# Patient Record
Sex: FEMALE | Race: OTHER | Employment: OTHER | URBAN - METROPOLITAN AREA
[De-identification: names, ages, dates, MRNs, and addresses within clinical notes are randomized per-mention and may not be internally consistent; named-entity substitution may affect disease eponyms.]

---

## 2017-08-17 ENCOUNTER — HOSPITAL ENCOUNTER (INPATIENT)
Age: 82
LOS: 1 days | Discharge: HOME | DRG: 392 | End: 2017-08-19
Attending: EMERGENCY MEDICINE | Admitting: INTERNAL MEDICINE
Payer: MEDICARE

## 2017-08-17 DIAGNOSIS — K57.92 ACUTE DIVERTICULITIS: Primary | ICD-10-CM

## 2017-08-17 PROCEDURE — P9612 CATHETERIZE FOR URINE SPEC: HCPCS

## 2017-08-17 PROCEDURE — 99285 EMERGENCY DEPT VISIT HI MDM: CPT

## 2017-08-17 RX ORDER — LOSARTAN POTASSIUM 50 MG/1
50 TABLET ORAL DAILY
Status: ON HOLD | COMMUNITY
End: 2020-10-20 | Stop reason: HOSPADM

## 2017-08-17 RX ORDER — ONDANSETRON 2 MG/ML
4 INJECTION INTRAMUSCULAR; INTRAVENOUS ONCE
Status: COMPLETED | OUTPATIENT
Start: 2017-08-17 | End: 2017-08-18

## 2017-08-17 RX ORDER — HYDROCHLOROTHIAZIDE 25 MG/1
25 TABLET ORAL DAILY
Status: ON HOLD | COMMUNITY
End: 2020-10-20

## 2017-08-17 RX ORDER — METOPROLOL SUCCINATE 50 MG/1
50 TABLET, EXTENDED RELEASE ORAL DAILY
Status: ON HOLD | COMMUNITY
End: 2021-01-01 | Stop reason: HOSPADM

## 2017-08-17 RX ORDER — LAMOTRIGINE 100 MG/1
100 TABLET ORAL 2 TIMES DAILY
COMMUNITY

## 2017-08-17 RX ORDER — SODIUM CHLORIDE 0.9 % (FLUSH) 0.9 %
3 SYRINGE (ML) INJECTION EVERY 8 HOURS
Status: DISCONTINUED | OUTPATIENT
Start: 2017-08-17 | End: 2017-08-18

## 2017-08-17 RX ORDER — ISOSORBIDE MONONITRATE 30 MG/1
30 TABLET, EXTENDED RELEASE ORAL DAILY
Status: ON HOLD | COMMUNITY
End: 2020-10-20

## 2017-08-17 RX ORDER — ROSUVASTATIN CALCIUM 10 MG/1
20 TABLET, COATED ORAL DAILY
COMMUNITY
End: 2022-01-01

## 2017-08-17 RX ORDER — CLOPIDOGREL BISULFATE 75 MG/1
75 TABLET ORAL DAILY
Status: ON HOLD | COMMUNITY
End: 2020-10-20

## 2017-08-17 RX ORDER — 0.9 % SODIUM CHLORIDE 0.9 %
500 INTRAVENOUS SOLUTION INTRAVENOUS ONCE
Status: COMPLETED | OUTPATIENT
Start: 2017-08-17 | End: 2017-08-18

## 2017-08-17 ASSESSMENT — PAIN SCALES - GENERAL: PAINLEVEL_OUTOF10: 9

## 2017-08-17 ASSESSMENT — PAIN DESCRIPTION - PAIN TYPE: TYPE: ACUTE PAIN

## 2017-08-17 ASSESSMENT — PAIN DESCRIPTION - LOCATION: LOCATION: ABDOMEN

## 2017-08-17 ASSESSMENT — PAIN DESCRIPTION - DESCRIPTORS: DESCRIPTORS: BURNING;SHARP

## 2017-08-17 ASSESSMENT — PAIN DESCRIPTION - ORIENTATION: ORIENTATION: MID

## 2017-08-18 ENCOUNTER — APPOINTMENT (OUTPATIENT)
Dept: GENERAL RADIOLOGY | Age: 82
DRG: 392 | End: 2017-08-18
Payer: MEDICARE

## 2017-08-18 ENCOUNTER — APPOINTMENT (OUTPATIENT)
Dept: CT IMAGING | Age: 82
DRG: 392 | End: 2017-08-18
Payer: MEDICARE

## 2017-08-18 PROBLEM — K57.32 DIVERTICULITIS OF LARGE INTESTINE: Status: ACTIVE | Noted: 2017-08-18

## 2017-08-18 LAB
ALBUMIN SERPL-MCNC: 4.3 G/DL (ref 3.9–4.9)
ALP BLD-CCNC: 71 U/L (ref 40–130)
ALT SERPL-CCNC: 11 U/L (ref 0–33)
AMYLASE: 104 U/L (ref 28–100)
ANION GAP SERPL CALCULATED.3IONS-SCNC: 16 MEQ/L (ref 7–13)
AST SERPL-CCNC: 16 U/L (ref 0–35)
BACTERIA: ABNORMAL /HPF
BASOPHILS ABSOLUTE: 0 K/UL (ref 0–0.2)
BASOPHILS RELATIVE PERCENT: 0.2 %
BILIRUB SERPL-MCNC: 0.4 MG/DL (ref 0–1.2)
BILIRUBIN URINE: ABNORMAL
BLOOD, URINE: NEGATIVE
BUN BLDV-MCNC: 26 MG/DL (ref 8–23)
CALCIUM SERPL-MCNC: 10.1 MG/DL (ref 8.6–10.2)
CHLORIDE BLD-SCNC: 97 MEQ/L (ref 98–107)
CLARITY: CLEAR
CO2: 23 MEQ/L (ref 22–29)
COLOR: ABNORMAL
CREAT SERPL-MCNC: 1.09 MG/DL (ref 0.5–0.9)
EOSINOPHILS ABSOLUTE: 0 K/UL (ref 0–0.7)
EOSINOPHILS RELATIVE PERCENT: 0.2 %
EPITHELIAL CELLS, UA: ABNORMAL /HPF
GFR AFRICAN AMERICAN: 58
GFR NON-AFRICAN AMERICAN: 47.9
GLOBULIN: 2.8 G/DL (ref 2.3–3.5)
GLUCOSE BLD-MCNC: 161 MG/DL (ref 74–109)
GLUCOSE URINE: NEGATIVE MG/DL
HCT VFR BLD CALC: 40.4 % (ref 37–47)
HEMOGLOBIN: 13 G/DL (ref 12–16)
KETONES, URINE: ABNORMAL MG/DL
LEUKOCYTE ESTERASE, URINE: ABNORMAL
LIPASE: 17 U/L (ref 13–60)
LYMPHOCYTES ABSOLUTE: 1.2 K/UL (ref 1–4.8)
LYMPHOCYTES RELATIVE PERCENT: 8.3 %
MCH RBC QN AUTO: 29.4 PG (ref 27–31.3)
MCHC RBC AUTO-ENTMCNC: 32.1 % (ref 33–37)
MCV RBC AUTO: 91.3 FL (ref 82–100)
MONOCYTES ABSOLUTE: 0.9 K/UL (ref 0.2–0.8)
MONOCYTES RELATIVE PERCENT: 6.5 %
NEUTROPHILS ABSOLUTE: 11.9 K/UL (ref 1.4–6.5)
NEUTROPHILS RELATIVE PERCENT: 84.8 %
NITRITE, URINE: NEGATIVE
PDW BLD-RTO: 13.9 % (ref 11.5–14.5)
PH UA: 6.5 (ref 5–9)
PLATELET # BLD: 274 K/UL (ref 130–400)
POTASSIUM SERPL-SCNC: 3.7 MEQ/L (ref 3.5–5.1)
PROTEIN UA: NEGATIVE MG/DL
RBC # BLD: 4.42 M/UL (ref 4.2–5.4)
RBC UA: ABNORMAL /HPF (ref 0–2)
SODIUM BLD-SCNC: 136 MEQ/L (ref 132–144)
SPECIFIC GRAVITY UA: 1.02 (ref 1–1.03)
TOTAL PROTEIN: 7.1 G/DL (ref 6.4–8.1)
TROPONIN: <0.01 NG/ML (ref 0–0.01)
URINE REFLEX TO CULTURE: YES
UROBILINOGEN, URINE: 1 E.U./DL
WBC # BLD: 14 K/UL (ref 4.8–10.8)
WBC UA: ABNORMAL /HPF (ref 0–5)

## 2017-08-18 PROCEDURE — 97161 PT EVAL LOW COMPLEX 20 MIN: CPT

## 2017-08-18 PROCEDURE — 82150 ASSAY OF AMYLASE: CPT

## 2017-08-18 PROCEDURE — G0378 HOSPITAL OBSERVATION PER HR: HCPCS

## 2017-08-18 PROCEDURE — G8987 SELF CARE CURRENT STATUS: HCPCS

## 2017-08-18 PROCEDURE — 2580000003 HC RX 258: Performed by: EMERGENCY MEDICINE

## 2017-08-18 PROCEDURE — 85025 COMPLETE CBC W/AUTO DIFF WBC: CPT

## 2017-08-18 PROCEDURE — 71010 XR CHEST PORTABLE: CPT

## 2017-08-18 PROCEDURE — 97166 OT EVAL MOD COMPLEX 45 MIN: CPT

## 2017-08-18 PROCEDURE — G8978 MOBILITY CURRENT STATUS: HCPCS

## 2017-08-18 PROCEDURE — 87086 URINE CULTURE/COLONY COUNT: CPT

## 2017-08-18 PROCEDURE — G8979 MOBILITY GOAL STATUS: HCPCS

## 2017-08-18 PROCEDURE — G8980 MOBILITY D/C STATUS: HCPCS

## 2017-08-18 PROCEDURE — 1210000000 HC MED SURG R&B

## 2017-08-18 PROCEDURE — G8988 SELF CARE GOAL STATUS: HCPCS

## 2017-08-18 PROCEDURE — 74176 CT ABD & PELVIS W/O CONTRAST: CPT

## 2017-08-18 PROCEDURE — 81001 URINALYSIS AUTO W/SCOPE: CPT

## 2017-08-18 PROCEDURE — 83690 ASSAY OF LIPASE: CPT

## 2017-08-18 PROCEDURE — 6360000002 HC RX W HCPCS: Performed by: EMERGENCY MEDICINE

## 2017-08-18 PROCEDURE — 6360000002 HC RX W HCPCS: Performed by: INTERNAL MEDICINE

## 2017-08-18 PROCEDURE — 96374 THER/PROPH/DIAG INJ IV PUSH: CPT

## 2017-08-18 PROCEDURE — 2580000003 HC RX 258: Performed by: INTERNAL MEDICINE

## 2017-08-18 PROCEDURE — 96375 TX/PRO/DX INJ NEW DRUG ADDON: CPT

## 2017-08-18 PROCEDURE — 6370000000 HC RX 637 (ALT 250 FOR IP): Performed by: INTERNAL MEDICINE

## 2017-08-18 PROCEDURE — 36415 COLL VENOUS BLD VENIPUNCTURE: CPT

## 2017-08-18 PROCEDURE — 93005 ELECTROCARDIOGRAM TRACING: CPT

## 2017-08-18 PROCEDURE — 80053 COMPREHEN METABOLIC PANEL: CPT

## 2017-08-18 PROCEDURE — 84484 ASSAY OF TROPONIN QUANT: CPT

## 2017-08-18 PROCEDURE — 2500000003 HC RX 250 WO HCPCS: Performed by: INTERNAL MEDICINE

## 2017-08-18 RX ORDER — METOPROLOL SUCCINATE 50 MG/1
50 TABLET, EXTENDED RELEASE ORAL DAILY
Status: DISCONTINUED | OUTPATIENT
Start: 2017-08-18 | End: 2017-08-19 | Stop reason: HOSPADM

## 2017-08-18 RX ORDER — CLOPIDOGREL BISULFATE 75 MG/1
75 TABLET ORAL DAILY
Status: DISCONTINUED | OUTPATIENT
Start: 2017-08-18 | End: 2017-08-19 | Stop reason: HOSPADM

## 2017-08-18 RX ORDER — ONDANSETRON 2 MG/ML
4 INJECTION INTRAMUSCULAR; INTRAVENOUS EVERY 6 HOURS PRN
Status: DISCONTINUED | OUTPATIENT
Start: 2017-08-18 | End: 2017-08-19 | Stop reason: HOSPADM

## 2017-08-18 RX ORDER — LOSARTAN POTASSIUM 50 MG/1
50 TABLET ORAL DAILY
Status: DISCONTINUED | OUTPATIENT
Start: 2017-08-18 | End: 2017-08-19 | Stop reason: HOSPADM

## 2017-08-18 RX ORDER — SODIUM CHLORIDE 9 MG/ML
INJECTION, SOLUTION INTRAVENOUS CONTINUOUS
Status: DISCONTINUED | OUTPATIENT
Start: 2017-08-18 | End: 2017-08-19

## 2017-08-18 RX ORDER — MORPHINE SULFATE 2 MG/ML
2 INJECTION, SOLUTION INTRAMUSCULAR; INTRAVENOUS
Status: DISCONTINUED | OUTPATIENT
Start: 2017-08-18 | End: 2017-08-19 | Stop reason: HOSPADM

## 2017-08-18 RX ORDER — ATORVASTATIN CALCIUM 20 MG/1
20 TABLET, FILM COATED ORAL NIGHTLY
Status: DISCONTINUED | OUTPATIENT
Start: 2017-08-18 | End: 2017-08-19 | Stop reason: HOSPADM

## 2017-08-18 RX ORDER — LAMOTRIGINE 200 MG/1
200 TABLET ORAL 2 TIMES DAILY
Status: DISCONTINUED | OUTPATIENT
Start: 2017-08-18 | End: 2017-08-19 | Stop reason: HOSPADM

## 2017-08-18 RX ORDER — CIPROFLOXACIN 2 MG/ML
400 INJECTION, SOLUTION INTRAVENOUS ONCE
Status: DISCONTINUED | OUTPATIENT
Start: 2017-08-18 | End: 2017-08-18

## 2017-08-18 RX ORDER — CIPROFLOXACIN 2 MG/ML
400 INJECTION, SOLUTION INTRAVENOUS EVERY 24 HOURS
Status: DISCONTINUED | OUTPATIENT
Start: 2017-08-19 | End: 2017-08-19

## 2017-08-18 RX ORDER — ISOSORBIDE MONONITRATE 30 MG/1
30 TABLET, EXTENDED RELEASE ORAL DAILY
Status: DISCONTINUED | OUTPATIENT
Start: 2017-08-18 | End: 2017-08-19 | Stop reason: HOSPADM

## 2017-08-18 RX ORDER — FAMOTIDINE 20 MG/1
20 TABLET, FILM COATED ORAL DAILY
Status: DISCONTINUED | OUTPATIENT
Start: 2017-08-18 | End: 2017-08-19 | Stop reason: HOSPADM

## 2017-08-18 RX ORDER — ACETAMINOPHEN 325 MG/1
650 TABLET ORAL EVERY 4 HOURS PRN
Status: DISCONTINUED | OUTPATIENT
Start: 2017-08-18 | End: 2017-08-19 | Stop reason: HOSPADM

## 2017-08-18 RX ADMIN — ACETAMINOPHEN 650 MG: 325 TABLET ORAL at 22:17

## 2017-08-18 RX ADMIN — VITAMIN D, TAB 1000IU (100/BT) 1000 UNITS: 25 TAB at 09:12

## 2017-08-18 RX ADMIN — LOSARTAN POTASSIUM 50 MG: 50 TABLET, FILM COATED ORAL at 09:12

## 2017-08-18 RX ADMIN — ACETAMINOPHEN 650 MG: 325 TABLET ORAL at 12:13

## 2017-08-18 RX ADMIN — ATORVASTATIN CALCIUM 20 MG: 20 TABLET, FILM COATED ORAL at 22:17

## 2017-08-18 RX ADMIN — HYDROMORPHONE HYDROCHLORIDE 0.5 MG: 1 INJECTION, SOLUTION INTRAMUSCULAR; INTRAVENOUS; SUBCUTANEOUS at 03:52

## 2017-08-18 RX ADMIN — METRONIDAZOLE 500 MG: 500 INJECTION, SOLUTION INTRAVENOUS at 06:12

## 2017-08-18 RX ADMIN — LAMOTRIGINE 200 MG: 200 TABLET ORAL at 22:17

## 2017-08-18 RX ADMIN — LAMOTRIGINE 200 MG: 200 TABLET ORAL at 09:12

## 2017-08-18 RX ADMIN — FAMOTIDINE 20 MG: 20 TABLET ORAL at 09:12

## 2017-08-18 RX ADMIN — METOPROLOL SUCCINATE 50 MG: 50 TABLET, EXTENDED RELEASE ORAL at 09:12

## 2017-08-18 RX ADMIN — CIPROFLOXACIN 400 MG: 2 INJECTION, SOLUTION INTRAVENOUS at 03:54

## 2017-08-18 RX ADMIN — TAPENTADOL HYDROCHLORIDE 50 MG: 50 TABLET, FILM COATED ORAL at 22:18

## 2017-08-18 RX ADMIN — ENOXAPARIN SODIUM 30 MG: 30 INJECTION SUBCUTANEOUS at 09:19

## 2017-08-18 RX ADMIN — ISOSORBIDE MONONITRATE 30 MG: 30 TABLET, EXTENDED RELEASE ORAL at 09:12

## 2017-08-18 RX ADMIN — HYDROMORPHONE HYDROCHLORIDE 1 MG: 1 INJECTION, SOLUTION INTRAMUSCULAR; INTRAVENOUS; SUBCUTANEOUS at 00:05

## 2017-08-18 RX ADMIN — METRONIDAZOLE 500 MG: 500 INJECTION, SOLUTION INTRAVENOUS at 12:13

## 2017-08-18 RX ADMIN — SODIUM CHLORIDE 500 ML: 9 INJECTION, SOLUTION INTRAVENOUS at 00:05

## 2017-08-18 RX ADMIN — ONDANSETRON 4 MG: 2 INJECTION INTRAMUSCULAR; INTRAVENOUS at 00:05

## 2017-08-18 RX ADMIN — SODIUM CHLORIDE: 9 INJECTION, SOLUTION INTRAVENOUS at 17:58

## 2017-08-18 RX ADMIN — METRONIDAZOLE 500 MG: 500 INJECTION, SOLUTION INTRAVENOUS at 22:22

## 2017-08-18 RX ADMIN — CLOPIDOGREL BISULFATE 75 MG: 75 TABLET ORAL at 09:12

## 2017-08-18 RX ADMIN — SODIUM CHLORIDE: 9 INJECTION, SOLUTION INTRAVENOUS at 06:12

## 2017-08-18 ASSESSMENT — ENCOUNTER SYMPTOMS
COUGH: 0
CONSTIPATION: 0
BLOOD IN STOOL: 0
DOUBLE VISION: 0
WHEEZING: 0
VOMITING: 1
ABDOMINAL PAIN: 1
HEMOPTYSIS: 0
SPUTUM PRODUCTION: 0
BLURRED VISION: 0
SHORTNESS OF BREATH: 0
HEARTBURN: 0
ORTHOPNEA: 0
DIARRHEA: 1
NAUSEA: 1

## 2017-08-18 ASSESSMENT — PAIN SCALES - GENERAL
PAINLEVEL_OUTOF10: 2
PAINLEVEL_OUTOF10: 0
PAINLEVEL_OUTOF10: 8
PAINLEVEL_OUTOF10: 0
PAINLEVEL_OUTOF10: 5
PAINLEVEL_OUTOF10: 4
PAINLEVEL_OUTOF10: 4
PAINLEVEL_OUTOF10: 0

## 2017-08-18 ASSESSMENT — PAIN DESCRIPTION - DESCRIPTORS: DESCRIPTORS: BURNING;THROBBING

## 2017-08-18 ASSESSMENT — PAIN DESCRIPTION - ORIENTATION: ORIENTATION: MID

## 2017-08-18 ASSESSMENT — PAIN DESCRIPTION - LOCATION: LOCATION: ABDOMEN

## 2017-08-19 VITALS
WEIGHT: 130 LBS | RESPIRATION RATE: 16 BRPM | HEART RATE: 63 BPM | OXYGEN SATURATION: 96 % | DIASTOLIC BLOOD PRESSURE: 52 MMHG | HEIGHT: 59 IN | SYSTOLIC BLOOD PRESSURE: 124 MMHG | BODY MASS INDEX: 26.21 KG/M2 | TEMPERATURE: 98.2 F

## 2017-08-19 LAB
ALBUMIN SERPL-MCNC: 3.4 G/DL (ref 3.9–4.9)
ALP BLD-CCNC: 57 U/L (ref 40–130)
ALT SERPL-CCNC: 8 U/L (ref 0–33)
ANION GAP SERPL CALCULATED.3IONS-SCNC: 7 MEQ/L (ref 7–13)
AST SERPL-CCNC: 12 U/L (ref 0–35)
BILIRUB SERPL-MCNC: 0.3 MG/DL (ref 0–1.2)
BUN BLDV-MCNC: 11 MG/DL (ref 8–23)
CALCIUM SERPL-MCNC: 8.6 MG/DL (ref 8.6–10.2)
CHLORIDE BLD-SCNC: 108 MEQ/L (ref 98–107)
CO2: 28 MEQ/L (ref 22–29)
CREAT SERPL-MCNC: 0.71 MG/DL (ref 0.5–0.9)
EKG ATRIAL RATE: 63 BPM
EKG P AXIS: 24 DEGREES
EKG P-R INTERVAL: 208 MS
EKG Q-T INTERVAL: 442 MS
EKG QRS DURATION: 118 MS
EKG QTC CALCULATION (BAZETT): 452 MS
EKG R AXIS: -2 DEGREES
EKG T AXIS: 187 DEGREES
EKG VENTRICULAR RATE: 63 BPM
GFR AFRICAN AMERICAN: >60
GFR NON-AFRICAN AMERICAN: >60
GLOBULIN: 2.1 G/DL (ref 2.3–3.5)
GLUCOSE BLD-MCNC: 74 MG/DL (ref 74–109)
HCT VFR BLD CALC: 34.5 % (ref 37–47)
HEMOGLOBIN: 11.2 G/DL (ref 12–16)
MCH RBC QN AUTO: 29.9 PG (ref 27–31.3)
MCHC RBC AUTO-ENTMCNC: 32.6 % (ref 33–37)
MCV RBC AUTO: 91.7 FL (ref 82–100)
PDW BLD-RTO: 13.6 % (ref 11.5–14.5)
PLATELET # BLD: 222 K/UL (ref 130–400)
POTASSIUM SERPL-SCNC: 4.1 MEQ/L (ref 3.5–5.1)
RBC # BLD: 3.76 M/UL (ref 4.2–5.4)
SODIUM BLD-SCNC: 143 MEQ/L (ref 132–144)
TOTAL PROTEIN: 5.5 G/DL (ref 6.4–8.1)
WBC # BLD: 6.5 K/UL (ref 4.8–10.8)

## 2017-08-19 PROCEDURE — 80053 COMPREHEN METABOLIC PANEL: CPT

## 2017-08-19 PROCEDURE — G0378 HOSPITAL OBSERVATION PER HR: HCPCS

## 2017-08-19 PROCEDURE — 2500000003 HC RX 250 WO HCPCS: Performed by: INTERNAL MEDICINE

## 2017-08-19 PROCEDURE — 2580000003 HC RX 258: Performed by: INTERNAL MEDICINE

## 2017-08-19 PROCEDURE — 85027 COMPLETE CBC AUTOMATED: CPT

## 2017-08-19 PROCEDURE — 36415 COLL VENOUS BLD VENIPUNCTURE: CPT

## 2017-08-19 PROCEDURE — 6370000000 HC RX 637 (ALT 250 FOR IP): Performed by: INTERNAL MEDICINE

## 2017-08-19 PROCEDURE — 2700000000 HC OXYGEN THERAPY PER DAY

## 2017-08-19 PROCEDURE — 6360000002 HC RX W HCPCS: Performed by: INTERNAL MEDICINE

## 2017-08-19 PROCEDURE — 93010 ELECTROCARDIOGRAM REPORT: CPT | Performed by: INTERNAL MEDICINE

## 2017-08-19 RX ORDER — CIPROFLOXACIN 250 MG/1
250 TABLET, FILM COATED ORAL 2 TIMES DAILY
Qty: 10 TABLET | Refills: 0 | Status: SHIPPED | OUTPATIENT
Start: 2017-08-19 | End: 2017-08-24

## 2017-08-19 RX ORDER — METRONIDAZOLE 250 MG/1
250 TABLET ORAL 3 TIMES DAILY
Qty: 15 TABLET | Refills: 0 | Status: SHIPPED | OUTPATIENT
Start: 2017-08-19 | End: 2017-08-24

## 2017-08-19 RX ORDER — METRONIDAZOLE 500 MG/1
250 TABLET ORAL 3 TIMES DAILY
Status: DISCONTINUED | OUTPATIENT
Start: 2017-08-19 | End: 2017-08-19 | Stop reason: HOSPADM

## 2017-08-19 RX ORDER — CIPROFLOXACIN 500 MG/1
250 TABLET, FILM COATED ORAL 2 TIMES DAILY
Status: DISCONTINUED | OUTPATIENT
Start: 2017-08-19 | End: 2017-08-19 | Stop reason: HOSPADM

## 2017-08-19 RX ADMIN — LAMOTRIGINE 200 MG: 200 TABLET ORAL at 09:14

## 2017-08-19 RX ADMIN — ENOXAPARIN SODIUM 30 MG: 30 INJECTION SUBCUTANEOUS at 09:14

## 2017-08-19 RX ADMIN — CIPROFLOXACIN 400 MG: 2 INJECTION, SOLUTION INTRAVENOUS at 03:50

## 2017-08-19 RX ADMIN — TAPENTADOL HYDROCHLORIDE 50 MG: 50 TABLET, FILM COATED ORAL at 11:47

## 2017-08-19 RX ADMIN — ISOSORBIDE MONONITRATE 30 MG: 30 TABLET, EXTENDED RELEASE ORAL at 09:14

## 2017-08-19 RX ADMIN — METOPROLOL SUCCINATE 50 MG: 50 TABLET, EXTENDED RELEASE ORAL at 09:14

## 2017-08-19 RX ADMIN — VITAMIN D, TAB 1000IU (100/BT) 1000 UNITS: 25 TAB at 09:14

## 2017-08-19 RX ADMIN — LOSARTAN POTASSIUM 50 MG: 50 TABLET, FILM COATED ORAL at 09:14

## 2017-08-19 RX ADMIN — ACETAMINOPHEN 650 MG: 325 TABLET ORAL at 03:57

## 2017-08-19 RX ADMIN — CLOPIDOGREL BISULFATE 75 MG: 75 TABLET ORAL at 09:14

## 2017-08-19 RX ADMIN — SODIUM CHLORIDE: 9 INJECTION, SOLUTION INTRAVENOUS at 03:50

## 2017-08-19 RX ADMIN — METRONIDAZOLE 500 MG: 500 INJECTION, SOLUTION INTRAVENOUS at 05:38

## 2017-08-19 RX ADMIN — FAMOTIDINE 20 MG: 20 TABLET ORAL at 09:14

## 2017-08-19 ASSESSMENT — PAIN SCALES - GENERAL
PAINLEVEL_OUTOF10: 1
PAINLEVEL_OUTOF10: 0
PAINLEVEL_OUTOF10: 0

## 2017-08-19 ASSESSMENT — ENCOUNTER SYMPTOMS
SHORTNESS OF BREATH: 0
NAUSEA: 0
VOMITING: 0

## 2017-08-20 LAB — URINE CULTURE, ROUTINE: NORMAL

## 2018-01-14 ENCOUNTER — HOSPITAL ENCOUNTER (INPATIENT)
Age: 83
LOS: 2 days | Discharge: HOME | DRG: 392 | End: 2018-01-17
Attending: INTERNAL MEDICINE | Admitting: INTERNAL MEDICINE
Payer: MEDICARE

## 2018-01-14 ENCOUNTER — APPOINTMENT (OUTPATIENT)
Dept: CT IMAGING | Age: 83
DRG: 392 | End: 2018-01-14
Payer: MEDICARE

## 2018-01-14 DIAGNOSIS — K52.9 COLITIS, ACUTE: Primary | ICD-10-CM

## 2018-01-14 DIAGNOSIS — R11.2 NON-INTRACTABLE VOMITING WITH NAUSEA, UNSPECIFIED VOMITING TYPE: ICD-10-CM

## 2018-01-14 LAB
ALBUMIN SERPL-MCNC: 4.4 G/DL (ref 3.9–4.9)
ALP BLD-CCNC: 71 U/L (ref 40–130)
ALT SERPL-CCNC: 12 U/L (ref 0–33)
ANION GAP SERPL CALCULATED.3IONS-SCNC: 19 MEQ/L (ref 7–13)
AST SERPL-CCNC: 19 U/L (ref 0–35)
BASOPHILS ABSOLUTE: 0.1 K/UL (ref 0–0.2)
BASOPHILS RELATIVE PERCENT: 0.5 %
BILIRUB SERPL-MCNC: 0.6 MG/DL (ref 0–1.2)
BUN BLDV-MCNC: 25 MG/DL (ref 8–23)
CALCIUM SERPL-MCNC: 10.6 MG/DL (ref 8.6–10.2)
CHLORIDE BLD-SCNC: 97 MEQ/L (ref 98–107)
CO2: 22 MEQ/L (ref 22–29)
CREAT SERPL-MCNC: 0.92 MG/DL (ref 0.5–0.9)
EOSINOPHILS ABSOLUTE: 0 K/UL (ref 0–0.7)
EOSINOPHILS RELATIVE PERCENT: 0.3 %
GFR AFRICAN AMERICAN: >60
GFR NON-AFRICAN AMERICAN: 58.2
GLOBULIN: 2.8 G/DL (ref 2.3–3.5)
GLUCOSE BLD-MCNC: 141 MG/DL (ref 74–109)
HCT VFR BLD CALC: 43.2 % (ref 37–47)
HEMOGLOBIN: 14.3 G/DL (ref 12–16)
LACTIC ACID: 2.3 MMOL/L (ref 0.5–2.2)
LIPASE: 22 U/L (ref 13–60)
LYMPHOCYTES ABSOLUTE: 1.2 K/UL (ref 1–4.8)
LYMPHOCYTES RELATIVE PERCENT: 9.1 %
MCH RBC QN AUTO: 30.5 PG (ref 27–31.3)
MCHC RBC AUTO-ENTMCNC: 33.1 % (ref 33–37)
MCV RBC AUTO: 92.3 FL (ref 82–100)
MONOCYTES ABSOLUTE: 0.6 K/UL (ref 0.2–0.8)
MONOCYTES RELATIVE PERCENT: 4.4 %
NEUTROPHILS ABSOLUTE: 11.7 K/UL (ref 1.4–6.5)
NEUTROPHILS RELATIVE PERCENT: 85.7 %
PDW BLD-RTO: 13.9 % (ref 11.5–14.5)
PLATELET # BLD: 266 K/UL (ref 130–400)
POTASSIUM SERPL-SCNC: 4 MEQ/L (ref 3.5–5.1)
RBC # BLD: 4.68 M/UL (ref 4.2–5.4)
SODIUM BLD-SCNC: 138 MEQ/L (ref 132–144)
TOTAL PROTEIN: 7.2 G/DL (ref 6.4–8.1)
WBC # BLD: 13.7 K/UL (ref 4.8–10.8)

## 2018-01-14 PROCEDURE — G0378 HOSPITAL OBSERVATION PER HR: HCPCS

## 2018-01-14 PROCEDURE — 2500000003 HC RX 250 WO HCPCS: Performed by: PHYSICIAN ASSISTANT

## 2018-01-14 PROCEDURE — 2580000003 HC RX 258: Performed by: INTERNAL MEDICINE

## 2018-01-14 PROCEDURE — 96375 TX/PRO/DX INJ NEW DRUG ADDON: CPT

## 2018-01-14 PROCEDURE — 83605 ASSAY OF LACTIC ACID: CPT

## 2018-01-14 PROCEDURE — 36415 COLL VENOUS BLD VENIPUNCTURE: CPT

## 2018-01-14 PROCEDURE — 80053 COMPREHEN METABOLIC PANEL: CPT

## 2018-01-14 PROCEDURE — 96361 HYDRATE IV INFUSION ADD-ON: CPT

## 2018-01-14 PROCEDURE — 6360000002 HC RX W HCPCS: Performed by: PHYSICIAN ASSISTANT

## 2018-01-14 PROCEDURE — 2580000003 HC RX 258: Performed by: PHYSICIAN ASSISTANT

## 2018-01-14 PROCEDURE — 96374 THER/PROPH/DIAG INJ IV PUSH: CPT

## 2018-01-14 PROCEDURE — 96376 TX/PRO/DX INJ SAME DRUG ADON: CPT

## 2018-01-14 PROCEDURE — 83690 ASSAY OF LIPASE: CPT

## 2018-01-14 PROCEDURE — 96365 THER/PROPH/DIAG IV INF INIT: CPT

## 2018-01-14 PROCEDURE — 99285 EMERGENCY DEPT VISIT HI MDM: CPT

## 2018-01-14 PROCEDURE — 6360000004 HC RX CONTRAST MEDICATION: Performed by: RADIOLOGY

## 2018-01-14 PROCEDURE — 85025 COMPLETE CBC W/AUTO DIFF WBC: CPT

## 2018-01-14 PROCEDURE — 96367 TX/PROPH/DG ADDL SEQ IV INF: CPT

## 2018-01-14 PROCEDURE — 74177 CT ABD & PELVIS W/CONTRAST: CPT

## 2018-01-14 RX ORDER — CIPROFLOXACIN 2 MG/ML
400 INJECTION, SOLUTION INTRAVENOUS ONCE
Status: COMPLETED | OUTPATIENT
Start: 2018-01-14 | End: 2018-01-14

## 2018-01-14 RX ORDER — SODIUM CHLORIDE 0.9 % (FLUSH) 0.9 %
10 SYRINGE (ML) INJECTION EVERY 12 HOURS SCHEDULED
Status: DISCONTINUED | OUTPATIENT
Start: 2018-01-15 | End: 2018-01-17 | Stop reason: HOSPADM

## 2018-01-14 RX ORDER — MORPHINE SULFATE 2 MG/ML
2 INJECTION, SOLUTION INTRAMUSCULAR; INTRAVENOUS
Status: DISCONTINUED | OUTPATIENT
Start: 2018-01-14 | End: 2018-01-15

## 2018-01-14 RX ORDER — MORPHINE SULFATE 4 MG/ML
4 INJECTION, SOLUTION INTRAMUSCULAR; INTRAVENOUS ONCE
Status: COMPLETED | OUTPATIENT
Start: 2018-01-14 | End: 2018-01-14

## 2018-01-14 RX ORDER — SODIUM CHLORIDE 0.9 % (FLUSH) 0.9 %
10 SYRINGE (ML) INJECTION PRN
Status: DISCONTINUED | OUTPATIENT
Start: 2018-01-14 | End: 2018-01-17 | Stop reason: HOSPADM

## 2018-01-14 RX ORDER — DEXTROSE AND SODIUM CHLORIDE 5; .9 G/100ML; G/100ML
INJECTION, SOLUTION INTRAVENOUS CONTINUOUS
Status: DISCONTINUED | OUTPATIENT
Start: 2018-01-14 | End: 2018-01-17

## 2018-01-14 RX ORDER — HYDROXYZINE HYDROCHLORIDE 25 MG/1
25 TABLET, FILM COATED ORAL EVERY 4 HOURS PRN
Status: ON HOLD | COMMUNITY
End: 2020-10-20

## 2018-01-14 RX ORDER — ONDANSETRON 2 MG/ML
4 INJECTION INTRAMUSCULAR; INTRAVENOUS ONCE
Status: COMPLETED | OUTPATIENT
Start: 2018-01-14 | End: 2018-01-14

## 2018-01-14 RX ORDER — 0.9 % SODIUM CHLORIDE 0.9 %
500 INTRAVENOUS SOLUTION INTRAVENOUS ONCE
Status: COMPLETED | OUTPATIENT
Start: 2018-01-14 | End: 2018-01-14

## 2018-01-14 RX ORDER — ONDANSETRON 2 MG/ML
4 INJECTION INTRAMUSCULAR; INTRAVENOUS EVERY 8 HOURS PRN
Status: DISCONTINUED | OUTPATIENT
Start: 2018-01-14 | End: 2018-01-17 | Stop reason: HOSPADM

## 2018-01-14 RX ORDER — MORPHINE SULFATE 4 MG/ML
4 INJECTION, SOLUTION INTRAMUSCULAR; INTRAVENOUS
Status: DISCONTINUED | OUTPATIENT
Start: 2018-01-14 | End: 2018-01-15

## 2018-01-14 RX ORDER — SODIUM CHLORIDE 0.9 % (FLUSH) 0.9 %
10 SYRINGE (ML) INJECTION EVERY 12 HOURS SCHEDULED
Status: DISCONTINUED | OUTPATIENT
Start: 2018-01-14 | End: 2018-01-14

## 2018-01-14 RX ORDER — ACETAMINOPHEN 325 MG/1
650 TABLET ORAL EVERY 4 HOURS PRN
COMMUNITY

## 2018-01-14 RX ORDER — ACETAMINOPHEN 325 MG/1
650 TABLET ORAL EVERY 4 HOURS PRN
Status: DISCONTINUED | OUTPATIENT
Start: 2018-01-14 | End: 2018-01-17 | Stop reason: HOSPADM

## 2018-01-14 RX ORDER — LORATADINE 10 MG/1
10 CAPSULE, LIQUID FILLED ORAL DAILY
COMMUNITY

## 2018-01-14 RX ORDER — DEXTROSE AND SODIUM CHLORIDE 5; .45 G/100ML; G/100ML
INJECTION, SOLUTION INTRAVENOUS CONTINUOUS
Status: DISCONTINUED | OUTPATIENT
Start: 2018-01-14 | End: 2018-01-14

## 2018-01-14 RX ORDER — NITROGLYCERIN 0.4 MG/1
0.4 TABLET SUBLINGUAL EVERY 5 MIN PRN
COMMUNITY

## 2018-01-14 RX ADMIN — ONDANSETRON 4 MG: 2 INJECTION INTRAMUSCULAR; INTRAVENOUS at 18:56

## 2018-01-14 RX ADMIN — CIPROFLOXACIN 400 MG: 2 INJECTION, SOLUTION INTRAVENOUS at 21:18

## 2018-01-14 RX ADMIN — METRONIDAZOLE 500 MG: 500 INJECTION, SOLUTION INTRAVENOUS at 22:59

## 2018-01-14 RX ADMIN — Medication 4 MG: at 19:30

## 2018-01-14 RX ADMIN — SODIUM CHLORIDE, PRESERVATIVE FREE 10 ML: 5 INJECTION INTRAVENOUS at 23:00

## 2018-01-14 RX ADMIN — IOPAMIDOL 100 ML: 755 INJECTION, SOLUTION INTRAVENOUS at 20:06

## 2018-01-14 RX ADMIN — Medication 4 MG: at 21:18

## 2018-01-14 RX ADMIN — SODIUM CHLORIDE 500 ML: 9 INJECTION, SOLUTION INTRAVENOUS at 18:55

## 2018-01-14 ASSESSMENT — ENCOUNTER SYMPTOMS
SHORTNESS OF BREATH: 0
PHOTOPHOBIA: 0
NAUSEA: 1
SORE THROAT: 0
BACK PAIN: 0
ABDOMINAL PAIN: 1
VOMITING: 1
COUGH: 0
TROUBLE SWALLOWING: 0

## 2018-01-14 ASSESSMENT — PAIN SCALES - GENERAL
PAINLEVEL_OUTOF10: 10
PAINLEVEL_OUTOF10: 0
PAINLEVEL_OUTOF10: 10
PAINLEVEL_OUTOF10: 7

## 2018-01-14 ASSESSMENT — PAIN DESCRIPTION - PAIN TYPE: TYPE: ACUTE PAIN

## 2018-01-14 ASSESSMENT — PAIN DESCRIPTION - FREQUENCY: FREQUENCY: CONTINUOUS

## 2018-01-14 ASSESSMENT — PAIN DESCRIPTION - ORIENTATION: ORIENTATION: RIGHT;LEFT;LOWER

## 2018-01-14 ASSESSMENT — PAIN DESCRIPTION - LOCATION: LOCATION: ABDOMEN

## 2018-01-14 ASSESSMENT — PAIN DESCRIPTION - ONSET: ONSET: SUDDEN

## 2018-01-15 ENCOUNTER — APPOINTMENT (OUTPATIENT)
Dept: GENERAL RADIOLOGY | Age: 83
DRG: 392 | End: 2018-01-15
Payer: MEDICARE

## 2018-01-15 PROBLEM — I10 ESSENTIAL HYPERTENSION: Status: ACTIVE | Noted: 2017-08-30

## 2018-01-15 PROBLEM — K57.32 DIVERTICULITIS OF LARGE INTESTINE: Status: RESOLVED | Noted: 2017-08-18 | Resolved: 2018-01-15

## 2018-01-15 LAB
GFR AFRICAN AMERICAN: >60
GFR NON-AFRICAN AMERICAN: 53
PERFORMED ON: ABNORMAL
POC CREATININE: 1 MG/DL (ref 0.6–1.2)
POC SAMPLE TYPE: ABNORMAL

## 2018-01-15 PROCEDURE — 1210000000 HC MED SURG R&B

## 2018-01-15 PROCEDURE — G8978 MOBILITY CURRENT STATUS: HCPCS

## 2018-01-15 PROCEDURE — 97161 PT EVAL LOW COMPLEX 20 MIN: CPT

## 2018-01-15 PROCEDURE — G0378 HOSPITAL OBSERVATION PER HR: HCPCS

## 2018-01-15 PROCEDURE — G8979 MOBILITY GOAL STATUS: HCPCS

## 2018-01-15 PROCEDURE — 96372 THER/PROPH/DIAG INJ SC/IM: CPT

## 2018-01-15 PROCEDURE — 96361 HYDRATE IV INFUSION ADD-ON: CPT

## 2018-01-15 PROCEDURE — 2580000003 HC RX 258: Performed by: INTERNAL MEDICINE

## 2018-01-15 PROCEDURE — 96366 THER/PROPH/DIAG IV INF ADDON: CPT

## 2018-01-15 PROCEDURE — G8980 MOBILITY D/C STATUS: HCPCS

## 2018-01-15 PROCEDURE — 6370000000 HC RX 637 (ALT 250 FOR IP): Performed by: INTERNAL MEDICINE

## 2018-01-15 PROCEDURE — 74018 RADEX ABDOMEN 1 VIEW: CPT

## 2018-01-15 PROCEDURE — 6360000002 HC RX W HCPCS: Performed by: INTERNAL MEDICINE

## 2018-01-15 RX ORDER — HYDROCHLOROTHIAZIDE 25 MG/1
25 TABLET ORAL DAILY
Status: DISCONTINUED | OUTPATIENT
Start: 2018-01-15 | End: 2018-01-17 | Stop reason: HOSPADM

## 2018-01-15 RX ORDER — LOSARTAN POTASSIUM 50 MG/1
50 TABLET ORAL DAILY
Status: DISCONTINUED | OUTPATIENT
Start: 2018-01-15 | End: 2018-01-17 | Stop reason: HOSPADM

## 2018-01-15 RX ORDER — LACTULOSE 10 G/15ML
20 SOLUTION ORAL 3 TIMES DAILY
Status: DISCONTINUED | OUTPATIENT
Start: 2018-01-15 | End: 2018-01-15

## 2018-01-15 RX ORDER — ROSUVASTATIN CALCIUM 10 MG/1
10 TABLET, COATED ORAL NIGHTLY
Status: DISCONTINUED | OUTPATIENT
Start: 2018-01-15 | End: 2018-01-17 | Stop reason: HOSPADM

## 2018-01-15 RX ORDER — CETIRIZINE HYDROCHLORIDE 10 MG/1
5 TABLET ORAL DAILY
Status: DISCONTINUED | OUTPATIENT
Start: 2018-01-15 | End: 2018-01-17 | Stop reason: HOSPADM

## 2018-01-15 RX ORDER — LAMOTRIGINE 200 MG/1
200 TABLET ORAL 2 TIMES DAILY
Status: DISCONTINUED | OUTPATIENT
Start: 2018-01-15 | End: 2018-01-17 | Stop reason: HOSPADM

## 2018-01-15 RX ORDER — METRONIDAZOLE 500 MG/1
500 TABLET ORAL 3 TIMES DAILY
Status: DISCONTINUED | OUTPATIENT
Start: 2018-01-15 | End: 2018-01-17 | Stop reason: HOSPADM

## 2018-01-15 RX ORDER — POLYETHYLENE GLYCOL 3350 17 G/17G
17 POWDER, FOR SOLUTION ORAL DAILY
Status: DISCONTINUED | OUTPATIENT
Start: 2018-01-15 | End: 2018-01-15

## 2018-01-15 RX ORDER — ASPIRIN 81 MG/1
81 TABLET, CHEWABLE ORAL DAILY
Status: DISCONTINUED | OUTPATIENT
Start: 2018-01-15 | End: 2018-01-17 | Stop reason: HOSPADM

## 2018-01-15 RX ORDER — ISOSORBIDE MONONITRATE 30 MG/1
30 TABLET, EXTENDED RELEASE ORAL DAILY
Status: DISCONTINUED | OUTPATIENT
Start: 2018-01-15 | End: 2018-01-17 | Stop reason: HOSPADM

## 2018-01-15 RX ORDER — CLOPIDOGREL BISULFATE 75 MG/1
75 TABLET ORAL DAILY
Status: DISCONTINUED | OUTPATIENT
Start: 2018-01-15 | End: 2018-01-17 | Stop reason: HOSPADM

## 2018-01-15 RX ORDER — CIPROFLOXACIN 2 MG/ML
400 INJECTION, SOLUTION INTRAVENOUS EVERY 12 HOURS
Status: DISCONTINUED | OUTPATIENT
Start: 2018-01-15 | End: 2018-01-17

## 2018-01-15 RX ORDER — ONDANSETRON 2 MG/ML
4 INJECTION INTRAMUSCULAR; INTRAVENOUS EVERY 6 HOURS PRN
Status: DISCONTINUED | OUTPATIENT
Start: 2018-01-15 | End: 2018-01-17 | Stop reason: HOSPADM

## 2018-01-15 RX ORDER — HYDROXYZINE HYDROCHLORIDE 25 MG/1
25 TABLET, FILM COATED ORAL EVERY 4 HOURS PRN
Status: DISCONTINUED | OUTPATIENT
Start: 2018-01-15 | End: 2018-01-17 | Stop reason: HOSPADM

## 2018-01-15 RX ORDER — NITROGLYCERIN 0.4 MG/1
0.4 TABLET SUBLINGUAL EVERY 5 MIN PRN
Status: DISCONTINUED | OUTPATIENT
Start: 2018-01-15 | End: 2018-01-17 | Stop reason: HOSPADM

## 2018-01-15 RX ADMIN — ENOXAPARIN SODIUM 40 MG: 40 INJECTION, SOLUTION INTRAVENOUS; SUBCUTANEOUS at 11:08

## 2018-01-15 RX ADMIN — METOPROLOL SUCCINATE 75 MG: 25 TABLET, FILM COATED, EXTENDED RELEASE ORAL at 11:05

## 2018-01-15 RX ADMIN — ASPIRIN 81 MG 81 MG: 81 TABLET ORAL at 11:04

## 2018-01-15 RX ADMIN — LAMOTRIGINE 200 MG: 200 TABLET ORAL at 11:05

## 2018-01-15 RX ADMIN — LAMOTRIGINE 200 MG: 200 TABLET ORAL at 22:43

## 2018-01-15 RX ADMIN — METRONIDAZOLE 500 MG: 500 TABLET ORAL at 11:05

## 2018-01-15 RX ADMIN — DEXTROSE AND SODIUM CHLORIDE: 5; 900 INJECTION, SOLUTION INTRAVENOUS at 13:14

## 2018-01-15 RX ADMIN — VITAMIN D, TAB 1000IU (100/BT) 1000 UNITS: 25 TAB at 11:05

## 2018-01-15 RX ADMIN — CIPROFLOXACIN 400 MG: 2 INJECTION, SOLUTION INTRAVENOUS at 22:45

## 2018-01-15 RX ADMIN — METRONIDAZOLE 500 MG: 500 TABLET ORAL at 22:43

## 2018-01-15 RX ADMIN — LOSARTAN POTASSIUM 50 MG: 50 TABLET ORAL at 11:04

## 2018-01-15 RX ADMIN — SODIUM CHLORIDE, PRESERVATIVE FREE 10 ML: 5 INJECTION INTRAVENOUS at 22:45

## 2018-01-15 RX ADMIN — ROSUVASTATIN 10 MG: 10 TABLET, FILM COATED ORAL at 22:43

## 2018-01-15 RX ADMIN — ISOSORBIDE MONONITRATE 30 MG: 30 TABLET, EXTENDED RELEASE ORAL at 11:06

## 2018-01-15 RX ADMIN — HYDROCHLOROTHIAZIDE 25 MG: 25 TABLET ORAL at 11:05

## 2018-01-15 RX ADMIN — DEXTROSE AND SODIUM CHLORIDE: 5; 900 INJECTION, SOLUTION INTRAVENOUS at 00:30

## 2018-01-15 RX ADMIN — METRONIDAZOLE 500 MG: 500 TABLET ORAL at 13:14

## 2018-01-15 RX ADMIN — CIPROFLOXACIN 400 MG: 2 INJECTION, SOLUTION INTRAVENOUS at 11:08

## 2018-01-15 RX ADMIN — SODIUM CHLORIDE, PRESERVATIVE FREE 10 ML: 5 INJECTION INTRAVENOUS at 11:08

## 2018-01-15 RX ADMIN — CETIRIZINE HYDROCHLORIDE 5 MG: 10 TABLET, FILM COATED ORAL at 11:05

## 2018-01-15 ASSESSMENT — PAIN SCALES - GENERAL: PAINLEVEL_OUTOF10: 0

## 2018-01-15 ASSESSMENT — ENCOUNTER SYMPTOMS
EYES NEGATIVE: 1
ABDOMINAL PAIN: 1
BLOOD IN STOOL: 0
NAUSEA: 1
CONSTIPATION: 1
VOMITING: 1
RESPIRATORY NEGATIVE: 1

## 2018-01-15 NOTE — CARE COORDINATION
Met with pt, daughter and spouse of pt. Daughter states that she lives with them and provides assistance as needed, but pt is good functionally. Pt uses a walker at home. They deny DC needs at this time. Plan return to home.

## 2018-01-15 NOTE — ED PROVIDER NOTES
[facility] ED  eMERGENCY dEPARTMENT eNCOUnter      Pt Name: Donell Doss  MRN: [id number]  [place] 1934  Date of evaluation: 1/14/2018  Provider: Anita Borja PA-C      HISTORY OF PRESENT ILLNESS    HPI  Donell Doss is a 80 y.o. female, who presents for evaluation of Left upper and lower abdominal pain present since awakening this morning. She did have 2 episodes of vomiting upon ED arrival without diarrhea or constipation. Patient does report ongoing nausea. She denies difficulty urinating, flank pain. States she is admitted to the hospital several months ago for acute diverticulitis but otherwise has had no abdominal issues in the past.      REVIEW OF SYSTEMS       Review of Systems   Constitutional: Negative for chills and fever. HENT: Negative for ear pain, sore throat and trouble swallowing. Eyes: Negative for photophobia and visual disturbance. Respiratory: Negative for cough and shortness of breath. Cardiovascular: Negative for chest pain, palpitations and leg swelling. Gastrointestinal: Positive for abdominal pain, nausea and vomiting. Genitourinary: Negative for difficulty urinating, dysuria, flank pain and hematuria. Musculoskeletal: Negative for back pain. Neurological: Negative for syncope, speech difficulty, numbness and headaches. Psychiatric/Behavioral: Negative for agitation, confusion and hallucinations.          PAST MEDICAL HISTORY     Past Medical History:   Diagnosis Date    Diverticulitis     Hyperlipidemia     Hypertension          SURGICAL HISTORY       Past Surgical History:   Procedure Laterality Date    CARDIAC SURGERY      CHOLECYSTECTOMY      JOINT REPLACEMENT           CURRENT MEDICATIONS       Previous Medications    ACETAMINOPHEN (TYLENOL) 325 MG TABLET    Take 650 mg by mouth every 6 hours as needed for Pain    ASPIRIN 81 MG TABLET    Take 81 mg by mouth daily    CLOPIDOGREL (PLAVIX) 75 MG TABLET    Take 75 mg by mouth daily Neck supple. Cardiovascular: Normal rate and regular rhythm. Pulmonary/Chest: Breath sounds normal. No respiratory distress. Abdominal: Soft. Bowel sounds are normal. She exhibits no pulsatile midline mass. There is tenderness in the suprapubic area, left upper quadrant and left lower quadrant. There is no rigidity, no rebound and no CVA tenderness. Musculoskeletal: Normal range of motion. She exhibits no edema. Neurological: She is alert and oriented to person, place, and time. No cranial nerve deficit. Skin: Skin is warm and dry. No rash noted. She is not diaphoretic. Nursing note and vitals reviewed. LABS:  Labs Reviewed   COMPREHENSIVE METABOLIC PANEL - Abnormal; Notable for the following:        Result Value    Chloride 97 (*)     Anion Gap 19 (*)     Glucose 141 (*)     BUN 25 (*)     CREATININE 0.92 (*)     GFR Non- 58.2 (*)     Calcium 10.6 (*)     All other components within normal limits   CBC WITH AUTO DIFFERENTIAL - Abnormal; Notable for the following:     WBC 13.7 (*)     Neutrophils # 11.7 (*)     All other components within normal limits   LACTIC ACID, PLASMA - Abnormal; Notable for the following:     Lactic Acid 2.3 (*)     All other components within normal limits   LIPASE   URINE RT REFLEX TO CULTURE         Procedures      MDM:   Vitals:    Vitals:    01/14/18 1826   BP: 131/66   Pulse: 61   Resp: 28   Temp: 97.8 °F (36.6 °C)   TempSrc: Temporal   SpO2: 96%   Weight: 133 lb (60.3 kg)       Patient to ED for acute abdominal pain as described above. Laboratory evaluation demonstrates an elevated white blood cell count at 13.7. Patient's vital signs have remained stable for duration of ED visit. CT scan of the abdomen pelvis with IV contrast does show a distal transverse colitis consistent with diverticulitis. There is also concern for neoplasm with endometrial wall thickening. These results are discussed with the patient.   I did recommend admission at this time given her advanced age he Is colitis. She was ordered Cipro and Flagyl in the emergency department. I spoke to Dr. Monge, who agreed to admit the patient for further evaluation and management. FINAL IMPRESSION      1. Colitis, acute    2.  Non-intractable vomiting with nausea, unspecified vomiting type          DISPOSITION/PLAN   DISPOSITION Decision To Admit 01/14/2018 08:57:26 PM        Janelle Valadez PA-C (electronically signed)  Emergency Physician Assistant            Beto Lindsey PA-C  01/14/18 5609

## 2018-01-15 NOTE — PLAN OF CARE
Problem: Falls - Risk of  Goal: Absence of falls  Outcome: Ongoing      Problem: Infection:  Goal: Will remain free from infection  Will remain free from infection  Outcome: Ongoing      Problem: Safety:  Goal: Free from accidental physical injury  Free from accidental physical injury  Outcome: Ongoing    Goal: Free from intentional harm  Free from intentional harm  Outcome: Ongoing      Problem: Daily Care:  Goal: Daily care needs are met  Daily care needs are met  Outcome: Ongoing      Problem: Pain:  Goal: Patient's pain/discomfort is manageable  Patient's pain/discomfort is manageable  Outcome: Ongoing      Problem: Skin Integrity:  Goal: Skin integrity will stabilize  Skin integrity will stabilize  Outcome: Ongoing      Problem: Discharge Planning:  Goal: Patients continuum of care needs are met  Patients continuum of care needs are met  Outcome: Ongoing

## 2018-01-16 LAB
ALBUMIN SERPL-MCNC: 3.3 G/DL (ref 3.9–4.9)
ALP BLD-CCNC: 53 U/L (ref 40–130)
ALT SERPL-CCNC: 9 U/L (ref 0–33)
ANION GAP SERPL CALCULATED.3IONS-SCNC: 14 MEQ/L (ref 7–13)
AST SERPL-CCNC: 14 U/L (ref 0–35)
BILIRUB SERPL-MCNC: 0.4 MG/DL (ref 0–1.2)
BUN BLDV-MCNC: 12 MG/DL (ref 8–23)
CALCIUM SERPL-MCNC: 8.7 MG/DL (ref 8.6–10.2)
CHLORIDE BLD-SCNC: 105 MEQ/L (ref 98–107)
CO2: 23 MEQ/L (ref 22–29)
CREAT SERPL-MCNC: 0.67 MG/DL (ref 0.5–0.9)
GFR AFRICAN AMERICAN: >60
GFR NON-AFRICAN AMERICAN: >60
GLOBULIN: 2.2 G/DL (ref 2.3–3.5)
GLUCOSE BLD-MCNC: 100 MG/DL (ref 74–109)
HCT VFR BLD CALC: 34.6 % (ref 37–47)
HEMOGLOBIN: 11.4 G/DL (ref 12–16)
MCH RBC QN AUTO: 30.8 PG (ref 27–31.3)
MCHC RBC AUTO-ENTMCNC: 32.9 % (ref 33–37)
MCV RBC AUTO: 93.5 FL (ref 82–100)
PDW BLD-RTO: 14.5 % (ref 11.5–14.5)
PLATELET # BLD: 213 K/UL (ref 130–400)
POTASSIUM SERPL-SCNC: 3.1 MEQ/L (ref 3.5–5.1)
RBC # BLD: 3.7 M/UL (ref 4.2–5.4)
SODIUM BLD-SCNC: 142 MEQ/L (ref 132–144)
TOTAL PROTEIN: 5.5 G/DL (ref 6.4–8.1)
WBC # BLD: 8 K/UL (ref 4.8–10.8)

## 2018-01-16 PROCEDURE — 80053 COMPREHEN METABOLIC PANEL: CPT

## 2018-01-16 PROCEDURE — G0378 HOSPITAL OBSERVATION PER HR: HCPCS

## 2018-01-16 PROCEDURE — 6370000000 HC RX 637 (ALT 250 FOR IP): Performed by: INTERNAL MEDICINE

## 2018-01-16 PROCEDURE — 6370000000 HC RX 637 (ALT 250 FOR IP): Performed by: PHYSICIAN ASSISTANT

## 2018-01-16 PROCEDURE — 96366 THER/PROPH/DIAG IV INF ADDON: CPT

## 2018-01-16 PROCEDURE — 6360000002 HC RX W HCPCS: Performed by: INTERNAL MEDICINE

## 2018-01-16 PROCEDURE — 2580000003 HC RX 258: Performed by: INTERNAL MEDICINE

## 2018-01-16 PROCEDURE — 99222 1ST HOSP IP/OBS MODERATE 55: CPT | Performed by: INTERNAL MEDICINE

## 2018-01-16 PROCEDURE — 85027 COMPLETE CBC AUTOMATED: CPT

## 2018-01-16 PROCEDURE — 36415 COLL VENOUS BLD VENIPUNCTURE: CPT

## 2018-01-16 PROCEDURE — 97166 OT EVAL MOD COMPLEX 45 MIN: CPT

## 2018-01-16 PROCEDURE — 1210000000 HC MED SURG R&B

## 2018-01-16 PROCEDURE — 96372 THER/PROPH/DIAG INJ SC/IM: CPT

## 2018-01-16 PROCEDURE — 96361 HYDRATE IV INFUSION ADD-ON: CPT

## 2018-01-16 PROCEDURE — G8988 SELF CARE GOAL STATUS: HCPCS

## 2018-01-16 PROCEDURE — 96376 TX/PRO/DX INJ SAME DRUG ADON: CPT

## 2018-01-16 PROCEDURE — G8987 SELF CARE CURRENT STATUS: HCPCS

## 2018-01-16 RX ORDER — DOCUSATE SODIUM 100 MG/1
100 CAPSULE, LIQUID FILLED ORAL 2 TIMES DAILY PRN
Status: DISCONTINUED | OUTPATIENT
Start: 2018-01-16 | End: 2018-01-17 | Stop reason: HOSPADM

## 2018-01-16 RX ORDER — POTASSIUM CHLORIDE 20 MEQ/1
40 TABLET, EXTENDED RELEASE ORAL ONCE
Status: COMPLETED | OUTPATIENT
Start: 2018-01-16 | End: 2018-01-16

## 2018-01-16 RX ADMIN — CIPROFLOXACIN 400 MG: 2 INJECTION, SOLUTION INTRAVENOUS at 21:49

## 2018-01-16 RX ADMIN — LOSARTAN POTASSIUM 50 MG: 50 TABLET ORAL at 09:45

## 2018-01-16 RX ADMIN — SODIUM CHLORIDE, PRESERVATIVE FREE 10 ML: 5 INJECTION INTRAVENOUS at 09:47

## 2018-01-16 RX ADMIN — ONDANSETRON 4 MG: 2 INJECTION INTRAMUSCULAR; INTRAVENOUS at 05:33

## 2018-01-16 RX ADMIN — METRONIDAZOLE 500 MG: 500 TABLET ORAL at 09:45

## 2018-01-16 RX ADMIN — HYDROCHLOROTHIAZIDE 25 MG: 25 TABLET ORAL at 09:45

## 2018-01-16 RX ADMIN — ENOXAPARIN SODIUM 40 MG: 40 INJECTION, SOLUTION INTRAVENOUS; SUBCUTANEOUS at 09:47

## 2018-01-16 RX ADMIN — ASPIRIN 81 MG 81 MG: 81 TABLET ORAL at 09:45

## 2018-01-16 RX ADMIN — CETIRIZINE HYDROCHLORIDE 5 MG: 10 TABLET, FILM COATED ORAL at 09:44

## 2018-01-16 RX ADMIN — DEXTROSE AND SODIUM CHLORIDE: 5; 900 INJECTION, SOLUTION INTRAVENOUS at 00:01

## 2018-01-16 RX ADMIN — ROSUVASTATIN 10 MG: 10 TABLET, FILM COATED ORAL at 21:49

## 2018-01-16 RX ADMIN — CIPROFLOXACIN 400 MG: 2 INJECTION, SOLUTION INTRAVENOUS at 09:47

## 2018-01-16 RX ADMIN — LAMOTRIGINE 200 MG: 200 TABLET ORAL at 21:49

## 2018-01-16 RX ADMIN — DEXTROSE AND SODIUM CHLORIDE: 5; 900 INJECTION, SOLUTION INTRAVENOUS at 21:49

## 2018-01-16 RX ADMIN — POTASSIUM CHLORIDE 40 MEQ: 1500 TABLET, EXTENDED RELEASE ORAL at 09:45

## 2018-01-16 RX ADMIN — POLYETHYLENE GLYCOL-3350 AND ELECTROLYTES 4000 ML: 236; 6.74; 5.86; 2.97; 22.74 POWDER, FOR SOLUTION ORAL at 15:14

## 2018-01-16 RX ADMIN — SODIUM CHLORIDE, PRESERVATIVE FREE 10 ML: 5 INJECTION INTRAVENOUS at 21:50

## 2018-01-16 RX ADMIN — DEXTROSE AND SODIUM CHLORIDE: 5; 900 INJECTION, SOLUTION INTRAVENOUS at 09:49

## 2018-01-16 RX ADMIN — ISOSORBIDE MONONITRATE 30 MG: 30 TABLET, EXTENDED RELEASE ORAL at 09:45

## 2018-01-16 RX ADMIN — ONDANSETRON 4 MG: 2 INJECTION INTRAMUSCULAR; INTRAVENOUS at 15:45

## 2018-01-16 RX ADMIN — METOPROLOL SUCCINATE 75 MG: 25 TABLET, FILM COATED, EXTENDED RELEASE ORAL at 09:45

## 2018-01-16 RX ADMIN — VITAMIN D, TAB 1000IU (100/BT) 1000 UNITS: 25 TAB at 09:45

## 2018-01-16 RX ADMIN — LAMOTRIGINE 200 MG: 200 TABLET ORAL at 09:44

## 2018-01-16 RX ADMIN — METRONIDAZOLE 500 MG: 500 TABLET ORAL at 15:14

## 2018-01-16 RX ADMIN — METRONIDAZOLE 500 MG: 500 TABLET ORAL at 21:49

## 2018-01-16 ASSESSMENT — ENCOUNTER SYMPTOMS
VOMITING: 0
NAUSEA: 0
DIARRHEA: 0
SHORTNESS OF BREATH: 0

## 2018-01-16 ASSESSMENT — PAIN SCALES - GENERAL: PAINLEVEL_OUTOF10: 0

## 2018-01-16 NOTE — CONSULTS
Consults    Patient Name: Tahira Parsons Date: 2018  6:26 PM  MR #: [id number]  : 1934    Attending Physician: Kristel Mejia MD  Reason for consult: Abdominal pain, history of diverticulitis     History of Presenting Illness:      Harmony Pineda is a 80 y.o. female on hospital day 1 with a history of abd pain. History Obtained From:  patient, electronic medical record  Patient admitted to our facility with complaints of sudden onset centralized abdominal pain. Patient states that  she was at her residence resting comfortably when she developed severe, sudden onset abdominal pain obliquely across her abdomen but worse centrally, along with associated nausea and vomiting. Reports a history of diverticulitis with similar symptoms which required admission to the hospital in 2017, patient became concerned she was having an acute diverticulitis episode so reported to the emergency department for close evaluation. No colonoscopy was performed after this episode of diverticulitis. CT abdomen revealed large degree of stool throughout colon consistent with constipation, colonic wall thickening of mid to distal transverse colon with a differential of reticular disease versus inflammatory/infectious colitis. Patient admits to suffering from chronic constipation which she has been told is caused by her medications - patient is treated with opioid medications by means of Nucynta and is treated with diuretics for blood pressure. Majority of laboratory studies were unremarkable, white blood cell count 13,700 on admission. Patient states she did have 2 bowel movements yesterday which were soft, but very dark in color almost black. Has not had bowel movement today. Patient states most recent colonoscopy was many years ago and is unsure of results from that study. Patient was never smoker, denies alcohol consumption, denies substance abuse. Denies family history of GI related malignancy. Denies, hematemesis, dysphagia, odynophagia, early satiety, fever, chills, diarrhea, hematochezia, rectal pain, weight loss. History:      Past Medical History:   Diagnosis Date    Diverticulitis     Hyperlipidemia     Hypertension      Past Surgical History:   Procedure Laterality Date    CARDIAC SURGERY      CHOLECYSTECTOMY      JOINT REPLACEMENT         Family History  History reviewed. No pertinent family history. [] Unable to obtain due to ventilated and/ or neurologic status    Social History     Social History    Marital status:      Spouse name: N/A    Number of children: N/A    Years of education: N/A     Occupational History    Not on file. Social History Main Topics    Smoking status: Never Smoker    Smokeless tobacco: Never Used    Alcohol use No    Drug use: No    Sexual activity: Not on file     Other Topics Concern    Not on file     Social History Narrative    No narrative on file      [] Unable to obtain due to ventilated and/ or neurologic status      Home Medications:      Prescriptions Prior to Admission: aspirin 81 MG tablet, Take 81 mg by mouth daily  hydrOXYzine (ATARAX) 25 MG tablet, Take 25 mg by mouth every 4 hours as needed for Itching  acetaminophen (TYLENOL) 325 MG tablet, Take 650 mg by mouth every 6 hours as needed for Pain  loratadine (CLARITIN) 10 MG capsule, Take 10 mg by mouth daily  nitroGLYCERIN (NITROSTAT) 0.4 MG SL tablet, Place 0.4 mg under the tongue every 5 minutes as needed for Chest pain up to max of 3 total doses. If no relief after 1 dose, call 911.   rosuvastatin (CRESTOR) 10 MG tablet, Take 10 mg by mouth nightly  lamoTRIgine (LAMICTAL) 100 MG tablet, Take 200 mg by mouth 2 times daily  losartan (COZAAR) 50 MG tablet, Take 50 mg by mouth daily  metoprolol succinate (TOPROL XL) 50 MG extended release tablet, Take 50 mg by mouth daily  vitamin D (CHOLECALCIFEROL) 1000 UNIT TABS tablet, Take 1,000 Units by mouth daily  tapentadol (NUCYNTA) Bowel: There is a small hiatal hernia. There is a large amount of stool from the cecum to the rectum. There is diverticulosis throughout the colon. There is nonspecific bowel wall thickening of the mid to distal terminal ileum. The appendix is not definitively identified. There is equalization of loops of small bowel. There is no evidence of dilatation. No ascites. Vasculature: No aortic aneurysm. There is heavy atherosclerotic calcification of the aorta and branch vessels. Urinary bladder: Evaluation the urinary bladder is obscured due to bilateral hip arthroplasties, and therefore not evaluated. Bones: Bilateral hip arthroplasties and streak artifact limits evaluation at the involved levels. 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 There are degenerative changes in the lumbar spine. There is vacuum disc phenomena at multiple levels. There is mild anterior listhesis of L4 on L5. There is disc height loss at L1-2. Pelvis organs: Newly completely obscured due to arthroplasty artifact, over the visualized portion appears to demonstrate a thickened endometrial stripe. 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 Other: No adenopathy, free air, or fluid collections are seen. Impression:  1. Large degree of stool throughout the colon, consistent with constipation. 2. Colonic wall thickening of the mid to distal transverse colon, this may be due to reticular disease, the differential also includes inflammatory or infectious etiologies. 3. Possible thickened endometrial stripe, recommend correlation with pelvic ultrasound. Impression:   63-year-old female with documented history of diverticulosis and diverticulitis admitted due to central abdominal pain, nausea, vomiting and dark stools. CT evidence of large degree of stool consistent with constipation.   There is some concern for diverticulitis but currently remains low on the list of differentials. Plan:   -Clear liquid diet  -Colace 100 mg by mouth twice a day when necessary  -Begin split bowel prep for treatment of constipation in preparation for possible colonoscopy during admission  -Continue to monitor blood counts  -Patient will require colonoscopy to assess lower GI tract when feasible  -Further recommendations to follow  Comments: Thank you for allowing us to participate in the care of this patient. Will continue to follow. Please call if questions or concerns arise.     Electronically signed by JEANINE Roberts on 1/16/2018 at 1:50 PM

## 2018-01-17 VITALS
DIASTOLIC BLOOD PRESSURE: 93 MMHG | RESPIRATION RATE: 17 BRPM | BODY MASS INDEX: 27.02 KG/M2 | TEMPERATURE: 98.1 F | HEART RATE: 67 BPM | HEIGHT: 59 IN | OXYGEN SATURATION: 96 % | WEIGHT: 134.04 LBS | SYSTOLIC BLOOD PRESSURE: 119 MMHG

## 2018-01-17 LAB
ALBUMIN SERPL-MCNC: 3.2 G/DL (ref 3.9–4.9)
ALP BLD-CCNC: 48 U/L (ref 40–130)
ALT SERPL-CCNC: 9 U/L (ref 0–33)
ANION GAP SERPL CALCULATED.3IONS-SCNC: 11 MEQ/L (ref 7–13)
AST SERPL-CCNC: 13 U/L (ref 0–35)
BILIRUB SERPL-MCNC: 0.3 MG/DL (ref 0–1.2)
BUN BLDV-MCNC: 4 MG/DL (ref 8–23)
CALCIUM SERPL-MCNC: 8.5 MG/DL (ref 8.6–10.2)
CHLORIDE BLD-SCNC: 108 MEQ/L (ref 98–107)
CO2: 27 MEQ/L (ref 22–29)
CREAT SERPL-MCNC: 0.73 MG/DL (ref 0.5–0.9)
GFR AFRICAN AMERICAN: >60
GFR NON-AFRICAN AMERICAN: >60
GLOBULIN: 1.9 G/DL (ref 2.3–3.5)
GLUCOSE BLD-MCNC: 106 MG/DL (ref 74–109)
HCT VFR BLD CALC: 33.1 % (ref 37–47)
HEMOGLOBIN: 10.9 G/DL (ref 12–16)
MCH RBC QN AUTO: 31.1 PG (ref 27–31.3)
MCHC RBC AUTO-ENTMCNC: 33 % (ref 33–37)
MCV RBC AUTO: 94.1 FL (ref 82–100)
PDW BLD-RTO: 14.1 % (ref 11.5–14.5)
PLATELET # BLD: 204 K/UL (ref 130–400)
POTASSIUM SERPL-SCNC: 3.7 MEQ/L (ref 3.5–5.1)
RBC # BLD: 3.52 M/UL (ref 4.2–5.4)
SODIUM BLD-SCNC: 146 MEQ/L (ref 132–144)
TOTAL PROTEIN: 5.1 G/DL (ref 6.4–8.1)
WBC # BLD: 5.6 K/UL (ref 4.8–10.8)

## 2018-01-17 PROCEDURE — 96361 HYDRATE IV INFUSION ADD-ON: CPT

## 2018-01-17 PROCEDURE — 36415 COLL VENOUS BLD VENIPUNCTURE: CPT

## 2018-01-17 PROCEDURE — 96366 THER/PROPH/DIAG IV INF ADDON: CPT

## 2018-01-17 PROCEDURE — 85027 COMPLETE CBC AUTOMATED: CPT

## 2018-01-17 PROCEDURE — 6370000000 HC RX 637 (ALT 250 FOR IP): Performed by: INTERNAL MEDICINE

## 2018-01-17 PROCEDURE — 80053 COMPREHEN METABOLIC PANEL: CPT

## 2018-01-17 PROCEDURE — G0378 HOSPITAL OBSERVATION PER HR: HCPCS

## 2018-01-17 PROCEDURE — 2580000003 HC RX 258: Performed by: INTERNAL MEDICINE

## 2018-01-17 PROCEDURE — 6360000002 HC RX W HCPCS: Performed by: INTERNAL MEDICINE

## 2018-01-17 RX ORDER — CIPROFLOXACIN 500 MG/1
500 TABLET, FILM COATED ORAL 2 TIMES DAILY
Qty: 14 TABLET | Refills: 0 | Status: SHIPPED | OUTPATIENT
Start: 2018-01-17 | End: 2018-01-24

## 2018-01-17 RX ORDER — POLYETHYLENE GLYCOL 3350 17 G/17G
17 POWDER, FOR SOLUTION ORAL DAILY
Qty: 510 G | Refills: 1 | Status: SHIPPED | OUTPATIENT
Start: 2018-01-17 | End: 2018-02-16

## 2018-01-17 RX ORDER — DOCUSATE SODIUM 100 MG/1
100 CAPSULE, LIQUID FILLED ORAL 2 TIMES DAILY PRN
Qty: 20 CAPSULE | Refills: 1 | Status: ON HOLD | OUTPATIENT
Start: 2018-01-17 | End: 2020-10-20

## 2018-01-17 RX ORDER — CIPROFLOXACIN 500 MG/1
500 TABLET, FILM COATED ORAL 2 TIMES DAILY
Status: DISCONTINUED | OUTPATIENT
Start: 2018-01-17 | End: 2018-01-17 | Stop reason: HOSPADM

## 2018-01-17 RX ORDER — METRONIDAZOLE 500 MG/1
500 TABLET ORAL 3 TIMES DAILY
Qty: 21 TABLET | Refills: 0 | Status: SHIPPED | OUTPATIENT
Start: 2018-01-17 | End: 2018-01-24

## 2018-01-17 RX ADMIN — CIPROFLOXACIN 400 MG: 2 INJECTION, SOLUTION INTRAVENOUS at 09:50

## 2018-01-17 RX ADMIN — SODIUM CHLORIDE, PRESERVATIVE FREE 10 ML: 5 INJECTION INTRAVENOUS at 09:56

## 2018-01-17 RX ADMIN — VITAMIN D, TAB 1000IU (100/BT) 1000 UNITS: 25 TAB at 09:53

## 2018-01-17 RX ADMIN — LOSARTAN POTASSIUM 50 MG: 50 TABLET ORAL at 09:54

## 2018-01-17 RX ADMIN — METRONIDAZOLE 500 MG: 500 TABLET ORAL at 09:55

## 2018-01-17 RX ADMIN — METRONIDAZOLE 500 MG: 500 TABLET ORAL at 15:05

## 2018-01-17 RX ADMIN — ISOSORBIDE MONONITRATE 30 MG: 30 TABLET, EXTENDED RELEASE ORAL at 09:50

## 2018-01-17 RX ADMIN — HYDROCHLOROTHIAZIDE 25 MG: 25 TABLET ORAL at 09:54

## 2018-01-17 RX ADMIN — ASPIRIN 81 MG 81 MG: 81 TABLET ORAL at 09:51

## 2018-01-17 RX ADMIN — METOPROLOL SUCCINATE 75 MG: 25 TABLET, FILM COATED, EXTENDED RELEASE ORAL at 09:53

## 2018-01-17 RX ADMIN — DEXTROSE AND SODIUM CHLORIDE: 5; 900 INJECTION, SOLUTION INTRAVENOUS at 09:49

## 2018-01-17 RX ADMIN — LAMOTRIGINE 200 MG: 200 TABLET ORAL at 09:51

## 2018-01-17 ASSESSMENT — ENCOUNTER SYMPTOMS
SHORTNESS OF BREATH: 0
VOMITING: 0
DIARRHEA: 0
NAUSEA: 0

## 2018-01-17 NOTE — PROGRESS NOTES
Gastroenterology Progress Note    Jammie Roman is a 80 y.o. female patient. Hospitalization day:2    Chief C/O:  Abd pain, constipation    SUBJECTIVE: 80year old female resting comfortably in bed, NAD, no abd pain since copious bowel movements after treated with colace and golytely. Feels back to her baseline, no other issues and she is anxious for DC.    ROS:  Cardiovascular ROS: no chest pain or dyspnea on exertion  Gastrointestinal ROS: no abdominal pain, change in bowel habits, or black or bloody stools  Respiratory ROS: no cough, shortness of breath, or wheezing    Physical    VITALS:  /69   Pulse 64   Temp 98.6 °F (37 °C) (Oral)   Resp 17   Ht 4' 11.06\" (1.5 m)   Wt 134 lb 0.6 oz (60.8 kg)   SpO2 97%   BMI 27.02 kg/m²   TEMPERATURE:  Current - Temp: 98.6 °F (37 °C); Max - Temp  Av.6 °F (37 °C)  Min: 98.6 °F (37 °C)  Max: 98.6 °F (37 °C)    General:  Awake, alert, NAD  Skin- without jaundice  Eyes: anicteric sclera  Cardiac: RRR, Nl s1s2, without murmurs  Lungs CTA Bilaterally, normal effort  Abdomen soft, ND, NT, no HSM, Bowel sounds normal  Ext: without edema  Neuro: no asterixis     Data    CBC:   Lab Results   Component Value Date    WBC 5.6 2018    RBC 3.52 2018    HGB 10.9 2018    HCT 33.1 2018    MCV 94.1 2018    MCH 31.1 2018    MCHC 33.0 2018    RDW 14.1 2018     2018    MPV 10.1 2015     Hepatic Function Panel:    Lab Results   Component Value Date    ALKPHOS 48 2018    ALT 9 2018    AST 13 2018    PROT 5.1 2018    BILITOT 0.3 2018       Radiology Review:  N/A    ASSESSMENT:  80year old female with abd pain/chronic constipation greatly improved with medical therapy    PLAN:  -Colace 100mg PO BID PRN  -Miralax as prescribed, PRN  -Stable for DC from GI standpoint, F/U 3-4 weeks in clinic    Thank you for allowing me to participate in the care of your patient.   Feel free to contact me
Patient had large liquid and loose bowel movement of 1500ml. Dr. Savana Reese rounded and explained that the prep is for \"cleaning out\" patient and colonoscopy will not be done this admission because of patient's diverticulitis.  Electronically signed by Vicenta Chavarria RN on 1/16/2018 at 5:02 PM
Pt pm assessment complete. Pt admission complete. Pt denies any pain at this time. Pt alert and oriented. Pt currently NPO. Spoke to dr. Ollie Mccarthy for iv fluid orders. Dr. Ollie Mccarthy ordered pt to receive dextrose 5% with Normal saline. Pt currently has flagyl running. Pt stated she does have a living will. Told son Arsenio Metcalf to bring in papers tomorrow so they can go in pt chart. Pt denies any needs at this time. Will continue to monitor pt.   Electronically signed by Timothy Woody RN on 1/14/2018 at 11:32 PM
Intravenous PRN Adam Kingston MD        acetaminophen (TYLENOL) tablet 650 mg  650 mg Oral Q4H PRN Adam Kingston MD        enoxaparin (LOVENOX) injection 40 mg  40 mg Subcutaneous Daily Adam Kingston MD   40 mg at 01/16/18 0947    ondansetron (ZOFRAN) injection 4 mg  4 mg Intravenous Q8H PRN Adam Kingston MD        dextrose 5 % and 0.9 % sodium chloride infusion   Intravenous Continuous Jordyn CHLOÉ Maddox  mL/hr at 01/16/18 2149      sodium chloride flush 0.9 % injection 10 mL  10 mL Intravenous 2 times per day Adam Kingston MD   10 mL at 01/16/18 2150     Allergies   Allergen Reactions    Ezetimibe-Simvastatin Other (See Comments)     Principal Problem:    Colitis  Active Problems:    Chronic coronary artery disease    Dyslipidemia    Essential hypertension    Central abdominal pain    Blood pressure 129/69, pulse 64, temperature 98.6 °F (37 °C), temperature source Oral, resp. rate 17, height 4' 11.06\" (1.5 m), weight 134 lb 0.6 oz (60.8 kg), SpO2 97 %. Subjective:  Symptoms:  Improved. No shortness of breath, chest pain, chest pressure, diarrhea or anxiety. Diet:  Poor intake. No nausea or vomiting. Activity level: Impaired due to weakness. Pain:  She reports no pain. Objective:  General Appearance: In no acute distress. Vital signs: (most recent): Blood pressure 129/69, pulse 64, temperature 98.6 °F (37 °C), temperature source Oral, resp. rate 17, height 4' 11.06\" (1.5 m), weight 134 lb 0.6 oz (60.8 kg), SpO2 97 %. Vital signs are normal.  No fever. Output: Producing urine and producing stool. HEENT: Normal HEENT exam.    Lungs:  Normal effort and normal respiratory rate. Heart: Normal rate. Regular rhythm. Abdomen: Abdomen is soft and non-distended. Hyperactive bowel sounds. There is no abdominal tenderness. There is no guarding. Extremities: Decreased range of motion. Neurological: Patient is alert and oriented to person, place and time.
Limits  Hearing: Within functional limits    Cognition:  Overall Orientation Status: Within Functional Limits  Follows Commands: Within Functional Limits    Observation/Palpation  Observation: No acute distress. ROM:  RLE PROM: WFL  RLE General PROM: Painful R hip movement  LLE PROM: WFL  RUE PROM: WFL  LUE PROM: WFL    Strength:  Strength Other  Other: Formal MMT deferred en lieu of functional strength assessment. Pt demonstrates pain limiting strength in R hip, however mobility as follows not limited significantly from baseline level of function. Neuro:  Balance  Comments: Pt able to take socks on and off while sitting EOB. No LOB noted. Motor Control  Gross Motor?:  (no atazia/apraxia, tremors, or dystonias noted)       Bed mobility  Rolling to Left: Modified independent  Rolling to Right: Modified independent  Supine to Sit: Modified independent  Sit to Supine: Modified independent  Comment: increased time to complete, however pt executes all tasks safely without difficulty     Transfers  Sit to Stand: Supervision  Stand to sit: Modified independent  Comment: Pt with increased time to stabilize once transitioning to stand, however no LOB noted. Increased time to complete    Ambulation 1  Surface: level tile  Device: Rolling Walker  Assistance: Supervision  Quality of Gait: Minimal weight shift and rotational components of gait pattern. Antalgic R LE stance. No LOB. Pt slow yet careful of path and pace. Distance: 20ft within hospital room. Stairs/Curb  Stairs?: No    Activity Tolerance  Activity Tolerance: No concerns    Exercises  Comments: Pt educated in supine antiembolic exercise program. Pt completes GS/QS/AP without difficulty. ASSESSMENT:   Decision Making: Low Complexity  History: Low  Exam: Low  Clinical Presentation: Low  No Skilled PT:  At baseline function    Prognosis: Good  Patient Education: PT POC; DC recs; role of PT in the hosp    DISCHARGE RECOMMENDATIONS:  No Skilled PT:
Oral Q4H PRN Jacqueline Frank MD        enoxaparin (LOVENOX) injection 40 mg  40 mg Subcutaneous Daily Jacqueline Frank MD   40 mg at 01/15/18 1108    ondansetron (ZOFRAN) injection 4 mg  4 mg Intravenous Q8H PRN Jacqueline Frank MD        dextrose 5 % and 0.9 % sodium chloride infusion   Intravenous Continuous Jordyn Maddox  mL/hr at 01/16/18 0001      sodium chloride flush 0.9 % injection 10 mL  10 mL Intravenous 2 times per day Jacqueline Frank MD   10 mL at 01/15/18 2245     Allergies   Allergen Reactions    Ezetimibe-Simvastatin Other (See Comments)     Principal Problem:    Colitis  Active Problems:    Chronic coronary artery disease    Dyslipidemia    Essential hypertension    Blood pressure (!) 135/55, pulse 70, temperature 98.8 °F (37.1 °C), temperature source Oral, resp. rate 16, height 4' 11.06\" (1.5 m), weight 134 lb 0.6 oz (60.8 kg), SpO2 94 %. Subjective:  Symptoms:  Stable. She reports anxiety. No shortness of breath, chest pain, chest pressure or diarrhea. Diet:  Poor intake. No nausea or vomiting. Activity level: Impaired due to weakness. Pain:  She complains of pain that is mild. Objective:  Vital signs: (most recent): Blood pressure (!) 135/55, pulse 70, temperature 98.8 °F (37.1 °C), temperature source Oral, resp. rate 16, height 4' 11.06\" (1.5 m), weight 134 lb 0.6 oz (60.8 kg), SpO2 94 %. Vital signs are normal.  No fever. Output: Producing urine. HEENT: Normal HEENT exam.    Lungs:  Normal effort and normal respiratory rate. Heart: Normal rate. Regular rhythm. Abdomen: Abdomen is soft. Hypoactive bowel sounds. There is right lower quadrant, left upper quadrant and suprapubic tenderness. There is no guarding. Extremities: Decreased range of motion. Neurological: Patient is alert and oriented to person, place and time. Pupils:  Pupils are equal, round, and reactive to light. Skin:  Warm and dry.       Assessment:  (Colitis with fecal
self-care activities as projected. []  Access appropriate D/C site with as few architectural barriers as possible.   []  Sequence self-care tasks with     []  Other :       Patient Goal: To go home when ready  Discussed and agreed upon: [x] Yes   [] No         Comments:     Assessment/Discharge Disposition:     Performance deficits / Impairments: Decreased functional mobility , Decreased ADL status, Decreased balance, Decreased strength, Decreased high-level IADLs  Prognosis: Good  Discharge Recommendations: Continue to assess pending progress  History: 2 complexities  Exam: 3 deficits  Assistance / Modification: Min A    Prognosis:  [x] Good   []Fair   [] Poor     Barriers to Improvement:  Balance, tolerance, strength    Recommended DME:  [] W/W   [] Tanvi Davidson   [] Rollator   [] W/C   [] Oscar Kearney  [] Shower Chair   []Dressing AD []  [facility]  [] Other:    Plan: ,  1-4x/wk    G-Codes:  OT G-codes  Functional Limitation: Self care  Self Care Current Status ():  At least 20 percent but less than 40 percent impaired, limited or restricted  Self Care Goal Status (): 0 percent impaired, limited or restricted    Time in:  1000  Time out:  1020  Timed treatment minutes:  20  Total treatment time/minutes:  20    Electronically signed by:    Jamil Smith OT  6/67/2870, 10:08 AM Electronically signed by NAVI Prajapati/L on 7/56/03 at 10:27 AM

## 2018-01-17 NOTE — CARE COORDINATION
Spoke with patient regarding discharge planning. She continues to deny any needs at home. Will discharge home today with her family.

## 2018-01-19 NOTE — DISCHARGE SUMMARY
Physician Discharge Summary     Patient ID:  Simone Jack  [id number]  27 y.o.  1934    Admit date: 1/14/2018    Discharge date and time: 1/17/2018  4:32 PM     Admitting Physician: Alayna Silveira MD     Discharge Physician: Alayna Silveira      Admission Diagnoses: Colitis [K52.9]  Colitis [K52.9]    Discharge Diagnoses: same    Admission Condition: fair    Discharged Condition: good    Indication for Admission: Abdominal pain    Hospital Course: Admitted to medical floor and she was given IV fluids and antibiotics. Her diet was advanced gradually. Gastroenterology consultation was obtained. The patient improved slowly and the family and the patient declined colonoscopy at this time. She was switched to oral antibiotics and discharged home in a stable condition. GI    Consults: GI    Significant Diagnostic Studies: see chart. Treatments: IV hydration and antibiotics: Cipro and metronidazole    Discharge Exam:  Elderly female with stable vital signs and in no acute distress. She tolerated a regular diet. Disposition: home    In process/preliminary results:  Outstanding Order Results     No orders found from 12/16/2017 to 1/15/2018.           Patient Instructions:   Discharge Medication List as of 1/17/2018  2:24 PM      START taking these medications    Details   ciprofloxacin (CIPRO) 500 MG tablet Take 1 tablet by mouth 2 times daily for 7 days, Disp-14 tablet, R-0Print      metroNIDAZOLE (FLAGYL) 500 MG tablet Take 1 tablet by mouth 3 times daily for 7 days, Disp-21 tablet, R-0Print         CONTINUE these medications which have NOT CHANGED    Details   aspirin 81 MG tablet Take 81 mg by mouth dailyHistorical Med      hydrOXYzine (ATARAX) 25 MG tablet Take 25 mg by mouth every 4 hours as needed for ItchingHistorical Med      acetaminophen (TYLENOL) 325 MG tablet Take 650 mg by mouth every 6 hours as needed for PainHistorical Med      loratadine (CLARITIN) 10 MG capsule Take 10 mg by mouth

## 2019-12-28 ENCOUNTER — APPOINTMENT (OUTPATIENT)
Dept: GENERAL RADIOLOGY | Age: 84
End: 2019-12-28
Payer: MEDICARE

## 2019-12-28 ENCOUNTER — HOSPITAL ENCOUNTER (EMERGENCY)
Age: 84
Discharge: HOME | End: 2019-12-28
Payer: MEDICARE

## 2019-12-28 VITALS
RESPIRATION RATE: 13 BRPM | HEART RATE: 61 BPM | HEIGHT: 58 IN | OXYGEN SATURATION: 95 % | DIASTOLIC BLOOD PRESSURE: 72 MMHG | TEMPERATURE: 97.8 F | SYSTOLIC BLOOD PRESSURE: 132 MMHG | WEIGHT: 173 LBS | BODY MASS INDEX: 36.31 KG/M2

## 2019-12-28 DIAGNOSIS — J40 BRONCHITIS: Primary | ICD-10-CM

## 2019-12-28 DIAGNOSIS — R06.02 SHORTNESS OF BREATH: ICD-10-CM

## 2019-12-28 LAB
ALBUMIN SERPL-MCNC: 4.7 G/DL (ref 3.5–4.6)
ALP BLD-CCNC: 68 U/L (ref 40–130)
ALT SERPL-CCNC: 11 U/L (ref 0–33)
ANION GAP SERPL CALCULATED.3IONS-SCNC: 14 MEQ/L (ref 9–15)
AST SERPL-CCNC: 18 U/L (ref 0–35)
BASOPHILS ABSOLUTE: 0.1 K/UL (ref 0–0.2)
BASOPHILS RELATIVE PERCENT: 0.9 %
BILIRUB SERPL-MCNC: 0.3 MG/DL (ref 0.2–0.7)
BUN BLDV-MCNC: 29 MG/DL (ref 8–23)
CALCIUM SERPL-MCNC: 9.7 MG/DL (ref 8.5–9.9)
CHLORIDE BLD-SCNC: 99 MEQ/L (ref 95–107)
CO2: 26 MEQ/L (ref 20–31)
CREAT SERPL-MCNC: 1.25 MG/DL (ref 0.5–0.9)
EKG ATRIAL RATE: 80 BPM
EKG P AXIS: 35 DEGREES
EKG P-R INTERVAL: 228 MS
EKG Q-T INTERVAL: 398 MS
EKG QRS DURATION: 132 MS
EKG QTC CALCULATION (BAZETT): 459 MS
EKG R AXIS: -15 DEGREES
EKG T AXIS: 84 DEGREES
EKG VENTRICULAR RATE: 80 BPM
EOSINOPHILS ABSOLUTE: 0.3 K/UL (ref 0–0.7)
EOSINOPHILS RELATIVE PERCENT: 4.6 %
GFR AFRICAN AMERICAN: 49.2
GFR NON-AFRICAN AMERICAN: 40.7
GLOBULIN: 3.4 G/DL (ref 2.3–3.5)
GLUCOSE BLD-MCNC: 102 MG/DL (ref 70–99)
HCT VFR BLD CALC: 43 % (ref 37–47)
HEMOGLOBIN: 14.2 G/DL (ref 12–16)
LYMPHOCYTES ABSOLUTE: 2.8 K/UL (ref 1–4.8)
LYMPHOCYTES RELATIVE PERCENT: 37.2 %
MCH RBC QN AUTO: 30.7 PG (ref 27–31.3)
MCHC RBC AUTO-ENTMCNC: 33.1 % (ref 33–37)
MCV RBC AUTO: 92.7 FL (ref 82–100)
MONOCYTES ABSOLUTE: 0.7 K/UL (ref 0.2–0.8)
MONOCYTES RELATIVE PERCENT: 8.9 %
NEUTROPHILS ABSOLUTE: 3.7 K/UL (ref 1.4–6.5)
NEUTROPHILS RELATIVE PERCENT: 48.4 %
PDW BLD-RTO: 14 % (ref 11.5–14.5)
PLATELET # BLD: 236 K/UL (ref 130–400)
POTASSIUM SERPL-SCNC: 3.9 MEQ/L (ref 3.4–4.9)
RBC # BLD: 4.63 M/UL (ref 4.2–5.4)
SODIUM BLD-SCNC: 139 MEQ/L (ref 135–144)
TOTAL PROTEIN: 8.1 G/DL (ref 6.3–8)
TROPONIN: <0.01 NG/ML (ref 0–0.01)
WBC # BLD: 7.6 K/UL (ref 4.8–10.8)

## 2019-12-28 PROCEDURE — 85025 COMPLETE CBC W/AUTO DIFF WBC: CPT

## 2019-12-28 PROCEDURE — 99285 EMERGENCY DEPT VISIT HI MDM: CPT

## 2019-12-28 PROCEDURE — 93005 ELECTROCARDIOGRAM TRACING: CPT | Performed by: PERSONAL EMERGENCY RESPONSE ATTENDANT

## 2019-12-28 PROCEDURE — 6360000002 HC RX W HCPCS: Performed by: PERSONAL EMERGENCY RESPONSE ATTENDANT

## 2019-12-28 PROCEDURE — 36415 COLL VENOUS BLD VENIPUNCTURE: CPT

## 2019-12-28 PROCEDURE — 84484 ASSAY OF TROPONIN QUANT: CPT

## 2019-12-28 PROCEDURE — 2580000003 HC RX 258: Performed by: PERSONAL EMERGENCY RESPONSE ATTENDANT

## 2019-12-28 PROCEDURE — 96374 THER/PROPH/DIAG INJ IV PUSH: CPT

## 2019-12-28 PROCEDURE — 71046 X-RAY EXAM CHEST 2 VIEWS: CPT

## 2019-12-28 PROCEDURE — 96361 HYDRATE IV INFUSION ADD-ON: CPT

## 2019-12-28 PROCEDURE — 80053 COMPREHEN METABOLIC PANEL: CPT

## 2019-12-28 RX ORDER — METHYLPREDNISOLONE SODIUM SUCCINATE 125 MG/2ML
125 INJECTION, POWDER, LYOPHILIZED, FOR SOLUTION INTRAMUSCULAR; INTRAVENOUS ONCE
Status: COMPLETED | OUTPATIENT
Start: 2019-12-28 | End: 2019-12-28

## 2019-12-28 RX ORDER — 0.9 % SODIUM CHLORIDE 0.9 %
500 INTRAVENOUS SOLUTION INTRAVENOUS ONCE
Status: COMPLETED | OUTPATIENT
Start: 2019-12-28 | End: 2019-12-28

## 2019-12-28 RX ORDER — PREDNISONE 20 MG/1
40 TABLET ORAL DAILY
Qty: 10 TABLET | Refills: 0 | Status: SHIPPED | OUTPATIENT
Start: 2019-12-28 | End: 2020-01-02

## 2019-12-28 RX ADMIN — METHYLPREDNISOLONE SODIUM SUCCINATE 125 MG: 125 INJECTION, POWDER, FOR SOLUTION INTRAMUSCULAR; INTRAVENOUS at 01:29

## 2019-12-28 RX ADMIN — SODIUM CHLORIDE 500 ML: 9 INJECTION, SOLUTION INTRAVENOUS at 02:26

## 2019-12-28 ASSESSMENT — ENCOUNTER SYMPTOMS
ABDOMINAL PAIN: 0
SHORTNESS OF BREATH: 1
SORE THROAT: 0
DIARRHEA: 0
COLOR CHANGE: 0
BLOOD IN STOOL: 0
NAUSEA: 0
CHEST TIGHTNESS: 1
VOMITING: 0
COUGH: 1
RHINORRHEA: 0

## 2020-02-15 ENCOUNTER — APPOINTMENT (OUTPATIENT)
Dept: GENERAL RADIOLOGY | Age: 85
End: 2020-02-15
Payer: MEDICARE

## 2020-02-15 ENCOUNTER — HOSPITAL ENCOUNTER (EMERGENCY)
Age: 85
Discharge: HOME | End: 2020-02-16
Attending: NEUROMUSCULOSKELETAL MEDICINE, SPORTS MEDICINE
Payer: MEDICARE

## 2020-02-15 LAB
ALBUMIN SERPL-MCNC: 4.3 G/DL (ref 3.5–4.6)
ALP BLD-CCNC: 86 U/L (ref 40–130)
ALT SERPL-CCNC: 65 U/L (ref 0–33)
ANION GAP SERPL CALCULATED.3IONS-SCNC: 15 MEQ/L (ref 9–15)
AST SERPL-CCNC: 32 U/L (ref 0–35)
BACTERIA: NEGATIVE /HPF
BASOPHILS ABSOLUTE: 0.1 K/UL (ref 0–0.2)
BASOPHILS RELATIVE PERCENT: 0.6 %
BILIRUB SERPL-MCNC: 0.4 MG/DL (ref 0.2–0.7)
BILIRUBIN URINE: NEGATIVE
BLOOD, URINE: NEGATIVE
BUN BLDV-MCNC: 20 MG/DL (ref 8–23)
CALCIUM SERPL-MCNC: 10.1 MG/DL (ref 8.5–9.9)
CHLORIDE BLD-SCNC: 92 MEQ/L (ref 95–107)
CLARITY: CLEAR
CO2: 23 MEQ/L (ref 20–31)
COLOR: YELLOW
CREAT SERPL-MCNC: 0.81 MG/DL (ref 0.5–0.9)
EOSINOPHILS ABSOLUTE: 0.2 K/UL (ref 0–0.7)
EOSINOPHILS RELATIVE PERCENT: 2.2 %
EPITHELIAL CELLS, UA: NORMAL /HPF (ref 0–5)
GFR AFRICAN AMERICAN: >60
GFR NON-AFRICAN AMERICAN: >60
GLOBULIN: 2.9 G/DL (ref 2.3–3.5)
GLUCOSE BLD-MCNC: 111 MG/DL (ref 70–99)
GLUCOSE URINE: NEGATIVE MG/DL
HCT VFR BLD CALC: 41.3 % (ref 37–47)
HEMOGLOBIN: 14 G/DL (ref 12–16)
HYALINE CASTS: NORMAL /HPF (ref 0–5)
KETONES, URINE: NEGATIVE MG/DL
LEUKOCYTE ESTERASE, URINE: ABNORMAL
LYMPHOCYTES ABSOLUTE: 1.9 K/UL (ref 1–4.8)
LYMPHOCYTES RELATIVE PERCENT: 19.2 %
MCH RBC QN AUTO: 30.7 PG (ref 27–31.3)
MCHC RBC AUTO-ENTMCNC: 33.9 % (ref 33–37)
MCV RBC AUTO: 90.4 FL (ref 82–100)
MONOCYTES ABSOLUTE: 0.7 K/UL (ref 0.2–0.8)
MONOCYTES RELATIVE PERCENT: 7.1 %
NEUTROPHILS ABSOLUTE: 7 K/UL (ref 1.4–6.5)
NEUTROPHILS RELATIVE PERCENT: 70.9 %
NITRITE, URINE: NEGATIVE
PDW BLD-RTO: 14 % (ref 11.5–14.5)
PH UA: 8 (ref 5–9)
PLATELET # BLD: 171 K/UL (ref 130–400)
POTASSIUM SERPL-SCNC: 3.2 MEQ/L (ref 3.4–4.9)
PROTEIN UA: NEGATIVE MG/DL
RBC # BLD: 4.57 M/UL (ref 4.2–5.4)
RBC UA: NORMAL /HPF (ref 0–5)
SODIUM BLD-SCNC: 130 MEQ/L (ref 135–144)
SPECIFIC GRAVITY UA: 1.01 (ref 1–1.03)
TOTAL PROTEIN: 7.2 G/DL (ref 6.3–8)
UROBILINOGEN, URINE: 0.2 E.U./DL
WBC # BLD: 9.9 K/UL (ref 4.8–10.8)
WBC UA: NORMAL /HPF (ref 0–5)

## 2020-02-15 PROCEDURE — 2580000003 HC RX 258: Performed by: NEUROMUSCULOSKELETAL MEDICINE, SPORTS MEDICINE

## 2020-02-15 PROCEDURE — 36415 COLL VENOUS BLD VENIPUNCTURE: CPT

## 2020-02-15 PROCEDURE — 99284 EMERGENCY DEPT VISIT MOD MDM: CPT

## 2020-02-15 PROCEDURE — 6370000000 HC RX 637 (ALT 250 FOR IP): Performed by: NEUROMUSCULOSKELETAL MEDICINE, SPORTS MEDICINE

## 2020-02-15 PROCEDURE — 81001 URINALYSIS AUTO W/SCOPE: CPT

## 2020-02-15 PROCEDURE — 80053 COMPREHEN METABOLIC PANEL: CPT

## 2020-02-15 PROCEDURE — 71045 X-RAY EXAM CHEST 1 VIEW: CPT

## 2020-02-15 PROCEDURE — 85025 COMPLETE CBC W/AUTO DIFF WBC: CPT

## 2020-02-15 PROCEDURE — 74018 RADEX ABDOMEN 1 VIEW: CPT

## 2020-02-15 RX ORDER — BISACODYL 10 MG
10 SUPPOSITORY, RECTAL RECTAL ONCE
Status: COMPLETED | OUTPATIENT
Start: 2020-02-15 | End: 2020-02-16

## 2020-02-15 RX ORDER — 0.9 % SODIUM CHLORIDE 0.9 %
1000 INTRAVENOUS SOLUTION INTRAVENOUS ONCE
Status: COMPLETED | OUTPATIENT
Start: 2020-02-15 | End: 2020-02-16

## 2020-02-15 RX ADMIN — BISACODYL 10 MG: 5 TABLET, COATED ORAL at 23:30

## 2020-02-15 RX ADMIN — MAGNESIUM CITRATE 296 ML: 1.75 LIQUID ORAL at 23:32

## 2020-02-15 RX ADMIN — SODIUM CHLORIDE 1000 ML: 9 INJECTION, SOLUTION INTRAVENOUS at 23:31

## 2020-02-15 ASSESSMENT — PAIN SCALES - GENERAL: PAINLEVEL_OUTOF10: 9

## 2020-02-15 ASSESSMENT — PAIN DESCRIPTION - ORIENTATION: ORIENTATION: LOWER;RIGHT;LEFT

## 2020-02-15 ASSESSMENT — PAIN DESCRIPTION - FREQUENCY: FREQUENCY: CONTINUOUS

## 2020-02-15 ASSESSMENT — PAIN DESCRIPTION - LOCATION: LOCATION: ABDOMEN

## 2020-02-15 ASSESSMENT — PAIN - FUNCTIONAL ASSESSMENT: PAIN_FUNCTIONAL_ASSESSMENT: ACTIVITIES ARE NOT PREVENTED

## 2020-02-15 ASSESSMENT — PAIN DESCRIPTION - DESCRIPTORS: DESCRIPTORS: SHARP

## 2020-02-15 ASSESSMENT — PAIN DESCRIPTION - PAIN TYPE: TYPE: ACUTE PAIN

## 2020-02-16 ENCOUNTER — APPOINTMENT (OUTPATIENT)
Dept: CT IMAGING | Age: 85
End: 2020-02-16
Payer: MEDICARE

## 2020-02-16 VITALS
OXYGEN SATURATION: 95 % | DIASTOLIC BLOOD PRESSURE: 52 MMHG | RESPIRATION RATE: 20 BRPM | TEMPERATURE: 97.5 F | HEART RATE: 71 BPM | WEIGHT: 178 LBS | BODY MASS INDEX: 35.88 KG/M2 | HEIGHT: 59 IN | SYSTOLIC BLOOD PRESSURE: 103 MMHG

## 2020-02-16 PROCEDURE — 6370000000 HC RX 637 (ALT 250 FOR IP): Performed by: NEUROMUSCULOSKELETAL MEDICINE, SPORTS MEDICINE

## 2020-02-16 PROCEDURE — 6360000002 HC RX W HCPCS: Performed by: NEUROMUSCULOSKELETAL MEDICINE, SPORTS MEDICINE

## 2020-02-16 PROCEDURE — 96375 TX/PRO/DX INJ NEW DRUG ADDON: CPT

## 2020-02-16 PROCEDURE — 96374 THER/PROPH/DIAG INJ IV PUSH: CPT

## 2020-02-16 PROCEDURE — 74176 CT ABD & PELVIS W/O CONTRAST: CPT

## 2020-02-16 RX ORDER — SENNA PLUS 8.6 MG/1
1 TABLET ORAL DAILY
Qty: 30 TABLET | Refills: 0 | Status: SHIPPED | OUTPATIENT
Start: 2020-02-16 | End: 2020-03-17

## 2020-02-16 RX ORDER — ONDANSETRON 2 MG/ML
4 INJECTION INTRAMUSCULAR; INTRAVENOUS ONCE
Status: COMPLETED | OUTPATIENT
Start: 2020-02-16 | End: 2020-02-16

## 2020-02-16 RX ORDER — MORPHINE SULFATE 2 MG/ML
2 INJECTION, SOLUTION INTRAMUSCULAR; INTRAVENOUS ONCE
Status: COMPLETED | OUTPATIENT
Start: 2020-02-16 | End: 2020-02-16

## 2020-02-16 RX ADMIN — BISACODYL 10 MG: 10 SUPPOSITORY RECTAL at 00:06

## 2020-02-16 RX ADMIN — ONDANSETRON 4 MG: 2 INJECTION INTRAMUSCULAR; INTRAVENOUS at 01:25

## 2020-02-16 RX ADMIN — MORPHINE SULFATE 2 MG: 2 INJECTION, SOLUTION INTRAMUSCULAR; INTRAVENOUS at 01:25

## 2020-02-16 ASSESSMENT — ENCOUNTER SYMPTOMS
EYE PAIN: 0
VOMITING: 0
WHEEZING: 0
ABDOMINAL PAIN: 1
EYE DISCHARGE: 0
COUGH: 0
DIARRHEA: 0
SINUS PAIN: 0
CONSTIPATION: 1
SORE THROAT: 0
NAUSEA: 1
EYE REDNESS: 0
SHORTNESS OF BREATH: 0

## 2020-02-16 ASSESSMENT — PAIN DESCRIPTION - ORIENTATION
ORIENTATION: LOWER
ORIENTATION: LOWER

## 2020-02-16 ASSESSMENT — PAIN SCALES - GENERAL
PAINLEVEL_OUTOF10: 6
PAINLEVEL_OUTOF10: 9
PAINLEVEL_OUTOF10: 7

## 2020-02-16 ASSESSMENT — PAIN DESCRIPTION - LOCATION
LOCATION: ABDOMEN
LOCATION: ABDOMEN

## 2020-02-16 ASSESSMENT — PAIN DESCRIPTION - FREQUENCY: FREQUENCY: CONTINUOUS

## 2020-02-16 ASSESSMENT — PAIN DESCRIPTION - DESCRIPTORS
DESCRIPTORS: SHARP
DESCRIPTORS: SHARP

## 2020-02-16 ASSESSMENT — PAIN DESCRIPTION - PROGRESSION: CLINICAL_PROGRESSION: GRADUALLY IMPROVING

## 2020-02-16 ASSESSMENT — PAIN DESCRIPTION - PAIN TYPE
TYPE: ACUTE PAIN
TYPE: ACUTE PAIN

## 2020-02-16 NOTE — ED NOTES
Patient appears to be resting comfortably. Patient states she is still having abd pain. States it has improved a little since receiving morphine. Denies any other needs, questions, or concerns. Dr. Karine Kessler patient is still having pain. Call light within reach. Will continue to monitor.       Alonzo Zapien, RN  02/16/20 1779

## 2020-02-16 NOTE — ED NOTES
Bed: 04  Expected date: 2/15/20  Expected time: 10:24 PM  Means of arrival:   Comments:  79 y/o F ABD pain      Marlyn Abdul RN  02/15/20 7114

## 2020-02-16 NOTE — ED PROVIDER NOTES
negative. Except as noted above the remainder of the review of systems was reviewed and negative. PAST MEDICAL HISTORY     Past Medical History:   Diagnosis Date    Diverticulitis     Hyperlipidemia     Hypertension          SURGICAL HISTORY       Past Surgical History:   Procedure Laterality Date    APPENDECTOMY      CARDIAC SURGERY      CHOLECYSTECTOMY      JOINT REPLACEMENT           CURRENTMEDICATIONS       Previous Medications    ACETAMINOPHEN (TYLENOL) 325 MG TABLET    Take 650 mg by mouth every 6 hours as needed for Pain    ASPIRIN 81 MG TABLET    Take 81 mg by mouth daily    CLOPIDOGREL (PLAVIX) 75 MG TABLET    Take 75 mg by mouth daily    DOCUSATE SODIUM (COLACE) 100 MG CAPSULE    Take 1 capsule by mouth 2 times daily as needed for Constipation    HYDROCHLOROTHIAZIDE (HYDRODIURIL) 25 MG TABLET    Take 25 mg by mouth daily    HYDROXYZINE (ATARAX) 25 MG TABLET    Take 25 mg by mouth every 4 hours as needed for Itching    ISOSORBIDE MONONITRATE (IMDUR) 30 MG EXTENDED RELEASE TABLET    Take 30 mg by mouth daily    LAMOTRIGINE (LAMICTAL) 100 MG TABLET    Take 200 mg by mouth 2 times daily    LORATADINE (CLARITIN) 10 MG CAPSULE    Take 10 mg by mouth daily    LOSARTAN (COZAAR) 50 MG TABLET    Take 50 mg by mouth daily    METOPROLOL SUCCINATE (TOPROL XL) 50 MG EXTENDED RELEASE TABLET    Take 50 mg by mouth daily    NITROGLYCERIN (NITROSTAT) 0.4 MG SL TABLET    Place 0.4 mg under the tongue every 5 minutes as needed for Chest pain up to max of 3 total doses. If no relief after 1 dose, call 911. ROSUVASTATIN (CRESTOR) 10 MG TABLET    Take 10 mg by mouth nightly    TAPENTADOL (NUCYNTA) 50 MG TABS    Take 50 mg by mouth 3 times daily . VITAMIN D (CHOLECALCIFEROL) 1000 UNIT TABS TABLET    Take 1,000 Units by mouth daily       ALLERGIES     Ezetimibe-simvastatin    FAMILY HISTORY     History reviewed. No pertinent family history.        SOCIAL HISTORY       Social History     Socioeconomic History  Marital status:      Spouse name: None    Number of children: None    Years of education: None    Highest education level: None   Occupational History    None   Social Needs    Financial resource strain: None    Food insecurity:     Worry: None     Inability: None    Transportation needs:     Medical: None     Non-medical: None   Tobacco Use    Smoking status: Never Smoker    Smokeless tobacco: Never Used   Substance and Sexual Activity    Alcohol use: No    Drug use: No    Sexual activity: None   Lifestyle    Physical activity:     Days per week: None     Minutes per session: None    Stress: None   Relationships    Social connections:     Talks on phone: None     Gets together: None     Attends [employer] service: None     Active member of club or organization: None     Attends meetings of clubs or organizations: None     Relationship status: None    Intimate partner violence:     Fear of current or ex partner: None     Emotionally abused: None     Physically abused: None     Forced sexual activity: None   Other Topics Concern    None   Social History Narrative    None         PHYSICAL EXAM       ED Triage Vitals [02/15/20 2237]   BP Temp Temp Source Pulse Resp SpO2 Height Weight   (!) 152/85 97.5 °F (36.4 °C) Oral 76 20 99 % 4' 11\" (1.499 m) 178 lb (80.7 kg)       Physical Exam  Vitals signs and nursing note reviewed. Constitutional:       General: She is not in acute distress. Appearance: She is normal weight. She is not ill-appearing, toxic-appearing or diaphoretic. HENT:      Head: Normocephalic and atraumatic. Mouth/Throat:      Mouth: Mucous membranes are moist.   Eyes:      Extraocular Movements: Extraocular movements intact. Conjunctiva/sclera: Conjunctivae normal.      Pupils: Pupils are equal, round, and reactive to light. Neck:      Musculoskeletal: Neck supple. Cardiovascular:      Rate and Rhythm: Normal rate and regular rhythm.       Pulses: Normal pulses. Heart sounds: Normal heart sounds. Pulmonary:      Effort: Pulmonary effort is normal. No respiratory distress. Breath sounds: Normal breath sounds. Abdominal:      General: Abdomen is flat. Bowel sounds are normal. There is distension. Palpations: Abdomen is soft. Tenderness: There is abdominal tenderness. Skin:     General: Skin is warm and dry. Neurological:      General: No focal deficit present. Mental Status: She is alert. MDM  KUB of the abdominal showed evidence for constipation. Dulcolax oral and Dulcolax suppository was given. .  Mag citrate 1 bottle was given. Patient did get some results with this and felt much better. CT of the abdomen and pelvis done without contrast shows evidence for prior cholecystectomy with increased common bile duct dilatation to 16 mm. There was no pancreatic duct dilatation or any evidence for acute pancreatitis. There is evidence for diverticulosis with markedly increased colonic stool without clear evidence for diverticulitis. Fluid-filled nondistended mid distal small bowel loops are present without evidence for obstruction. FINAL IMPRESSION      1.  Constipation, unspecified constipation type          DISPOSITION/PLAN   DISPOSITION          DISCHARGE MEDICATIONS:  Paul Floyd MD(electronically signed)  Attending Emergency Physician            Mahnaz Dumont MD  02/16/20 6795

## 2020-09-08 ENCOUNTER — HOSPITAL ENCOUNTER (EMERGENCY)
Age: 85
Discharge: HOME | End: 2020-09-08
Attending: STUDENT IN AN ORGANIZED HEALTH CARE EDUCATION/TRAINING PROGRAM
Payer: MEDICARE

## 2020-09-08 ENCOUNTER — APPOINTMENT (OUTPATIENT)
Dept: GENERAL RADIOLOGY | Age: 85
End: 2020-09-08
Payer: MEDICARE

## 2020-09-08 VITALS
HEART RATE: 63 BPM | BODY MASS INDEX: 35.08 KG/M2 | TEMPERATURE: 98.1 F | DIASTOLIC BLOOD PRESSURE: 71 MMHG | SYSTOLIC BLOOD PRESSURE: 131 MMHG | RESPIRATION RATE: 16 BRPM | WEIGHT: 174 LBS | OXYGEN SATURATION: 97 % | HEIGHT: 59 IN

## 2020-09-08 LAB
BACTERIA: NEGATIVE /HPF
BILIRUBIN URINE: ABNORMAL
BLOOD, URINE: NEGATIVE
CLARITY: CLEAR
COLOR: ABNORMAL
EPITHELIAL CELLS, UA: ABNORMAL /HPF (ref 0–5)
GLUCOSE URINE: NEGATIVE MG/DL
HYALINE CASTS: ABNORMAL /HPF (ref 0–5)
KETONES, URINE: ABNORMAL MG/DL
LEUKOCYTE ESTERASE, URINE: ABNORMAL
NITRITE, URINE: NEGATIVE
PH UA: 7.5 (ref 5–9)
PROTEIN UA: ABNORMAL MG/DL
RBC UA: ABNORMAL /HPF (ref 0–5)
SPECIFIC GRAVITY UA: 1.03 (ref 1–1.03)
URINE REFLEX TO CULTURE: ABNORMAL
UROBILINOGEN, URINE: 1 E.U./DL
WBC UA: ABNORMAL /HPF (ref 0–5)

## 2020-09-08 PROCEDURE — 6360000002 HC RX W HCPCS: Performed by: STUDENT IN AN ORGANIZED HEALTH CARE EDUCATION/TRAINING PROGRAM

## 2020-09-08 PROCEDURE — 74022 RADEX COMPL AQT ABD SERIES: CPT

## 2020-09-08 PROCEDURE — 99283 EMERGENCY DEPT VISIT LOW MDM: CPT

## 2020-09-08 PROCEDURE — 96372 THER/PROPH/DIAG INJ SC/IM: CPT

## 2020-09-08 PROCEDURE — 81001 URINALYSIS AUTO W/SCOPE: CPT

## 2020-09-08 RX ORDER — DOCUSATE SODIUM 100 MG/1
100 CAPSULE, LIQUID FILLED ORAL 2 TIMES DAILY
Qty: 10 CAPSULE | Refills: 0 | Status: SHIPPED | OUTPATIENT
Start: 2020-09-08 | End: 2020-09-13

## 2020-09-08 RX ADMIN — METHYLNALTREXONE BROMIDE 12 MG: 12 INJECTION, SOLUTION SUBCUTANEOUS at 13:04

## 2020-09-08 ASSESSMENT — ENCOUNTER SYMPTOMS
VOMITING: 0
COUGH: 0
DIARRHEA: 0
SINUS PRESSURE: 0
BACK PAIN: 0
ABDOMINAL PAIN: 1
TROUBLE SWALLOWING: 0
CHEST TIGHTNESS: 0
CONSTIPATION: 1
NAUSEA: 1
SHORTNESS OF BREATH: 0

## 2020-09-08 ASSESSMENT — PAIN SCALES - GENERAL: PAINLEVEL_OUTOF10: 10

## 2020-09-08 ASSESSMENT — PAIN DESCRIPTION - ORIENTATION: ORIENTATION: MID

## 2020-09-08 ASSESSMENT — PAIN DESCRIPTION - PAIN TYPE: TYPE: ACUTE PAIN

## 2020-09-08 ASSESSMENT — PAIN DESCRIPTION - LOCATION: LOCATION: ABDOMEN

## 2020-09-08 NOTE — ED PROVIDER NOTES
[address] [facility] ED  eMERGENCY dEPARTMENT eNCOUnter      Pt Name: Ruby Emanuel  MRN: [id number]  [place] 1934  Date of evaluation: 9/8/2020  Provider: Aguila Crane, [address] [place] [address]       Chief Complaint   Patient presents with    Constipation    Nausea         HISTORY OF PRESENT ILLNESS   (Location/Symptom, Timing/Onset,Context/Setting, Quality, Duration, Modifying Factors, Severity)  Note limiting factors. Ruby Emanuel is a 80 y.o. female who presents to the emergency department with complaints of constipation. Patient states she has not had a bowel movement in 3 days and she is taking Nucynta and a long-acting pain medicine. Patient states she usually moves her bowels every day after taking milk of magnesia however she feels that extreme need to move her bowels but cannot go. Patient took milk of magnesia with no relief. Patient states that she feels nauseous from the extreme urge to move her bowels and that she has had the same problem multiple times before in the past.  Patient went to the urgent care and she rates her abdominal discomfort and need for constipation as a 10 out of 10 as far as discomfort and was referred to the emergency room for further work-up. Patient denies fever, chills, cough, vomiting, diarrhea, loss of taste or smell. Patient states she has just a lot of pressure in her abdomen is been the need to move her bowels. Patient son is present. I have the patient's permission to interview, examined her, discussed her care with her son present. The history is provided by the patient and a relative. NursingNotes were reviewed. REVIEW OF SYSTEMS    (2-9 systems for level 4, 10 or more for level 5)     Review of Systems   Constitutional: Negative for activity change, appetite change, chills, fever and unexpected weight change. HENT: Negative for drooling, ear pain, nosebleeds, sinus pressure and trouble swallowing.     Respiratory: Negative for cough, chest tightness and shortness of breath. Cardiovascular: Negative for chest pain and leg swelling. Gastrointestinal: Positive for abdominal pain, constipation and nausea. Negative for diarrhea and vomiting. Endocrine: Negative for polydipsia and polyphagia. Genitourinary: Negative for dysuria, flank pain and frequency. Musculoskeletal: Negative for back pain and myalgias. Skin: Negative for pallor and rash. Neurological: Negative for syncope, weakness and headaches. Hematological: Does not bruise/bleed easily. All other systems reviewed and are negative. Except as noted above the remainder of the review of systems was reviewed and negative. PAST MEDICAL HISTORY     Past Medical History:   Diagnosis Date    Diverticulitis     Hyperlipidemia     Hypertension          SURGICALHISTORY       Past Surgical History:   Procedure Laterality Date    APPENDECTOMY      CARDIAC SURGERY      CHOLECYSTECTOMY      JOINT REPLACEMENT           CURRENT MEDICATIONS       Discharge Medication List as of 9/8/2020  3:04 PM      CONTINUE these medications which have NOT CHANGED    Details   !! docusate sodium (COLACE) 100 MG capsule Take 1 capsule by mouth 2 times daily as needed for Constipation, Disp-20 capsule, R-1Normal      aspirin 81 MG tablet Take 81 mg by mouth dailyHistorical Med      hydrOXYzine (ATARAX) 25 MG tablet Take 25 mg by mouth every 4 hours as needed for ItchingHistorical Med      acetaminophen (TYLENOL) 325 MG tablet Take 650 mg by mouth every 6 hours as needed for PainHistorical Med      loratadine (CLARITIN) 10 MG capsule Take 10 mg by mouth dailyHistorical Med      nitroGLYCERIN (NITROSTAT) 0.4 MG SL tablet Place 0.4 mg under the tongue every 5 minutes as needed for Chest pain up to max of 3 total doses. If no relief after 1 dose, call 911. Historical Med      rosuvastatin (CRESTOR) 10 MG tablet Take 10 mg by mouth nightlyHistorical Med      lamoTRIgine (LAMICTAL) 100 MG tablet Take 200 mg by mouth 2 times dailyHistorical Med      losartan (COZAAR) 50 MG tablet Take 50 mg by mouth dailyHistorical Med      metoprolol succinate (TOPROL XL) 50 MG extended release tablet Take 50 mg by mouth dailyHistorical Med      vitamin D (CHOLECALCIFEROL) 1000 UNIT TABS tablet Take 1,000 Units by mouth dailyHistorical Med      tapentadol (NUCYNTA) 50 MG TABS Take 50 mg by mouth 3 times daily . Historical Med      clopidogrel (PLAVIX) 75 MG tablet Take 75 mg by mouth dailyHistorical Med      hydrochlorothiazide (HYDRODIURIL) 25 MG tablet Take 25 mg by mouth dailyHistorical Med      isosorbide mononitrate (IMDUR) 30 MG extended release tablet Take 30 mg by mouth dailyHistorical Med       !! - Potential duplicate medications found. Please discuss with provider. ALLERGIES     Ezetimibe-simvastatin    FAMILY HISTORY     History reviewed. No pertinent family history.        SOCIAL HISTORY       Social History     Socioeconomic History    Marital status:      Spouse name: None    Number of children: None    Years of education: None    Highest education level: None   Occupational History    None   Social Needs    Financial resource strain: None    Food insecurity     Worry: None     Inability: None    Transportation needs     Medical: None     Non-medical: None   Tobacco Use    Smoking status: Never Smoker    Smokeless tobacco: Never Used   Substance and Sexual Activity    Alcohol use: No    Drug use: No    Sexual activity: None   Lifestyle    Physical activity     Days per week: None     Minutes per session: None    Stress: None   Relationships    Social connections     Talks on phone: None     Gets together: None     Attends [employer] service: None     Active member of club or organization: None     Attends meetings of clubs or organizations: None     Relationship status: None    Intimate partner violence     Fear of current or ex partner: None     Emotionally abused: None Physically abused: None     Forced sexual activity: None   Other Topics Concern    None   Social History Narrative    None       SCREENINGS      @FLOW([id number])@      PHYSICAL EXAM    (up to 7 for level 4, 8 or more for level 5)     ED Triage Vitals   BP Temp Temp Source Pulse Resp SpO2 Height Weight   09/08/20 1200 09/08/20 1159 09/08/20 1159 09/08/20 1159 09/08/20 1159 09/08/20 1159 09/08/20 1159 09/08/20 1159   131/71 98.1 °F (36.7 °C) Oral 63 16 97 % 4' 11\" (1.499 m) 174 lb (78.9 kg)       Physical Exam  Vitals signs and nursing note reviewed. Constitutional:       General: She is awake. She is not in acute distress. Appearance: Normal appearance. She is well-developed and normal weight. She is not ill-appearing, toxic-appearing or diaphoretic. Comments: No photophobia. No phonophobia. HENT:      Head: Normocephalic and atraumatic. No Barton's sign. Right Ear: External ear normal.      Left Ear: External ear normal.      Nose: Nose normal. No congestion or rhinorrhea. Mouth/Throat:      Mouth: Mucous membranes are moist.      Pharynx: Oropharynx is clear. No oropharyngeal exudate or posterior oropharyngeal erythema. Eyes:      General: No scleral icterus. Right eye: No foreign body or discharge. Left eye: No discharge. Extraocular Movements: Extraocular movements intact. Conjunctiva/sclera: Conjunctivae normal.      Left eye: No exudate. Pupils: Pupils are equal, round, and reactive to light. Neck:      Musculoskeletal: Normal range of motion and neck supple. No neck rigidity. Vascular: No JVD. Trachea: No tracheal deviation. Comments: No meningismus. Cardiovascular:      Rate and Rhythm: Normal rate and regular rhythm. Heart sounds: Normal heart sounds. Heart sounds not distant. No murmur. No friction rub. No gallop. Pulmonary:      Effort: Pulmonary effort is normal. No respiratory distress.       Breath sounds: Normal breath radiologist. Chrystal Bonilla radiographic images are visualized and preliminarily interpreted by the emergency physician with the below findings:    Abdominal series 1 view chest: Lungs are clear, no free air, copious stool in the transverse and descending colon. No air-fluid levels. Interpretation per the Radiologist below, if available at the time ofthis note:    XR ACUTE ABD SERIES CHEST 1 VW   Final Result   1 NO ACTIVE CARDIOPULMONARY DISEASE. RADIOGRAPHIC FINDINGS SUGGESTIVE COPD. CORRELATE CLINICALLY   2.  NO FREE AIR. 3.  NONSPECIFIC BOWEL GAS PATTERN    OTHER FINDINGS DETAILED ABOVE            ED BEDSIDE ULTRASOUND:   Performed by ED Physician - none    LABS:  Labs Reviewed   URINE RT REFLEX TO CULTURE - Abnormal; Notable for the following components:       Result Value    Color, UA DARK YELLOW (*)     Bilirubin Urine SMALL (*)     Ketones, Urine TRACE (*)     Protein, UA TRACE (*)     Leukocyte Esterase, Urine SMALL (*)     All other components within normal limits   MICROSCOPIC URINALYSIS - Abnormal; Notable for the following components:    RBC, UA 3-5 (*)     All other components within normal limits       All other labs were within normal range or not returned as of this dictation. EMERGENCY DEPARTMENT COURSE and DIFFERENTIAL DIAGNOSIS/MDM:   Vitals:    Vitals:    09/08/20 1159 09/08/20 1200   BP:  131/71   Pulse: 63    Resp: 16    Temp: 98.1 °F (36.7 °C)    TempSrc: Oral    SpO2: 97%    Weight: 174 lb (78.9 kg)    Height: 4' 11\" (1.499 m)            MDM  Patient was given a shot of Relistor. Soapsuds enemas were used and the patient had a very large bowel movement. ED physician examined the bowel movement there is approximately 2 to 3 pounds of stool in the bedside commode. Patient states that she is completely better and she is no longer nauseous and no longer has the overwhelming pressure/urgency to move her bowels.   After discussing the urinalysis the patient and her daughter would rather wait

## 2020-09-08 NOTE — ED TRIAGE NOTES
Pt presents to the Er with complaints of constipation since Sunday with abdominal pain  Patient also complains of nausea without emesis   Patients VSS  Afebrile   Took MOM last night without relief

## 2020-09-08 NOTE — ED NOTES
Attempted to administer a soap suds enema. Patient was not able to tolerate whole bag. Able to instill approx 500mL before patient adamantly told me to stop. I explained to patient that she needed to hold the fluid as long as possible to obtain optimal results. Patient held for approx 5min. Patient now up to [facility]. Told to sit as long as possible to get all of the stool to pass. Patient verbalized understanding.       Willian Sanchez RN  09/08/20 0317

## 2020-09-08 NOTE — ED NOTES
Patient had excellent results from soap suds. Patient feels much relief in abdomen.      Blessing Kelley RN  09/08/20 6830

## 2020-10-19 ENCOUNTER — APPOINTMENT (OUTPATIENT)
Dept: GENERAL RADIOLOGY | Age: 85
End: 2020-10-19
Payer: MEDICARE

## 2020-10-19 ENCOUNTER — HOSPITAL ENCOUNTER (OUTPATIENT)
Age: 85
Setting detail: OBSERVATION
Discharge: HOME | End: 2020-10-20
Attending: INTERNAL MEDICINE | Admitting: INTERNAL MEDICINE
Payer: MEDICARE

## 2020-10-19 LAB
ALBUMIN SERPL-MCNC: 4 G/DL (ref 3.5–4.6)
ALP BLD-CCNC: 66 U/L (ref 40–130)
ALT SERPL-CCNC: 19 U/L (ref 0–33)
ANION GAP SERPL CALCULATED.3IONS-SCNC: 11 MEQ/L (ref 9–15)
AST SERPL-CCNC: 27 U/L (ref 0–35)
BASOPHILS ABSOLUTE: 0 K/UL (ref 0–0.2)
BASOPHILS RELATIVE PERCENT: 0.5 %
BILIRUB SERPL-MCNC: 0.3 MG/DL (ref 0.2–0.7)
BUN BLDV-MCNC: 22 MG/DL (ref 8–23)
CALCIUM SERPL-MCNC: 9.5 MG/DL (ref 8.5–9.9)
CHLORIDE BLD-SCNC: 108 MEQ/L (ref 95–107)
CO2: 24 MEQ/L (ref 20–31)
CREAT SERPL-MCNC: 0.86 MG/DL (ref 0.5–0.9)
EOSINOPHILS ABSOLUTE: 0.2 K/UL (ref 0–0.7)
EOSINOPHILS RELATIVE PERCENT: 2.2 %
GFR AFRICAN AMERICAN: >60
GFR NON-AFRICAN AMERICAN: >60
GLOBULIN: 2.3 G/DL (ref 2.3–3.5)
GLUCOSE BLD-MCNC: 124 MG/DL (ref 70–99)
HCT VFR BLD CALC: 41.7 % (ref 37–47)
HEMOGLOBIN: 13.5 G/DL (ref 12–16)
LIPASE: 30 U/L (ref 12–95)
LYMPHOCYTES ABSOLUTE: 1.3 K/UL (ref 1–4.8)
LYMPHOCYTES RELATIVE PERCENT: 18.7 %
MCH RBC QN AUTO: 30.4 PG (ref 27–31.3)
MCHC RBC AUTO-ENTMCNC: 32.5 % (ref 33–37)
MCV RBC AUTO: 93.7 FL (ref 82–100)
MONOCYTES ABSOLUTE: 0.6 K/UL (ref 0.2–0.8)
MONOCYTES RELATIVE PERCENT: 8.7 %
NEUTROPHILS ABSOLUTE: 4.9 K/UL (ref 1.4–6.5)
NEUTROPHILS RELATIVE PERCENT: 69.9 %
PDW BLD-RTO: 14.9 % (ref 11.5–14.5)
PLATELET # BLD: 185 K/UL (ref 130–400)
POTASSIUM SERPL-SCNC: 4.2 MEQ/L (ref 3.4–4.9)
PRO-BNP: 4727 PG/ML
RBC # BLD: 4.45 M/UL (ref 4.2–5.4)
SODIUM BLD-SCNC: 143 MEQ/L (ref 135–144)
TOTAL PROTEIN: 6.3 G/DL (ref 6.3–8)
TROPONIN: <0.01 NG/ML (ref 0–0.01)
WBC # BLD: 7 K/UL (ref 4.8–10.8)

## 2020-10-19 PROCEDURE — 6370000000 HC RX 637 (ALT 250 FOR IP): Performed by: PERSONAL EMERGENCY RESPONSE ATTENDANT

## 2020-10-19 PROCEDURE — 84484 ASSAY OF TROPONIN QUANT: CPT

## 2020-10-19 PROCEDURE — 80053 COMPREHEN METABOLIC PANEL: CPT

## 2020-10-19 PROCEDURE — 99285 EMERGENCY DEPT VISIT HI MDM: CPT

## 2020-10-19 PROCEDURE — 81001 URINALYSIS AUTO W/SCOPE: CPT

## 2020-10-19 PROCEDURE — 36415 COLL VENOUS BLD VENIPUNCTURE: CPT

## 2020-10-19 PROCEDURE — 83880 ASSAY OF NATRIURETIC PEPTIDE: CPT

## 2020-10-19 PROCEDURE — 71045 X-RAY EXAM CHEST 1 VIEW: CPT

## 2020-10-19 PROCEDURE — 83690 ASSAY OF LIPASE: CPT

## 2020-10-19 PROCEDURE — 85025 COMPLETE CBC W/AUTO DIFF WBC: CPT

## 2020-10-19 RX ORDER — NITROGLYCERIN 0.4 MG/1
0.4 TABLET SUBLINGUAL EVERY 5 MIN PRN
Status: DISCONTINUED | OUTPATIENT
Start: 2020-10-19 | End: 2020-10-20 | Stop reason: HOSPADM

## 2020-10-19 RX ADMIN — LIDOCAINE HYDROCHLORIDE: 20 SOLUTION ORAL; TOPICAL at 23:16

## 2020-10-19 ASSESSMENT — ENCOUNTER SYMPTOMS
RHINORRHEA: 0
DIARRHEA: 0
ABDOMINAL PAIN: 0
NAUSEA: 0
SHORTNESS OF BREATH: 0
COLOR CHANGE: 0
COUGH: 1
VOMITING: 0
SORE THROAT: 0
BLOOD IN STOOL: 0

## 2020-10-19 ASSESSMENT — PAIN DESCRIPTION - DESCRIPTORS: DESCRIPTORS: BURNING

## 2020-10-19 ASSESSMENT — PAIN SCALES - GENERAL: PAINLEVEL_OUTOF10: 10

## 2020-10-19 ASSESSMENT — PAIN DESCRIPTION - LOCATION: LOCATION: ABDOMEN;CHEST

## 2020-10-19 ASSESSMENT — PAIN DESCRIPTION - PAIN TYPE: TYPE: ACUTE PAIN

## 2020-10-20 ENCOUNTER — APPOINTMENT (OUTPATIENT)
Dept: CT IMAGING | Age: 85
End: 2020-10-20
Payer: MEDICARE

## 2020-10-20 VITALS
TEMPERATURE: 98.8 F | DIASTOLIC BLOOD PRESSURE: 77 MMHG | WEIGHT: 143.6 LBS | HEIGHT: 55 IN | RESPIRATION RATE: 20 BRPM | OXYGEN SATURATION: 100 % | BODY MASS INDEX: 33.23 KG/M2 | SYSTOLIC BLOOD PRESSURE: 128 MMHG | HEART RATE: 76 BPM

## 2020-10-20 PROBLEM — R07.9 CHEST PAIN: Status: ACTIVE | Noted: 2020-10-20

## 2020-10-20 LAB
ANION GAP SERPL CALCULATED.3IONS-SCNC: 12 MEQ/L (ref 9–15)
BACTERIA: NEGATIVE /HPF
BILIRUBIN URINE: NEGATIVE
BLOOD, URINE: NEGATIVE
BUN BLDV-MCNC: 19 MG/DL (ref 8–23)
CALCIUM SERPL-MCNC: 9.7 MG/DL (ref 8.5–9.9)
CHLORIDE BLD-SCNC: 102 MEQ/L (ref 95–107)
CLARITY: CLEAR
CO2: 26 MEQ/L (ref 20–31)
COLOR: YELLOW
CREAT SERPL-MCNC: 0.73 MG/DL (ref 0.5–0.9)
D DIMER: 2.02 MG/L FEU (ref 0–0.5)
EKG ATRIAL RATE: 87 BPM
EKG ATRIAL RATE: 92 BPM
EKG P AXIS: 54 DEGREES
EKG P AXIS: 72 DEGREES
EKG P-R INTERVAL: 220 MS
EKG P-R INTERVAL: 222 MS
EKG Q-T INTERVAL: 376 MS
EKG Q-T INTERVAL: 378 MS
EKG QRS DURATION: 128 MS
EKG QRS DURATION: 130 MS
EKG QTC CALCULATION (BAZETT): 452 MS
EKG QTC CALCULATION (BAZETT): 467 MS
EKG R AXIS: -20 DEGREES
EKG R AXIS: -21 DEGREES
EKG T AXIS: 106 DEGREES
EKG T AXIS: 81 DEGREES
EKG VENTRICULAR RATE: 87 BPM
EKG VENTRICULAR RATE: 92 BPM
EPITHELIAL CELLS, UA: NORMAL /HPF (ref 0–5)
GFR AFRICAN AMERICAN: >60
GFR NON-AFRICAN AMERICAN: >60
GLUCOSE BLD-MCNC: 111 MG/DL (ref 70–99)
GLUCOSE URINE: NEGATIVE MG/DL
HYALINE CASTS: NORMAL /HPF (ref 0–5)
KETONES, URINE: NEGATIVE MG/DL
LEUKOCYTE ESTERASE, URINE: ABNORMAL
LV EF: 13 %
LVEF MODALITY: NORMAL
MAGNESIUM: 2 MG/DL (ref 1.7–2.4)
NITRITE, URINE: NEGATIVE
PH UA: 6 (ref 5–9)
POTASSIUM REFLEX MAGNESIUM: 3.4 MEQ/L (ref 3.4–4.9)
PROTEIN UA: NEGATIVE MG/DL
RBC UA: NORMAL /HPF (ref 0–5)
SODIUM BLD-SCNC: 140 MEQ/L (ref 135–144)
SPECIFIC GRAVITY UA: 1.01 (ref 1–1.03)
TROPONIN: <0.01 NG/ML (ref 0–0.01)
TROPONIN: <0.01 NG/ML (ref 0–0.01)
URINE REFLEX TO CULTURE: ABNORMAL
UROBILINOGEN, URINE: 0.2 E.U./DL
WBC UA: NORMAL /HPF (ref 0–5)

## 2020-10-20 PROCEDURE — 2700000000 HC OXYGEN THERAPY PER DAY

## 2020-10-20 PROCEDURE — 85379 FIBRIN DEGRADATION QUANT: CPT

## 2020-10-20 PROCEDURE — 83735 ASSAY OF MAGNESIUM: CPT

## 2020-10-20 PROCEDURE — 80175 DRUG SCREEN QUAN LAMOTRIGINE: CPT

## 2020-10-20 PROCEDURE — 36415 COLL VENOUS BLD VENIPUNCTURE: CPT

## 2020-10-20 PROCEDURE — G0378 HOSPITAL OBSERVATION PER HR: HCPCS

## 2020-10-20 PROCEDURE — 84484 ASSAY OF TROPONIN QUANT: CPT

## 2020-10-20 PROCEDURE — 6360000002 HC RX W HCPCS: Performed by: NURSE PRACTITIONER

## 2020-10-20 PROCEDURE — 6370000000 HC RX 637 (ALT 250 FOR IP): Performed by: NURSE PRACTITIONER

## 2020-10-20 PROCEDURE — 2580000003 HC RX 258: Performed by: NURSE PRACTITIONER

## 2020-10-20 PROCEDURE — 6370000000 HC RX 637 (ALT 250 FOR IP): Performed by: PERSONAL EMERGENCY RESPONSE ATTENDANT

## 2020-10-20 PROCEDURE — 71275 CT ANGIOGRAPHY CHEST: CPT

## 2020-10-20 PROCEDURE — 93306 TTE W/DOPPLER COMPLETE: CPT

## 2020-10-20 PROCEDURE — 96372 THER/PROPH/DIAG INJ SC/IM: CPT

## 2020-10-20 PROCEDURE — 96374 THER/PROPH/DIAG INJ IV PUSH: CPT

## 2020-10-20 PROCEDURE — 6360000002 HC RX W HCPCS: Performed by: PERSONAL EMERGENCY RESPONSE ATTENDANT

## 2020-10-20 PROCEDURE — 93005 ELECTROCARDIOGRAM TRACING: CPT | Performed by: NURSE PRACTITIONER

## 2020-10-20 PROCEDURE — 80048 BASIC METABOLIC PNL TOTAL CA: CPT

## 2020-10-20 PROCEDURE — 6360000004 HC RX CONTRAST MEDICATION: Performed by: INTERNAL MEDICINE

## 2020-10-20 RX ORDER — ACETAMINOPHEN 650 MG/1
650 SUPPOSITORY RECTAL EVERY 6 HOURS PRN
Status: DISCONTINUED | OUTPATIENT
Start: 2020-10-20 | End: 2020-10-20 | Stop reason: HOSPADM

## 2020-10-20 RX ORDER — PROMETHAZINE HYDROCHLORIDE 12.5 MG/1
12.5 TABLET ORAL EVERY 6 HOURS PRN
Status: DISCONTINUED | OUTPATIENT
Start: 2020-10-20 | End: 2020-10-20 | Stop reason: HOSPADM

## 2020-10-20 RX ORDER — CETIRIZINE HYDROCHLORIDE 10 MG/1
10 TABLET ORAL DAILY
Status: DISCONTINUED | OUTPATIENT
Start: 2020-10-20 | End: 2020-10-20 | Stop reason: HOSPADM

## 2020-10-20 RX ORDER — ROSUVASTATIN CALCIUM 20 MG/1
20 TABLET, COATED ORAL DAILY
Status: DISCONTINUED | OUTPATIENT
Start: 2020-10-20 | End: 2020-10-20 | Stop reason: HOSPADM

## 2020-10-20 RX ORDER — FUROSEMIDE 10 MG/ML
20 INJECTION INTRAMUSCULAR; INTRAVENOUS ONCE
Status: COMPLETED | OUTPATIENT
Start: 2020-10-20 | End: 2020-10-20

## 2020-10-20 RX ORDER — VITAMIN B COMPLEX
2000 TABLET ORAL DAILY
Status: DISCONTINUED | OUTPATIENT
Start: 2020-10-20 | End: 2020-10-20 | Stop reason: HOSPADM

## 2020-10-20 RX ORDER — ASPIRIN 81 MG/1
81 TABLET, CHEWABLE ORAL DAILY
Status: DISCONTINUED | OUTPATIENT
Start: 2020-10-20 | End: 2020-10-20 | Stop reason: SDUPTHER

## 2020-10-20 RX ORDER — L. ACIDOPHILUS/L.BULGARICUS 1MM CELL
1 TABLET ORAL DAILY
Status: DISCONTINUED | OUTPATIENT
Start: 2020-10-20 | End: 2020-10-20 | Stop reason: HOSPADM

## 2020-10-20 RX ORDER — METOPROLOL SUCCINATE 50 MG/1
50 TABLET, EXTENDED RELEASE ORAL DAILY
Status: DISCONTINUED | OUTPATIENT
Start: 2020-10-20 | End: 2020-10-20 | Stop reason: HOSPADM

## 2020-10-20 RX ORDER — CALCIUM CARBONATE 500(1250)
1200 TABLET ORAL DAILY
Status: ON HOLD | COMMUNITY
End: 2021-01-01

## 2020-10-20 RX ORDER — POLYETHYLENE GLYCOL 3350 17 G/17G
17 POWDER, FOR SOLUTION ORAL DAILY PRN
Status: DISCONTINUED | OUTPATIENT
Start: 2020-10-20 | End: 2020-10-20 | Stop reason: HOSPADM

## 2020-10-20 RX ORDER — SODIUM CHLORIDE 0.9 % (FLUSH) 0.9 %
10 SYRINGE (ML) INJECTION PRN
Status: DISCONTINUED | OUTPATIENT
Start: 2020-10-20 | End: 2020-10-20 | Stop reason: HOSPADM

## 2020-10-20 RX ORDER — ASPIRIN 81 MG/1
81 TABLET, CHEWABLE ORAL ONCE
Status: COMPLETED | OUTPATIENT
Start: 2020-10-20 | End: 2020-10-20

## 2020-10-20 RX ORDER — SODIUM CHLORIDE 0.9 % (FLUSH) 0.9 %
10 SYRINGE (ML) INJECTION EVERY 12 HOURS SCHEDULED
Status: DISCONTINUED | OUTPATIENT
Start: 2020-10-20 | End: 2020-10-20 | Stop reason: HOSPADM

## 2020-10-20 RX ORDER — ACETAMINOPHEN 325 MG/1
650 TABLET ORAL EVERY 6 HOURS PRN
Status: DISCONTINUED | OUTPATIENT
Start: 2020-10-20 | End: 2020-10-20 | Stop reason: HOSPADM

## 2020-10-20 RX ORDER — POTASSIUM CHLORIDE 20 MEQ/1
40 TABLET, EXTENDED RELEASE ORAL ONCE
Status: DISCONTINUED | OUTPATIENT
Start: 2020-10-20 | End: 2020-10-20 | Stop reason: HOSPADM

## 2020-10-20 RX ORDER — LORATADINE 10 MG/1
10 CAPSULE, LIQUID FILLED ORAL DAILY
Status: DISCONTINUED | OUTPATIENT
Start: 2020-10-20 | End: 2020-10-20 | Stop reason: CLARIF

## 2020-10-20 RX ORDER — SACUBITRIL AND VALSARTAN 24; 26 MG/1; MG/1
1 TABLET, FILM COATED ORAL 2 TIMES DAILY
Status: ON HOLD | COMMUNITY
End: 2021-01-01 | Stop reason: HOSPADM

## 2020-10-20 RX ORDER — CALCIUM CARBONATE 500(1250)
500 TABLET ORAL DAILY
Status: DISCONTINUED | OUTPATIENT
Start: 2020-10-20 | End: 2020-10-20 | Stop reason: HOSPADM

## 2020-10-20 RX ORDER — LAMOTRIGINE 100 MG/1
100 TABLET ORAL 2 TIMES DAILY
Status: DISCONTINUED | OUTPATIENT
Start: 2020-10-20 | End: 2020-10-20 | Stop reason: HOSPADM

## 2020-10-20 RX ORDER — ONDANSETRON 2 MG/ML
4 INJECTION INTRAMUSCULAR; INTRAVENOUS EVERY 6 HOURS PRN
Status: DISCONTINUED | OUTPATIENT
Start: 2020-10-20 | End: 2020-10-20 | Stop reason: HOSPADM

## 2020-10-20 RX ORDER — ASPIRIN 81 MG/1
81 TABLET, CHEWABLE ORAL DAILY
Status: DISCONTINUED | OUTPATIENT
Start: 2020-10-21 | End: 2020-10-20 | Stop reason: HOSPADM

## 2020-10-20 RX ADMIN — NITROGLYCERIN 0.4 MG: 0.4 TABLET, ORALLY DISINTEGRATING SUBLINGUAL at 01:46

## 2020-10-20 RX ADMIN — ASPIRIN 81 MG CHEWABLE TABLET 81 MG: 81 TABLET CHEWABLE at 04:45

## 2020-10-20 RX ADMIN — FUROSEMIDE 20 MG: 10 INJECTION, SOLUTION INTRAMUSCULAR; INTRAVENOUS at 00:41

## 2020-10-20 RX ADMIN — NITROGLYCERIN 0.4 MG: 0.4 TABLET, ORALLY DISINTEGRATING SUBLINGUAL at 01:41

## 2020-10-20 RX ADMIN — Medication 10 ML: at 08:54

## 2020-10-20 RX ADMIN — ROSUVASTATIN CALCIUM 20 MG: 20 TABLET, FILM COATED ORAL at 08:53

## 2020-10-20 RX ADMIN — NITROGLYCERIN 0.4 MG: 0.4 TABLET, ORALLY DISINTEGRATING SUBLINGUAL at 01:35

## 2020-10-20 RX ADMIN — LACTOBACILLUS TAB 1 TABLET: TAB at 08:53

## 2020-10-20 RX ADMIN — ENOXAPARIN SODIUM 40 MG: 40 INJECTION SUBCUTANEOUS at 08:54

## 2020-10-20 RX ADMIN — CETIRIZINE HYDROCHLORIDE 10 MG: 10 TABLET, FILM COATED ORAL at 08:54

## 2020-10-20 RX ADMIN — LAMOTRIGINE 100 MG: 100 TABLET ORAL at 09:06

## 2020-10-20 RX ADMIN — METOPROLOL SUCCINATE 50 MG: 50 TABLET, EXTENDED RELEASE ORAL at 08:53

## 2020-10-20 RX ADMIN — CALCIUM 500 MG: 500 TABLET ORAL at 08:53

## 2020-10-20 RX ADMIN — CHOLECALCIFEROL TAB 25 MCG (1000 UNIT) 2000 UNITS: 25 TAB at 09:06

## 2020-10-20 RX ADMIN — SACUBITRIL AND VALSARTAN 1 TABLET: 24; 26 TABLET, FILM COATED ORAL at 08:53

## 2020-10-20 RX ADMIN — IOPAMIDOL 100 ML: 612 INJECTION, SOLUTION INTRAVENOUS at 07:27

## 2020-10-20 ASSESSMENT — ENCOUNTER SYMPTOMS
COUGH: 1
NAUSEA: 0
ABDOMINAL PAIN: 1
EYES NEGATIVE: 1
RHINORRHEA: 0
WHEEZING: 0
BACK PAIN: 1
ALLERGIC/IMMUNOLOGIC NEGATIVE: 1
SORE THROAT: 0
SHORTNESS OF BREATH: 1
VOMITING: 0

## 2020-10-20 ASSESSMENT — PAIN DESCRIPTION - ORIENTATION: ORIENTATION: MID

## 2020-10-20 ASSESSMENT — PAIN DESCRIPTION - PROGRESSION: CLINICAL_PROGRESSION: GRADUALLY WORSENING

## 2020-10-20 ASSESSMENT — PAIN SCALES - GENERAL
PAINLEVEL_OUTOF10: 10
PAINLEVEL_OUTOF10: 0

## 2020-10-20 ASSESSMENT — PAIN DESCRIPTION - FREQUENCY: FREQUENCY: INTERMITTENT

## 2020-10-20 ASSESSMENT — PAIN DESCRIPTION - ONSET: ONSET: GRADUAL

## 2020-10-20 ASSESSMENT — PAIN DESCRIPTION - PAIN TYPE: TYPE: ACUTE PAIN

## 2020-10-20 ASSESSMENT — PAIN DESCRIPTION - LOCATION: LOCATION: CHEST

## 2020-10-20 ASSESSMENT — PAIN - FUNCTIONAL ASSESSMENT: PAIN_FUNCTIONAL_ASSESSMENT: PREVENTS OR INTERFERES SOME ACTIVE ACTIVITIES AND ADLS

## 2020-10-20 ASSESSMENT — PAIN DESCRIPTION - DESCRIPTORS: DESCRIPTORS: BURNING;ACHING

## 2020-10-20 NOTE — FLOWSHEET NOTE
01:25 Pt admitted to floor. A&Ox4/poor historian on medications. Unable to rec medications at this time. VS stable. Lungs clear/diminished. Pt states SOB with exertion. Canchris Helm placed at pts request d/t right hip pain/juan care provided prior to placing. Pt states she walks at home c walker/cane but has been having a lot of difficulty lately since her fall. Call light within reach/fall precautions in place. Dr. Sukhdeep Mcrae at bedside to assess pt.     01:30 Pt became SOB at rest c labored breathing. SpO2 97% on 2L. States she had crushing 10/10 chest pain. EKG obtained showing no changes. PRN SL nitro admin x3 doses for chest pain see MAR. Pt VS stable. Pt states CP was relieved after 3rd dose. Breathing is non-labored, SpO2 99% on 2L NC. HR is NSR at 87.    03:00 Pt resting in bed. VS stable. Denies pain. Stable breathing pattern, SpO2 99% on 1L NC. Call light within reach. Fall precautions in place.

## 2020-10-20 NOTE — PROGRESS NOTES
[facility] Occupational Therapy      Date: 10/20/2020  Patient Name: Milady Guerra        MRN: [id number]  Account: [de-identified]   : 1934  (80 y.o.)  Room: [place] [address]    Pt. is of observation status. To comply with medicare and insurance regulations, to see patient we require updated orders including a valid OT treatment diagnosis. Please reorder as appropriate.      Electronically signed by ROSA Hussein on 10/20/2020 at 8:07 AM

## 2020-10-20 NOTE — FLOWSHEET NOTE
D-Dimer 2.02, Dr. Miguel Goldstein notified. CT ordered. 2nd IV placed, #20 Right AC. CT called/transport set up.

## 2020-10-20 NOTE — PROGRESS NOTES
Physical Therapy  Facility/Department: [facility] [id number]      PT evaluation attempted 10/20/20, at 8:08 AM EDT, however this patient status is currently observation. Per facility protocol, PT evaluation and treatment orders for patients in observation status must include a PT specific diagnosis (i.e. \"difficulty ambulating\", \"lack of coordination\", etc.) These order specifications may be added to the \"comments\" section of the PT evaluation and treatment order. Requested updated Physical Therapy orders from referring provider via \"sticky note\". Coordination team notified.      We thank you for your referral!    [facility]) Physical Therapy Department    Electronically signed by Don Hazel PT on 10/20/20 at 8:08 AM EDT

## 2020-10-20 NOTE — ED NOTES
Bed: 04  Expected date: 10/19/20  Expected time: 10:21 PM  Means of arrival: [facility]  Comments:  86F abd pain     John Park RN  10/19/20 8398

## 2020-10-20 NOTE — ED PROVIDER NOTES
[address] [facility] ED  eMERGENCY dEPARTMENT eNCOUnter      Pt Name: Tico Menon  MRN: [id number]  Teagangfshruti 1934  Date of evaluation: 10/19/2020  Provider: [address] is a 80 y.o. female with PMHx of diverticulitis, hyperlipidemia, hypertension, CAD, seizures, CABG (1990s) presents to the emergency department with abdominal pain. Patient states she has not felt well all day due to chest congestion with clear sputum and nasal congestion, taking Claritin. For dinner she ate pizza and salad and afterwards sat in her recliner. She then started with left upper quadrant/epigastric abdominal burning pain. She states it did get worse with ambulation. It is constant. She denies fevers, dizziness, shortness of breath, nausea, vomiting, diarrhea, urinary symptoms. No leg swelling. Dr. Juarez Smith is her cardiologist.     HPI    Nursing Notes were reviewed. REVIEW OF SYSTEMS       Review of Systems   Constitutional: Negative for appetite change, chills and fever. HENT: Positive for congestion. Negative for rhinorrhea and sore throat. Respiratory: Positive for cough. Negative for shortness of breath. Cardiovascular: Positive for chest pain. Gastrointestinal: Negative for abdominal pain, blood in stool, diarrhea, nausea and vomiting. Genitourinary: Negative for difficulty urinating. Musculoskeletal: Negative for neck stiffness. Skin: Negative for color change and rash. Neurological: Negative for dizziness, syncope, weakness, light-headedness, numbness and headaches. All other systems reviewed and are negative.             PAST MEDICAL HISTORY     Past Medical History:   Diagnosis Date    Diverticulitis     Hyperlipidemia     Hypertension          SURGICAL HISTORY       Past Surgical History:   Procedure Laterality Date    APPENDECTOMY      CARDIAC SURGERY      CHOLECYSTECTOMY      JOINT REPLACEMENT           CURRENT MEDICATIONS Previous Medications    ACETAMINOPHEN (TYLENOL) 325 MG TABLET    Take 650 mg by mouth every 6 hours as needed for Pain    ASPIRIN 81 MG TABLET    Take 81 mg by mouth daily    CLOPIDOGREL (PLAVIX) 75 MG TABLET    Take 75 mg by mouth daily    DOCUSATE SODIUM (COLACE) 100 MG CAPSULE    Take 1 capsule by mouth 2 times daily as needed for Constipation    HYDROCHLOROTHIAZIDE (HYDRODIURIL) 25 MG TABLET    Take 25 mg by mouth daily    HYDROXYZINE (ATARAX) 25 MG TABLET    Take 25 mg by mouth every 4 hours as needed for Itching    ISOSORBIDE MONONITRATE (IMDUR) 30 MG EXTENDED RELEASE TABLET    Take 30 mg by mouth daily    LAMOTRIGINE (LAMICTAL) 100 MG TABLET    Take 200 mg by mouth 2 times daily    LORATADINE (CLARITIN) 10 MG CAPSULE    Take 10 mg by mouth daily    LOSARTAN (COZAAR) 50 MG TABLET    Take 50 mg by mouth daily    METOPROLOL SUCCINATE (TOPROL XL) 50 MG EXTENDED RELEASE TABLET    Take 50 mg by mouth daily    NITROGLYCERIN (NITROSTAT) 0.4 MG SL TABLET    Place 0.4 mg under the tongue every 5 minutes as needed for Chest pain up to max of 3 total doses. If no relief after 1 dose, call 911. ROSUVASTATIN (CRESTOR) 10 MG TABLET    Take 10 mg by mouth nightly    TAPENTADOL (NUCYNTA) 50 MG TABS    Take 50 mg by mouth 3 times daily . VITAMIN D (CHOLECALCIFEROL) 1000 UNIT TABS TABLET    Take 1,000 Units by mouth daily       ALLERGIES     Ezetimibe-simvastatin    FAMILY HISTORY     History reviewed. No pertinent family history.        SOCIAL HISTORY       Social History     Socioeconomic History    Marital status:      Spouse name: None    Number of children: None    Years of education: None    Highest education level: None   Occupational History    None   Social Needs    Financial resource strain: None    Food insecurity     Worry: None     Inability: None    Transportation needs     Medical: None     Non-medical: None   Tobacco Use    Smoking status: Never Smoker    Smokeless tobacco: Never Used Substance and Sexual Activity    Alcohol use: No    Drug use: No    Sexual activity: None   Lifestyle    Physical activity     Days per week: None     Minutes per session: None    Stress: None   Relationships    Social connections     Talks on phone: None     Gets together: None     Attends Confucianism service: None     Active member of club or organization: None     Attends meetings of clubs or organizations: None     Relationship status: None    Intimate partner violence     Fear of current or ex partner: None     Emotionally abused: None     Physically abused: None     Forced sexual activity: None   Other Topics Concern    None   Social History Narrative    None         PHYSICAL EXAM         ED Triage Vitals   BP Temp Temp src Pulse Resp SpO2 Height Weight   -- -- -- -- -- -- -- --       Physical Exam  Constitutional:       Appearance: She is well-developed. HENT:      Head: Normocephalic and atraumatic. Eyes:      Conjunctiva/sclera: Conjunctivae normal.      Pupils: Pupils are equal, round, and reactive to light. Neck:      Musculoskeletal: Normal range of motion and neck supple. Trachea: No tracheal deviation. Cardiovascular:      Heart sounds: Normal heart sounds. Pulmonary:      Effort: Pulmonary effort is normal. No respiratory distress. Breath sounds: Normal breath sounds. No stridor. Comments: Patient dyspneic moving from ambulance cart to bed without extreme exertion. Abdominal:      General: Bowel sounds are normal. There is no distension. Palpations: Abdomen is soft. There is no mass. Tenderness: There is abdominal tenderness. There is no guarding or rebound. Comments: Mild epigastric and left upper quadrant abdominal tenderness to palpation, abdomen soft and nondistended, no rebound or rigidity, pulsatile mass or bruit, no ecchymosis   Musculoskeletal: Normal range of motion. Skin:     General: Skin is warm and dry.       Capillary Refill: Capillary she does have pain to her left axillary region/LUQ. She had a BM which relieved her pain and now she is asymptomatic. No nitro given at this time. Patient symptoms may be GI related. However she does continue have dyspnea with minimal exertion, even going from bedside commode to bed, which is new for patient. Daughter at bedside also states new and seemed to start today. Patient does not take diuretics at home. She states she has been on them in the past intermittently for leg swelling. She was placed on Entresto approximately 1 month ago and states fatigue and shortness of breath has worsened. No hypoxia noted but dyspnea noted. Patient will be admitted at this time. CRITICAL CARE TIME   Total Critical Caretime was 0 minutes, excluding separately reportable procedures. There was a high probability of clinically significant/life threatening deterioration in the patient's condition which required my urgent intervention. Procedures    FINAL IMPRESSION      1. Chest pain, unspecified type    2. Dyspnea on exertion          DISPOSITION/PLAN   DISPOSITION Decision To Admit 10/19/2020 11:53:34 PM      PATIENT REFERRED TO:  No follow-up provider specified. DISCHARGE MEDICATIONS:  New Prescriptions    No medications on file          (Please notethat portions of this note were completed with a voice recognition program.  Efforts were made to edit the dictations but occasionally words are mis-transcribed. )    JEANINE Luis (electronically signed)  Emergency Physician Assistant         JEANINE Saenz  10/20/20 9 [facility]  10/20/20 0020

## 2020-10-20 NOTE — ED TRIAGE NOTES
Patient states she had chest pain and abd pain that started around 7 pm. That is why she called 911 she states she never had a pain like this before.

## 2020-10-20 NOTE — CONSULTS
Cardiology Consult Note      Date:   10/20/2020  Patient name:  Eric Means  Date of admission:  10/19/2020 10:25 PM  MRN:   [id number]  YOB: 1934  Time of Consult:  11:17 AM    Consulting Cardiologist: Dr. Selam Reddy. Sultana Fontenot APRN-CNP  Primary Cardiologist:  Dr. Morena Solomon     Referring Provider: General Auxier MD     Consult reason; chest pain    Assessment  1. Chest pain: Resolved  2. Hypertension  3. CAD: Status post CABG and stents  4. Cardiomyopathy:7/14/2020. Lexiscan 22% EF. Currently on Entresto  5. Shortness of breath  6. Hypokalemia; K+ 3.4    Plan  1. Awaiting echocardiogram results  2. Potassium 40 meq p.o. x1 today  3. Continue to monitor on telemetry  4. Further recommendations per Dr. Alexis De La Paz MD       HPI:   Eric Means is a 80 y.o.  female patient who is being at the request of Dr. Juarez Pearson for inpatient consultation of angina. She was admitted on 10/19/2020. Previous [facility] and [address] [place] [address] records have been reviewed in detail. Sujatha Paris is a 80 yr old female with PMH of HTN, hyperlipidemia, CAD status post CABG and stents, cardiomyopathy and known coronary disease. Revascularization 5 years ago included stenting of the left main that presented to the ER with abdominal pain, chest pain and shortness of breath. She states that her symptoms started in the left lower chest and progressed after eating pizza. She was administered a GI cocktail in the ER with symptom relief, but stated that the pain then moved to her back. EKG; SR with 1st degree AV block HR 92. Chest Xray: cardiac enlargement with  Mild central vascular congestion, similar to last exam  Suspect small right sided pleural effusion. CT chest: negative for PE. Labs: , K+ 3.4, BUN 19, Creat 0.73, MAG 2.0, BNP 4727, Troponin  Negative x 2, WBC 7.0, Hgb 13.5, HCT 41.7, D-dimer 2.02.       She states that she had been feeling abdominal discomfort for a couple days in her left lower quadrant that worsened yesterday after eating as what she would describe as a burning sensation which prompted her to come to the emergency room. She states she did not experience chest pain until she arrived at the emergency department where she was administered nitroglycerin that relieved her chest pain. She states she lives at home with her  and her daughter. She ambulates around the house with a walker and sometimes uses a wheelchair to aid with ambulation. She states she continues to do laundry, clean house, and cook meals. Denies any change in appetite, or oral intake. Currently she is resting comfortably in bed and denies any chest pain, palpitations, lightheadedness or dizziness, or shortness of breath. Cardiac History:    1. CABG (CABG)   1992. L-LAD. S-RCA   2. CHF (congestive heart failure), NYHA class III, chronic, systolic (190.39,079.9) (X91.47)   3. Hyperlipidemia (272.4) (E78.5)   4. Hypertension, benign (401.1) (I10)   5. 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 Lexiscan 22% EF. Patient wishes no AICD      10/14/15 LVEF 20% with 2+ MR      5/2015- LVEF 20%   6. 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 LVEF 22%. No inducible ischemia   9/30/2000 Stents X 2      7/2/15 Drug eluting stent mid RCA, balloon PTCA PDA      6/25/15 LM PTCA with drug eluting stent. 6/25/15 Cardiac Cath- LM 90%; LAC occluded; RCA 90%; L to LAD patent collateral for      RCA; S to RCA occluded; LVEF 25%; LVEDP 12 mmHg; right cath with PCWP 12; PAP      25/10      1992 CABG L-LAD, S-RCA      1992 IWMI   7. Never a smoker   8. Osteoarthritis, chronic (715.90) (M19.90)   9. Overweight (278.02) (E66.3)   10. Premature ventricular contractions (427.69) (I49.3)   11. Seizure disorder (345.90) (G40.909)   · Seizure 2011, none since as of 5/2015   12. Shortness of breath (786.05) (R06.02)   13.  Status post angioplasty (V45.89) (Z98.62)   · 7/2/15 Drug eluting stent mid RCA, balloon PTCA PDA      6/25/15 LM PTCA with drug eluting stent. 14. Status post left hip replacement (V43.64) (Z96.642)   15. Status post right hip replacement (V43.64) (Z96.641)   16. Ventricular ectopy (427.69) (I49.3)    [facility] Office visit note 9/2/2020  Mrs. Perlita Garcia is 80years old with severe ischemic cardiomyopathy and known coronary disease. Revascularization 5 years ago included stenting of the left main. At that time internal mammary artery fortunately remained patent. She still has ejection fraction less than 25% on perfusion study earlier this month. At last office visit she had described prior angina has resolved but was still slightly short of breath. . That has resolved but she still gets short of breath if she walks too far. She still would like to avoid interventions. She is still not interested in AICD. She is had no palpitations lightheadedness or syncope. She notes no particular claudication. She is eating and drinking well. She has no abdominal pain. She has no PND or orthopnea. At that last office visit in July, Imdur was added and [place] begun after was determined insurance would cover it. After being on Entresto laboratories on August 13 showed normal electrolytes BUN and creatinine with creatinine 0.94. Blood pressure check in the office that day was 118/76. She denies chest pain, chest pressure, palpitations, lightheadedness, syncope, pnd and orthopnea. She has not recently been hospitalized or seen in an ER. She says since being on Entresto she can walk further. She walks 25 to 30 yards without shortness of breath which is a improvement. She is not had dizziness or palpitations. She has no lower extremity edema. She is eating and drinking well. She is had no adverse effect from the medicine in fact she thinks the [place] improved her allergy symptoms.  She says usually she has to take fair amount of Claritin for cough and that has resolved. Impression  Patient with still functional class III CHF but much higher functional capacity since being on Entresto. She has had no angina related to her ischemic cardiomyopathy and coronary disease and lower extremity edema has resolved as well. She is very satisfied with her current level of activity and the recent improvement with change in medication. Renal function is stable as well. That being the case, we will continue her current medical regimen and plan to reassess in 6 months. She and family are aware of symptoms that could occur that would necessitate a sooner visit or ER visits. Orders  1 return visit 6 months    [facility] Medication List 9/2/2020  1. Align 4 MG Oral Capsule; take 1 tablet daily; Therapy: (Recorded:[id number]) to Recorded   2. Aspirin EC 81 MG Oral Tablet Delayed Release; 1 TABLET DAILY; Therapy: [id number] to (Evaluate:[id number])  Requested for: 88Ttr4066; Last   Rx:[id number] Ordered   3. Claritin 10 MG Oral Tablet; TAKE 1 TABLET DAILY AS NEEDED; Therapy: (Recorded:[id number]) to Recorded   4. Diclofenac Sodium 1 % Transdermal Gel; use as directed as needed; Therapy: [id number] to Recorded   5. Entresto 24-26 MG Oral Tablet; TAKE 1 TABLET BY MOUTH TWICE A DAY; Therapy: 29Mnq6188 to (Marii Michele)  Requested for: 34Imt1663; Last   Rx:[id number] Ordered   6. lamoTRIgine 100 MG Oral Tablet; TAKE 2 TABLETS TWICE DAILY; Therapy: [id number] to Recorded   7. Metoprolol Succinate ER 50 MG Oral Tablet Extended Release 24 Hour; Takes 1 and 1/2   tablets daily  Requested for: 74Mwq3600; Last Rx:[id number] Ordered   8. Narcan 4 MG/0.1ML Nasal Liquid; USE AS DIRECTED; Therapy: (Daniel Sosa) to Recorded   9. Nitroglycerin 0.4 MG Sublingual Tablet Sublingual; PLACE 1 TABLET UNDER THE   TONGUE EVERY 5 MINUTES UP TO 3 DOSES AS NEEDED FOR CHEST PAIN. if no relief after third dose call 911  Requested for: [id number]; Last Rx:[id number]   Ordered   10.  Nucynta 50 MG Oral pain up to max of 3 total doses. If no relief after 1 dose, call 911. Historical Provider, MD   rosuvastatin (CRESTOR) 10 MG tablet Take 20 mg by mouth daily     Historical Provider, MD   lamoTRIgine (LAMICTAL) 100 MG tablet Take 100 mg by mouth 2 times daily     Historical Provider, MD   losartan (COZAAR) 50 MG tablet Take 50 mg by mouth daily    Historical Provider, MD   metoprolol succinate (TOPROL XL) 50 MG extended release tablet Take 50 mg by mouth daily    Historical Provider, MD   vitamin D (CHOLECALCIFEROL) 1000 UNIT TABS tablet Take 2,000 Units by mouth daily     Historical Provider, MD   tapentadol (NUCYNTA) 50 MG TABS Take 50 mg by mouth 3 times daily .     Historical Provider, MD       Current Medications:      IV Medications:  Current Facility-Administered Medications   Medication Dose Route Frequency Provider Last Rate Last Dose    sodium chloride flush 0.9 % injection 10 mL  10 mL Intravenous 2 times per day Rickey Alfaro RN, NP   10 mL at 10/20/20 0854    sodium chloride flush 0.9 % injection 10 mL  10 mL Intravenous PRN Rickey Alfaro RN, NP        acetaminophen (TYLENOL) tablet 650 mg  650 mg Oral Q6H PRN Rickey Alfaro RN, NP        Or    acetaminophen (TYLENOL) suppository 650 mg  650 mg Rectal Q6H PRN Rickey Alfaro RN, NP        polyethylene glycol (GLYCOLAX) packet 17 g  17 g Oral Daily PRN Rickey Alfaro RN, NP        promethazine (PHENERGAN) tablet 12.5 mg  12.5 mg Oral Q6H PRN Rickey Alfaro RN, NP        Or    ondansetron (ZOFRAN) injection 4 mg  4 mg Intravenous Q6H PRN Rickey Alfaro RN, NP        [START ON 10/21/2020] aspirin chewable tablet 81 mg  81 mg Oral Daily Rickey Alfaro RN, NP        enoxaparin (LOVENOX) injection 40 mg  40 mg Subcutaneous Daily Rickey Alfaro RN, NP   40 mg at 10/20/20 0854    lamoTRIgine (LAMICTAL) tablet 100 mg  100 mg Oral BID Rickey Alfaro RN, NP   100 mg at 10/20/20 0906    lactobacillus acidophilus [facility]) 1 tablet  1 tablet Oral Daily Rickey Alfaro RN, NP   1 tablet at 10/20/20 0853    calcium elemental (OSCAL) tablet 500 mg  500 mg Oral Daily Rickey Alfaro RN, NP   500 mg at 10/20/20 0853    metoprolol succinate (TOPROL XL) extended release tablet 50 mg  50 mg Oral Daily Rickey Alfaro RN, NP   50 mg at 10/20/20 0853    rosuvastatin (CRESTOR) tablet 20 mg  20 mg Oral Daily Rickey Alfaro RN, NP   20 mg at 10/20/20 0853    sacubitril-valsartan (ENTRESTO) 24-26 MG per tablet 1 tablet  1 tablet Oral BID Rickey Alfaro RN, NP   1 tablet at 10/20/20 0853    vitamin D (CHOLECALCIFEROL) tablet 2,000 Units  2,000 Units Oral Daily Rickey Alfaro RN, NP   2,000 Units at 10/20/20 0906    cetirizine (ZYRTEC) tablet 10 mg  10 mg Oral Daily Rickey Alfaro RN, NP   10 mg at 10/20/20 0854    nitroGLYCERIN (NITROSTAT) SL tablet 0.4 mg  0.4 mg Sublingual Q5 Min PRN Cite JEANINE Reynaga   0.4 mg at 10/20/20 0146         Review of Systems  Constitutional: No weight loss, fever, chills, weakness or fatigue. HEENT: No visual loss, blurred vision, double vision or yellow sclerae. Skin: No rash or itching  Cardiovascular: Admits to recent chest pain. No palpitations or edema. Respiratory: Admits to exertional shortness of breath, denies cough or sputum. Gastrointestinal:  No  nausea, vomiting or diarrhea. Admits to abdominal pain. No bloody or dark tarry stools. Neurological:  No headache, dizziness, syncope,   Musculoskeletal:  No muscle, back pain, joint pain or stiffness. Hematologic: No anemia, bleeding or bruising.     Vital Signs:  Vitals:    10/20/20 0146 10/20/20 0149 10/20/20 0304 10/20/20 0603   BP: 124/79 122/60 133/77 126/73   Pulse:  76 75 77   Resp:       Temp:       TempSrc:       SpO2:   100% 100%   Weight:       Height:           Intake/Output Summary (Last 24 hours) at 10/20/2020 Eileen 245 filed at 10/20/2020 0854  Gross per 24 hour   Intake 0 ml   Output 600 ml   Net -600 ml       Patient Vitals for the past 96 hrs (Last 3 readings):   Weight   10/20/20 0125 143 lb 9.6 oz (65.1 kg)   10/19/20 2227 131 lb (59.4 kg)       Physical exam   Constitutional:  Well developed, awake/alert/oriented x3, no distress, alert and cooperative. Eyes:  PERRL, sclera clear, conjunctiva pink. EMMT:  mucous membranes  moist, no apparent injury, no lesion seen. Head/Neck:  Neck supple, no apparent injury, thyroid without mass or tenderness, No JVD, trachea midline, no bruits. Respiratory/Thorax: Patent airways, CTAB,  normal breath sounds with good chest expansion, thorax symmetric. Cardiovascular: Regular, rate and rhythm, no murmurs, , normal S1 and S2, PMI non displaced. Gastrointestinal:  Non distended, soft, non-tender, no rebound tenderness or guarding,   Genitourinary:  deferred  Musculoskeletal:  No apparent injury. Extremities:  Normal extremities, no edema,DP 1+ equal bilaterally. Neurological:  Alert and oriented x3. Moves extremities spontaneous with purpose  Psychological:  Appropriate mood and behavior  Skin:  Warm and dry,        Diagnostics:    EKG:10/20/2020  Sinus rhythm with 1st degree AV block with fusion complexes  Left ventricular hypertrophy with QRS widening and repolarization abnormality  Abnormal ECG  When compared with ECG of 19-OCT-2020 22:37,  fusion complexes are now present  premature atrial complexes are no longer present      Telemetry: normal sinus .   60s to 80s with frequent PVCs    Lab Data:  BMP:  Recent Labs     10/19/20  2303 10/20/20  0509    140   K 4.2 3.4   * 102   CO2 24 26   BUN 22 19   CREATININE 0.86 0.73   LABGLOM >60.0 >60.0       CBC:  Recent Labs     10/19/20  2303   WBC 7.0   RBC 4.45   HGB 13.5   HCT 41.7   MCV 93.7   MCH 30.4   MCHC 32.5*   RDW 14.9*          Cardiac Enzymes:   Recent Labs     10/19/20  2303 including the 3D MIPS were permanently archived. All CT scans at this facility use dose modulation, iterative reconstruction, and/or weight based dosing when appropriate to reduce radiation dose to as low as reasonably achievable. FINDINGS: Pulmonary arteries: There is good opacification of the main, lobar, segmental and proximal subsegmental pulmonary artery branches. No sign of pulmonary embolus in the central pulmonary arterial tree. No evidence of thoracic aortic aneurysm or dissection. Lungs: Minimal subpleural reticulation/scarring at the lung bases. No consolidation. Pleura:Normal. No effusion or thickening                       Mediastinum: No other significant abnormality. No lymphadenopathy. No pericardial effusion. Mild cardiomegaly                       Trachea:Negative                            Vessels:Thoracic aorta is intact. Visualized abdomen: Small hiatal hernia. Bones: No acute bone pathology     NO EVIDENCE OF PULMONARY EMBOLISM. Xr Chest Portable    Result Date: 10/20/2020  EXAMINATION: Portable AP ERECT view of the chest. CLINICAL HISTORY:  chest pain and abdominal pain DATE: 10/19/2020 10:54 PM COMPARISONS: 2/15/2020 FINDINGS: There are multiple sternal sutures and mediastinal clips present. The heart is mildly enlarged, unchanged. Mild degree of central vascular congestion, similar last exam. No new infiltrate. Suspect minimal right-sided pleural effusion. There are mild degenerative changes in spine. Mild levoscoliosis dorsal spine. CARDIAC ENLARGEMENT WITH MILD CENTRAL VASCULAR CONGESTION, SIMILAR LAST EXAM. SUSPECT SMALL RIGHT-SIDED PLEURAL EFFUSION. Impression:   Principal Problem:    Chest pain  Active Problems:    Chronic coronary artery disease    Dyslipidemia    Essential hypertension  Resolved Problems:    * No resolved hospital problems.  *    Patient Active Problem List   Diagnosis    Chronic coronary artery disease    Dyslipidemia    Colitis    Essential hypertension    Central abdominal pain    Chest pain         Thank you for the opportunity to participate in the care of your patient. Do not hesitate to call if you have any questions.     Electronically signed by HUMPHREY Neves CNP, [address] on 10/20/2020 at 11:17 AM

## 2020-10-20 NOTE — DISCHARGE SUMMARY
Physician Discharge Summary     Patient ID:  Javi Call  [id number]  30 y.o.  1934    Admit date: 10/19/2020    Discharge date : 10/20/20     Admitting Physician: Rachell Chan MD     Discharge Physician: London Jessica MD     Admission Diagnoses: Chest pain [R07.9]    Discharge Diagnoses: ***    Admission Condition: {condition:30014}    Discharged Condition: {condition:22704}    Hospital Course: ***    Consults: {consultation:70584}    Significant Diagnostic Studies: {diagnostics:14178}    Discharge Exam:  {exam; complete normal and system select:88921}    Labs:   Recent Labs     10/19/20  2303   WBC 7.0   HGB 13.5   HCT 41.7        Recent Labs     10/19/20  2303 10/20/20  0509    140   K 4.2 3.4   * 102   CO2 24 26   BUN 22 19   CREATININE 0.86 0.73   CALCIUM 9.5 9.7     Recent Labs     10/19/20  2303   AST 27   ALT 19   BILITOT 0.3   ALKPHOS 66     No results for input(s): INR in the last 72 hours. Recent Labs     10/19/20  2303 10/20/20  0509 10/20/20  0812   TROPONINI <0.010 <0.010 <0.010       Urinalysis:   Lab Results   Component Value Date    NITRU Negative 10/19/2020    WBCUA 0-2 10/19/2020    BACTERIA Negative 10/19/2020    RBCUA 0-2 10/19/2020    BLOODU Negative 10/19/2020    SPECGRAV 1.007 10/19/2020    GLUCOSEU Negative 10/19/2020       Radiology:   Most recent    Chest CT      WITH CONTRAST:No results found for this or any previous visit. WITHOUT CONTRAST: No results found for this or any previous visit. CXR      2-view:   Results for orders placed during the hospital encounter of 19   XR CHEST STANDARD (2 VW)    Narrative Patient MRN: [id number]    : 1934    Age:  80 years    Gender: Female    Order Date: 2019 1:00 AM.     Exam: XR CHEST (2 VW)    Number of Views: 3     Indication:  25-year-old female with cough and chest tightness.     Comparison: 2017    Findings: Stable, enlarged cardiomediastinal silhouette, thoracic aortic vascular calcifications underlying central pulmonary vascular congestion. No infiltrate/pneumonia pneumothorax. No pleural effusion. Impression Impression:    1. Stable, enlarged cardiomediastinal silhouette with underlying central pulmonary vascular congestion. 2. Vascular calcifications thoracic aorta. Portable:   Results for orders placed during the hospital encounter of 10/19/20   XR CHEST PORTABLE    Narrative EXAMINATION: Portable AP ERECT view of the chest.    CLINICAL HISTORY:  chest pain and abdominal pain    DATE: 10/19/2020 10:54 PM    COMPARISONS: 2/15/2020    FINDINGS: There are multiple sternal sutures and mediastinal clips present. The heart is mildly enlarged, unchanged. Mild degree of central vascular congestion, similar last exam. No new infiltrate. Suspect minimal right-sided pleural effusion. There   are mild degenerative changes in spine. Mild levoscoliosis dorsal spine. Impression CARDIAC ENLARGEMENT WITH MILD CENTRAL VASCULAR CONGESTION, SIMILAR LAST EXAM.    SUSPECT SMALL RIGHT-SIDED PLEURAL EFFUSION. Echo No results found for this or any previous visit.     Disposition: {disposition:26649}    In process/preliminary results:  Outstanding Order Results     Date and Time Order Name Status Description    10/20/2020 0509 LAMOTRIGINE LEVEL In process     10/20/2020 0139 EKG 12 lead Preliminary           Patient Instructions:   Current Discharge Medication List      CONTINUE these medications which have NOT CHANGED    Details   calcium carbonate (OSCAL) 500 MG TABS tablet Take 1,200 mg by mouth daily      Bacillus Coagulans-Inulin (ALIGN PREBIOTIC-PROBIOTIC PO) Take by mouth Unsure of dose      sacubitril-valsartan (ENTRESTO) 24-26 MG per tablet Take 1 tablet by mouth 2 times daily      aspirin 81 MG tablet Take 81 mg by mouth daily      acetaminophen (TYLENOL) 325 MG tablet Take 650 mg by mouth every 6 hours as needed for Pain      loratadine (CLARITIN) 10 MG capsule Take 10 mg by mouth daily      nitroGLYCERIN (NITROSTAT) 0.4 MG SL tablet Place 0.4 mg under the tongue every 5 minutes as needed for Chest pain up to max of 3 total doses. If no relief after 1 dose, call 911. rosuvastatin (CRESTOR) 10 MG tablet Take 20 mg by mouth daily       lamoTRIgine (LAMICTAL) 100 MG tablet Take 100 mg by mouth 2 times daily       metoprolol succinate (TOPROL XL) 50 MG extended release tablet Take 50 mg by mouth daily      vitamin D (CHOLECALCIFEROL) 1000 UNIT TABS tablet Take 2,000 Units by mouth daily       tapentadol (NUCYNTA) 50 MG TABS Take 50 mg by mouth 3 times daily . STOP taking these medications       docusate sodium (COLACE) 100 MG capsule Comments:   Reason for Stopping:         hydrOXYzine (ATARAX) 25 MG tablet Comments:   Reason for Stopping:         losartan (COZAAR) 50 MG tablet Comments:   Reason for Stopping:         clopidogrel (PLAVIX) 75 MG tablet Comments:   Reason for Stopping:         hydrochlorothiazide (HYDRODIURIL) 25 MG tablet Comments:   Reason for Stopping:         isosorbide mononitrate (IMDUR) 30 MG extended release tablet Comments:   Reason for Stopping:             Activity: {discharge activity:10745}  Diet: {diet:35949}  Wound Care: {wound care:70581}    Follow-up with *** in {0-10:94165} {units:11}.     DC time 35 minutes    Signed:  Electronically signed by Dagoberto Laurent MD on 10/20/2020 at 5:36 PM

## 2020-10-20 NOTE — H&P
[facility]    HISTORY AND PHYSICAL EXAM    PATIENT NAME:  Cristo Wiley    MRN:  [id number]  SERVICE DATE:  10/20/2020   SERVICE TIME:  1:33 AM    Primary Care Physician: Jhon Mckee MD         SUBJECTIVE  CHIEF COMPLAINT: Chest pain    HPI: Patient states she came to the ER because she had chest pain. Patient states that it was a  7/10 sharp pain in the left lower chest and left upper abdomen quadrant that radiated to the back. While in the ER patient was given a GI cocktail which relieved the pain slightly. Shortly afterwards she had a bowel movement in the ER and she reports that the pain went away after the bowel movement. Patient also states that she has had a cough all day today that she relates to her allergies. She states she takes Claritin for the cough when needed. Patient denies chest pain, shortness of breath, or cough at this time. Patient denies any abdominal pain, fever, nausea, or vomiting. Patient admits to occasional constipation but none currently. Patient has a significant past cardiac history of CAD with CABG 20 to 30 years ago and cardiac stents. She also has a history of hypertension and hyperlipidemia. PT reports past history of seizures 20 yrs ago. Patient is not able to remember all of her meds at this time. Family will gather her meds from home and bring them in the morning. This Mrs. Rio Lyle did her chest x-ray really show anything that you saw me and really means got past medical history remember having direct cardiac history but mild cardiomegaly possible vascular  PAST MEDICAL HISTORY:    Past Medical History:   Diagnosis Date    Diverticulitis     Hyperlipidemia     Hypertension      PAST SURGICAL HISTORY:    Past Surgical History:   Procedure Laterality Date    APPENDECTOMY      CARDIAC SURGERY      CHOLECYSTECTOMY      JOINT REPLACEMENT       FAMILY HISTORY:  History reviewed. No pertinent family history.   SOCIAL HISTORY:    Social History daily    Historical Provider, MD   nitroGLYCERIN (NITROSTAT) 0.4 MG SL tablet Place 0.4 mg under the tongue every 5 minutes as needed for Chest pain up to max of 3 total doses. If no relief after 1 dose, call 911. Historical Provider, MD   rosuvastatin (CRESTOR) 10 MG tablet Take 10 mg by mouth nightly    Historical Provider, MD   lamoTRIgine (LAMICTAL) 100 MG tablet Take 200 mg by mouth 2 times daily    Historical Provider, MD   losartan (COZAAR) 50 MG tablet Take 50 mg by mouth daily    Historical Provider, MD   metoprolol succinate (TOPROL XL) 50 MG extended release tablet Take 50 mg by mouth daily    Historical Provider, MD   vitamin D (CHOLECALCIFEROL) 1000 UNIT TABS tablet Take 1,000 Units by mouth daily    Historical Provider, MD   tapentadol (NUCYNTA) 50 MG TABS Take 50 mg by mouth 3 times daily . Historical Provider, MD   clopidogrel (PLAVIX) 75 MG tablet Take 75 mg by mouth daily    Historical Provider, MD   hydrochlorothiazide (HYDRODIURIL) 25 MG tablet Take 25 mg by mouth daily    Historical Provider, MD   isosorbide mononitrate (IMDUR) 30 MG extended release tablet Take 30 mg by mouth daily    Historical Provider, MD       ALLERGIES: Ezetimibe-simvastatin    REVIEW OF SYSTEM:   Review of Systems   Constitutional: Negative for appetite change, fatigue, fever and unexpected weight change. HENT: Negative for congestion, rhinorrhea and sore throat. Eyes: Negative. Respiratory: Positive for cough and shortness of breath. Negative for wheezing. Cardiovascular: Positive for chest pain. Negative for palpitations and leg swelling. Gastrointestinal: Positive for abdominal pain (LUQ). Negative for nausea and vomiting. Endocrine: Negative. Genitourinary: Negative for difficulty urinating, dysuria and pelvic pain. Musculoskeletal: Positive for back pain (Left thoracic). Skin: Negative. Allergic/Immunologic: Negative. Neurological: Negative. Hematological: Negative. Psychiatric/Behavioral: Negative. OBJECTIVE  PHYSICAL EXAM: BP (!) 147/71   Pulse 83   Temp 98 °F (36.7 °C) (Oral)   Resp 28   Ht 4' 2\" (1.27 m)   Wt 131 lb (59.4 kg)   SpO2 97%   BMI 36.84 kg/m²     Physical Exam  Vitals signs and nursing note reviewed. Constitutional:       General: She is not in acute distress. Appearance: She is well-developed. HENT:      Right Ear: External ear normal.      Left Ear: External ear normal.      Nose: Nose normal.   Eyes:      Pupils: Pupils are equal, round, and reactive to light. Neck:      Musculoskeletal: Normal range of motion. Cardiovascular:      Rate and Rhythm: Normal rate and regular rhythm. Pulses: Normal pulses. Pulmonary:      Effort: Pulmonary effort is normal. No respiratory distress. Breath sounds: Normal breath sounds. No decreased air movement. No wheezing, rhonchi or rales. Chest:      Chest wall: Tenderness (Left lower ribs) present. Abdominal:      General: Bowel sounds are normal. There is no distension. Palpations: Abdomen is soft. There is no mass. Tenderness: There is no abdominal tenderness. There is no guarding or rebound. Hernia: No hernia is present. Musculoskeletal: Normal range of motion. Right lower leg: No edema. Left lower leg: No edema. Skin:     General: Skin is warm and dry. Capillary Refill: Capillary refill takes less than 2 seconds. Neurological:      Mental Status: She is alert and oriented to person, place, and time. Psychiatric:         Mood and Affect: Mood normal.           DATA:     Diagnostic tests reviewed for today's visit:    Most recent labs and imaging results reviewed.      LABS:    Recent Results (from the past 24 hour(s))   Comprehensive Metabolic Panel    Collection Time: 10/19/20 11:03 PM   Result Value Ref Range    Sodium 143 135 - 144 mEq/L    Potassium 4.2 3.4 - 4.9 mEq/L    Chloride 108 (H) 95 - 107 mEq/L    CO2 24 20 - 31 mEq/L    Anion Gap 11 9 - 15 mEq/L    Glucose 124 (H) 70 - 99 mg/dL    BUN 22 8 - 23 mg/dL    CREATININE 0.86 0.50 - 0.90 mg/dL    GFR Non-African American >60.0 >60    GFR  >60.0 >60    Calcium 9.5 8.5 - 9.9 mg/dL    Total Protein 6.3 6.3 - 8.0 g/dL    Alb 4.0 3.5 - 4.6 g/dL    Total Bilirubin 0.3 0.2 - 0.7 mg/dL    Alkaline Phosphatase 66 40 - 130 U/L    ALT 19 0 - 33 U/L    AST 27 0 - 35 U/L    Globulin 2.3 2.3 - 3.5 g/dL   CBC Auto Differential    Collection Time: 10/19/20 11:03 PM   Result Value Ref Range    WBC 7.0 4.8 - 10.8 K/uL    RBC 4.45 4.20 - 5.40 M/uL    Hemoglobin 13.5 12.0 - 16.0 g/dL    Hematocrit 41.7 37.0 - 47.0 %    MCV 93.7 82.0 - 100.0 fL    MCH 30.4 27.0 - 31.3 pg    MCHC 32.5 (L) 33.0 - 37.0 %    RDW 14.9 (H) 11.5 - 14.5 %    Platelets 468 300 - 568 K/uL    Neutrophils % 69.9 %    Lymphocytes % 18.7 %    Monocytes % 8.7 %    Eosinophils % 2.2 %    Basophils % 0.5 %    Neutrophils Absolute 4.9 1.4 - 6.5 K/uL    Lymphocytes Absolute 1.3 1.0 - 4.8 K/uL    Monocytes Absolute 0.6 0.2 - 0.8 K/uL    Eosinophils Absolute 0.2 0.0 - 0.7 K/uL    Basophils Absolute 0.0 0.0 - 0.2 K/uL   Lipase    Collection Time: 10/19/20 11:03 PM   Result Value Ref Range    Lipase 30 12 - 95 U/L   Troponin    Collection Time: 10/19/20 11:03 PM   Result Value Ref Range    Troponin <0.010 0.000 - 0.010 ng/mL   Brain Natriuretic Peptide    Collection Time: 10/19/20 11:03 PM   Result Value Ref Range    Pro-BNP 4,727 pg/mL       IMAGING:  No results found. VTE Prophylaxis: low molecular weight heparin -  start    ASSESSMENT AND PLAN    Principal Problem:     1) Chest pain: Reported chest pain by patient. Located lower left rib and left upper abdomen quadrant. Pain reproducible on palpation. Initial troponin negative. EKG shows first-degree AV block with multiple PVCs.  Chest XR: Cardiomegaly and possible vascular congestion  We will consult cardiology, get echo, place patient on telemetry monitoring, get D-dimer, cycle troponin, and get EKG daily. Active Problems:     2) Chronic coronary artery disease: Patient has history of CABG and 4 cardiac stents. Patient on aspirin and Plavix. We will consult cardiology, place patient on telemetry monitoring, cycle troponin, and get EKG daily. We will resume home meds. 3) Essential hypertension: Controlled with oral medication at home. We will resume home meds. Will monitor blood pressure regularly. 4) Dyslipidemia: Controlled with oral medication at home. We will resume home meds. 5) Seizures: Patient reports she has a past history of seizures 20 to 30 years ago. PT takes lamictal  We will resume home med and get lamictal level. .        Plan of care discussed with: patient    SIGNATURE: Jay Jay Maldonado RN, NP  DATE: October 20, 2020  TIME: 1:33 AM     CHARLINE Jensen MD - supervising physician

## 2020-10-20 NOTE — DISCHARGE INSTR - COC
Continuity of Care Form    Patient Name: Kassy Aragon   :  1934  MRN:  [id number]    Admit date:  10/19/2020  Discharge date:  ***    Code Status Order: Prior   Advance Directives:     Admitting Physician:  No admitting provider for patient encounter. PCP: Noble Burden MD    Discharging Nurse: [facility] Unit/Room#:   Discharging Unit Phone Number: ***    Emergency Contact:   Extended Emergency Contact Information  Primary Emergency Contact: Gifty Juan  Address: [address] [place] [address], [address] [place] [address] Phone: [phone]  Relation: Child  Secondary Emergency Contact: [address] [place] [address] Phone: [phone]  Relation: Child    Past Surgical History:  Past Surgical History:   Procedure Laterality Date    APPENDECTOMY      CARDIAC SURGERY      CHOLECYSTECTOMY      JOINT REPLACEMENT         Immunization History: There is no immunization history on file for this patient. Active Problems:  Patient Active Problem List   Diagnosis Code    Chronic coronary artery disease I25.10    Dyslipidemia E78.5    Colitis K52.9    Essential hypertension I10    Central abdominal pain R10.9       Isolation/Infection:   Isolation          No Isolation        Patient Infection Status     None to display          Nurse Assessment:  Last Vital Signs: There were no vitals taken for this visit.     Last documented pain score (0-10 scale):    Last Weight:   Wt Readings from Last 1 Encounters:   20 174 lb (78.9 kg)     Mental Status:  {IP PT MENTAL STATUS:66711}    IV Access:  { KATALINA IV ACCESS:972072066}    Nursing Mobility/ADLs:  Walking   {P DME AZP}  Transfer  {P DME GFNI:202720428}  Bathing  {CHP DME OKHE:924673516}  Dressing  {CHP DME IVGT:528840917}  Toileting  {CHP DME LNTS:353371227}  Feeding  {CHP DME RTTS:431794727}  Med Admin  {CHP DME CTJY:498852214}  Med Delivery   { KATALINA MED Delivery:551818708}    Wound transferring to Rehab): {Prognosis:7627777669}    Recommended Labs or Other Treatments After Discharge: ***    Physician Certification: I certify the above information and transfer of Cristo Wiley  is necessary for the continuing treatment of the diagnosis listed and that she requires {Admit to Appropriate Level of Care:37744} for {GREATER/LESS:497417885} 30 days.      Update Admission H&P: {CHP DME Changes in GFAKV:181332058}    PHYSICIAN SIGNATURE:  {Esignature:074473058}

## 2020-10-22 LAB — LAMOTRIGINE LEVEL: <0.9 UG/ML (ref 2.5–15)

## 2020-11-03 ENCOUNTER — APPOINTMENT (OUTPATIENT)
Dept: CT IMAGING | Age: 85
DRG: 293 | End: 2020-11-03
Payer: MEDICARE

## 2020-11-03 ENCOUNTER — APPOINTMENT (OUTPATIENT)
Dept: GENERAL RADIOLOGY | Age: 85
DRG: 293 | End: 2020-11-03
Payer: MEDICARE

## 2020-11-03 ENCOUNTER — HOSPITAL ENCOUNTER (INPATIENT)
Age: 85
LOS: 1 days | Discharge: HOME HEALTH | DRG: 293 | End: 2020-11-04
Attending: STUDENT IN AN ORGANIZED HEALTH CARE EDUCATION/TRAINING PROGRAM | Admitting: INTERNAL MEDICINE
Payer: MEDICARE

## 2020-11-03 PROBLEM — J96.01 ACUTE RESPIRATORY FAILURE WITH HYPOXIA (HCC): Status: ACTIVE | Noted: 2020-11-03

## 2020-11-03 LAB
ALBUMIN SERPL-MCNC: 4 G/DL (ref 3.5–4.6)
ALP BLD-CCNC: 92 U/L (ref 40–130)
ALT SERPL-CCNC: 48 U/L (ref 0–33)
ANION GAP SERPL CALCULATED.3IONS-SCNC: 12 MEQ/L (ref 9–15)
APTT: 29.4 SEC (ref 24.4–36.8)
AST SERPL-CCNC: 50 U/L (ref 0–35)
BACTERIA: NEGATIVE /HPF
BASOPHILS ABSOLUTE: 0 K/UL (ref 0–0.2)
BASOPHILS RELATIVE PERCENT: 0.7 %
BILIRUB SERPL-MCNC: 0.4 MG/DL (ref 0.2–0.7)
BILIRUBIN URINE: NEGATIVE
BLOOD, URINE: NEGATIVE
BUN BLDV-MCNC: 18 MG/DL (ref 8–23)
C-REACTIVE PROTEIN, HIGH SENSITIVITY: 2.8 MG/L (ref 0–5)
CALCIUM SERPL-MCNC: 9.1 MG/DL (ref 8.5–9.9)
CHLORIDE BLD-SCNC: 104 MEQ/L (ref 95–107)
CLARITY: CLEAR
CO2: 22 MEQ/L (ref 20–31)
COLOR: ABNORMAL
CREAT SERPL-MCNC: 0.89 MG/DL (ref 0.5–0.9)
EKG ATRIAL RATE: 80 BPM
EKG P AXIS: 50 DEGREES
EKG P-R INTERVAL: 224 MS
EKG Q-T INTERVAL: 400 MS
EKG QRS DURATION: 134 MS
EKG QTC CALCULATION (BAZETT): 461 MS
EKG R AXIS: -14 DEGREES
EKG T AXIS: 159 DEGREES
EKG VENTRICULAR RATE: 80 BPM
EOSINOPHILS ABSOLUTE: 0.1 K/UL (ref 0–0.7)
EOSINOPHILS RELATIVE PERCENT: 1.8 %
EPITHELIAL CELLS, UA: ABNORMAL /HPF (ref 0–5)
GFR AFRICAN AMERICAN: >60
GFR NON-AFRICAN AMERICAN: >60
GLOBULIN: 2.7 G/DL (ref 2.3–3.5)
GLUCOSE BLD-MCNC: 136 MG/DL (ref 70–99)
GLUCOSE URINE: NEGATIVE MG/DL
HCT VFR BLD CALC: 38.5 % (ref 37–47)
HEMOGLOBIN: 12.7 G/DL (ref 12–16)
KETONES, URINE: NEGATIVE MG/DL
LACTIC ACID: 1.4 MMOL/L (ref 0.5–2.2)
LEUKOCYTE ESTERASE, URINE: NEGATIVE
LIPASE: 21 U/L (ref 12–95)
LYMPHOCYTES ABSOLUTE: 1.2 K/UL (ref 1–4.8)
LYMPHOCYTES RELATIVE PERCENT: 19.9 %
MAGNESIUM: 2.3 MG/DL (ref 1.7–2.4)
MCH RBC QN AUTO: 31 PG (ref 27–31.3)
MCHC RBC AUTO-ENTMCNC: 33.1 % (ref 33–37)
MCV RBC AUTO: 93.8 FL (ref 82–100)
MONOCYTES ABSOLUTE: 0.4 K/UL (ref 0.2–0.8)
MONOCYTES RELATIVE PERCENT: 7.1 %
NEUTROPHILS ABSOLUTE: 4.4 K/UL (ref 1.4–6.5)
NEUTROPHILS RELATIVE PERCENT: 70.5 %
NITRITE, URINE: NEGATIVE
PDW BLD-RTO: 14.8 % (ref 11.5–14.5)
PH UA: 5 (ref 5–9)
PLATELET # BLD: 180 K/UL (ref 130–400)
POC CREATININE WHOLE BLOOD: 1
POTASSIUM SERPL-SCNC: 4.5 MEQ/L (ref 3.4–4.9)
PRO-BNP: 5055 PG/ML
PROCALCITONIN: 0.07 NG/ML (ref 0–0.15)
PROTEIN UA: 30 MG/DL
RBC # BLD: 4.1 M/UL (ref 4.2–5.4)
RBC UA: ABNORMAL /HPF (ref 0–5)
SODIUM BLD-SCNC: 138 MEQ/L (ref 135–144)
SPECIFIC GRAVITY UA: 1.08 (ref 1–1.03)
TOTAL CK: 80 U/L (ref 0–170)
TOTAL PROTEIN: 6.7 G/DL (ref 6.3–8)
TROPONIN: <0.01 NG/ML (ref 0–0.01)
TSH SERPL DL<=0.05 MIU/L-ACNC: 3.69 UIU/ML (ref 0.44–3.86)
URINE REFLEX TO CULTURE: ABNORMAL
UROBILINOGEN, URINE: 0.2 E.U./DL
WBC # BLD: 6.2 K/UL (ref 4.8–10.8)
WBC UA: ABNORMAL /HPF (ref 0–5)

## 2020-11-03 PROCEDURE — 85025 COMPLETE CBC W/AUTO DIFF WBC: CPT

## 2020-11-03 PROCEDURE — 80053 COMPREHEN METABOLIC PANEL: CPT

## 2020-11-03 PROCEDURE — 85730 THROMBOPLASTIN TIME PARTIAL: CPT

## 2020-11-03 PROCEDURE — 1210000000 HC MED SURG R&B

## 2020-11-03 PROCEDURE — 2580000003 HC RX 258: Performed by: NURSE PRACTITIONER

## 2020-11-03 PROCEDURE — 83605 ASSAY OF LACTIC ACID: CPT

## 2020-11-03 PROCEDURE — 6360000004 HC RX CONTRAST MEDICATION: Performed by: STUDENT IN AN ORGANIZED HEALTH CARE EDUCATION/TRAINING PROGRAM

## 2020-11-03 PROCEDURE — 99221 1ST HOSP IP/OBS SF/LOW 40: CPT | Performed by: NURSE PRACTITIONER

## 2020-11-03 PROCEDURE — 6370000000 HC RX 637 (ALT 250 FOR IP): Performed by: NURSE PRACTITIONER

## 2020-11-03 PROCEDURE — 6360000002 HC RX W HCPCS: Performed by: NURSE PRACTITIONER

## 2020-11-03 PROCEDURE — 94762 N-INVAS EAR/PLS OXIMTRY CONT: CPT

## 2020-11-03 PROCEDURE — 83880 ASSAY OF NATRIURETIC PEPTIDE: CPT

## 2020-11-03 PROCEDURE — 99285 EMERGENCY DEPT VISIT HI MDM: CPT

## 2020-11-03 PROCEDURE — 71046 X-RAY EXAM CHEST 2 VIEWS: CPT

## 2020-11-03 PROCEDURE — 82550 ASSAY OF CK (CPK): CPT

## 2020-11-03 PROCEDURE — 86141 C-REACTIVE PROTEIN HS: CPT

## 2020-11-03 PROCEDURE — 36415 COLL VENOUS BLD VENIPUNCTURE: CPT

## 2020-11-03 PROCEDURE — 81001 URINALYSIS AUTO W/SCOPE: CPT

## 2020-11-03 PROCEDURE — 93005 ELECTROCARDIOGRAM TRACING: CPT | Performed by: STUDENT IN AN ORGANIZED HEALTH CARE EDUCATION/TRAINING PROGRAM

## 2020-11-03 PROCEDURE — 6360000002 HC RX W HCPCS: Performed by: STUDENT IN AN ORGANIZED HEALTH CARE EDUCATION/TRAINING PROGRAM

## 2020-11-03 PROCEDURE — 83690 ASSAY OF LIPASE: CPT

## 2020-11-03 PROCEDURE — 84145 PROCALCITONIN (PCT): CPT

## 2020-11-03 PROCEDURE — 96376 TX/PRO/DX INJ SAME DRUG ADON: CPT

## 2020-11-03 PROCEDURE — 84443 ASSAY THYROID STIM HORMONE: CPT

## 2020-11-03 PROCEDURE — 96372 THER/PROPH/DIAG INJ SC/IM: CPT

## 2020-11-03 PROCEDURE — 84484 ASSAY OF TROPONIN QUANT: CPT

## 2020-11-03 PROCEDURE — 83735 ASSAY OF MAGNESIUM: CPT

## 2020-11-03 PROCEDURE — 96374 THER/PROPH/DIAG INJ IV PUSH: CPT

## 2020-11-03 PROCEDURE — 74177 CT ABD & PELVIS W/CONTRAST: CPT

## 2020-11-03 PROCEDURE — G0378 HOSPITAL OBSERVATION PER HR: HCPCS

## 2020-11-03 RX ORDER — SODIUM CHLORIDE 0.9 % (FLUSH) 0.9 %
10 SYRINGE (ML) INJECTION PRN
Status: DISCONTINUED | OUTPATIENT
Start: 2020-11-03 | End: 2020-11-04 | Stop reason: HOSPADM

## 2020-11-03 RX ORDER — POLYETHYLENE GLYCOL 3350 17 G/17G
17 POWDER, FOR SOLUTION ORAL DAILY PRN
Status: DISCONTINUED | OUTPATIENT
Start: 2020-11-03 | End: 2020-11-04 | Stop reason: HOSPADM

## 2020-11-03 RX ORDER — ACETAMINOPHEN 325 MG/1
650 TABLET ORAL EVERY 6 HOURS PRN
Status: DISCONTINUED | OUTPATIENT
Start: 2020-11-03 | End: 2020-11-04 | Stop reason: HOSPADM

## 2020-11-03 RX ORDER — ROSUVASTATIN CALCIUM 20 MG/1
20 TABLET, COATED ORAL DAILY
Status: DISCONTINUED | OUTPATIENT
Start: 2020-11-03 | End: 2020-11-04 | Stop reason: HOSPADM

## 2020-11-03 RX ORDER — PROMETHAZINE HYDROCHLORIDE 12.5 MG/1
12.5 TABLET ORAL EVERY 6 HOURS PRN
Status: DISCONTINUED | OUTPATIENT
Start: 2020-11-03 | End: 2020-11-04 | Stop reason: HOSPADM

## 2020-11-03 RX ORDER — ASPIRIN 81 MG/1
81 TABLET, CHEWABLE ORAL DAILY
Status: DISCONTINUED | OUTPATIENT
Start: 2020-11-03 | End: 2020-11-04 | Stop reason: HOSPADM

## 2020-11-03 RX ORDER — VITAMIN B COMPLEX
2000 TABLET ORAL DAILY
Status: DISCONTINUED | OUTPATIENT
Start: 2020-11-03 | End: 2020-11-04 | Stop reason: HOSPADM

## 2020-11-03 RX ORDER — DOCUSATE SODIUM 100 MG/1
100 CAPSULE, LIQUID FILLED ORAL 2 TIMES DAILY PRN
Status: DISCONTINUED | OUTPATIENT
Start: 2020-11-03 | End: 2020-11-04 | Stop reason: HOSPADM

## 2020-11-03 RX ORDER — CALCIUM CARBONATE 500(1250)
1000 TABLET ORAL DAILY
Status: DISCONTINUED | OUTPATIENT
Start: 2020-11-03 | End: 2020-11-04 | Stop reason: HOSPADM

## 2020-11-03 RX ORDER — SODIUM CHLORIDE 0.9 % (FLUSH) 0.9 %
10 SYRINGE (ML) INJECTION EVERY 12 HOURS SCHEDULED
Status: DISCONTINUED | OUTPATIENT
Start: 2020-11-03 | End: 2020-11-04 | Stop reason: HOSPADM

## 2020-11-03 RX ORDER — ACETAMINOPHEN 650 MG/1
650 SUPPOSITORY RECTAL EVERY 6 HOURS PRN
Status: DISCONTINUED | OUTPATIENT
Start: 2020-11-03 | End: 2020-11-04 | Stop reason: HOSPADM

## 2020-11-03 RX ORDER — ONDANSETRON 2 MG/ML
4 INJECTION INTRAMUSCULAR; INTRAVENOUS EVERY 6 HOURS PRN
Status: DISCONTINUED | OUTPATIENT
Start: 2020-11-03 | End: 2020-11-04 | Stop reason: HOSPADM

## 2020-11-03 RX ORDER — METOPROLOL SUCCINATE 50 MG/1
50 TABLET, EXTENDED RELEASE ORAL DAILY
Status: DISCONTINUED | OUTPATIENT
Start: 2020-11-03 | End: 2020-11-04 | Stop reason: HOSPADM

## 2020-11-03 RX ORDER — L. ACIDOPHILUS/L.BULGARICUS 1MM CELL
1 TABLET ORAL 2 TIMES DAILY
Status: DISCONTINUED | OUTPATIENT
Start: 2020-11-03 | End: 2020-11-04 | Stop reason: HOSPADM

## 2020-11-03 RX ORDER — NALOXONE HYDROCHLORIDE 4 MG/.1ML
1 SPRAY NASAL PRN
COMMUNITY

## 2020-11-03 RX ORDER — FUROSEMIDE 10 MG/ML
40 INJECTION INTRAMUSCULAR; INTRAVENOUS 2 TIMES DAILY
Status: DISCONTINUED | OUTPATIENT
Start: 2020-11-03 | End: 2020-11-04

## 2020-11-03 RX ORDER — PANTOPRAZOLE SODIUM 40 MG/1
40 TABLET, DELAYED RELEASE ORAL
Status: DISCONTINUED | OUTPATIENT
Start: 2020-11-04 | End: 2020-11-04 | Stop reason: HOSPADM

## 2020-11-03 RX ORDER — ACETAMINOPHEN 325 MG/1
650 TABLET ORAL EVERY 6 HOURS PRN
Status: DISCONTINUED | OUTPATIENT
Start: 2020-11-03 | End: 2020-11-03 | Stop reason: SDUPTHER

## 2020-11-03 RX ORDER — OXYCODONE HYDROCHLORIDE 5 MG/1
5 TABLET ORAL 3 TIMES DAILY
Status: DISCONTINUED | OUTPATIENT
Start: 2020-11-03 | End: 2020-11-04 | Stop reason: HOSPADM

## 2020-11-03 RX ORDER — DOCUSATE SODIUM 100 MG/1
100 CAPSULE, LIQUID FILLED ORAL 2 TIMES DAILY PRN
COMMUNITY

## 2020-11-03 RX ORDER — LAMOTRIGINE 100 MG/1
100 TABLET ORAL 2 TIMES DAILY
Status: DISCONTINUED | OUTPATIENT
Start: 2020-11-03 | End: 2020-11-04 | Stop reason: HOSPADM

## 2020-11-03 RX ORDER — FUROSEMIDE 10 MG/ML
20 INJECTION INTRAMUSCULAR; INTRAVENOUS ONCE
Status: COMPLETED | OUTPATIENT
Start: 2020-11-03 | End: 2020-11-03

## 2020-11-03 RX ADMIN — IOPAMIDOL 100 ML: 612 INJECTION, SOLUTION INTRAVENOUS at 10:54

## 2020-11-03 RX ADMIN — OXYCODONE 5 MG: 5 TABLET ORAL at 21:35

## 2020-11-03 RX ADMIN — Medication 10 ML: at 21:36

## 2020-11-03 RX ADMIN — FUROSEMIDE 40 MG: 10 INJECTION, SOLUTION INTRAMUSCULAR; INTRAVENOUS at 16:51

## 2020-11-03 RX ADMIN — LAMOTRIGINE 100 MG: 100 TABLET ORAL at 21:35

## 2020-11-03 RX ADMIN — SACUBITRIL AND VALSARTAN 1 TABLET: 24; 26 TABLET, FILM COATED ORAL at 21:36

## 2020-11-03 RX ADMIN — LACTOBACILLUS TAB 1 TABLET: TAB at 21:35

## 2020-11-03 RX ADMIN — FUROSEMIDE 20 MG: 10 INJECTION, SOLUTION INTRAMUSCULAR; INTRAVENOUS at 11:57

## 2020-11-03 RX ADMIN — ENOXAPARIN SODIUM 40 MG: 40 INJECTION SUBCUTANEOUS at 16:51

## 2020-11-03 ASSESSMENT — ENCOUNTER SYMPTOMS
WHEEZING: 0
SHORTNESS OF BREATH: 0
DIARRHEA: 0
VOMITING: 1
TROUBLE SWALLOWING: 0
BACK PAIN: 0
CHEST TIGHTNESS: 0
ABDOMINAL PAIN: 1
CONSTIPATION: 0
SINUS PRESSURE: 0
VOMITING: 0
SHORTNESS OF BREATH: 1
ALLERGIC/IMMUNOLOGIC NEGATIVE: 1
EYES NEGATIVE: 1
ABDOMINAL PAIN: 0
SORE THROAT: 0
SINUS PAIN: 0
NAUSEA: 1
COUGH: 0
BLOOD IN STOOL: 0
ANAL BLEEDING: 0

## 2020-11-03 ASSESSMENT — PAIN SCALES - GENERAL
PAINLEVEL_OUTOF10: 9
PAINLEVEL_OUTOF10: 5

## 2020-11-03 ASSESSMENT — PAIN DESCRIPTION - LOCATION: LOCATION: ABDOMEN

## 2020-11-03 NOTE — ACP (ADVANCE CARE PLANNING)
Advance Care Planning     Advance Care Planning Activator (Inpatient)  Conversation Note      Date of ACP Conversation: 11/3/2020    Conversation Conducted with: Patient and son Kelsie Simmons at bedside. ACP Activator: Alice Lizarraga    *When Decision Maker makes decisions on behalf of the incapacitated patient: Decision Maker is asked to consider and make decisions based on patient values, known preferences, or best interests. Health Care Decision Maker: Living Will and HC-POA provided by family in ER; given to registration to input into Epic system. Current Designated Health Care Decision Maker:   (If there is a valid [address] named in the prollie Makers\" box in the ACP activity, but it is not visible above, be sure to open that field and then select the health care decision maker relationship (ie \"primary\") in the blank space to the right of the name.) Validate  this information as still accurate & up-to-date; edit [address] field as needed.)    Note: Assess and validate information in current ACP documents, as indicated. If no Decision Maker listed above or available through scanned documents, then:    If no Authorized Decision Maker has previously been identified, then patient chooses [address]:  \"Who would you like to name as your primary health care decision-maker? \"               Name: Venus Jacinto          Relationship: Daughter            Phone number: [phone]  [employer] this person be reached easily? \" Yes     \"Who would you like to name as your back-up decision maker? \"   Name: Joseluis Morales          Relationship: Son            Phone number: [phone]  Spl[employer] this person be reached easily? \" Yes    Note: If the relationship of these Decision-Makers to the patient does NOT follow your state's Next of Kin hierarchy, recommend that patient complete ACP document that meets state-specific requirements to allow them to act on the patient's behalf when appropriate. Care Preferences    Ventilation: \"If you were in your present state of health and suddenly became very ill and were unable to breathe on your own, what would your preference be about the use of a ventilator (breathing machine) if it were available to you? \"      Would the patient desire the use of ventilator (breathing machine)?: no    \"If your health worsens and it becomes clear that your chance of recovery is unlikely, what would your preference be about the use of a ventilator (breathing machine) if it were available to you? \"     Would the patient desire the use of ventilator (breathing machine)?: No      Resuscitation  \"CPR works best to restart the heart when there is a sudden event, like a heart attack, in someone who is otherwise healthy. Unfortunately, CPR does not typically restart the heart for people who have serious health conditions or who are very sick. \"    \"In the event your heart stopped as a result of an underlying serious health condition, would you want attempts to be made to restart your heart (answer \"yes\" for attempt to resuscitate) or would you prefer a natural death (answer \"no\" for do not attempt to resuscitate)? \" no      NOTE: If the patient has a valid advance directive AND now provides care preference(s) that are inconsistent with that prior directive, advise the patient to consider either: creating a new advance directive that complies with state-specific requirements; or, if that is not possible, orally revoking that prior directive in accordance with state-specific requirements, which must be documented in the EHR. [x] Yes   [] No   Educated Patient / [employer] regarding differences between Advance Directives and portable DNR orders.     Length of ACP Conversation in minutes:      Conversation Outcomes:  [x] ACP discussion completed  [x] Existing advance directive reviewed with patient; no changes to patient's previously recorded wishes  [] New Advance Directive completed  [] Portable Do Not Rescitate prepared for Provider review and signature  [] POLST/POST/MOLST/MOST prepared for Provider review and signature      Follow-up plan:    [] Schedule follow-up conversation to continue planning  [] Referred individual to Provider for additional questions/concerns   [] Advised patient/agent/surrogate to review completed ACP document and update if needed with changes in condition, patient preferences or care setting    [] This note routed to one or more involved healthcare providers      ACP documents provided by family, given to registration to scan into Epic.

## 2020-11-03 NOTE — ED PROVIDER NOTES
[address] [facility] ED  eMERGENCY dEPARTMENT eNCOUnter      Pt Name: Tico Menon  MRN: [id number]  [place] 1934  Date of evaluation: 11/3/2020  Provider: Laveda Collet, [address] [place] [address]       Chief Complaint   Patient presents with    Shortness of Breath    Abdominal Pain     with vomiting          HISTORY OF PRESENT ILLNESS   (Location/Symptom, Timing/Onset,Context/Setting, Quality, Duration, Modifying Factors, Severity)  Note limiting factors. Tico Menon is a 80 y.o. female who presents to the emergency department with complaint of shortness of breath and abdominal pain. Patient vomited once today. Patient son states the patient's been progressively getting short of breath for the past 1 to 2 months and has been worsening. He states the patient wakes up at 3 in the morning extremely short of breath feeling like she is choking to death. Patient had a recent CAT scan of her chest that shows no blood clot dated 10/20/2020. Patient denies any fever, chills or cough. Patient complains of a dull achy epigastric abdominal pain that started today. States that she vomited once. Patient denies prior episode of abdominal pain that feels like this. Patient states her last bowel movement was yesterday. She had taken a laxative in order to have the bowel movement. Patient denies any black, bloody, tarry stool, or mucus in her stool. On physical exam the patient has JVD, pitting edema bilateral legs, and crackles at both lung bases. Laying flat makes the shortness of breath worse. The history is provided by the patient and a relative. NursingNotes were reviewed. REVIEW OF SYSTEMS    (2-9 systems for level 4, 10 or more for level 5)     Review of Systems   Constitutional: Negative for activity change, appetite change, chills, fever and unexpected weight change. HENT: Negative for drooling, ear pain, nosebleeds, sinus pressure and trouble swallowing.     Respiratory: Positive for shortness of breath. Negative for cough and chest tightness. Orthopnea   Cardiovascular: Positive for leg swelling. Negative for chest pain. Gastrointestinal: Positive for abdominal pain. Negative for anal bleeding, blood in stool, diarrhea and vomiting. Endocrine: Negative for polydipsia and polyphagia. Genitourinary: Negative for dysuria, flank pain and frequency. Musculoskeletal: Negative for back pain and myalgias. Skin: Negative for pallor and rash. Neurological: Negative for syncope, weakness and headaches. Hematological: Does not bruise/bleed easily. All other systems reviewed and are negative. Except as noted above the remainder of the review of systems was reviewed and negative. PAST MEDICAL HISTORY     Past Medical History:   Diagnosis Date    Diverticulitis     Hyperlipidemia     Hypertension          SURGICALHISTORY       Past Surgical History:   Procedure Laterality Date    APPENDECTOMY      CARDIAC SURGERY      CHOLECYSTECTOMY      JOINT REPLACEMENT           CURRENT MEDICATIONS       Previous Medications    ACETAMINOPHEN (TYLENOL) 325 MG TABLET    Take 650 mg by mouth every 6 hours as needed for Pain    ASPIRIN 81 MG TABLET    Take 81 mg by mouth daily    BACILLUS COAGULANS-INULIN (ALIGN PREBIOTIC-PROBIOTIC PO)    Take by mouth Unsure of dose    CALCIUM CARBONATE (OSCAL) 500 MG TABS TABLET    Take 1,200 mg by mouth daily    LAMOTRIGINE (LAMICTAL) 100 MG TABLET    Take 100 mg by mouth 2 times daily     LORATADINE (CLARITIN) 10 MG CAPSULE    Take 10 mg by mouth daily    METOPROLOL SUCCINATE (TOPROL XL) 50 MG EXTENDED RELEASE TABLET    Take 50 mg by mouth daily    NITROGLYCERIN (NITROSTAT) 0.4 MG SL TABLET    Place 0.4 mg under the tongue every 5 minutes as needed for Chest pain up to max of 3 total doses. If no relief after 1 dose, call 911.     ROSUVASTATIN (CRESTOR) 10 MG TABLET    Take 20 mg by mouth daily     SACUBITRIL-VALSARTAN (ENTRESTO) 24-26 MG PER TABLET    Take 1 tablet by mouth 2 times daily    TAPENTADOL (NUCYNTA) 50 MG TABS    Take 50 mg by mouth 3 times daily . VITAMIN D (CHOLECALCIFEROL) 1000 UNIT TABS TABLET    Take 2,000 Units by mouth daily        ALLERGIES     Ezetimibe-simvastatin    FAMILY HISTORY     History reviewed. No pertinent family history.        SOCIAL HISTORY       Social History     Socioeconomic History    Marital status:      Spouse name: None    Number of children: None    Years of education: None    Highest education level: None   Occupational History    None   Social Needs    Financial resource strain: None    Food insecurity     Worry: None     Inability: None    Transportation needs     Medical: None     Non-medical: None   Tobacco Use    Smoking status: Never Smoker    Smokeless tobacco: Never Used   Substance and Sexual Activity    Alcohol use: No    Drug use: No    Sexual activity: None   Lifestyle    Physical activity     Days per week: None     Minutes per session: None    Stress: None   Relationships    Social connections     Talks on phone: None     Gets together: None     Attends Buddhist service: None     Active member of club or organization: None     Attends meetings of clubs or organizations: None     Relationship status: None    Intimate partner violence     Fear of current or ex partner: None     Emotionally abused: None     Physically abused: None     Forced sexual activity: None   Other Topics Concern    None   Social History Narrative    None       SCREENINGS    [place] Coma Scale  Eye Opening: Spontaneous  Best Verbal Response: Oriented  Best Motor Response: Obeys commands  Luigi Coma Scale Score: 15 @FLOW([id number])@      PHYSICAL EXAM    (up to 7 for level 4, 8 or more for level 5)     ED Triage Vitals [11/03/20 1003]   BP Temp Temp Source Pulse Resp SpO2 Height Weight   119/75 96.1 °F (35.6 °C) Temporal 83 19 97 % 4' 11\" (1.499 m) 129 lb (58.5 kg)       Physical Exam  Vitals Tenderness: There is no abdominal tenderness. There is no right CVA tenderness, left CVA tenderness, guarding or rebound. Hernia: No hernia is present. Musculoskeletal: Normal range of motion. General: No swelling, tenderness, deformity or signs of injury. Right lower le+ Pitting Edema present. Left lower le+ Pitting Edema present. Lymphadenopathy:      Head:      Right side of head: No submental adenopathy. Left side of head: No submental adenopathy. Skin:     General: Skin is warm and dry. Capillary Refill: Capillary refill takes less than 2 seconds. Coloration: Skin is not jaundiced or pale. Findings: No bruising, erythema, lesion or rash. Neurological:      General: No focal deficit present. Mental Status: She is alert and oriented to person, place, and time. Mental status is at baseline. Cranial Nerves: No cranial nerve deficit. Sensory: No sensory deficit. Motor: No weakness. Coordination: Coordination normal.      Deep Tendon Reflexes: Reflexes are normal and symmetric. Psychiatric:         Mood and Affect: Mood normal.         Behavior: Behavior normal. Behavior is cooperative. Thought Content: Thought content normal.         Judgment: Judgment normal.         DIAGNOSTIC RESULTS     EKG: All EKG's are interpreted by the Emergency Department Physician who either signs or Co-signsthis chart in the absence of a cardiologist.    EKG: Sinus rhythm with first-degree AV block with normal axis. LVH voltage. T wave inversion in leads I, aVL, V5 and V6. There is slow R wave transition the precordial leads. The QT interval is 400 ms. The QTC is 461 ms. There are no PVCs.     RADIOLOGY:   Non-plain filmimages such as CT, Ultrasound and MRI are read by the radiologist. Plain radiographic images are visualized and preliminarily interpreted by the emergency physician with the below findings:        Interpretation per the Radiologist below, if available at the time ofthis note:    CT ABDOMEN PELVIS W IV CONTRAST Additional Contrast? None   Final Result   1. THERE IS LOW-ATTENUATION, DISTENTION OF THE ENDOMETRIAL CANAL. THIS IS AN ABNORMAL FINDING POSTMENOPAUSAL FEMALE. UNDERLYING MALIGNANCY IS NOT EXCLUDED. WILL NEED FOLLOW-UP AND FURTHER EVALUATION   2. THERE IS A SMALL TO MODERATE PROBABLE SLIDING HIATAL HERNIA. CORRELATE CLINICALLY. OTHER FINDINGS DETAILED ABOVE             CT scans at this facility use dose modulation, iterative reconstruction, and/or weight based dosing when appropriate to reduce radiation dose to as low as reasonably achievable. XR CHEST (2 VW)    (Results Pending)     Chest x-ray: Cardiomegaly, fluid in the fissure, sternotomy wires are present, pulmonary vascular congestion. ED BEDSIDE ULTRASOUND:   Performed by ED Physician - none    LABS:  Labs Reviewed   CBC WITH AUTO DIFFERENTIAL - Abnormal; Notable for the following components:       Result Value    RBC 4.10 (*)     RDW 14.8 (*)     All other components within normal limits   COMPREHENSIVE METABOLIC PANEL - Abnormal; Notable for the following components:    Glucose 136 (*)     ALT 48 (*)     AST 50 (*)     All other components within normal limits   URINE RT REFLEX TO CULTURE - Abnormal; Notable for the following components:    Color, UA DARK YELLOW (*)     Protein, UA 30 (*)     All other components within normal limits   POCT CREATININE - URINE - Normal   LIPASE   APTT   BRAIN NATRIURETIC PEPTIDE   CK   HIGH SENSITIVITY CRP   MAGNESIUM   TROPONIN   TSH WITHOUT REFLEX   PROCALCITONIN   LACTIC ACID, PLASMA   MICROSCOPIC URINALYSIS       All other labs were within normal range or not returned as of this dictation.     EMERGENCY DEPARTMENT COURSE and DIFFERENTIAL DIAGNOSIS/MDM:   Vitals:    Vitals:    11/03/20 1003 11/03/20 1110 11/03/20 1206   BP: 119/75 128/80 138/89   Pulse: 83 75 73   Resp: 19 29 23   Temp: 96.1 °F (35.6 °C)     TempSrc: Temporal     SpO2: 97% 95% 97%   Weight: 129 lb (58.5 kg)     Height: 4' 11\" (1.499 m)         Delay in length of stay secondary delay in CMP processing by lab. MDM  Ovulatory pulse ox patient dropped to 89%. Ambulatory pulse ox was repeated and patient's pulse ox decreased to 87% with a heart rate of 127. Patient does not use supplemental oxygen. The ER physician asked that she use part-time supplemental oxygen such as night and her son states that it is not prescribed to her. CAT scan of the abdomen is unremarkable. Patient has congestive heart failure acute on chronic. Patient states that she is taking Entresto, and thinks that maybe her shortness of breath or her abdominal pain has started since then. On physical exam the patient has hepatojugular reflux, JVD, and pitting edema of the legs. Additionally she has crackles at bilateral lung bases. Spoke with [facility], Dr. Micheline Huerta who states that he will follow the patient in consultation. ER had given the patient a dose of Lasix. Urine spec gravity is 1.081 however there are no ketones. The patient's urine is yellow. The ER physician does not believe this to be a true calculation the specific gravity. The findings were discussed with the hospitalist Dr. Meir Holbrook who accepted the patient. CRITICAL CARE TIME   Total Critical Care time was 36 minutes, excluding separately reportableprocedures. There was a high probability of clinicallysignificant/life threatening deterioration in the patient's condition which required my urgent intervention. CONSULTS:  IP CONSULT TO CARDIOLOGY    PROCEDURES:  Unless otherwise noted below, none     Procedures    FINAL IMPRESSION      1. Acute on chronic systolic CHF (congestive heart failure) ([address]    2. Orthopnea    3. Abdominal pain, epigastric          DISPOSITION/PLAN   DISPOSITION        PATIENT REFERRED TO:  No follow-up provider specified.     DISCHARGE MEDICATIONS:  New Prescriptions    No medications on file          (Please note that portions of this note were completed with a voice recognition program.  Efforts were made to edit the dictations but occasionally words are mis-transcribed.)    Bernice Carver DO (electronically signed)  Attending Emergency Physician         Bernice Carver DO  11/03/20 2439

## 2020-11-03 NOTE — CONSULTS
Palliative Care Consult Note  Patient: Liliya Sandoval  Gender: female  YOB: 1934  Unit/Bed: F443/O042-85  CodeStatus: Full Code  Inpatient Treatment Team: Treatment Team: Attending Provider: Yarely Finch DO; Consulting Physician: Ashley River MD; Ambulatory Care Manager: Sesar Lozada RN; Patient Care Tech: [employer]  Admit Date:  11/3/2020    Chief Complaint: shortness of breath    History of Presenting Illness:        Liliya Sandoval is a 80 y.o. female on hospital day 0 with a history of HTN, HLN, diverticulitis,CHF, CAD s/p CABG and stent placement, GERD,seizures. Patient presented to the E.D on 11/3 with complains of SOB, abdominal pain and vomiting. Patient had CT scan of abdomen/pelvis-noted low attenuation of the endometrial canal and small to moderate probable hiatal hernia. CXR shows pulmonary vascular congestion/increased interstitial markings. Consulted cardiology. Patient presented today alert and oriented x3 and in NAD. She reports that she lives with her  and daughter, however daughter just recently purchased a home and is planning to move out. Patient is able to walk with walker. No recent falls. Continent of bowel/bladder. She denies any pain this visit. Denies any increased SOB, fever, CP, cough or fatigue. Positive for abdominal discomfort and occasional nausea . Positive for emesis x 1 today. Has GI follow up scheduled as outpatient. UA was negative. Ck -80,CRP-2.8,procalcitonin 0.07. Discussed with patient in detail palliative care concept. Answered all questions and she is in agreement to be followed by palliative care as outpatient. Review of Systems:       Review of Systems   Constitutional: Negative for chills, fatigue, fever and unexpected weight change. HENT: Negative for congestion, mouth sores, sinus pain, sore throat and trouble swallowing. Respiratory: Negative for cough, chest tightness, shortness of breath and wheezing. Cardiovascular: Positive for leg swelling. Negative for chest pain and palpitations. Gastrointestinal: Positive for nausea. Negative for abdominal pain, constipation, diarrhea and vomiting. Endocrine: Negative for polydipsia, polyphagia and polyuria. Genitourinary: Negative for dysuria, flank pain, frequency and urgency. Musculoskeletal: Negative for arthralgias, back pain, gait problem, joint swelling and myalgias. Skin: Negative. Neurological: Positive for weakness. Negative for tremors, seizures, speech difficulty, numbness and headaches. Psychiatric/Behavioral: Negative for agitation, confusion, sleep disturbance and suicidal ideas. The patient is not nervous/anxious. Physical Examination:       /64   Pulse 76   Temp 97.2 °F (36.2 °C)   Resp 22   Ht 4' 11\" (1.499 m)   Wt 129 lb (58.5 kg)   SpO2 100%   BMI 26.05 kg/m²    Physical Exam  Constitutional:       Appearance: She is well-developed. HENT:      Head: Normocephalic and atraumatic. Eyes:      Pupils: Pupils are equal, round, and reactive to light. Neck:      Musculoskeletal: Normal range of motion and neck supple. Cardiovascular:      Rate and Rhythm: Normal rate and regular rhythm. Heart sounds: No murmur. No friction rub. No gallop. Pulmonary:      Effort: Pulmonary effort is normal.      Breath sounds: Normal breath sounds. No wheezing or rales. Chest:      Chest wall: No tenderness. Abdominal:      General: Bowel sounds are normal. There is no distension. Palpations: Abdomen is soft. Tenderness: There is no abdominal tenderness. There is no guarding. Musculoskeletal: Normal range of motion. General: No tenderness or deformity. Right lower leg: Edema present. Left lower leg: Edema present. Skin:     General: Skin is warm and dry. Findings: No erythema or rash. Neurological:      Mental Status: She is alert and oriented to person, place, and time.       Motor: (PROTONIX) tablet 40 mg  40 mg Oral QAM DEIDRE Ledezma, APRN - CNP           History: PMHx:  Past Medical History:   Diagnosis Date    Diverticulitis     Hyperlipidemia     Hypertension        PSHx:  Past Surgical History:   Procedure Laterality Date    APPENDECTOMY      CARDIAC SURGERY      CHOLECYSTECTOMY      JOINT REPLACEMENT         Social Hx:  Social History     Socioeconomic History    Marital status:      Spouse name: None    Number of children: None    Years of education: None    Highest education level: None   Occupational History    None   Social Needs    Financial resource strain: None    Food insecurity     Worry: None     Inability: None    Transportation needs     Medical: None     Non-medical: None   Tobacco Use    Smoking status: Never Smoker    Smokeless tobacco: Never Used   Substance and Sexual Activity    Alcohol use: No    Drug use: No    Sexual activity: None   Lifestyle    Physical activity     Days per week: None     Minutes per session: None    Stress: None   Relationships    Social connections     Talks on phone: None     Gets together: None     Attends Congregation service: None     Active member of club or organization: None     Attends meetings of clubs or organizations: None     Relationship status: None    Intimate partner violence     Fear of current or ex partner: None     Emotionally abused: None     Physically abused: None     Forced sexual activity: None   Other Topics Concern    None   Social History Narrative    None       Family Hx:  History reviewed. No pertinent family history.     LABS: Reviewed     CBC:  Lab Results   Component Value Date    WBC 6.2 11/03/2020    RBC 4.10 11/03/2020    HGB 12.7 11/03/2020    HCT 38.5 11/03/2020    MCV 93.8 11/03/2020    MCH 31.0 11/03/2020    MCHC 33.1 11/03/2020    RDW 14.8 11/03/2020     11/03/2020    MPV 10.1 06/26/2015     CBC with Differential:   Lab Results   Component Value Date    WBC 6.2 11/03/2020    RBC 4.10 11/03/2020    HGB 12.7 11/03/2020    HCT 38.5 11/03/2020     11/03/2020    MCV 93.8 11/03/2020    MCH 31.0 11/03/2020    MCHC 33.1 11/03/2020    RDW 14.8 11/03/2020    LYMPHOPCT 19.9 11/03/2020    MONOPCT 7.1 11/03/2020    BASOPCT 0.7 11/03/2020    MONOSABS 0.4 11/03/2020    LYMPHSABS 1.2 11/03/2020    EOSABS 0.1 11/03/2020    BASOSABS 0.0 11/03/2020     CMP:    Lab Results   Component Value Date     11/03/2020    K 4.5 11/03/2020    K 3.4 10/20/2020     11/03/2020    CO2 22 11/03/2020    BUN 18 11/03/2020    CREATININE 0.89 11/03/2020    GFRAA >60.0 11/03/2020    LABGLOM >60.0 11/03/2020    GLUCOSE 136 11/03/2020    PROT 6.7 11/03/2020    LABALBU 4.0 11/03/2020    CALCIUM 9.1 11/03/2020    BILITOT 0.4 11/03/2020    ALKPHOS 92 11/03/2020    AST 50 11/03/2020    ALT 48 11/03/2020     BMP:    Lab Results   Component Value Date     11/03/2020    K 4.5 11/03/2020    K 3.4 10/20/2020     11/03/2020    CO2 22 11/03/2020    BUN 18 11/03/2020    LABALBU 4.0 11/03/2020    CREATININE 0.89 11/03/2020    CALCIUM 9.1 11/03/2020    GFRAA >60.0 11/03/2020    LABGLOM >60.0 11/03/2020    GLUCOSE 136 11/03/2020     TSH:   Lab Results   Component Value Date    TSH 3.690 11/03/2020     Vitamin B12 and Folate: No components found for: FOLIC,  D17  Urinalysis:   Lab Results   Component Value Date    NITRU Negative 11/03/2020    WBCUA 3-5 11/03/2020    BACTERIA Negative 11/03/2020    RBCUA 3-5 11/03/2020    BLOODU Negative 11/03/2020    SPECGRAV 1.081 11/03/2020    GLUCOSEU Negative 11/03/2020           FUNCTIONAL ADL´S:     Independent: [ x ] Eating, [x   ] Dressing, [   ] Transferring, [   ] [place]air [place], [   ] Bathing, [  x ] Continence  Dependent   : [  ] Eating, [   ] Dressing, [   ] Transferring, [   ] [place]air [place], [   ] Bathing, [   ] Continence  W. assistant : [  ] Eating, [   ] Dressing, [  x ] Transferring, [ x  ] Toileting, [ x  ] Bathing, [   ] Continence    Radiology: Reviewed Xr Chest (2 Vw)    Result Date: 11/3/2020  EXAMINATION: XR CHEST (2 VW)  CLINICAL HISTORY:  SOB, JVD, Hepatojuguloreflux, orthopnea, b/l crackles lung bases, egophany; assess for left heart failure and or pneumonia COMPARISONS: October 19, 2020  FINDINGS: Two views of the chest are submitted. There are multiple median sternotomy wires overlying the cardiac silhouette. The cardiac silhouette is enlarged. Unremarkable. Pulmonary vascular congested with increased interstitial markings. Right sided trachea. Right perihilar remains prominent but unchanged No focal infiltrates. No effusions. No Pneumothoraces. PULMONARY VASCULAR CONGESTION INCREASED INTERSTITIAL MARKINGS SUGGESTING CHF. CORRELATE CLINICALLY    Ct Abdomen Pelvis W Iv Contrast Additional Contrast? None    Result Date: 11/3/2020  Examination: CT ABDOMEN PELVIS W IV CONTRAST Indication:   upper abdominal pain x 1 week Technique: Multiple serial axial images was performed through the abdomen and pelvis utilizing 100cc of Isovue-300. Images were reconstructed in the axial and coronal and sagittal planes. Portions of the pelvis are not well seen due to bilateral streak  artifact from patient's bilateral total hip arthroplasties. Comparison: February 16, 2020 Findings: The visualized basal lungs show no focal parenchymal abnormalities. The liver,  spleen, pancreas, adrenals, are unremarkable. The gallbladder surgically absent. There is an area of low-attenuation the inferior pole the right kidney. 1.2 cm. Likely renal cysts. Left kidney is unremarkable. The bladder is not well seen. Large and small bowel show no sign of obstruction. The appendix is surgically absent. .  No diverticulitis. Small to moderate hiatal hernia. There is distention of the endometrial canal seen. It measures approximately 2.6 cm. No free air. No free fluid.   The visualized abdominal aorta is of normal size and caliber. No significant retroperitoneal adenopathy. Minimal grade 1 anterolisthesis of L4 on L5.     1. THERE IS LOW-ATTENUATION, DISTENTION OF THE ENDOMETRIAL CANAL. THIS IS AN ABNORMAL FINDING POSTMENOPAUSAL FEMALE. UNDERLYING MALIGNANCY IS NOT EXCLUDED. WILL NEED FOLLOW-UP AND FURTHER EVALUATION 2. THERE IS A SMALL TO MODERATE PROBABLE SLIDING HIATAL HERNIA. CORRELATE CLINICALLY. OTHER FINDINGS DETAILED ABOVE  CT scans at this facility use dose modulation, iterative reconstruction, and/or weight based dosing when appropriate to reduce radiation dose to as low as reasonably achievable. Assessment and plan: 1. Acute on chronic systolic congestive heart failure  -cardiology was consulted  -Echo was done 10/20 shows  LVEF 10-15 %  -pro BNP 5,055  -on lasix 40mg IV BID,  - Entresto BID  -monitor K and Mg  -on aspirin and crestor  -monitor weight daily, I&O  2. GERD  -has appointment scheduled with GI for work up on Nov 13th  -on protonix 40 mg daily  -CT abdomen reviewed  3. Nausea  -has PRN Phenergan and Zofran in place  4. Seizures  -on Lamictal  5. Shortness of breath  -nocturnal oxygen and PRN  6. Palliative care encounter  -spoke to patient about palliative care concept and symptom management  -she is in agreement to be seen by palliative care as outpatient  -patient can be discharged from palliative stand point  -will schedule patient to be seen in the community for symptom management, advanced care planning and goals of care discussion. -Advance Care Planning  Discussed goals of care with patient. Explained in extensive detail nuances between full code, DNR CCA and DNR CC. Patient has made the decision to be Full Code      -Goals of Care Discussion:  Disease process and goals of treatment were discussed in basic terms. Patient goal is to optimize available comfort care measures to decrease SOB, nausea, maintain current functional level and quality of life.  We discussed the palliative care philosophy in light of those goals. We discussed all care options contingent on treatment response and QOL. Much active listening, presence, and emotional support were given.          Electronically signed by HUMPHREY [place] NP on 11/3/2020 at 4:05 PM

## 2020-11-03 NOTE — CONSULTS
Cardiology Consult Note      Date:   11/3/2020  Patient name:  Javi Call  Date of admission:  11/3/2020 10:09 AM  MRN:   [id number]  YOB: 1934  Time of Consult:  2:43 PM    Consulting Cardiologist: Ramírez Peres MD  Primary Cardiologist:  Dr. Louise Stack     Referring Provider: HUMPHREY Farrell    HPI:   Javi Call is a 80 y.o.  female patient who is being at the request of HUMPHREY Farrell for inpatient consultation of congestive heart failure. She was admitted on 11/3/2020. Previous [address] and [address] records have been reviewed in detail. She presented to the emergency department with complaints of sudden worsening shortness of breath and some vague abdominal pain. She had some nausea and vomiting. She has had some exertional shortness of breath for the past several weeks but notes some gradual worsening. She apparently woke up early this morning and felt like she was choking to death. She was noted to be very short of breath. She was given IV lasix in the ED and is now feeling much better. She is resting comfortably and carrying on a normal conversation. She recently presented with some atypical chest pain and shortness of breath. CT scan on 10/20/2020 was negative for PE. She has a history of ASHD with underlying ischemic cardiomyopathy. She has known severe LV dysfunction. She is status post previous CABG and PCI procedures. Last revascularization about   5 years ago included stenting of the left main trunk. LIMA graft was patent at that time. Most recent LVEF noted to be 10--15%. She has opted against consideration of an ICD implant and has requested conservative therapy. She has a history of hypertension and hyperlipidemia. She has chronic exertional dyspnea noted on previous office notes by Dr. Dinorah Madrigal. Allergies:   Allergies   Allergen Reactions    Ezetimibe-Simvastatin Other (See Comments)       Past Medical History:  Past Medical History: Diagnosis Date    Diverticulitis     Hyperlipidemia     Hypertension        Past Surgical History:  Past Surgical History:   Procedure Laterality Date    APPENDECTOMY      CARDIAC SURGERY      CHOLECYSTECTOMY      JOINT REPLACEMENT         Family History:   History reviewed. No pertinent family history. Social  History:     Social History     Tobacco Use    Smoking status: Never Smoker    Smokeless tobacco: Never Used   Substance Use Topics    Alcohol use: No       Home Medications:    Prior to Admission medications    Medication Sig Start Date End Date Taking? Authorizing Provider   diclofenac sodium (VOLTAREN) 1 % GEL Apply 2 g topically 2 times daily   Yes Historical Provider, MD   calcium carbonate (OSCAL) 500 MG TABS tablet Take 1,200 mg by mouth daily   Yes Historical Provider, MD   Bacillus Coagulans-Inulin (ALIGN PREBIOTIC-PROBIOTIC PO) Take by mouth Unsure of dose   Yes Historical Provider, MD   sacubitril-valsartan (ENTRESTO) 24-26 MG per tablet Take 1 tablet by mouth 2 times daily   Yes Historical Provider, MD   aspirin 81 MG tablet Take 81 mg by mouth daily   Yes Historical Provider, MD   loratadine (CLARITIN) 10 MG capsule Take 10 mg by mouth daily   Yes Historical Provider, MD   rosuvastatin (CRESTOR) 10 MG tablet Take 20 mg by mouth daily    Yes Historical Provider, MD   lamoTRIgine (LAMICTAL) 100 MG tablet Take 100 mg by mouth 2 times daily    Yes Historical Provider, MD   metoprolol succinate (TOPROL XL) 50 MG extended release tablet Take 50 mg by mouth daily   Yes Historical Provider, MD   vitamin D (CHOLECALCIFEROL) 1000 UNIT TABS tablet Take 2,000 Units by mouth daily    Yes Historical Provider, MD   tapentadol (NUCYNTA) 50 MG TABS Take 50 mg by mouth 3 times daily .    Yes Historical Provider, MD   naloxone 4 MG/0.1ML LIQD nasal spray 1 spray by Nasal route as needed for Opioid Reversal    Historical Provider, MD   docusate sodium (COLACE) 100 MG capsule Take 100 mg by mouth 2 times daily as needed for Constipation    Historical Provider, MD   acetaminophen (TYLENOL) 325 MG tablet Take 650 mg by mouth every 6 hours as needed for Pain    Historical Provider, MD   nitroGLYCERIN (NITROSTAT) 0.4 MG SL tablet Place 0.4 mg under the tongue every 5 minutes as needed for Chest pain up to max of 3 total doses. If no relief after 1 dose, call 911.     Historical Provider, MD       Current Medications:      IV Medications:  Current Facility-Administered Medications   Medication Dose Route Frequency Provider Last Rate Last Dose    sodium chloride flush 0.9 % injection 10 mL  10 mL Intravenous 2 times per day HUMPHREY Turpin CNP        sodium chloride flush 0.9 % injection 10 mL  10 mL Intravenous PRN HUMPHREY Turpin CNP        acetaminophen (TYLENOL) tablet 650 mg  650 mg Oral Q6H PRN HUMPHREY Turpin CNP        Or    acetaminophen (TYLENOL) suppository 650 mg  650 mg Rectal Q6H PRN HUMPHREY Turpin CNP        polyethylene glycol (GLYCOLAX) packet 17 g  17 g Oral Daily PRN HUMPHREY Turpin CNP        promethazine (PHENERGAN) tablet 12.5 mg  12.5 mg Oral Q6H PRN HUMPHREY Turpin CNP        Or    ondansetron (ZOFRAN) injection 4 mg  4 mg Intravenous Q6H PRN HUMPHREY Turpin CNP        enoxaparin (LOVENOX) injection 40 mg  40 mg Subcutaneous Daily HUMPHREY Turpin CNP        furosemide (LASIX) injection 40 mg  40 mg Intravenous BID HUMPHREY Turpin CNP        aspirin chewable tablet 81 mg  81 mg Oral Daily HUMPHREY Turpin CNP        calcium elemental (OSCAL) tablet 1,000 mg  1,000 mg Oral Daily HUMPHREY Turpin CNP        lamoTRIgine (LAMICTAL) tablet 100 mg  100 mg Oral BID HUMPHREY Turpin CNP        metoprolol succinate (TOPROL XL) extended release tablet 50 mg  50 mg Oral Daily HUMPHREY Turpin CNP        rosuvastatin (CRESTOR) tablet 20 mg  20 mg Oral Daily HUMPHREY Turpin CNP        sacubitril-valsartan (ENTRESTO) 24-26 MG per tablet 1 tablet  1 tablet Oral BID Rosewood Darien, APRN - CNP        vitamin D (CHOLECALCIFEROL) tablet 2,000 Units  2,000 Units Oral Daily Guy RecinosHUMPHREY - CNP           ROS:   · 12 point review of systems was obtained in detail and is negative other than that noted above or below. Vital Signs:  Vitals:    11/03/20 1003 11/03/20 1110 11/03/20 1206 11/03/20 1250   BP: 119/75 128/80 138/89    Pulse: 83 75 73 75   Resp: 19 29 23 22   Temp: 96.1 °F (35.6 °C)      TempSrc: Temporal      SpO2: 97% 95% 97% 96%   Weight: 129 lb (58.5 kg)      Height: 4' 11\" (1.499 m)        No intake or output data in the 24 hours ending 11/03/20 1443    Patient Vitals for the past 96 hrs (Last 3 readings):   Weight   11/03/20 1003 129 lb (58.5 kg)       Physical Examination:  GENERAL APPEARANCE: Well developed, well nourished, in no acute distress. CHEST: Symmetric and non-tender. INTEGUMENT: Skin warm and dry, without gross excoriationis or lesions. HEENT: No gross abnormalities of conjunctiva, teeth, gums, oral mucosa  NECK: Supple, no JVD, no bruit. Thyroid not palpable. Carotid upstrokes normal.  NEURO/PSHCY: Alert and oriented x3; appropriate behavior and responses and responses, grossly normal cerebellar function with normal balance and coordination  LUNGS: Clear to auscultation bilaterally; normal respiratory effort. HEART: Rate and rhythm regular with II/VI DEISON SLB; no gallop appreciated. There are no rubs, clicks or heaves. PMI nondisplaced. ABDOMEN: Soft, nontender, no palpable hepatosplenomegaly, no mases, no bruits. Abdominal aorta not noted to be enlarged. MUSCULOSKELETAL: Ambulatory with normal tandem gait. EXTREMITIES: Warm with good color, no clubbing or cyanois. There is trivial to 1+ bilateral edema noted. PERIPHERAL VASCULAR: Pulses present and equally palpable; 2+ throughout. No femoral bruits.       Diagnostics:    EKG:  Sinus rhythm with 1st degree AV block with fusion 9. 1   PROT 6.7   LABALBU 4.0   BILITOT 0.4   ALKPHOS 92   AST 50*   ALT 48*     Lipase:  Recent Labs     11/03/20  1030   LIPASE 21       Radiology:   CT ABDOMEN PELVIS W IV CONTRAST Additional Contrast? None   Final Result   1. THERE IS LOW-ATTENUATION, DISTENTION OF THE ENDOMETRIAL CANAL. THIS IS AN ABNORMAL FINDING POSTMENOPAUSAL FEMALE. UNDERLYING MALIGNANCY IS NOT EXCLUDED. WILL NEED FOLLOW-UP AND FURTHER EVALUATION   2. THERE IS A SMALL TO MODERATE PROBABLE SLIDING HIATAL HERNIA. CORRELATE CLINICALLY. OTHER FINDINGS DETAILED ABOVE             CT scans at this facility use dose modulation, iterative reconstruction, and/or weight based dosing when appropriate to reduce radiation dose to as low as reasonably achievable. XR CHEST (2 VW)   Final Result   PULMONARY VASCULAR CONGESTION INCREASED INTERSTITIAL MARKINGS SUGGESTING CHF. CORRELATE CLINICALLY          Impression:     1. Acute onset of worsening dypsnea. Probable acute pulmonary edema leading to acute respiratory failure. Improved with IV lasix. 2.  Underlying ischemic cardiomyopathy with severe LV dysfunction. 3.  Status post CABG and Multivessel angioplasty and stenting procedures. 4.  Hypertension. 5.  Hyperlipidemia. Cardiac History:    1. CABG (CABG)   1992. L-LAD. S-RCA   2. CHF (congestive heart failure), NYHA class III, chronic, systolic (854.28,584.2) (Q40.16)   3. Hyperlipidemia (272.4) (E78.5)   4. Hypertension, benign (401.1) (I10)   5. 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 Lexiscan 22% EF. Patient wishes no AICD      10/14/15 LVEF 20% with 2+ MR      5/2015- LVEF 20%   6. 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 LVEF 22%. No inducible ischemia   9/30/2000 Stents X 2      7/2/15 Drug eluting stent mid RCA, balloon PTCA PDA      6/25/15 LM PTCA with drug eluting stent.       6/25/15 Cardiac Cath- LM 90%; LAC occluded; RCA 90%; L to LAD patent collateral for      RCA; S to RCA occluded; LVEF 25%; LVEDP 12 mmHg; right cath with PCWP 12; PAP      25/10      1992 CABG L-LAD, S-RCA      1992 IWMI   7. Never a smoker   8. Osteoarthritis, chronic (715.90) (M19.90)   9. Overweight (278.02) (E66.3)   10. Premature ventricular contractions (427.69) (I49.3)   11. Seizure disorder (345.90) (G40.909)   · Seizure 2011, none since as of 5/2015   12. Shortness of breath (786.05) (R06.02)   13. Status post angioplasty (Q09.97) (M83.39)   · 7/2/15 Drug eluting stent mid RCA, balloon PTCA PDA      6/25/15 LM PTCA with drug eluting stent. 14. Status post left hip replacement (V43.64) (Z96.642)   15. Status post right hip replacement (V43.64) (Z96.641)   16. Ventricular ectopy (427.69) (I49.3)      Recommendations:    Monitor on telemetry. IV diuresis. Cautiously in light of her generally frail state. Conservative cardiac care per her request.    Continue Entresto, Toprol XL, and Crestor. Likely discharge home tomorrow. Thank you for the opportunity to participate in the care of your patient. Do not hesitate to call if you have any questions.     Electronically signed by Arie Pink MD, [facility] on 11/3/2020 at 2:43 PM

## 2020-11-03 NOTE — CARE COORDINATION
[facility] [place] Case Management Initial Discharge Assessment    Met with patient and son Karrie Wagner ([phone]) at bedside to discuss discharge plan. PCP: Stefanie Frye MD                                  Date of Last Visit: Telephone visit on 10/28/20. If no PCP, list provided? N/A    Discharge Planning    Living Arrangements: - Lives in two-story with first floor access to bed/bath with spouse and daughter Otoniel Payne. Who do you live with? Spouse and daughter, Shirlyn Burkitt ([phone]). Both daughter and son can assist with care and transportation. Note: Spouse is 80years old. Who helps you with your care:  Hienn Burkitt and Karrie Wagner (children). If lives at home: Do you have any barriers navigating in your home? No barriers when patient is at physical baseline. Patient can perform ADL? Yes - When at baseline. Current Services (outpatient and in home) :  - No services at present. Dialysis: No    Is transportation available to get to your appointments? Yes    DME Equipment:  yes sobeida Macias    Respiratory equipment: None    Respiratory provider:  no     Pharmacy:  yes - [address] with Medication Assistance Program?  No      Patient agreeable to [address]? Patient/family agreeable to [address], no agency preference at time of ER assessment. Patient agreeable to SNF/Rehab? Yes, Company - No preference stated. Freedom of choice discussed. Other discharge needs identified? Other - To be determined after medical work-up. Freedom of choice list provided with basic dialogue that supports the patient's individualized plan of care/goals and shares the quality data associated with the providers. Yes    Does Patient Have a High-Risk for Readmission Diagnosis (CHF, PN, MI, COPD)?  Yes    If Yes,   [facility]    The plan for Transition of Care is related to the following treatment goals: medical management of respiratory and cardiac status; diuresis; activity intolerance assessment. Initial Discharge Plan? (Note: please see concurrent daily documentation for any updates after initial note). Home with [facility].     The Patient and/or patient representative: Patient and son Akhil Weber were provided with notice of choice of any post-acute providers for care and equipment and agrees with discharge plan  Yes    Electronically signed by Jose A Freitas RN on 11/3/2020 at 11:51 AM

## 2020-11-03 NOTE — H&P
Hospitalist History and Physical  Name: Zahida Finley  Age: 80 y.o. Gender: female  CodeStatus: Prior  Allergies: Ezetimibe-Simvastatin    Chief Complaint:Shortness of Breath and Abdominal Pain (with vomiting )    Primary Care Provider: Phill Hoyt MD  InpatientTreatment Team: Treatment Team: Attending Provider: Rosie Huerta DO; Registered Nurse: Vera Trammell RN  Admission Date: 11/3/2020      Subjective: 80 y.o. female with a history of hypertension, hyperlipidemia, CAD status post CABG and stents presents with increased shortness of breath and abdominal pain. Patient states her shortness of breath has worsened over the last 2 months. Son at bedside reports the last 2 nights patient has awoken at approximately 4 AM with severe shortness of breath. Patient describes PND and orthopnea. She denies any fever, chest pain, palpitations, pedal edema or productive cough  Patient also reports 1 episode of emesis today. Patient's abdominal discomfort is located epigastric, described as GERD-like symptoms. She reports nausea, belching and heartburn usually after eating. Patient does have scheduled GI appointment for further work-up. Patient is in no current distress, afebrile and hemodynamically stable. Physical Exam  HENT:      Head: Normocephalic and atraumatic. Right Ear: External ear normal.      Left Ear: External ear normal.      Nose: Nose normal.      Mouth/Throat:      Pharynx: Oropharynx is clear. Eyes:      Extraocular Movements: Extraocular movements intact. Conjunctiva/sclera: Conjunctivae normal.      Pupils: Pupils are equal, round, and reactive to light. Neck:      Musculoskeletal: Normal range of motion and neck supple. Cardiovascular:      Rate and Rhythm: Normal rate and regular rhythm. Pulses: Normal pulses. Heart sounds: Murmur present. Pulmonary:      Effort: Pulmonary effort is normal.      Breath sounds: Normal breath sounds.    Abdominal:      General: Bowel sounds are normal. There is no distension. Palpations: Abdomen is soft. There is no mass. Tenderness: There is no abdominal tenderness. There is no right CVA tenderness, left CVA tenderness, guarding or rebound. Hernia: No hernia is present. Musculoskeletal: Normal range of motion. Skin:     General: Skin is warm and dry. Capillary Refill: Capillary refill takes less than 2 seconds. Neurological:      General: No focal deficit present. Mental Status: She is alert and oriented to person, place, and time. Psychiatric:         Mood and Affect: Mood normal.         Review of Systems   Constitutional: Negative. HENT: Negative. Eyes: Negative. Respiratory: Positive for shortness of breath. Gastrointestinal: Positive for abdominal pain, nausea and vomiting. Endocrine: Negative. Genitourinary: Negative. Musculoskeletal: Negative. Skin: Negative. Allergic/Immunologic: Negative. Neurological: Negative. Hematological: Negative. Psychiatric/Behavioral: Negative. Medications:  Reviewed     No current facility-administered medications on file prior to encounter. Current Outpatient Medications on File Prior to Encounter   Medication Sig Dispense Refill    calcium carbonate (OSCAL) 500 MG TABS tablet Take 1,200 mg by mouth daily      Bacillus Coagulans-Inulin (ALIGN PREBIOTIC-PROBIOTIC PO) Take by mouth Unsure of dose      sacubitril-valsartan (ENTRESTO) 24-26 MG per tablet Take 1 tablet by mouth 2 times daily      aspirin 81 MG tablet Take 81 mg by mouth daily      acetaminophen (TYLENOL) 325 MG tablet Take 650 mg by mouth every 6 hours as needed for Pain      loratadine (CLARITIN) 10 MG capsule Take 10 mg by mouth daily      nitroGLYCERIN (NITROSTAT) 0.4 MG SL tablet Place 0.4 mg under the tongue every 5 minutes as needed for Chest pain up to max of 3 total doses. If no relief after 1 dose, call 911.       rosuvastatin (CRESTOR) 10 MG tablet Take 20 mg by mouth daily       lamoTRIgine (LAMICTAL) 100 MG tablet Take 100 mg by mouth 2 times daily       metoprolol succinate (TOPROL XL) 50 MG extended release tablet Take 50 mg by mouth daily      vitamin D (CHOLECALCIFEROL) 1000 UNIT TABS tablet Take 2,000 Units by mouth daily       tapentadol (NUCYNTA) 50 MG TABS Take 50 mg by mouth 3 times daily . Past Medical History:   Diagnosis Date    Diverticulitis     Hyperlipidemia     Hypertension        Past Surgical History:   Procedure Laterality Date    APPENDECTOMY      CARDIAC SURGERY      CHOLECYSTECTOMY      JOINT REPLACEMENT          History reviewed. No pertinent family history.      Social History     Socioeconomic History    Marital status:      Spouse name: Not on file    Number of children: Not on file    Years of education: Not on file    Highest education level: Not on file   Occupational History    Not on file   Social Needs    Financial resource strain: Not on file    Food insecurity     Worry: Not on file     Inability: Not on file    Transportation needs     Medical: Not on file     Non-medical: Not on file   Tobacco Use    Smoking status: Never Smoker    Smokeless tobacco: Never Used   Substance and Sexual Activity    Alcohol use: No    Drug use: No    Sexual activity: Not on file   Lifestyle    Physical activity     Days per week: Not on file     Minutes per session: Not on file    Stress: Not on file   Relationships    Social connections     Talks on phone: Not on file     Gets together: Not on file     Attends Jain service: Not on file     Active member of club or organization: Not on file     Attends meetings of clubs or organizations: Not on file     Relationship status: Not on file    Intimate partner violence     Fear of current or ex partner: Not on file     Emotionally abused: Not on file     Physically abused: Not on file     Forced sexual activity: Not on file   Other Topics Concern    Not on file   Social History Narrative    Not on file        Infusion Medications:   Scheduled Medications:   PRN Meds:     Labs:   Recent Labs     20  1030   WBC 6.2   HGB 12.7   HCT 38.5        Recent Labs     20  1030      K 4.5      CO2 22   BUN 18   CREATININE 0.89   CALCIUM 9.1     Recent Labs     20  1030   AST 50*   ALT 48*   BILITOT 0.4   ALKPHOS 92     No results for input(s): INR in the last 72 hours. Recent Labs     20  1030   CKTOTAL 80   TROPONINI <0.010       Urinalysis:   Lab Results   Component Value Date    NITRU Negative 2020    WBCUA 3-5 2020    BACTERIA Negative 2020    RBCUA 3-5 2020    BLOODU Negative 2020    SPECGRAV 1.081 2020    GLUCOSEU Negative 2020       Radiology:   Most recent    Chest CT      WITH CONTRAST:No results found for this or any previous visit. WITHOUT CONTRAST: No results found for this or any previous visit. CXR      2-view:   Results for orders placed during the hospital encounter of 19   XR CHEST STANDARD (2 VW)    Narrative Patient MRN: [id number]    : 1934    Age:  80 years    Gender: Female    Order Date: 2019 1:00 AM.     Exam: XR CHEST (2 VW)    Number of Views: 3     Indication:  26-year-old female with cough and chest tightness. Comparison: 2017    Findings: Stable, enlarged cardiomediastinal silhouette, thoracic aortic vascular calcifications underlying central pulmonary vascular congestion. No infiltrate/pneumonia pneumothorax. No pleural effusion. Impression Impression:    1. Stable, enlarged cardiomediastinal silhouette with underlying central pulmonary vascular congestion. 2. Vascular calcifications thoracic aorta.           Portable:   Results for orders placed during the hospital encounter of 10/19/20   XR CHEST PORTABLE    Narrative EXAMINATION: Portable AP ERECT view of the chest.    CLINICAL HISTORY:  chest pain and abdominal pain    DATE: 10/19/2020 10:54 PM    COMPARISONS: 2/15/2020    FINDINGS: There are multiple sternal sutures and mediastinal clips present. The heart is mildly enlarged, unchanged. Mild degree of central vascular congestion, similar last exam. No new infiltrate. Suspect minimal right-sided pleural effusion. There   are mild degenerative changes in spine. Mild levoscoliosis dorsal spine. Impression CARDIAC ENLARGEMENT WITH MILD CENTRAL VASCULAR CONGESTION, SIMILAR LAST EXAM.    SUSPECT SMALL RIGHT-SIDED PLEURAL EFFUSION. Echo No results found for this or any previous visit. Assessment/Plan:    # Acute on chronic decompensated systolic / diastolic heart failure:   -Lasix IVP BID,   -daily weights, fluid restriction, strict intake and output.   -Cardio consulted. -ECHO shows LVEF 10-15% . Pro BNP 5,055.    - Goal K and Mg 4.0 and 2.0, respectively. -Telemetry.   -Ordered ASA and statin.   -Nocturnal pulse ox     # gastroesophageal reflux disease: 40 mg Protonix daily. Further GI work-up scheduled for Friday 13th. # Seizures: We will resume lamictal     # Essential hypertension/dyslipidemia:  Resume home medications    # Consulted Palliative care to assess if patient can benefit from Palliative care services as outpatient. I personally spent estimated 60 minutes with this patient today. Additional work up or/and treatment plan may be added today or then after based on clinical progression. I am managing a portion of pt care. Some medical issues are handled by other specialists. Additional work up and treatment should be done in out pt setting by pt PCP and other out pt providers. In addition to examining and evaluating pt, I spent additional time explaining care, normaland abnormal findings, and treatment plan. All of pt questions were answered. Counseling, diet and education were provided. Case will be discussed with nursing staff when appropriate.  Family will be updated if and when appropriate.       Electronically signed by HUMPHREY Ramirez CNP on 11/3/2020 at 1:46 PM

## 2020-11-03 NOTE — PROGRESS NOTES
ADMITTED FROM ED, PT SON AT BEDSIDE PT VSS DENIES ANY RESP DISTRESS AT THIS TIME ADMISSION IN PROGRESS

## 2020-11-04 VITALS
HEIGHT: 59 IN | HEART RATE: 60 BPM | RESPIRATION RATE: 19 BRPM | SYSTOLIC BLOOD PRESSURE: 108 MMHG | DIASTOLIC BLOOD PRESSURE: 50 MMHG | WEIGHT: 138 LBS | TEMPERATURE: 97.7 F | BODY MASS INDEX: 27.82 KG/M2 | OXYGEN SATURATION: 96 %

## 2020-11-04 PROBLEM — J96.01 ACUTE RESPIRATORY FAILURE WITH HYPOXIA (HCC): Status: RESOLVED | Noted: 2020-11-03 | Resolved: 2020-11-04

## 2020-11-04 LAB
ANION GAP SERPL CALCULATED.3IONS-SCNC: 12 MEQ/L (ref 9–15)
BUN BLDV-MCNC: 15 MG/DL (ref 8–23)
CALCIUM SERPL-MCNC: 8.9 MG/DL (ref 8.5–9.9)
CHLORIDE BLD-SCNC: 102 MEQ/L (ref 95–107)
CHOLESTEROL, TOTAL: 172 MG/DL (ref 0–199)
CO2: 27 MEQ/L (ref 20–31)
CREAT SERPL-MCNC: 0.88 MG/DL (ref 0.5–0.9)
GFR AFRICAN AMERICAN: >60
GFR NON-AFRICAN AMERICAN: >60
GLUCOSE BLD-MCNC: 83 MG/DL (ref 70–99)
HDLC SERPL-MCNC: 62 MG/DL (ref 40–59)
LDL CHOLESTEROL CALCULATED: 95 MG/DL (ref 0–129)
MAGNESIUM: 2.2 MG/DL (ref 1.7–2.4)
POTASSIUM SERPL-SCNC: 3.5 MEQ/L (ref 3.4–4.9)
SODIUM BLD-SCNC: 141 MEQ/L (ref 135–144)
TRIGL SERPL-MCNC: 75 MG/DL (ref 0–150)

## 2020-11-04 PROCEDURE — 96376 TX/PRO/DX INJ SAME DRUG ADON: CPT

## 2020-11-04 PROCEDURE — 6370000000 HC RX 637 (ALT 250 FOR IP): Performed by: NURSE PRACTITIONER

## 2020-11-04 PROCEDURE — 83735 ASSAY OF MAGNESIUM: CPT

## 2020-11-04 PROCEDURE — 80048 BASIC METABOLIC PNL TOTAL CA: CPT

## 2020-11-04 PROCEDURE — 2700000000 HC OXYGEN THERAPY PER DAY

## 2020-11-04 PROCEDURE — G0378 HOSPITAL OBSERVATION PER HR: HCPCS

## 2020-11-04 PROCEDURE — 6360000002 HC RX W HCPCS: Performed by: NURSE PRACTITIONER

## 2020-11-04 PROCEDURE — 80061 LIPID PANEL: CPT

## 2020-11-04 PROCEDURE — 36415 COLL VENOUS BLD VENIPUNCTURE: CPT

## 2020-11-04 PROCEDURE — 93010 ELECTROCARDIOGRAM REPORT: CPT | Performed by: INTERNAL MEDICINE

## 2020-11-04 PROCEDURE — 96372 THER/PROPH/DIAG INJ SC/IM: CPT

## 2020-11-04 RX ORDER — PANTOPRAZOLE SODIUM 40 MG/1
40 TABLET, DELAYED RELEASE ORAL
Qty: 30 TABLET | Refills: 3 | Status: ON HOLD | OUTPATIENT
Start: 2020-11-05 | End: 2022-01-01

## 2020-11-04 RX ORDER — FUROSEMIDE 20 MG/1
20 TABLET ORAL DAILY
Qty: 60 TABLET | Refills: 3 | Status: SHIPPED | OUTPATIENT
Start: 2020-11-04

## 2020-11-04 RX ORDER — FUROSEMIDE 20 MG/1
20 TABLET ORAL DAILY
Status: DISCONTINUED | OUTPATIENT
Start: 2020-11-04 | End: 2020-11-04 | Stop reason: HOSPADM

## 2020-11-04 RX ADMIN — SACUBITRIL AND VALSARTAN 1 TABLET: 24; 26 TABLET, FILM COATED ORAL at 09:24

## 2020-11-04 RX ADMIN — CALCIUM 1000 MG: 500 TABLET ORAL at 09:24

## 2020-11-04 RX ADMIN — Medication 2000 UNITS: at 09:24

## 2020-11-04 RX ADMIN — PANTOPRAZOLE SODIUM 40 MG: 40 TABLET, DELAYED RELEASE ORAL at 05:54

## 2020-11-04 RX ADMIN — METOPROLOL SUCCINATE 50 MG: 50 TABLET, EXTENDED RELEASE ORAL at 09:25

## 2020-11-04 RX ADMIN — FUROSEMIDE 40 MG: 10 INJECTION, SOLUTION INTRAMUSCULAR; INTRAVENOUS at 09:25

## 2020-11-04 RX ADMIN — ENOXAPARIN SODIUM 40 MG: 40 INJECTION SUBCUTANEOUS at 09:25

## 2020-11-04 RX ADMIN — LAMOTRIGINE 100 MG: 100 TABLET ORAL at 09:25

## 2020-11-04 RX ADMIN — ASPIRIN 81 MG: 81 TABLET, CHEWABLE ORAL at 09:25

## 2020-11-04 RX ADMIN — LACTOBACILLUS TAB 1 TABLET: TAB at 09:25

## 2020-11-04 NOTE — DISCHARGE SUMMARY
Discharge Summary    Bijal Brown  :  1934  MRN:  [id number]    ADMIT DATE:  11/3/2020  DISCHARGE DATE:  2020    PRIMARY CARE PHYSICIAN:  Waqar Mijares MD      CODE STATUS:  Full Code    DISCHARGE DIAGNOSES:  Active Problems:    * No active hospital problems. *  Resolved Problems:    Acute respiratory failure with hypoxia [facility])      HOSPITAL COURSE:  59-year-old female with a past medical history of chronic systolic heart failure with severely reduced EF of 10 to 15% presented with increased shortness of breath and abdominal pain. BNP is elevated. Patient became hypoxic with desaturation to 87% with ambulation emergency department. She admitted with acute hypoxic respiratory failure secondary to CHF exacerbation. Cardiology was consulted. She received IV Lasix and had clinical improvement of her symptoms. Patient is cleared by cardiology for discharge home on 20 mg of Lasix daily. Patient also reported GERD symptoms on admission and hiatal hernia was noted on imaging. Patient was started on Protonix with follow-up with GI as an outpatient. SIGNIFICANT DIAGNOSTIC STUDIES:  CT ABDOMEN PELVIS W IV CONTRAST Additional Contrast? None   Final Result   1. THERE IS LOW-ATTENUATION, DISTENTION OF THE ENDOMETRIAL CANAL. THIS IS AN ABNORMAL FINDING POSTMENOPAUSAL FEMALE. UNDERLYING MALIGNANCY IS NOT EXCLUDED. WILL NEED FOLLOW-UP AND FURTHER EVALUATION   2. THERE IS A SMALL TO MODERATE PROBABLE SLIDING HIATAL HERNIA. CORRELATE CLINICALLY. OTHER FINDINGS DETAILED ABOVE             CT scans at this facility use dose modulation, iterative reconstruction, and/or weight based dosing when appropriate to reduce radiation dose to as low as reasonably achievable. XR CHEST (2 VW)   Final Result   PULMONARY VASCULAR CONGESTION INCREASED INTERSTITIAL MARKINGS SUGGESTING CHF.  CORRELATE CLINICALLY          CONSULTANTS:  Cardiology  RECOMMENDED NEXT STEPS:   Follow-up with cardiology as an outpatient DISCHARGE MEDICATIONS:       [facility]   Home Medication Instructions THA:[id number]    Printed on:11/04/20 1241   Medication Information                      acetaminophen (TYLENOL) 325 MG tablet  Take 650 mg by mouth every 6 hours as needed for Pain             aspirin 81 MG tablet  Take 81 mg by mouth daily             Bacillus Coagulans-Inulin (ALIGN PREBIOTIC-PROBIOTIC PO)  Take by mouth Unsure of dose             calcium carbonate (OSCAL) 500 MG TABS tablet  Take 1,200 mg by mouth daily             diclofenac sodium (VOLTAREN) 1 % GEL  Apply 2 g topically 2 times daily             docusate sodium (COLACE) 100 MG capsule  Take 100 mg by mouth 2 times daily as needed for Constipation             furosemide (LASIX) 20 MG tablet  Take 1 tablet by mouth daily             lamoTRIgine (LAMICTAL) 100 MG tablet  Take 100 mg by mouth 2 times daily              loratadine (CLARITIN) 10 MG capsule  Take 10 mg by mouth daily             metoprolol succinate (TOPROL XL) 50 MG extended release tablet  Take 50 mg by mouth daily             naloxone 4 MG/0.1ML LIQD nasal spray  1 spray by Nasal route as needed for Opioid Reversal             nitroGLYCERIN (NITROSTAT) 0.4 MG SL tablet  Place 0.4 mg under the tongue every 5 minutes as needed for Chest pain up to max of 3 total doses. If no relief after 1 dose, call 911. rosuvastatin (CRESTOR) 10 MG tablet  Take 20 mg by mouth daily              sacubitril-valsartan (ENTRESTO) 24-26 MG per tablet  Take 1 tablet by mouth 2 times daily             tapentadol (NUCYNTA) 50 MG TABS  Take 50 mg by mouth 3 times daily . vitamin D (CHOLECALCIFEROL) 1000 UNIT TABS tablet  Take 2,000 Units by mouth daily                GEN: Alert and oriented. NAD, well nourished  HEENT: Normocephalic, atraumatic. HAWA, Neck supple. Resp: CTA b/l no wheezing or rhonchi. No respiratory distress  Cardio: RRR. No murmur  Abd: Soft, nondistended. NTTP.  BS present  Ext: No erythema or edema. LE NTTP    DIET: DIET LOW SODIUM 2 GM;    ACTIVITY: No restriction. ______________________________________________________________________  SYCEFDWNDT OF FOLLOW UP:   [] Moderate Complexity: follow up within 7-14 calendar days (49505)   [x] Severe Complexity: follow up within 7 calendar days (23563)    FOLLOW UP TESTING, PENDING RESULTS OR REFERRALS AT TRANSITIONAL CARE VISIT:   []  Yes    []  No    PENDING STUDIES:   none    DISPOSITION: Home        Follow up with Jose E Yanez MD  [address]  [phone]    On 11/12/2020  8:15 AM.  [address] [place] [address] office. Please wear mask to office. INSTRUCTIONS TO MA/SW: Please call patient on day after discharge (must document patient  contacted within 2 business days of discharge). FOLLOW UP QUESTIONS FOR MA/SW:  1. Did you get medications filled and taking them as instructed from discharge? 2. Are you following your discharge instructions from your hospital stay? 3. Please confirm patient is scheduled for a follow up appointment within the above time frame.     DISCHARGE TIME: > 30 minutes    SIGNED:  Arsh Dudley DO   11/4/2020, 12:41 PM

## 2020-11-04 NOTE — PROGRESS NOTES
Patient assessment and vitals complete and stable. Patient alert and oriented, up with standby assist and walker.  Will continue to monitor

## 2020-11-04 NOTE — CARE COORDINATION
This Care Transition Nurse provided CHF booklet and zones sheet and reviewed. Discussed the use of zones sheet. Stressed importance of phoning physician promptly for symptoms in the yellow zone and ems for symptoms in the red zone. Discussed causes of CHF, symptoms, daily weights, BP management. Explained the importance of weighing self daily and keeping a log of weight and blood pressure. Phone cardiologist for weight gain of 3 or more pounds in 1-7 days, sob, edema, fatigue, increased cough, and importance of following a low sodium diet, fluids to consider if restricted, medications, vaccines, cardiac rehab. Explained the importance of  reading food labels for sodium content and not consume more than 2000 mg per day. Discussed foods that are typically high is sodium to avoid. Reviewed Food Recommended/Foods to Avoid. Discussed Palliative Care and brochure provided. Patient states Palliative Care nurse did meet with her. Stressed importance of follow up with PCP within one week of discharge and with Cardiologist as directed Patient voiced understanding. Patient states she does all of her own cooking. She cooks whole foods from scratch and does not used processed foods. She makes her own salt free adobo and sofrito. She uses salt free seasonings when cooking. She does sometimes order take out from restaurants. Discussed asking them to prepare her food low sodium. She has a scale but has not been weighing herself daily. Discussed importance of weighing herself daily in the am after urinating and before eating and drinking to detect fluid retention early. Son came in during education and he was included in the discussion.

## 2020-11-04 NOTE — PROGRESS NOTES
[facility] OF THE [facility] Heart Progress Note               Rounding Cardiologist:  Arie Pink MD   Primary Cardiologist: Dr. Henson Dash     Date:  11/4/2020  Patient:  Luis Bonilla  YOB: 1934  MRN:  [id number]   Admit Date:  11/3/2020      SUBJECTIVE    Luis Bonilla was seen and examined today at bedside. She is visiting with her son. Sitting up in bed. Feels well. No chest pain or shortness of breath. Consult HPI:   Luis Bonilla is a 80 y.o.  female patient who is being at the request of HUMPHREY Oliva for inpatient consultation of congestive heart failure. She was admitted on 11/3/2020. Previous [facility] and [facility] records have been reviewed in detail. She presented to the emergency department with complaints of sudden worsening shortness of breath and some vague abdominal pain. She had some nausea and vomiting. She has had some exertional shortness of breath for the past several weeks but notes some gradual worsening. She apparently woke up early this morning and felt like she was choking to death. She was noted to be very short of breath. She was given IV lasix in the ED and is now feeling much better. She is resting comfortably and carrying on a normal conversation. She recently presented with some atypical chest pain and shortness of breath. CT scan on 10/20/2020 was negative for PE. She has a history of ASHD with underlying ischemic cardiomyopathy. She has known severe LV dysfunction. She is status post previous CABG and PCI procedures. Last revascularization about   5 years ago included stenting of the left main trunk. LIMA graft was patent at that time. Most recent LVEF noted to be 10--15%. She has opted against consideration of an ICD implant and has requested conservative therapy. She has a history of hypertension and hyperlipidemia. She has chronic exertional dyspnea noted on previous office notes by Dr. Wily Denny.        VITALS     Vitals:    11/03/20 1406 11/03/20 1700 11/03/20 2103 11/03/20 2148   BP: 126/64      Pulse: 76      Resp:   20    Temp: 97.2 °F (36.2 °C)      TempSrc:   Oral    SpO2: 100%   99%   Weight:  138 lb (62.6 kg)     Height:           Intake/Output Summary (Last 24 hours) at 11/4/2020 1044  Last data filed at 11/4/2020 0602  Gross per 24 hour   Intake 240 ml   Output 1050 ml   Net -810 ml       Patient Vitals for the past 96 hrs (Last 3 readings):   Weight   11/03/20 1700 138 lb (62.6 kg)   11/03/20 1003 129 lb (58.5 kg)       PHYSICAL EXAM   PHYSICAL EXAMINATION:  GENERAL:  Well developed, well nourished, in no acute distress. CHEST:  Symmetric and nontender. NEURO/PSYCH:  Alert and oriented times three with approppriate behavior and responses. NECK:  Supple, no JVD, no bruit. LUNGS:  Clear to auscultation bilaterally, normal respiratory effort. HEART:  Rate and rhythm regular with no evident murmur, no gallop appreciated. There are no rubs, clicks or heaves. EXTREMITIES:  Warm with good color, no clubbing or cyanosis. There is no edema noted. PERIPHERAL VASCULAR:  Pulses present and equally palpable; 2+ throughout. DIAGNOSTIC RESULTS   EKG:  Sinus rhythm with 1st degree AV block with fusion complexes  Left ventricular hypertrophy with QRS widening and repolarization abnormality  Abnormal ECG  When compared with ECG of 19-OCT-2020 22:37,  fusion complexes are now present  premature atrial complexes are no longer present     Telemetry: normal sinus . Echocardiogram Summary:   Generalized hypokinesis of the left ventricle with overall severe   impairment of LV systolic function. Left ventricular ejection fraction is visually estimated at 10-15%. Normal left ventricular wall thickness. Normal right ventricular chamber size and function. Mitral valve leaflets are mildly thickened with preserved leaflet   mobility. Moderate (2-3+) mitral regurgitation is present.    Mildly thickened aortic valve leaflets with preserved leaflet mobility. Mild (1+) tricuspid regurgitation with estimated RVSP of 43 mm Hg. Moderately dilated left atrium. Signature   ----------------------------------------------------------------   Electronically signed by Azael Vasquez MD(Interpreting   physician) on 10/20/2020 01:53 PM      LAB DATA   BMP:  Recent Labs     11/03/20  1030 11/04/20  0628    141   K 4.5 3.5    102   CO2 22 27   BUN 18 15   CREATININE 0.89 0.88   LABGLOM >60.0 >60.0       Cardiac Enzymes:  Recent Labs     11/03/20  1030   CKTOTAL 80   TROPONINI <0.010       CBC:   Lab Results   Component Value Date    WBC 6.2 11/03/2020    RBC 4.10 11/03/2020    HGB 12.7 11/03/2020    HCT 38.5 11/03/2020     11/03/2020       CMP:    Lab Results   Component Value Date     11/04/2020    K 3.5 11/04/2020    K 3.4 10/20/2020     11/04/2020    CO2 27 11/04/2020    BUN 15 11/04/2020    CREATININE 0.88 11/04/2020    GFRAA >60.0 11/04/2020    LABGLOM >60.0 11/04/2020    GLUCOSE 83 11/04/2020    CALCIUM 8.9 11/04/2020       Hepatic Function Panel:    Lab Results   Component Value Date    ALKPHOS 92 11/03/2020    ALT 48 11/03/2020    AST 50 11/03/2020    PROT 6.7 11/03/2020    BILITOT 0.4 11/03/2020    LABALBU 4.0 11/03/2020       Magnesium:    Lab Results   Component Value Date    MG 2.2 11/04/2020     Lipid Profile:    Lab Results   Component Value Date    TRIG 75 11/04/2020    HDL 62 11/04/2020    [address] 95 11/04/2020       TSH:    Lab Results   Component Value Date    TSH 3.690 11/03/2020     PRO-BNP:   Lab Results   Component Value Date    PROBNP 5,055 11/03/2020    PROBNP 4,727 10/19/2020        RADIOLOGY     CT ABDOMEN PELVIS W IV CONTRAST Additional Contrast? None   Final Result   1. THERE IS LOW-ATTENUATION, DISTENTION OF THE ENDOMETRIAL CANAL. THIS IS AN ABNORMAL FINDING POSTMENOPAUSAL FEMALE. UNDERLYING MALIGNANCY IS NOT EXCLUDED. WILL NEED FOLLOW-UP AND FURTHER EVALUATION   2.  THERE IS A SMALL TO MODERATE PROBABLE SLIDING HIATAL HERNIA. CORRELATE CLINICALLY. OTHER FINDINGS DETAILED ABOVE             CT scans at this facility use dose modulation, iterative reconstruction, and/or weight based dosing when appropriate to reduce radiation dose to as low as reasonably achievable. XR CHEST (2 VW)   Final Result   PULMONARY VASCULAR CONGESTION INCREASED INTERSTITIAL MARKINGS SUGGESTING CHF. CORRELATE CLINICALLY            CURRENT MEDICATIONS       sodium chloride flush  10 mL Intravenous 2 times per day    enoxaparin  40 mg Subcutaneous Daily    furosemide  40 mg Intravenous BID    aspirin  81 mg Oral Daily    calcium elemental  1,000 mg Oral Daily    lamoTRIgine  100 mg Oral BID    metoprolol succinate  50 mg Oral Daily    rosuvastatin  20 mg Oral Daily    sacubitril-valsartan  1 tablet Oral BID    Vitamin D  2,000 Units Oral Daily    oxyCODONE  5 mg Oral TID    lactobacillus acidophilus  1 tablet Oral BID    pantoprazole  40 mg Oral QAM AC       ASSESSMENT   Active Problems:    Acute respiratory failure with hypoxia (HCC)  Resolved Problems:    * No resolved hospital problems. *    1. Acute onset of worsening dypsnea. Probable acute pulmonary edema leading to acute respiratory failure. No much improved. 2.  Underlying ischemic cardiomyopathy with severe LV dysfunction. 3.  Status post CABG and Multivessel angioplasty and stenting procedures. 4.  Hypertension. 5.  Hyperlipidemia. Cardiac History:    1. CABG (CABG)   1992. L-LAD. S-RCA   2. CHF (congestive heart failure), NYHA class III, chronic, systolic (867.50,991.9) (V93.55)   3. Hyperlipidemia (272.4) (E78.5)   4. Hypertension, benign (401.1) (I10)   5. 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 Lexiscan 22% EF. Patient wishes no AICD      10/14/15 LVEF 20% with 2+ MR      5/2015- LVEF 20%   6. 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 LVEF 22%. No inducible ischemia   9/30/2000 Stents X 2      7/2/15 Drug eluting stent mid RCA, balloon PTCA PDA      6/25/15 LM PTCA with drug eluting stent. 6/25/15 Cardiac Cath- LM 90%; LAC occluded; RCA 90%; L to LAD patent collateral for      RCA; S to RCA occluded; LVEF 25%; LVEDP 12 mmHg; right cath with PCWP 12; PAP      25/10      1992 CABG L-LAD, S-RCA      1992 IWMI   7. Never a smoker   8. Osteoarthritis, chronic (715.90) (M19.90)   9. Overweight (278.02) (E66.3)   10. Premature ventricular contractions (427.69) (I49.3)   11. Seizure disorder (345.90) (G40.909)   · Seizure 2011, none since as of 5/2015   12. Shortness of breath (786.05) (R06.02)   13. Status post angioplasty (Z17.43) (C40.07)   · 7/2/15 Drug eluting stent mid RCA, balloon PTCA PDA      6/25/15 LM PTCA with drug eluting stent. 14. Status post left hip replacement (V43.64) (Z96.642)   15. Status post right hip replacement (V43.64) (Z96.641)   16. 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 She will be started on furosemide 20 mg daily. Scheduled followup office visit next week. Advised to limit fluid and Na+ intake and to monitor weights daily.        Electronically signed by Josh Childs MD, [facility] on 11/4/2020 at 10:44 AM

## 2020-12-10 LAB
ANION GAP SERPL CALCULATED.3IONS-SCNC: 10 MEQ/L (ref 9–15)
BUN BLDV-MCNC: 19 MG/DL (ref 8–23)
CALCIUM SERPL-MCNC: 9.7 MG/DL (ref 8.5–9.9)
CHLORIDE BLD-SCNC: 104 MEQ/L (ref 95–107)
CO2: 29 MEQ/L (ref 20–31)
CREAT SERPL-MCNC: 0.99 MG/DL (ref 0.5–0.9)
GFR AFRICAN AMERICAN: >60
GFR NON-AFRICAN AMERICAN: 53.1
GLUCOSE BLD-MCNC: 95 MG/DL (ref 70–99)
POTASSIUM SERPL-SCNC: 4.6 MEQ/L (ref 3.4–4.9)
SODIUM BLD-SCNC: 143 MEQ/L (ref 135–144)

## 2021-01-01 ENCOUNTER — APPOINTMENT (OUTPATIENT)
Dept: GENERAL RADIOLOGY | Age: 86
End: 2021-01-01
Payer: MEDICARE

## 2021-01-01 ENCOUNTER — APPOINTMENT (OUTPATIENT)
Dept: CT IMAGING | Age: 86
End: 2021-01-01
Payer: MEDICARE

## 2021-01-01 ENCOUNTER — HOSPITAL ENCOUNTER (EMERGENCY)
Age: 86
Discharge: HOME | End: 2021-08-26
Payer: MEDICARE

## 2021-01-01 ENCOUNTER — HOSPITAL ENCOUNTER (EMERGENCY)
Age: 86
Discharge: HOME | End: 2021-08-01
Payer: MEDICARE

## 2021-01-01 ENCOUNTER — HOSPITAL ENCOUNTER (INPATIENT)
Age: 86
LOS: 2 days | Discharge: HOME | DRG: 392 | End: 2021-10-19
Attending: FAMILY MEDICINE | Admitting: INTERNAL MEDICINE
Payer: MEDICARE

## 2021-01-01 ENCOUNTER — HOSPITAL ENCOUNTER (OUTPATIENT)
Age: 86
Setting detail: OBSERVATION
Discharge: HOME | End: 2021-05-29
Attending: INTERNAL MEDICINE | Admitting: INTERNAL MEDICINE
Payer: MEDICARE

## 2021-01-01 ENCOUNTER — APPOINTMENT (OUTPATIENT)
Dept: GENERAL RADIOLOGY | Age: 86
DRG: 392 | End: 2021-01-01
Payer: MEDICARE

## 2021-01-01 ENCOUNTER — HOSPITAL ENCOUNTER (EMERGENCY)
Age: 86
Discharge: HOME | End: 2021-08-12
Attending: EMERGENCY MEDICINE
Payer: MEDICARE

## 2021-01-01 ENCOUNTER — HOSPITAL ENCOUNTER (EMERGENCY)
Age: 86
Discharge: HOME | End: 2021-07-22
Payer: MEDICARE

## 2021-01-01 ENCOUNTER — APPOINTMENT (OUTPATIENT)
Dept: CT IMAGING | Age: 86
DRG: 392 | End: 2021-01-01
Payer: MEDICARE

## 2021-01-01 VITALS
TEMPERATURE: 98.6 F | HEIGHT: 59 IN | DIASTOLIC BLOOD PRESSURE: 58 MMHG | HEART RATE: 82 BPM | RESPIRATION RATE: 16 BRPM | BODY MASS INDEX: 23.79 KG/M2 | WEIGHT: 118 LBS | OXYGEN SATURATION: 97 % | SYSTOLIC BLOOD PRESSURE: 123 MMHG

## 2021-01-01 VITALS
HEIGHT: 58 IN | HEART RATE: 73 BPM | SYSTOLIC BLOOD PRESSURE: 112 MMHG | RESPIRATION RATE: 17 BRPM | DIASTOLIC BLOOD PRESSURE: 62 MMHG | TEMPERATURE: 98.2 F | WEIGHT: 124 LBS | OXYGEN SATURATION: 95 % | BODY MASS INDEX: 26.03 KG/M2

## 2021-01-01 VITALS
BODY MASS INDEX: 25.2 KG/M2 | OXYGEN SATURATION: 100 % | SYSTOLIC BLOOD PRESSURE: 126 MMHG | HEIGHT: 59 IN | HEART RATE: 70 BPM | RESPIRATION RATE: 18 BRPM | TEMPERATURE: 98.6 F | DIASTOLIC BLOOD PRESSURE: 59 MMHG | WEIGHT: 125 LBS

## 2021-01-01 VITALS
DIASTOLIC BLOOD PRESSURE: 99 MMHG | SYSTOLIC BLOOD PRESSURE: 133 MMHG | RESPIRATION RATE: 22 BRPM | TEMPERATURE: 98.4 F | BODY MASS INDEX: 25.25 KG/M2 | WEIGHT: 125 LBS | HEART RATE: 79 BPM | OXYGEN SATURATION: 97 %

## 2021-01-01 VITALS
DIASTOLIC BLOOD PRESSURE: 95 MMHG | HEIGHT: 59 IN | TEMPERATURE: 98.2 F | SYSTOLIC BLOOD PRESSURE: 107 MMHG | RESPIRATION RATE: 18 BRPM | WEIGHT: 126 LBS | OXYGEN SATURATION: 97 % | HEART RATE: 75 BPM | BODY MASS INDEX: 25.4 KG/M2

## 2021-01-01 VITALS
BODY MASS INDEX: 25.4 KG/M2 | WEIGHT: 126 LBS | RESPIRATION RATE: 27 BRPM | SYSTOLIC BLOOD PRESSURE: 116 MMHG | OXYGEN SATURATION: 97 % | DIASTOLIC BLOOD PRESSURE: 94 MMHG | TEMPERATURE: 97.6 F | HEART RATE: 79 BPM | HEIGHT: 59 IN

## 2021-01-01 DIAGNOSIS — E87.5 HYPERKALEMIA: ICD-10-CM

## 2021-01-01 DIAGNOSIS — Z91.199 H/O NONCOMPLIANCE WITH MEDICAL TREATMENT, PRESENTING HAZARDS TO HEALTH: ICD-10-CM

## 2021-01-01 DIAGNOSIS — G40.209: Primary | ICD-10-CM

## 2021-01-01 DIAGNOSIS — R07.9 CHEST PAIN, UNSPECIFIED TYPE: Primary | ICD-10-CM

## 2021-01-01 DIAGNOSIS — R56.9 SEIZURE (HCC): Primary | ICD-10-CM

## 2021-01-01 DIAGNOSIS — R10.13 ABDOMINAL PAIN, EPIGASTRIC: Primary | ICD-10-CM

## 2021-01-01 DIAGNOSIS — E86.0 DEHYDRATION: ICD-10-CM

## 2021-01-01 DIAGNOSIS — K57.92 ACUTE DIVERTICULITIS: Primary | ICD-10-CM

## 2021-01-01 DIAGNOSIS — G40.909 SEIZURE DISORDER (HCC): Primary | ICD-10-CM

## 2021-01-01 LAB
ALBUMIN SERPL-MCNC: 3.6 G/DL (ref 3.5–4.6)
ALBUMIN SERPL-MCNC: 3.9 G/DL (ref 3.5–4.6)
ALBUMIN SERPL-MCNC: 4 G/DL (ref 3.5–4.6)
ALBUMIN SERPL-MCNC: 4.1 G/DL (ref 3.5–4.6)
ALBUMIN SERPL-MCNC: 4.2 G/DL (ref 3.5–4.6)
ALBUMIN SERPL-MCNC: 4.4 G/DL (ref 3.5–4.6)
ALBUMIN SERPL-MCNC: 4.4 G/DL (ref 3.5–4.6)
ALP BLD-CCNC: 55 U/L (ref 40–130)
ALP BLD-CCNC: 57 U/L (ref 40–130)
ALP BLD-CCNC: 60 U/L (ref 40–130)
ALP BLD-CCNC: 71 U/L (ref 40–130)
ALP BLD-CCNC: 87 U/L (ref 40–130)
ALP BLD-CCNC: 93 U/L (ref 40–130)
ALP BLD-CCNC: 96 U/L (ref 40–130)
ALT SERPL-CCNC: 25 U/L (ref 0–33)
ALT SERPL-CCNC: 33 U/L (ref 0–33)
ALT SERPL-CCNC: 44 U/L (ref 0–33)
ALT SERPL-CCNC: 6 U/L (ref 0–33)
ALT SERPL-CCNC: 8 U/L (ref 0–33)
ALT SERPL-CCNC: <5 U/L (ref 0–33)
ALT SERPL-CCNC: <5 U/L (ref 0–33)
AMYLASE: 131 U/L (ref 22–93)
ANION GAP SERPL CALCULATED.3IONS-SCNC: 11 MEQ/L (ref 9–15)
ANION GAP SERPL CALCULATED.3IONS-SCNC: 12 MEQ/L (ref 9–15)
ANION GAP SERPL CALCULATED.3IONS-SCNC: 12 MEQ/L (ref 9–15)
ANION GAP SERPL CALCULATED.3IONS-SCNC: 13 MEQ/L (ref 9–15)
ANION GAP SERPL CALCULATED.3IONS-SCNC: 13 MEQ/L (ref 9–15)
ANION GAP SERPL CALCULATED.3IONS-SCNC: 14 MEQ/L (ref 9–15)
ANION GAP SERPL CALCULATED.3IONS-SCNC: 15 MEQ/L (ref 9–15)
ANION GAP SERPL CALCULATED.3IONS-SCNC: 9 MEQ/L (ref 9–15)
ANION GAP SERPL CALCULATED.3IONS-SCNC: 9 MEQ/L (ref 9–15)
APTT: 21.8 SEC (ref 24.4–36.8)
APTT: 29 SEC (ref 24.4–36.8)
AST SERPL-CCNC: 11 U/L (ref 0–35)
AST SERPL-CCNC: 11 U/L (ref 0–35)
AST SERPL-CCNC: 12 U/L (ref 0–35)
AST SERPL-CCNC: 15 U/L (ref 0–35)
AST SERPL-CCNC: 18 U/L (ref 0–35)
AST SERPL-CCNC: 30 U/L (ref 0–35)
AST SERPL-CCNC: 36 U/L (ref 0–35)
BACTERIA: NEGATIVE /HPF
BASOPHILS ABSOLUTE: 0 K/UL (ref 0–0.2)
BASOPHILS ABSOLUTE: 0.1 K/UL (ref 0–0.2)
BASOPHILS RELATIVE PERCENT: 0.3 %
BASOPHILS RELATIVE PERCENT: 0.4 %
BASOPHILS RELATIVE PERCENT: 0.4 %
BASOPHILS RELATIVE PERCENT: 0.5 %
BASOPHILS RELATIVE PERCENT: 0.6 %
BASOPHILS RELATIVE PERCENT: 0.6 %
BASOPHILS RELATIVE PERCENT: 0.8 %
BASOPHILS RELATIVE PERCENT: 0.8 %
BASOPHILS RELATIVE PERCENT: 0.9 %
BILIRUB SERPL-MCNC: 0.4 MG/DL (ref 0.2–0.7)
BILIRUB SERPL-MCNC: 0.5 MG/DL (ref 0.2–0.7)
BILIRUB SERPL-MCNC: 0.5 MG/DL (ref 0.2–0.7)
BILIRUB SERPL-MCNC: 0.6 MG/DL (ref 0.2–0.7)
BILIRUB SERPL-MCNC: 0.7 MG/DL (ref 0.2–0.7)
BILIRUB SERPL-MCNC: 0.7 MG/DL (ref 0.2–0.7)
BILIRUB SERPL-MCNC: 0.9 MG/DL (ref 0.2–0.7)
BILIRUBIN URINE: NEGATIVE
BILIRUBIN URINE: NEGATIVE
BLOOD, URINE: NEGATIVE
BLOOD, URINE: NEGATIVE
BUN BLDV-MCNC: 11 MG/DL (ref 8–23)
BUN BLDV-MCNC: 13 MG/DL (ref 8–23)
BUN BLDV-MCNC: 15 MG/DL (ref 8–23)
BUN BLDV-MCNC: 21 MG/DL (ref 8–23)
BUN BLDV-MCNC: 22 MG/DL (ref 8–23)
BUN BLDV-MCNC: 25 MG/DL (ref 8–23)
BUN BLDV-MCNC: 26 MG/DL (ref 8–23)
BUN BLDV-MCNC: 27 MG/DL (ref 8–23)
BUN BLDV-MCNC: 41 MG/DL (ref 8–23)
CALCIUM SERPL-MCNC: 10.2 MG/DL (ref 8.5–9.9)
CALCIUM SERPL-MCNC: 10.2 MG/DL (ref 8.5–9.9)
CALCIUM SERPL-MCNC: 9.1 MG/DL (ref 8.5–9.9)
CALCIUM SERPL-MCNC: 9.4 MG/DL (ref 8.5–9.9)
CALCIUM SERPL-MCNC: 9.6 MG/DL (ref 8.5–9.9)
CALCIUM SERPL-MCNC: 9.7 MG/DL (ref 8.5–9.9)
CALCIUM SERPL-MCNC: 9.8 MG/DL (ref 8.5–9.9)
CALCIUM SERPL-MCNC: 9.8 MG/DL (ref 8.5–9.9)
CALCIUM SERPL-MCNC: 9.9 MG/DL (ref 8.5–9.9)
CHLORIDE BLD-SCNC: 101 MEQ/L (ref 95–107)
CHLORIDE BLD-SCNC: 104 MEQ/L (ref 95–107)
CHLORIDE BLD-SCNC: 93 MEQ/L (ref 95–107)
CHLORIDE BLD-SCNC: 96 MEQ/L (ref 95–107)
CHLORIDE BLD-SCNC: 97 MEQ/L (ref 95–107)
CHLORIDE BLD-SCNC: 98 MEQ/L (ref 95–107)
CHLORIDE BLD-SCNC: 99 MEQ/L (ref 95–107)
CLARITY: CLEAR
CLARITY: CLEAR
CO2: 22 MEQ/L (ref 20–31)
CO2: 23 MEQ/L (ref 20–31)
CO2: 26 MEQ/L (ref 20–31)
CO2: 27 MEQ/L (ref 20–31)
CO2: 27 MEQ/L (ref 20–31)
CO2: 28 MEQ/L (ref 20–31)
CO2: 28 MEQ/L (ref 20–31)
CO2: 29 MEQ/L (ref 20–31)
CO2: 31 MEQ/L (ref 20–31)
COLOR: ABNORMAL
COLOR: YELLOW
CREAT SERPL-MCNC: 0.63 MG/DL (ref 0.5–0.9)
CREAT SERPL-MCNC: 0.86 MG/DL (ref 0.5–0.9)
CREAT SERPL-MCNC: 0.89 MG/DL (ref 0.5–0.9)
CREAT SERPL-MCNC: 0.91 MG/DL (ref 0.5–0.9)
CREAT SERPL-MCNC: 0.96 MG/DL (ref 0.5–0.9)
CREAT SERPL-MCNC: 1.04 MG/DL (ref 0.5–0.9)
CREAT SERPL-MCNC: 1.08 MG/DL (ref 0.5–0.9)
CREAT SERPL-MCNC: 1.1 MG/DL (ref 0.5–0.9)
CREAT SERPL-MCNC: 1.85 MG/DL (ref 0.5–0.9)
EKG ATRIAL RATE: 72 BPM
EKG ATRIAL RATE: 76 BPM
EKG ATRIAL RATE: 78 BPM
EKG ATRIAL RATE: 82 BPM
EKG ATRIAL RATE: 88 BPM
EKG ATRIAL RATE: 98 BPM
EKG P AXIS: 0 DEGREES
EKG P AXIS: 35 DEGREES
EKG P AXIS: 47 DEGREES
EKG P AXIS: 53 DEGREES
EKG P AXIS: 67 DEGREES
EKG P AXIS: 71 DEGREES
EKG P-R INTERVAL: 198 MS
EKG P-R INTERVAL: 214 MS
EKG P-R INTERVAL: 214 MS
EKG P-R INTERVAL: 218 MS
EKG P-R INTERVAL: 248 MS
EKG P-R INTERVAL: 254 MS
EKG Q-T INTERVAL: 388 MS
EKG Q-T INTERVAL: 392 MS
EKG Q-T INTERVAL: 392 MS
EKG Q-T INTERVAL: 408 MS
EKG Q-T INTERVAL: 408 MS
EKG Q-T INTERVAL: 412 MS
EKG QRS DURATION: 130 MS
EKG QRS DURATION: 130 MS
EKG QRS DURATION: 132 MS
EKG QRS DURATION: 134 MS
EKG QRS DURATION: 140 MS
EKG QRS DURATION: 140 MS
EKG QTC CALCULATION (BAZETT): 446 MS
EKG QTC CALCULATION (BAZETT): 457 MS
EKG QTC CALCULATION (BAZETT): 459 MS
EKG QTC CALCULATION (BAZETT): 469 MS
EKG QTC CALCULATION (BAZETT): 474 MS
EKG QTC CALCULATION (BAZETT): 495 MS
EKG R AXIS: -16 DEGREES
EKG R AXIS: -16 DEGREES
EKG R AXIS: -18 DEGREES
EKG R AXIS: -21 DEGREES
EKG R AXIS: -23 DEGREES
EKG R AXIS: -27 DEGREES
EKG T AXIS: 112 DEGREES
EKG T AXIS: 114 DEGREES
EKG T AXIS: 126 DEGREES
EKG T AXIS: 130 DEGREES
EKG T AXIS: 139 DEGREES
EKG T AXIS: 140 DEGREES
EKG VENTRICULAR RATE: 72 BPM
EKG VENTRICULAR RATE: 76 BPM
EKG VENTRICULAR RATE: 78 BPM
EKG VENTRICULAR RATE: 82 BPM
EKG VENTRICULAR RATE: 88 BPM
EKG VENTRICULAR RATE: 98 BPM
EOSINOPHILS ABSOLUTE: 0 K/UL (ref 0–0.7)
EOSINOPHILS ABSOLUTE: 0.1 K/UL (ref 0–0.7)
EOSINOPHILS ABSOLUTE: 0.2 K/UL (ref 0–0.7)
EOSINOPHILS RELATIVE PERCENT: 0.1 %
EOSINOPHILS RELATIVE PERCENT: 1.1 %
EOSINOPHILS RELATIVE PERCENT: 1.4 %
EOSINOPHILS RELATIVE PERCENT: 1.5 %
EOSINOPHILS RELATIVE PERCENT: 1.9 %
EOSINOPHILS RELATIVE PERCENT: 2 %
EOSINOPHILS RELATIVE PERCENT: 2.1 %
EOSINOPHILS RELATIVE PERCENT: 2.2 %
EOSINOPHILS RELATIVE PERCENT: 2.3 %
EPITHELIAL CELLS, UA: ABNORMAL /HPF (ref 0–5)
GFR AFRICAN AMERICAN: 27
GFR AFRICAN AMERICAN: 31.2
GFR AFRICAN AMERICAN: 56.8
GFR AFRICAN AMERICAN: 58.1
GFR AFRICAN AMERICAN: >60
GFR NON-AFRICAN AMERICAN: 22
GFR NON-AFRICAN AMERICAN: 25.8
GFR NON-AFRICAN AMERICAN: 46.9
GFR NON-AFRICAN AMERICAN: 48
GFR NON-AFRICAN AMERICAN: 50.1
GFR NON-AFRICAN AMERICAN: 54.9
GFR NON-AFRICAN AMERICAN: 58.5
GFR NON-AFRICAN AMERICAN: 60
GFR NON-AFRICAN AMERICAN: >60
GFR NON-AFRICAN AMERICAN: >60
GLOBULIN: 2.4 G/DL (ref 2.3–3.5)
GLOBULIN: 2.5 G/DL (ref 2.3–3.5)
GLOBULIN: 2.5 G/DL (ref 2.3–3.5)
GLOBULIN: 2.8 G/DL (ref 2.3–3.5)
GLOBULIN: 2.9 G/DL (ref 2.3–3.5)
GLOBULIN: 2.9 G/DL (ref 2.3–3.5)
GLOBULIN: 3 G/DL (ref 2.3–3.5)
GLUCOSE BLD-MCNC: 103 MG/DL (ref 60–115)
GLUCOSE BLD-MCNC: 106 MG/DL (ref 60–115)
GLUCOSE BLD-MCNC: 107 MG/DL (ref 70–99)
GLUCOSE BLD-MCNC: 107 MG/DL (ref 70–99)
GLUCOSE BLD-MCNC: 108 MG/DL (ref 60–115)
GLUCOSE BLD-MCNC: 108 MG/DL (ref 60–115)
GLUCOSE BLD-MCNC: 118 MG/DL (ref 60–115)
GLUCOSE BLD-MCNC: 120 MG/DL (ref 60–115)
GLUCOSE BLD-MCNC: 121 MG/DL (ref 70–99)
GLUCOSE BLD-MCNC: 122 MG/DL (ref 70–99)
GLUCOSE BLD-MCNC: 123 MG/DL (ref 60–115)
GLUCOSE BLD-MCNC: 155 MG/DL (ref 70–99)
GLUCOSE BLD-MCNC: 178 MG/DL (ref 70–99)
GLUCOSE BLD-MCNC: 85 MG/DL (ref 70–99)
GLUCOSE BLD-MCNC: 91 MG/DL (ref 70–99)
GLUCOSE BLD-MCNC: 96 MG/DL (ref 70–99)
GLUCOSE URINE: NEGATIVE MG/DL
GLUCOSE URINE: NEGATIVE MG/DL
HCT VFR BLD CALC: 35.8 % (ref 37–47)
HCT VFR BLD CALC: 38.2 % (ref 37–47)
HCT VFR BLD CALC: 38.8 % (ref 37–47)
HCT VFR BLD CALC: 39 % (ref 37–47)
HCT VFR BLD CALC: 40.4 % (ref 37–47)
HCT VFR BLD CALC: 40.7 % (ref 37–47)
HCT VFR BLD CALC: 42.3 % (ref 37–47)
HCT VFR BLD CALC: 44.3 % (ref 37–47)
HCT VFR BLD CALC: 44.9 % (ref 37–47)
HEMOGLOBIN: 12 G/DL (ref 12–16)
HEMOGLOBIN: 12.7 G/DL (ref 12–16)
HEMOGLOBIN: 13 G/DL (ref 12–16)
HEMOGLOBIN: 13 G/DL (ref 12–16)
HEMOGLOBIN: 13.1 G/DL (ref 12–16)
HEMOGLOBIN: 13.4 G/DL (ref 12–16)
HEMOGLOBIN: 13.7 G/DL (ref 12–16)
HEMOGLOBIN: 14.3 G/DL (ref 12–16)
HEMOGLOBIN: 14.6 G/DL (ref 12–16)
HYALINE CASTS: ABNORMAL /LPF (ref 0–5)
INR BLD: 1
INR BLD: 1.1
KETONES, URINE: ABNORMAL MG/DL
KETONES, URINE: NEGATIVE MG/DL
LAMOTRIGINE LEVEL: <0.9 UG/ML (ref 2.5–15)
LAMOTRIGINE LEVEL: <0.9 UG/ML (ref 2.5–15)
LEUKOCYTE ESTERASE, URINE: ABNORMAL
LEUKOCYTE ESTERASE, URINE: NEGATIVE
LIPASE: 34 U/L (ref 12–95)
LIPASE: 37 U/L (ref 12–95)
LV EF: 13 %
LVEF MODALITY: NORMAL
LYMPHOCYTES ABSOLUTE: 0.9 K/UL (ref 1–4.8)
LYMPHOCYTES ABSOLUTE: 1 K/UL (ref 1–4.8)
LYMPHOCYTES ABSOLUTE: 1.1 K/UL (ref 1–4.8)
LYMPHOCYTES ABSOLUTE: 1.2 K/UL (ref 1–4.8)
LYMPHOCYTES ABSOLUTE: 1.2 K/UL (ref 1–4.8)
LYMPHOCYTES ABSOLUTE: 1.3 K/UL (ref 1–4.8)
LYMPHOCYTES ABSOLUTE: 1.4 K/UL (ref 1–4.8)
LYMPHOCYTES ABSOLUTE: 1.8 K/UL (ref 1–4.8)
LYMPHOCYTES ABSOLUTE: 2 K/UL (ref 1–4.8)
LYMPHOCYTES RELATIVE PERCENT: 14.4 %
LYMPHOCYTES RELATIVE PERCENT: 14.4 %
LYMPHOCYTES RELATIVE PERCENT: 15.9 %
LYMPHOCYTES RELATIVE PERCENT: 18.8 %
LYMPHOCYTES RELATIVE PERCENT: 19.8 %
LYMPHOCYTES RELATIVE PERCENT: 25.1 %
LYMPHOCYTES RELATIVE PERCENT: 27.2 %
LYMPHOCYTES RELATIVE PERCENT: 32.1 %
LYMPHOCYTES RELATIVE PERCENT: 9 %
MAGNESIUM: 1.9 MG/DL (ref 1.7–2.4)
MAGNESIUM: 1.9 MG/DL (ref 1.7–2.4)
MAGNESIUM: 2.1 MG/DL (ref 1.7–2.4)
MAGNESIUM: 2.2 MG/DL (ref 1.7–2.4)
MAGNESIUM: 2.4 MG/DL (ref 1.7–2.4)
MCH RBC QN AUTO: 29.2 PG (ref 27–31.3)
MCH RBC QN AUTO: 29.3 PG (ref 27–31.3)
MCH RBC QN AUTO: 29.6 PG (ref 27–31.3)
MCH RBC QN AUTO: 29.8 PG (ref 27–31.3)
MCH RBC QN AUTO: 29.9 PG (ref 27–31.3)
MCH RBC QN AUTO: 30.2 PG (ref 27–31.3)
MCH RBC QN AUTO: 30.4 PG (ref 27–31.3)
MCH RBC QN AUTO: 30.7 PG (ref 27–31.3)
MCH RBC QN AUTO: 31.4 PG (ref 27–31.3)
MCHC RBC AUTO-ENTMCNC: 32.2 % (ref 33–37)
MCHC RBC AUTO-ENTMCNC: 32.4 % (ref 33–37)
MCHC RBC AUTO-ENTMCNC: 32.4 % (ref 33–37)
MCHC RBC AUTO-ENTMCNC: 32.5 % (ref 33–37)
MCHC RBC AUTO-ENTMCNC: 32.6 % (ref 33–37)
MCHC RBC AUTO-ENTMCNC: 33 % (ref 33–37)
MCHC RBC AUTO-ENTMCNC: 33.3 % (ref 33–37)
MCHC RBC AUTO-ENTMCNC: 33.4 % (ref 33–37)
MCHC RBC AUTO-ENTMCNC: 34 % (ref 33–37)
MCV RBC AUTO: 90.1 FL (ref 82–100)
MCV RBC AUTO: 91.1 FL (ref 82–100)
MCV RBC AUTO: 91.3 FL (ref 82–100)
MCV RBC AUTO: 91.3 FL (ref 82–100)
MCV RBC AUTO: 91.5 FL (ref 82–100)
MCV RBC AUTO: 91.7 FL (ref 82–100)
MCV RBC AUTO: 91.8 FL (ref 82–100)
MCV RBC AUTO: 91.9 FL (ref 82–100)
MCV RBC AUTO: 92.2 FL (ref 82–100)
MONOCYTES ABSOLUTE: 0.5 K/UL (ref 0.2–0.8)
MONOCYTES ABSOLUTE: 0.6 K/UL (ref 0.2–0.8)
MONOCYTES ABSOLUTE: 0.6 K/UL (ref 0.2–0.8)
MONOCYTES ABSOLUTE: 0.7 K/UL (ref 0.2–0.8)
MONOCYTES RELATIVE PERCENT: 11.1 %
MONOCYTES RELATIVE PERCENT: 11.6 %
MONOCYTES RELATIVE PERCENT: 5.9 %
MONOCYTES RELATIVE PERCENT: 7.2 %
MONOCYTES RELATIVE PERCENT: 7.8 %
MONOCYTES RELATIVE PERCENT: 7.9 %
MONOCYTES RELATIVE PERCENT: 7.9 %
MONOCYTES RELATIVE PERCENT: 8 %
MONOCYTES RELATIVE PERCENT: 9.5 %
NEUTROPHILS ABSOLUTE: 3.5 K/UL (ref 1.4–6.5)
NEUTROPHILS ABSOLUTE: 3.5 K/UL (ref 1.4–6.5)
NEUTROPHILS ABSOLUTE: 3.6 K/UL (ref 1.4–6.5)
NEUTROPHILS ABSOLUTE: 4.1 K/UL (ref 1.4–6.5)
NEUTROPHILS ABSOLUTE: 4.2 K/UL (ref 1.4–6.5)
NEUTROPHILS ABSOLUTE: 5.6 K/UL (ref 1.4–6.5)
NEUTROPHILS ABSOLUTE: 5.7 K/UL (ref 1.4–6.5)
NEUTROPHILS ABSOLUTE: 6.9 K/UL (ref 1.4–6.5)
NEUTROPHILS ABSOLUTE: 8.5 K/UL (ref 1.4–6.5)
NEUTROPHILS RELATIVE PERCENT: 57.2 %
NEUTROPHILS RELATIVE PERCENT: 60.8 %
NEUTROPHILS RELATIVE PERCENT: 61.9 %
NEUTROPHILS RELATIVE PERCENT: 66.2 %
NEUTROPHILS RELATIVE PERCENT: 68.8 %
NEUTROPHILS RELATIVE PERCENT: 75.8 %
NEUTROPHILS RELATIVE PERCENT: 75.9 %
NEUTROPHILS RELATIVE PERCENT: 76.3 %
NEUTROPHILS RELATIVE PERCENT: 83.6 %
NITRITE, URINE: NEGATIVE
NITRITE, URINE: NEGATIVE
PDW BLD-RTO: 14.1 % (ref 11.5–14.5)
PDW BLD-RTO: 14.3 % (ref 11.5–14.5)
PDW BLD-RTO: 14.5 % (ref 11.5–14.5)
PDW BLD-RTO: 15.8 % (ref 11.5–14.5)
PDW BLD-RTO: 15.8 % (ref 11.5–14.5)
PDW BLD-RTO: 16.2 % (ref 11.5–14.5)
PDW BLD-RTO: 16.4 % (ref 11.5–14.5)
PERFORMED ON: ABNORMAL
PERFORMED ON: NORMAL
PH UA: 5 (ref 5–9)
PH UA: 6 (ref 5–9)
PLATELET # BLD: 174 K/UL (ref 130–400)
PLATELET # BLD: 200 K/UL (ref 130–400)
PLATELET # BLD: 210 K/UL (ref 130–400)
PLATELET # BLD: 213 K/UL (ref 130–400)
PLATELET # BLD: 216 K/UL (ref 130–400)
PLATELET # BLD: 216 K/UL (ref 130–400)
PLATELET # BLD: 217 K/UL (ref 130–400)
PLATELET # BLD: 235 K/UL (ref 130–400)
PLATELET # BLD: 242 K/UL (ref 130–400)
POC CREATININE: 2.1 MG/DL (ref 0.6–1.2)
POC SAMPLE TYPE: ABNORMAL
POTASSIUM REFLEX MAGNESIUM: 3.3 MEQ/L (ref 3.4–4.9)
POTASSIUM REFLEX MAGNESIUM: 3.6 MEQ/L (ref 3.4–4.9)
POTASSIUM SERPL-SCNC: 3 MEQ/L (ref 3.4–4.9)
POTASSIUM SERPL-SCNC: 3.5 MEQ/L (ref 3.4–4.9)
POTASSIUM SERPL-SCNC: 3.6 MEQ/L (ref 3.4–4.9)
POTASSIUM SERPL-SCNC: 3.6 MEQ/L (ref 3.4–4.9)
POTASSIUM SERPL-SCNC: 3.7 MEQ/L (ref 3.4–4.9)
POTASSIUM SERPL-SCNC: 4 MEQ/L (ref 3.4–4.9)
POTASSIUM SERPL-SCNC: 5.1 MEQ/L (ref 3.4–4.9)
POTASSIUM SERPL-SCNC: 5.2 MEQ/L (ref 3.4–4.9)
POTASSIUM SERPL-SCNC: 5.2 MEQ/L (ref 3.4–4.9)
PROLACTIN: 48.9 NG/ML
PROTEIN UA: ABNORMAL MG/DL
PROTEIN UA: NEGATIVE MG/DL
PROTHROMBIN TIME: 13.5 SEC (ref 12.3–14.9)
PROTHROMBIN TIME: 13.8 SEC (ref 12.3–14.9)
RBC # BLD: 3.9 M/UL (ref 4.2–5.4)
RBC # BLD: 4.14 M/UL (ref 4.2–5.4)
RBC # BLD: 4.24 M/UL (ref 4.2–5.4)
RBC # BLD: 4.27 M/UL (ref 4.2–5.4)
RBC # BLD: 4.43 M/UL (ref 4.2–5.4)
RBC # BLD: 4.45 M/UL (ref 4.2–5.4)
RBC # BLD: 4.61 M/UL (ref 4.2–5.4)
RBC # BLD: 4.86 M/UL (ref 4.2–5.4)
RBC # BLD: 4.99 M/UL (ref 4.2–5.4)
SARS-COV-2, NAAT: NOT DETECTED
SODIUM BLD-SCNC: 130 MEQ/L (ref 135–144)
SODIUM BLD-SCNC: 136 MEQ/L (ref 135–144)
SODIUM BLD-SCNC: 136 MEQ/L (ref 135–144)
SODIUM BLD-SCNC: 137 MEQ/L (ref 135–144)
SODIUM BLD-SCNC: 137 MEQ/L (ref 135–144)
SODIUM BLD-SCNC: 138 MEQ/L (ref 135–144)
SODIUM BLD-SCNC: 138 MEQ/L (ref 135–144)
SODIUM BLD-SCNC: 140 MEQ/L (ref 135–144)
SODIUM BLD-SCNC: 141 MEQ/L (ref 135–144)
SPECIFIC GRAVITY UA: 1.01 (ref 1–1.03)
SPECIFIC GRAVITY UA: 1.02 (ref 1–1.03)
TOTAL CK: 81 U/L (ref 0–170)
TOTAL PROTEIN: 6 G/DL (ref 6.3–8)
TOTAL PROTEIN: 6.4 G/DL (ref 6.3–8)
TOTAL PROTEIN: 6.6 G/DL (ref 6.3–8)
TOTAL PROTEIN: 6.9 G/DL (ref 6.3–8)
TOTAL PROTEIN: 7.1 G/DL (ref 6.3–8)
TOTAL PROTEIN: 7.2 G/DL (ref 6.3–8)
TOTAL PROTEIN: 7.4 G/DL (ref 6.3–8)
TROPONIN: <0.01 NG/ML (ref 0–0.01)
URINE REFLEX TO CULTURE: ABNORMAL
URINE REFLEX TO CULTURE: NORMAL
UROBILINOGEN, URINE: 0.2 E.U./DL
UROBILINOGEN, URINE: 1 E.U./DL
WBC # BLD: 10.2 K/UL (ref 4.8–10.8)
WBC # BLD: 5.1 K/UL (ref 4.8–10.8)
WBC # BLD: 5.8 K/UL (ref 4.8–10.8)
WBC # BLD: 6.1 K/UL (ref 4.8–10.8)
WBC # BLD: 6.2 K/UL (ref 4.8–10.8)
WBC # BLD: 6.8 K/UL (ref 4.8–10.8)
WBC # BLD: 7.4 K/UL (ref 4.8–10.8)
WBC # BLD: 7.5 K/UL (ref 4.8–10.8)
WBC # BLD: 9.1 K/UL (ref 4.8–10.8)
WBC UA: ABNORMAL /HPF (ref 0–5)

## 2021-01-01 PROCEDURE — 85610 PROTHROMBIN TIME: CPT

## 2021-01-01 PROCEDURE — 96360 HYDRATION IV INFUSION INIT: CPT

## 2021-01-01 PROCEDURE — 96375 TX/PRO/DX INJ NEW DRUG ADDON: CPT

## 2021-01-01 PROCEDURE — 71045 X-RAY EXAM CHEST 1 VIEW: CPT

## 2021-01-01 PROCEDURE — 2580000003 HC RX 258: Performed by: INTERNAL MEDICINE

## 2021-01-01 PROCEDURE — 1210000000 HC MED SURG R&B

## 2021-01-01 PROCEDURE — 99284 EMERGENCY DEPT VISIT MOD MDM: CPT

## 2021-01-01 PROCEDURE — 96365 THER/PROPH/DIAG IV INF INIT: CPT

## 2021-01-01 PROCEDURE — 93005 ELECTROCARDIOGRAM TRACING: CPT | Performed by: PHYSICIAN ASSISTANT

## 2021-01-01 PROCEDURE — 93306 TTE W/DOPPLER COMPLETE: CPT

## 2021-01-01 PROCEDURE — 85025 COMPLETE CBC W/AUTO DIFF WBC: CPT

## 2021-01-01 PROCEDURE — 6370000000 HC RX 637 (ALT 250 FOR IP): Performed by: PHYSICIAN ASSISTANT

## 2021-01-01 PROCEDURE — 84146 ASSAY OF PROLACTIN: CPT

## 2021-01-01 PROCEDURE — 96374 THER/PROPH/DIAG INJ IV PUSH: CPT

## 2021-01-01 PROCEDURE — 93005 ELECTROCARDIOGRAM TRACING: CPT | Performed by: EMERGENCY MEDICINE

## 2021-01-01 PROCEDURE — 96366 THER/PROPH/DIAG IV INF ADDON: CPT

## 2021-01-01 PROCEDURE — 82550 ASSAY OF CK (CPK): CPT

## 2021-01-01 PROCEDURE — 81003 URINALYSIS AUTO W/O SCOPE: CPT

## 2021-01-01 PROCEDURE — 80053 COMPREHEN METABOLIC PANEL: CPT

## 2021-01-01 PROCEDURE — 93010 ELECTROCARDIOGRAM REPORT: CPT | Performed by: INTERNAL MEDICINE

## 2021-01-01 PROCEDURE — 94664 DEMO&/EVAL PT USE INHALER: CPT

## 2021-01-01 PROCEDURE — G0378 HOSPITAL OBSERVATION PER HR: HCPCS

## 2021-01-01 PROCEDURE — 74019 RADEX ABDOMEN 2 VIEWS: CPT

## 2021-01-01 PROCEDURE — 6370000000 HC RX 637 (ALT 250 FOR IP): Performed by: INTERNAL MEDICINE

## 2021-01-01 PROCEDURE — 84484 ASSAY OF TROPONIN QUANT: CPT

## 2021-01-01 PROCEDURE — 70450 CT HEAD/BRAIN W/O DYE: CPT

## 2021-01-01 PROCEDURE — 96361 HYDRATE IV INFUSION ADD-ON: CPT

## 2021-01-01 PROCEDURE — 36415 COLL VENOUS BLD VENIPUNCTURE: CPT

## 2021-01-01 PROCEDURE — 6360000002 HC RX W HCPCS: Performed by: INTERNAL MEDICINE

## 2021-01-01 PROCEDURE — 83735 ASSAY OF MAGNESIUM: CPT

## 2021-01-01 PROCEDURE — 87635 SARS-COV-2 COVID-19 AMP PRB: CPT

## 2021-01-01 PROCEDURE — 6370000000 HC RX 637 (ALT 250 FOR IP): Performed by: PERSONAL EMERGENCY RESPONSE ATTENDANT

## 2021-01-01 PROCEDURE — 96372 THER/PROPH/DIAG INJ SC/IM: CPT

## 2021-01-01 PROCEDURE — 2580000003 HC RX 258: Performed by: EMERGENCY MEDICINE

## 2021-01-01 PROCEDURE — 83690 ASSAY OF LIPASE: CPT

## 2021-01-01 PROCEDURE — 84132 ASSAY OF SERUM POTASSIUM: CPT

## 2021-01-01 PROCEDURE — 85730 THROMBOPLASTIN TIME PARTIAL: CPT

## 2021-01-01 PROCEDURE — 96367 TX/PROPH/DG ADDL SEQ IV INF: CPT

## 2021-01-01 PROCEDURE — 99285 EMERGENCY DEPT VISIT HI MDM: CPT

## 2021-01-01 PROCEDURE — 80175 DRUG SCREEN QUAN LAMOTRIGINE: CPT

## 2021-01-01 PROCEDURE — 6360000002 HC RX W HCPCS: Performed by: PERSONAL EMERGENCY RESPONSE ATTENDANT

## 2021-01-01 PROCEDURE — 93005 ELECTROCARDIOGRAM TRACING: CPT | Performed by: PERSONAL EMERGENCY RESPONSE ATTENDANT

## 2021-01-01 PROCEDURE — 2700000000 HC OXYGEN THERAPY PER DAY

## 2021-01-01 PROCEDURE — 74022 RADEX COMPL AQT ABD SERIES: CPT

## 2021-01-01 PROCEDURE — 74176 CT ABD & PELVIS W/O CONTRAST: CPT

## 2021-01-01 PROCEDURE — 2500000003 HC RX 250 WO HCPCS: Performed by: PERSONAL EMERGENCY RESPONSE ATTENDANT

## 2021-01-01 PROCEDURE — 81001 URINALYSIS AUTO W/SCOPE: CPT

## 2021-01-01 PROCEDURE — 6360000002 HC RX W HCPCS: Performed by: FAMILY MEDICINE

## 2021-01-01 PROCEDURE — 80048 BASIC METABOLIC PNL TOTAL CA: CPT

## 2021-01-01 PROCEDURE — 2580000003 HC RX 258: Performed by: FAMILY MEDICINE

## 2021-01-01 PROCEDURE — 6360000002 HC RX W HCPCS: Performed by: EMERGENCY MEDICINE

## 2021-01-01 PROCEDURE — 93005 ELECTROCARDIOGRAM TRACING: CPT | Performed by: INTERNAL MEDICINE

## 2021-01-01 PROCEDURE — 82150 ASSAY OF AMYLASE: CPT

## 2021-01-01 PROCEDURE — 6360000002 HC RX W HCPCS: Performed by: PHYSICIAN ASSISTANT

## 2021-01-01 PROCEDURE — 73502 X-RAY EXAM HIP UNI 2-3 VIEWS: CPT

## 2021-01-01 RX ORDER — ONDANSETRON 2 MG/ML
4 INJECTION INTRAMUSCULAR; INTRAVENOUS EVERY 6 HOURS PRN
Status: DISCONTINUED | OUTPATIENT
Start: 2021-01-01 | End: 2021-01-01 | Stop reason: HOSPADM

## 2021-01-01 RX ORDER — SPIRONOLACTONE 25 MG/1
25 TABLET ORAL DAILY
Qty: 30 TABLET | Refills: 3 | Status: SHIPPED | OUTPATIENT
Start: 2021-01-01 | End: 2021-01-01

## 2021-01-01 RX ORDER — ISOSORBIDE MONONITRATE 60 MG/1
60 TABLET, EXTENDED RELEASE ORAL DAILY
Status: DISCONTINUED | OUTPATIENT
Start: 2021-01-01 | End: 2021-01-01 | Stop reason: HOSPADM

## 2021-01-01 RX ORDER — AMOXICILLIN AND CLAVULANATE POTASSIUM 875; 125 MG/1; MG/1
1 TABLET, FILM COATED ORAL 2 TIMES DAILY
Qty: 14 TABLET | Refills: 0 | Status: SHIPPED | OUTPATIENT
Start: 2021-01-01 | End: 2021-01-01

## 2021-01-01 RX ORDER — SODIUM CHLORIDE 9 MG/ML
INJECTION, SOLUTION INTRAVENOUS CONTINUOUS
Status: DISPENSED | OUTPATIENT
Start: 2021-01-01 | End: 2021-01-01

## 2021-01-01 RX ORDER — POTASSIUM CHLORIDE 20 MEQ/1
20 TABLET, EXTENDED RELEASE ORAL ONCE
Status: COMPLETED | OUTPATIENT
Start: 2021-01-01 | End: 2021-01-01

## 2021-01-01 RX ORDER — FENTANYL CITRATE 50 UG/ML
25 INJECTION, SOLUTION INTRAMUSCULAR; INTRAVENOUS ONCE
Status: DISCONTINUED | OUTPATIENT
Start: 2021-01-01 | End: 2021-01-01

## 2021-01-01 RX ORDER — LOSARTAN POTASSIUM 50 MG/1
50 TABLET ORAL DAILY
Qty: 30 TABLET | Refills: 3 | Status: ON HOLD | OUTPATIENT
Start: 2021-01-01 | End: 2022-01-01 | Stop reason: HOSPADM

## 2021-01-01 RX ORDER — ACETAMINOPHEN 650 MG/1
650 SUPPOSITORY RECTAL EVERY 6 HOURS PRN
Status: DISCONTINUED | OUTPATIENT
Start: 2021-01-01 | End: 2021-01-01 | Stop reason: HOSPADM

## 2021-01-01 RX ORDER — FUROSEMIDE 20 MG/1
20 TABLET ORAL DAILY
Status: DISCONTINUED | OUTPATIENT
Start: 2021-01-01 | End: 2021-01-01 | Stop reason: HOSPADM

## 2021-01-01 RX ORDER — SODIUM POLYSTYRENE SULFONATE 15 G/60ML
30 SUSPENSION ORAL; RECTAL ONCE
Status: COMPLETED | OUTPATIENT
Start: 2021-01-01 | End: 2021-01-01

## 2021-01-01 RX ORDER — ONDANSETRON 4 MG/1
4 TABLET, ORALLY DISINTEGRATING ORAL EVERY 8 HOURS PRN
Status: DISCONTINUED | OUTPATIENT
Start: 2021-01-01 | End: 2021-01-01 | Stop reason: HOSPADM

## 2021-01-01 RX ORDER — ONDANSETRON 2 MG/ML
4 INJECTION INTRAMUSCULAR; INTRAVENOUS ONCE
Status: COMPLETED | OUTPATIENT
Start: 2021-01-01 | End: 2021-01-01

## 2021-01-01 RX ORDER — 0.9 % SODIUM CHLORIDE 0.9 %
1000 INTRAVENOUS SOLUTION INTRAVENOUS ONCE
Status: COMPLETED | OUTPATIENT
Start: 2021-01-01 | End: 2021-01-01

## 2021-01-01 RX ORDER — METOPROLOL SUCCINATE 25 MG/1
75 TABLET, EXTENDED RELEASE ORAL DAILY
Qty: 30 TABLET | Refills: 3 | Status: ON HOLD | OUTPATIENT
Start: 2021-01-01 | End: 2022-01-01 | Stop reason: HOSPADM

## 2021-01-01 RX ORDER — ISOSORBIDE MONONITRATE 60 MG/1
60 TABLET, EXTENDED RELEASE ORAL DAILY
Qty: 30 TABLET | Refills: 3 | Status: ON HOLD | OUTPATIENT
Start: 2021-01-01 | End: 2022-01-01 | Stop reason: HOSPADM

## 2021-01-01 RX ORDER — ROSUVASTATIN CALCIUM 20 MG/1
20 TABLET, COATED ORAL NIGHTLY
Status: DISCONTINUED | OUTPATIENT
Start: 2021-01-01 | End: 2021-01-01 | Stop reason: HOSPADM

## 2021-01-01 RX ORDER — SODIUM CHLORIDE 9 MG/ML
25 INJECTION, SOLUTION INTRAVENOUS PRN
Status: DISCONTINUED | OUTPATIENT
Start: 2021-01-01 | End: 2021-01-01 | Stop reason: HOSPADM

## 2021-01-01 RX ORDER — NICOTINE POLACRILEX 4 MG
15 LOZENGE BUCCAL PRN
Status: DISCONTINUED | OUTPATIENT
Start: 2021-01-01 | End: 2021-01-01 | Stop reason: HOSPADM

## 2021-01-01 RX ORDER — CIPROFLOXACIN 2 MG/ML
400 INJECTION, SOLUTION INTRAVENOUS ONCE
Status: DISCONTINUED | OUTPATIENT
Start: 2021-01-01 | End: 2021-01-01

## 2021-01-01 RX ORDER — MORPHINE SULFATE 2 MG/ML
2 INJECTION, SOLUTION INTRAMUSCULAR; INTRAVENOUS ONCE
Status: COMPLETED | OUTPATIENT
Start: 2021-01-01 | End: 2021-01-01

## 2021-01-01 RX ORDER — LAMOTRIGINE 100 MG/1
100 TABLET ORAL 2 TIMES DAILY
Status: DISCONTINUED | OUTPATIENT
Start: 2021-01-01 | End: 2021-01-01 | Stop reason: HOSPADM

## 2021-01-01 RX ORDER — LOSARTAN POTASSIUM 50 MG/1
50 TABLET ORAL DAILY
Status: DISCONTINUED | OUTPATIENT
Start: 2021-01-01 | End: 2021-01-01 | Stop reason: HOSPADM

## 2021-01-01 RX ORDER — SODIUM CHLORIDE 0.9 % (FLUSH) 0.9 %
5-40 SYRINGE (ML) INJECTION EVERY 12 HOURS SCHEDULED
Status: DISCONTINUED | OUTPATIENT
Start: 2021-01-01 | End: 2021-01-01 | Stop reason: HOSPADM

## 2021-01-01 RX ORDER — FUROSEMIDE 20 MG/1
20 TABLET ORAL DAILY
Status: DISCONTINUED | OUTPATIENT
Start: 2021-01-01 | End: 2021-01-01

## 2021-01-01 RX ORDER — DEXTROSE MONOHYDRATE 25 G/50ML
12.5 INJECTION, SOLUTION INTRAVENOUS PRN
Status: DISCONTINUED | OUTPATIENT
Start: 2021-01-01 | End: 2021-01-01 | Stop reason: HOSPADM

## 2021-01-01 RX ORDER — PANTOPRAZOLE SODIUM 40 MG/1
40 TABLET, DELAYED RELEASE ORAL
Status: DISCONTINUED | OUTPATIENT
Start: 2021-01-01 | End: 2021-01-01 | Stop reason: HOSPADM

## 2021-01-01 RX ORDER — ACETAMINOPHEN 325 MG/1
650 TABLET ORAL EVERY 6 HOURS PRN
Status: DISCONTINUED | OUTPATIENT
Start: 2021-01-01 | End: 2021-01-01 | Stop reason: HOSPADM

## 2021-01-01 RX ORDER — DEXTROSE MONOHYDRATE 50 MG/ML
100 INJECTION, SOLUTION INTRAVENOUS PRN
Status: DISCONTINUED | OUTPATIENT
Start: 2021-01-01 | End: 2021-01-01 | Stop reason: HOSPADM

## 2021-01-01 RX ORDER — OXYCODONE HYDROCHLORIDE 5 MG/1
5 TABLET ORAL 3 TIMES DAILY
Status: DISCONTINUED | OUTPATIENT
Start: 2021-01-01 | End: 2021-01-01 | Stop reason: HOSPADM

## 2021-01-01 RX ORDER — ASPIRIN 81 MG/1
81 TABLET ORAL DAILY
Status: DISCONTINUED | OUTPATIENT
Start: 2021-01-01 | End: 2021-01-01 | Stop reason: HOSPADM

## 2021-01-01 RX ORDER — POTASSIUM CHLORIDE 20 MEQ/1
40 TABLET, EXTENDED RELEASE ORAL ONCE
Status: COMPLETED | OUTPATIENT
Start: 2021-01-01 | End: 2021-01-01

## 2021-01-01 RX ORDER — PROMETHAZINE HYDROCHLORIDE 12.5 MG/1
12.5 TABLET ORAL EVERY 6 HOURS PRN
Status: DISCONTINUED | OUTPATIENT
Start: 2021-01-01 | End: 2021-01-01 | Stop reason: HOSPADM

## 2021-01-01 RX ORDER — POTASSIUM CHLORIDE 7.45 MG/ML
10 INJECTION INTRAVENOUS
Status: COMPLETED | OUTPATIENT
Start: 2021-01-01 | End: 2021-01-01

## 2021-01-01 RX ORDER — ROSUVASTATIN CALCIUM 20 MG/1
20 TABLET, COATED ORAL DAILY
Status: DISCONTINUED | OUTPATIENT
Start: 2021-01-01 | End: 2021-01-01 | Stop reason: HOSPADM

## 2021-01-01 RX ORDER — SPIRONOLACTONE 25 MG/1
25 TABLET ORAL DAILY
Status: DISCONTINUED | OUTPATIENT
Start: 2021-01-01 | End: 2021-01-01 | Stop reason: HOSPADM

## 2021-01-01 RX ORDER — ASPIRIN 81 MG/1
81 TABLET, CHEWABLE ORAL DAILY
Status: DISCONTINUED | OUTPATIENT
Start: 2021-01-01 | End: 2021-01-01 | Stop reason: HOSPADM

## 2021-01-01 RX ORDER — FUROSEMIDE 20 MG/1
20 TABLET ORAL 2 TIMES DAILY
Status: DISCONTINUED | OUTPATIENT
Start: 2021-01-01 | End: 2021-01-01 | Stop reason: HOSPADM

## 2021-01-01 RX ORDER — METOPROLOL SUCCINATE 50 MG/1
50 TABLET, EXTENDED RELEASE ORAL DAILY
Status: DISCONTINUED | OUTPATIENT
Start: 2021-01-01 | End: 2021-01-01

## 2021-01-01 RX ORDER — POLYETHYLENE GLYCOL 3350 17 G/17G
17 POWDER, FOR SOLUTION ORAL DAILY PRN
Status: DISCONTINUED | OUTPATIENT
Start: 2021-01-01 | End: 2021-01-01 | Stop reason: HOSPADM

## 2021-01-01 RX ORDER — FENTANYL CITRATE 50 UG/ML
25 INJECTION, SOLUTION INTRAMUSCULAR; INTRAVENOUS ONCE
Status: COMPLETED | OUTPATIENT
Start: 2021-01-01 | End: 2021-01-01

## 2021-01-01 RX ORDER — DICYCLOMINE HYDROCHLORIDE 10 MG/1
10 CAPSULE ORAL EVERY 6 HOURS PRN
Status: DISCONTINUED | OUTPATIENT
Start: 2021-01-01 | End: 2021-01-01 | Stop reason: HOSPADM

## 2021-01-01 RX ORDER — SODIUM CHLORIDE 0.9 % (FLUSH) 0.9 %
5-40 SYRINGE (ML) INJECTION PRN
Status: DISCONTINUED | OUTPATIENT
Start: 2021-01-01 | End: 2021-01-01 | Stop reason: HOSPADM

## 2021-01-01 RX ORDER — VITAMIN B COMPLEX
2000 TABLET ORAL DAILY
Status: DISCONTINUED | OUTPATIENT
Start: 2021-01-01 | End: 2021-01-01 | Stop reason: HOSPADM

## 2021-01-01 RX ORDER — ALBUTEROL SULFATE 90 UG/1
2 AEROSOL, METERED RESPIRATORY (INHALATION) 4 TIMES DAILY PRN
Status: DISCONTINUED | OUTPATIENT
Start: 2021-01-01 | End: 2021-01-01 | Stop reason: HOSPADM

## 2021-01-01 RX ADMIN — POTASSIUM CHLORIDE 10 MEQ: 7.46 INJECTION, SOLUTION INTRAVENOUS at 01:10

## 2021-01-01 RX ADMIN — POTASSIUM CHLORIDE 10 MEQ: 7.46 INJECTION, SOLUTION INTRAVENOUS at 00:17

## 2021-01-01 RX ADMIN — ENOXAPARIN SODIUM 40 MG: 40 INJECTION SUBCUTANEOUS at 08:17

## 2021-01-01 RX ADMIN — POTASSIUM CHLORIDE 20 MEQ: 1500 TABLET, EXTENDED RELEASE ORAL at 12:20

## 2021-01-01 RX ADMIN — LAMOTRIGINE 100 MG: 100 TABLET ORAL at 08:56

## 2021-01-01 RX ADMIN — LAMOTRIGINE 300 MG: 100 TABLET ORAL at 16:39

## 2021-01-01 RX ADMIN — FUROSEMIDE 20 MG: 20 TABLET ORAL at 08:55

## 2021-01-01 RX ADMIN — METOPROLOL SUCCINATE 75 MG: 25 TABLET, FILM COATED, EXTENDED RELEASE ORAL at 08:55

## 2021-01-01 RX ADMIN — SODIUM CHLORIDE: 9 INJECTION, SOLUTION INTRAVENOUS at 00:10

## 2021-01-01 RX ADMIN — LAMOTRIGINE 100 MG: 100 TABLET ORAL at 20:09

## 2021-01-01 RX ADMIN — FUROSEMIDE 20 MG: 20 TABLET ORAL at 08:17

## 2021-01-01 RX ADMIN — Medication 10 ML: at 22:34

## 2021-01-01 RX ADMIN — PANTOPRAZOLE SODIUM 40 MG: 40 TABLET, DELAYED RELEASE ORAL at 07:30

## 2021-01-01 RX ADMIN — ISOSORBIDE MONONITRATE 60 MG: 60 TABLET, EXTENDED RELEASE ORAL at 11:20

## 2021-01-01 RX ADMIN — ASPIRIN 81 MG: 81 TABLET, CHEWABLE ORAL at 08:55

## 2021-01-01 RX ADMIN — POTASSIUM CHLORIDE 20 MEQ: 1500 TABLET, EXTENDED RELEASE ORAL at 11:21

## 2021-01-01 RX ADMIN — Medication 10 ML: at 00:16

## 2021-01-01 RX ADMIN — SPIRONOLACTONE 25 MG: 25 TABLET, FILM COATED ORAL at 08:55

## 2021-01-01 RX ADMIN — PIPERACILLIN AND TAZOBACTAM 3375 MG: 3; .375 INJECTION, POWDER, LYOPHILIZED, FOR SOLUTION INTRAVENOUS at 17:49

## 2021-01-01 RX ADMIN — POTASSIUM CHLORIDE 10 MEQ: 7.46 INJECTION, SOLUTION INTRAVENOUS at 02:34

## 2021-01-01 RX ADMIN — ACETAMINOPHEN 650 MG: 325 TABLET ORAL at 05:16

## 2021-01-01 RX ADMIN — FAMOTIDINE 20 MG: 10 INJECTION, SOLUTION INTRAVENOUS at 01:26

## 2021-01-01 RX ADMIN — Medication 10 ML: at 09:22

## 2021-01-01 RX ADMIN — ISOSORBIDE MONONITRATE 60 MG: 60 TABLET, EXTENDED RELEASE ORAL at 17:35

## 2021-01-01 RX ADMIN — FUROSEMIDE 20 MG: 20 TABLET ORAL at 11:20

## 2021-01-01 RX ADMIN — ENOXAPARIN SODIUM 30 MG: 100 INJECTION SUBCUTANEOUS at 09:50

## 2021-01-01 RX ADMIN — METOPROLOL SUCCINATE 50 MG: 50 TABLET, EXTENDED RELEASE ORAL at 08:17

## 2021-01-01 RX ADMIN — SODIUM POLYSTYRENE SULFONATE 30 G: 15 SUSPENSION ORAL; RECTAL at 20:57

## 2021-01-01 RX ADMIN — PANTOPRAZOLE SODIUM 40 MG: 40 TABLET, DELAYED RELEASE ORAL at 06:00

## 2021-01-01 RX ADMIN — CIPROFLOXACIN 400 MG: 2 INJECTION, SOLUTION INTRAVENOUS at 21:44

## 2021-01-01 RX ADMIN — SODIUM POLYSTYRENE SULFONATE 30 G: 15 SUSPENSION ORAL; RECTAL at 02:43

## 2021-01-01 RX ADMIN — SODIUM CHLORIDE: 9 INJECTION, SOLUTION INTRAVENOUS at 11:34

## 2021-01-01 RX ADMIN — SODIUM CHLORIDE, PRESERVATIVE FREE 10 ML: 5 INJECTION INTRAVENOUS at 20:10

## 2021-01-01 RX ADMIN — Medication 2000 UNITS: at 11:20

## 2021-01-01 RX ADMIN — SODIUM CHLORIDE 1000 ML: 9 INJECTION, SOLUTION INTRAVENOUS at 19:05

## 2021-01-01 RX ADMIN — OXYCODONE 5 MG: 5 TABLET ORAL at 11:20

## 2021-01-01 RX ADMIN — POTASSIUM CHLORIDE 10 MEQ: 7.46 INJECTION, SOLUTION INTRAVENOUS at 04:43

## 2021-01-01 RX ADMIN — ASPIRIN 81 MG: 81 TABLET, COATED ORAL at 11:20

## 2021-01-01 RX ADMIN — POTASSIUM CHLORIDE 10 MEQ: 7.46 INJECTION, SOLUTION INTRAVENOUS at 07:25

## 2021-01-01 RX ADMIN — NITROGLYCERIN 1 INCH: 20 OINTMENT TOPICAL at 02:43

## 2021-01-01 RX ADMIN — ONDANSETRON 4 MG: 2 INJECTION INTRAMUSCULAR; INTRAVENOUS at 03:04

## 2021-01-01 RX ADMIN — FUROSEMIDE 20 MG: 20 TABLET ORAL at 17:35

## 2021-01-01 RX ADMIN — PIPERACILLIN AND TAZOBACTAM 3375 MG: 3; .375 INJECTION, POWDER, LYOPHILIZED, FOR SOLUTION INTRAVENOUS at 04:42

## 2021-01-01 RX ADMIN — SODIUM CHLORIDE, PRESERVATIVE FREE 10 ML: 5 INJECTION INTRAVENOUS at 08:17

## 2021-01-01 RX ADMIN — PIPERACILLIN AND TAZOBACTAM 3375 MG: 3; .375 INJECTION, POWDER, LYOPHILIZED, FOR SOLUTION INTRAVENOUS at 12:20

## 2021-01-01 RX ADMIN — ROSUVASTATIN CALCIUM 20 MG: 20 TABLET, FILM COATED ORAL at 08:17

## 2021-01-01 RX ADMIN — ASPIRIN 81 MG: 81 TABLET, CHEWABLE ORAL at 08:17

## 2021-01-01 RX ADMIN — METOPROLOL SUCCINATE 75 MG: 50 TABLET, EXTENDED RELEASE ORAL at 11:21

## 2021-01-01 RX ADMIN — ENOXAPARIN SODIUM 40 MG: 40 INJECTION SUBCUTANEOUS at 08:56

## 2021-01-01 RX ADMIN — POTASSIUM CHLORIDE 10 MEQ: 7.46 INJECTION, SOLUTION INTRAVENOUS at 06:07

## 2021-01-01 RX ADMIN — MORPHINE SULFATE 2 MG: 2 INJECTION, SOLUTION INTRAMUSCULAR; INTRAVENOUS at 04:52

## 2021-01-01 RX ADMIN — LAMOTRIGINE 100 MG: 100 TABLET ORAL at 08:17

## 2021-01-01 RX ADMIN — FENTANYL CITRATE 25 MCG: 50 INJECTION, SOLUTION INTRAMUSCULAR; INTRAVENOUS at 02:52

## 2021-01-01 RX ADMIN — ISOSORBIDE MONONITRATE 60 MG: 60 TABLET, EXTENDED RELEASE ORAL at 08:56

## 2021-01-01 RX ADMIN — ENOXAPARIN SODIUM 30 MG: 100 INJECTION SUBCUTANEOUS at 12:22

## 2021-01-01 RX ADMIN — SPIRONOLACTONE 25 MG: 25 TABLET, FILM COATED ORAL at 17:36

## 2021-01-01 RX ADMIN — LOSARTAN POTASSIUM 50 MG: 50 TABLET, FILM COATED ORAL at 08:55

## 2021-01-01 RX ADMIN — POTASSIUM CHLORIDE 10 MEQ: 7.46 INJECTION, SOLUTION INTRAVENOUS at 03:33

## 2021-01-01 RX ADMIN — POTASSIUM CHLORIDE 40 MEQ: 1500 TABLET, EXTENDED RELEASE ORAL at 09:50

## 2021-01-01 RX ADMIN — LEVETIRACETAM 500 MG: 100 INJECTION, SOLUTION, CONCENTRATE INTRAVENOUS at 12:53

## 2021-01-01 RX ADMIN — PIPERACILLIN AND TAZOBACTAM 3375 MG: 3; .375 INJECTION, POWDER, LYOPHILIZED, FOR SOLUTION INTRAVENOUS at 05:17

## 2021-01-01 RX ADMIN — LIDOCAINE HYDROCHLORIDE: 20 SOLUTION ORAL; TOPICAL at 01:37

## 2021-01-01 ASSESSMENT — ENCOUNTER SYMPTOMS
NAUSEA: 0
VOMITING: 0
NAUSEA: 0
ABDOMINAL PAIN: 1
CHEST TIGHTNESS: 0
VOMITING: 0
RHINORRHEA: 0
ABDOMINAL PAIN: 0
BACK PAIN: 0
SHORTNESS OF BREATH: 0
VOMITING: 0
BLOOD IN STOOL: 0
ABDOMINAL PAIN: 0
COLOR CHANGE: 0
COUGH: 0
RESPIRATORY NEGATIVE: 1
RHINORRHEA: 0
SORE THROAT: 0
DIARRHEA: 0
SHORTNESS OF BREATH: 0
SHORTNESS OF BREATH: 1
VOMITING: 0
CONSTIPATION: 0
ABDOMINAL PAIN: 1
NAUSEA: 0
SORE THROAT: 0
ABDOMINAL DISTENTION: 0
DIARRHEA: 0
RHINORRHEA: 0
EYE DISCHARGE: 0
SORE THROAT: 0
DIARRHEA: 0
EYE PAIN: 0
COLOR CHANGE: 0
COLOR CHANGE: 0
COUGH: 0
SORE THROAT: 0
SHORTNESS OF BREATH: 0
COUGH: 0
PHOTOPHOBIA: 0
EYE PAIN: 0
FLATUS: 1
NAUSEA: 1
NAUSEA: 0
CONSTIPATION: 1
RHINORRHEA: 0
DIARRHEA: 0
NAUSEA: 0
ABDOMINAL PAIN: 1
ABDOMINAL PAIN: 1
COUGH: 0
VOMITING: 0
CHEST TIGHTNESS: 1
SORE THROAT: 0
BLOOD IN STOOL: 0

## 2021-01-01 ASSESSMENT — PAIN DESCRIPTION - LOCATION
LOCATION: ABDOMEN

## 2021-01-01 ASSESSMENT — PAIN DESCRIPTION - PAIN TYPE
TYPE: ACUTE PAIN

## 2021-01-01 ASSESSMENT — PAIN SCALES - GENERAL
PAINLEVEL_OUTOF10: 8
PAINLEVEL_OUTOF10: 6
PAINLEVEL_OUTOF10: 4
PAINLEVEL_OUTOF10: 3
PAINLEVEL_OUTOF10: 8
PAINLEVEL_OUTOF10: 6
PAINLEVEL_OUTOF10: 5
PAINLEVEL_OUTOF10: 3
PAINLEVEL_OUTOF10: 8
PAINLEVEL_OUTOF10: 3

## 2021-01-01 ASSESSMENT — PAIN DESCRIPTION - FREQUENCY
FREQUENCY: CONTINUOUS
FREQUENCY: CONTINUOUS

## 2021-01-01 ASSESSMENT — PAIN DESCRIPTION - DESCRIPTORS
DESCRIPTORS: OTHER (COMMENT)
DESCRIPTORS: SHARP
DESCRIPTORS: DULL

## 2021-01-01 ASSESSMENT — PAIN DESCRIPTION - ORIENTATION
ORIENTATION: UPPER;MID
ORIENTATION: LOWER;LEFT
ORIENTATION: MID;LOWER
ORIENTATION: MID;LOWER

## 2021-02-25 ENCOUNTER — HOSPITAL ENCOUNTER (EMERGENCY)
Age: 86
Discharge: HOME | End: 2021-02-25
Attending: EMERGENCY MEDICINE
Payer: MEDICARE

## 2021-02-25 ENCOUNTER — APPOINTMENT (OUTPATIENT)
Dept: CT IMAGING | Age: 86
End: 2021-02-25
Payer: MEDICARE

## 2021-02-25 VITALS
RESPIRATION RATE: 18 BRPM | HEART RATE: 62 BPM | SYSTOLIC BLOOD PRESSURE: 128 MMHG | DIASTOLIC BLOOD PRESSURE: 65 MMHG | OXYGEN SATURATION: 94 % | TEMPERATURE: 98.1 F | BODY MASS INDEX: 25 KG/M2 | HEIGHT: 59 IN | WEIGHT: 124 LBS

## 2021-02-25 DIAGNOSIS — K59.00 CONSTIPATION, UNSPECIFIED CONSTIPATION TYPE: ICD-10-CM

## 2021-02-25 DIAGNOSIS — R10.9 ABDOMINAL PAIN, UNSPECIFIED ABDOMINAL LOCATION: Primary | ICD-10-CM

## 2021-02-25 LAB
ALBUMIN SERPL-MCNC: 4.1 G/DL (ref 3.5–4.6)
ALP BLD-CCNC: 63 U/L (ref 40–130)
ALT SERPL-CCNC: 8 U/L (ref 0–33)
ANION GAP SERPL CALCULATED.3IONS-SCNC: 14 MEQ/L (ref 9–15)
AST SERPL-CCNC: 15 U/L (ref 0–35)
BACTERIA: NEGATIVE /HPF
BASOPHILS ABSOLUTE: 0 K/UL (ref 0–0.2)
BASOPHILS RELATIVE PERCENT: 0.6 %
BILIRUB SERPL-MCNC: 0.5 MG/DL (ref 0.2–0.7)
BILIRUBIN URINE: ABNORMAL
BLOOD, URINE: NEGATIVE
BUN BLDV-MCNC: 21 MG/DL (ref 8–23)
CALCIUM SERPL-MCNC: 9.9 MG/DL (ref 8.5–9.9)
CHLORIDE BLD-SCNC: 97 MEQ/L (ref 95–107)
CLARITY: CLEAR
CO2: 25 MEQ/L (ref 20–31)
COLOR: ABNORMAL
CREAT SERPL-MCNC: 0.82 MG/DL (ref 0.5–0.9)
EOSINOPHILS ABSOLUTE: 0.1 K/UL (ref 0–0.7)
EOSINOPHILS RELATIVE PERCENT: 1.3 %
EPITHELIAL CELLS, UA: NORMAL /HPF (ref 0–5)
GFR AFRICAN AMERICAN: >60
GFR AFRICAN AMERICAN: >60
GFR NON-AFRICAN AMERICAN: 59
GFR NON-AFRICAN AMERICAN: >60
GLOBULIN: 2.9 G/DL (ref 2.3–3.5)
GLUCOSE BLD-MCNC: 139 MG/DL (ref 70–99)
GLUCOSE URINE: NEGATIVE MG/DL
HCT VFR BLD CALC: 42.5 % (ref 37–47)
HEMOGLOBIN: 13.8 G/DL (ref 12–16)
HYALINE CASTS: NORMAL /HPF (ref 0–5)
KETONES, URINE: ABNORMAL MG/DL
LACTIC ACID: 2.4 MMOL/L (ref 0.5–2.2)
LEUKOCYTE ESTERASE, URINE: ABNORMAL
LIPASE: 22 U/L (ref 12–95)
LYMPHOCYTES ABSOLUTE: 1.2 K/UL (ref 1–4.8)
LYMPHOCYTES RELATIVE PERCENT: 16.1 %
MCH RBC QN AUTO: 30.4 PG (ref 27–31.3)
MCHC RBC AUTO-ENTMCNC: 32.4 % (ref 33–37)
MCV RBC AUTO: 93.6 FL (ref 82–100)
MONOCYTES ABSOLUTE: 0.4 K/UL (ref 0.2–0.8)
MONOCYTES RELATIVE PERCENT: 5 %
NEUTROPHILS ABSOLUTE: 5.8 K/UL (ref 1.4–6.5)
NEUTROPHILS RELATIVE PERCENT: 77 %
NITRITE, URINE: NEGATIVE
PDW BLD-RTO: 14.4 % (ref 11.5–14.5)
PERFORMED ON: ABNORMAL
PH UA: 7.5 (ref 5–9)
PLATELET # BLD: 216 K/UL (ref 130–400)
POC CREATININE WHOLE BLOOD: 0.9
POC CREATININE: 0.9 MG/DL (ref 0.6–1.2)
POC SAMPLE TYPE: ABNORMAL
POTASSIUM SERPL-SCNC: 3.8 MEQ/L (ref 3.4–4.9)
PROTEIN UA: 30 MG/DL
RBC # BLD: 4.54 M/UL (ref 4.2–5.4)
SODIUM BLD-SCNC: 136 MEQ/L (ref 135–144)
SPECIFIC GRAVITY UA: 1.03 (ref 1–1.03)
TOTAL PROTEIN: 7 G/DL (ref 6.3–8)
URINE REFLEX TO CULTURE: ABNORMAL
UROBILINOGEN, URINE: 1 E.U./DL
WBC # BLD: 7.5 K/UL (ref 4.8–10.8)
WBC UA: NORMAL /HPF (ref 0–5)

## 2021-02-25 PROCEDURE — 81001 URINALYSIS AUTO W/SCOPE: CPT

## 2021-02-25 PROCEDURE — 6360000004 HC RX CONTRAST MEDICATION: Performed by: EMERGENCY MEDICINE

## 2021-02-25 PROCEDURE — 85025 COMPLETE CBC W/AUTO DIFF WBC: CPT

## 2021-02-25 PROCEDURE — 83605 ASSAY OF LACTIC ACID: CPT

## 2021-02-25 PROCEDURE — 2580000003 HC RX 258: Performed by: EMERGENCY MEDICINE

## 2021-02-25 PROCEDURE — 83690 ASSAY OF LIPASE: CPT

## 2021-02-25 PROCEDURE — 36415 COLL VENOUS BLD VENIPUNCTURE: CPT

## 2021-02-25 PROCEDURE — 80053 COMPREHEN METABOLIC PANEL: CPT

## 2021-02-25 PROCEDURE — 74177 CT ABD & PELVIS W/CONTRAST: CPT

## 2021-02-25 PROCEDURE — 99285 EMERGENCY DEPT VISIT HI MDM: CPT

## 2021-02-25 PROCEDURE — 6370000000 HC RX 637 (ALT 250 FOR IP): Performed by: EMERGENCY MEDICINE

## 2021-02-25 RX ORDER — 0.9 % SODIUM CHLORIDE 0.9 %
500 INTRAVENOUS SOLUTION INTRAVENOUS ONCE
Status: COMPLETED | OUTPATIENT
Start: 2021-02-25 | End: 2021-02-25

## 2021-02-25 RX ADMIN — IOPAMIDOL 100 ML: 612 INJECTION, SOLUTION INTRAVENOUS at 12:16

## 2021-02-25 RX ADMIN — SODIUM CHLORIDE 500 ML: 9 INJECTION, SOLUTION INTRAVENOUS at 13:07

## 2021-02-25 RX ADMIN — MAGNESIUM CITRATE 296 ML: 1.75 LIQUID ORAL at 13:34

## 2021-02-25 ASSESSMENT — ENCOUNTER SYMPTOMS
SORE THROAT: 0
NAUSEA: 0
VOMITING: 0
CHEST TIGHTNESS: 0
CONSTIPATION: 1
SHORTNESS OF BREATH: 0
EYE PAIN: 0
ABDOMINAL PAIN: 1

## 2021-02-25 ASSESSMENT — PAIN DESCRIPTION - DESCRIPTORS: DESCRIPTORS: CRAMPING;BURNING

## 2021-02-25 ASSESSMENT — PAIN SCALES - GENERAL: PAINLEVEL_OUTOF10: 10

## 2021-02-25 ASSESSMENT — PAIN DESCRIPTION - LOCATION: LOCATION: ABDOMEN

## 2021-02-25 NOTE — ED TRIAGE NOTES
Patient brought in by [facility] for abdominal pain and constipation. Patient states \" she has not had a bowel movement x 2 days and took MOM last night but it was ineffective. \" Patient rates pain \"10/10 \" on verbal scale. Patient denies and pain medication at home. Patient alert and oriented x 4.

## 2021-02-25 NOTE — ED NOTES
Dr. Beckwith Litradha at the bedside at this time. Assessment completed.       Daphne Baptiste RN  02/25/21 7846

## 2021-02-26 NOTE — ED PROVIDER NOTES
[address] [facility] ED  EMERGENCY DEPARTMENT ENCOUNTER      Pt Name: Claudette Avers  MRN: [id number]  [place] 1934  Date of evaluation: 2/25/2021  Provider: Jory Reynolds DO    CHIEF COMPLAINT       Chief Complaint   Patient presents with    Abdominal Pain     started this am    Constipation     MOM last night; x 2 days         HISTORY OF PRESENT ILLNESS   (Location/Symptom, Timing/Onset, Context/Setting, Quality, Duration, Modifying Factors, Severity)  Note limiting factors. Claudette Avers is a 80 y.o. female who presents to the emergency department . Patient came in with lower abdominal pain. She believes that she is constipated. She did take some milk of magnesia last night but it has not helped. Patient has been to the ER for constipation in the past.  Her doctor told her to take a stool softener regularly which she is doing. She was told not to take MiraLAX. No urinary symptoms no vomiting. HPI    Nursing Notes were reviewed. REVIEW OF SYSTEMS    (2-9 systems for level 4, 10 or more for level 5)     Review of Systems   Constitutional: Negative for activity change, appetite change and fatigue. HENT: Negative for congestion and sore throat. Eyes: Negative for pain and visual disturbance. Respiratory: Negative for chest tightness and shortness of breath. Cardiovascular: Negative for chest pain. Gastrointestinal: Positive for abdominal pain and constipation. Negative for nausea and vomiting. Endocrine: Negative for polydipsia. Genitourinary: Negative for flank pain and urgency. Musculoskeletal: Negative for gait problem and neck stiffness. Skin: Negative for rash. Neurological: Negative for weakness, light-headedness and headaches. Psychiatric/Behavioral: Negative for confusion and sleep disturbance. Except as noted above the remainder of the review of systems was reviewed and negative.        PAST MEDICAL HISTORY     Past Medical History:   Diagnosis Date    Diverticulitis     Hyperlipidemia     Hypertension          SURGICAL HISTORY       Past Surgical History:   Procedure Laterality Date    APPENDECTOMY      CARDIAC SURGERY      CHOLECYSTECTOMY      JOINT REPLACEMENT           CURRENT MEDICATIONS       Discharge Medication List as of 2/25/2021  1:22 PM      CONTINUE these medications which have NOT CHANGED    Details   furosemide (LASIX) 20 MG tablet Take 1 tablet by mouth daily, Disp-60 tablet,R-3Normal      pantoprazole (PROTONIX) 40 MG tablet Take 1 tablet by mouth every morning (before breakfast), Disp-30 tablet,R-3Normal      diclofenac sodium (VOLTAREN) 1 % GEL Apply 2 g topically 2 times daily, Topical, 2 TIMES DAILY, Historical Med      naloxone 4 MG/0.1ML LIQD nasal spray 1 spray by Nasal route as needed for Opioid ReversalHistorical Med      docusate sodium (COLACE) 100 MG capsule Take 100 mg by mouth 2 times daily as needed for ConstipationHistorical Med      calcium carbonate (OSCAL) 500 MG TABS tablet Take 1,200 mg by mouth dailyHistorical Med      Bacillus Coagulans-Inulin (ALIGN PREBIOTIC-PROBIOTIC PO) Take by mouth Unsure of doseHistorical Med      sacubitril-valsartan (ENTRESTO) 24-26 MG per tablet Take 1 tablet by mouth 2 times dailyHistorical Med      aspirin 81 MG tablet Take 81 mg by mouth dailyHistorical Med      acetaminophen (TYLENOL) 325 MG tablet Take 650 mg by mouth every 6 hours as needed for PainHistorical Med      loratadine (CLARITIN) 10 MG capsule Take 10 mg by mouth dailyHistorical Med      nitroGLYCERIN (NITROSTAT) 0.4 MG SL tablet Place 0.4 mg under the tongue every 5 minutes as needed for Chest pain up to max of 3 total doses. If no relief after 1 dose, call 911. Historical Med      rosuvastatin (CRESTOR) 10 MG tablet Take 20 mg by mouth daily Historical Med      lamoTRIgine (LAMICTAL) 100 MG tablet Take 100 mg by mouth 2 times daily Historical Med      metoprolol succinate (TOPROL XL) 50 MG extended release tablet Take 50 mg Exam  Vitals signs and nursing note reviewed. Constitutional:       General: She is not in acute distress. Appearance: She is well-developed. She is not diaphoretic. HENT:      Head: Normocephalic and atraumatic. Right Ear: External ear normal.      Left Ear: External ear normal.      Mouth/Throat:      Pharynx: No oropharyngeal exudate. Eyes:      Conjunctiva/sclera: Conjunctivae normal.      Pupils: Pupils are equal, round, and reactive to light. Neck:      Musculoskeletal: Normal range of motion and neck supple. Thyroid: No thyromegaly. Vascular: No JVD. Trachea: No tracheal deviation. Cardiovascular:      Rate and Rhythm: Normal rate. Heart sounds: Normal heart sounds. No murmur. Pulmonary:      Effort: Pulmonary effort is normal. No respiratory distress. Breath sounds: Normal breath sounds. No wheezing. Abdominal:      General: Bowel sounds are normal.      Palpations: Abdomen is soft. Tenderness: There is abdominal tenderness in the right lower quadrant, suprapubic area and left lower quadrant. There is no guarding. Musculoskeletal: Normal range of motion. Skin:     General: Skin is warm and dry. Findings: No rash. Neurological:      Mental Status: She is alert and oriented to person, place, and time. Cranial Nerves: No cranial nerve deficit.    Psychiatric:         Behavior: Behavior normal.         DIAGNOSTIC RESULTS     EKG: All EKG's are interpreted by the Emergency Department Physician who either signs or Co-signs this chart in the absence of a cardiologist.      RADIOLOGY:   Non-plain film images such as CT, Ultrasound and MRI are read by the radiologist. Plain radiographic images are visualized and preliminarily interpreted by the emergency physician with the below findings:        Interpretation per the Radiologist below, if available at the time of this note:    CT ABDOMEN PELVIS W IV CONTRAST Additional Contrast? None   Final Result There are no acute intra-abdominal changes. Evaluation of the pelvis is significantly limited due to metallic artifact from bilateral hip prostheses. There is a hypodensity in the region of the uterus which may represent fluid which appear out of proportion for patient's age. May consider colonoscopy and biopsy. 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 Unchanged since previous study. ED BEDSIDE ULTRASOUND:   Performed by ED Physician - none    LABS:  Labs Reviewed   CBC WITH AUTO DIFFERENTIAL - Abnormal; Notable for the following components:       Result Value    MCHC 32.4 (*)     All other components within normal limits   COMPREHENSIVE METABOLIC PANEL - Abnormal; Notable for the following components:    Glucose 139 (*)     All other components within normal limits   LACTIC ACID, PLASMA - Abnormal; Notable for the following components:    Lactic Acid 2.4 (*)     All other components within normal limits   URINE RT REFLEX TO CULTURE - Abnormal; Notable for the following components:    Color, UA DARK YELLOW (*)     Bilirubin Urine SMALL (*)     Ketones, Urine TRACE (*)     Protein, UA 30 (*)     Leukocyte Esterase, Urine TRACE (*)     All other components within normal limits   POCT VENOUS - Abnormal; Notable for the following components:    GFR Non- 59 (*)     All other components within normal limits   POCT CREATININE - URINE - Normal   LIPASE   MICROSCOPIC URINALYSIS       All other labs were within normal range or not returned as of this dictation. EMERGENCY DEPARTMENT COURSE and DIFFERENTIAL DIAGNOSIS/MDM:   Vitals:    Vitals:    02/25/21 1131   BP: 128/65   Pulse: 62   Resp: 18   Temp: 98.1 °F (36.7 °C)   TempSrc: Oral   SpO2: 94%   Weight: 124 lb (56.2 kg)   Height: 4' 11\" (1.499 m)       Patient came in with lower abdominal pain. Hyperactive bowel sounds. CT does not show anything acute.   There is stool throughout her bowel and I will send her home with some magnesium citrate. Patient to return if she gets worse. MDM      REASSESSMENT          CRITICAL CARE TIME   Total Critical Care time was 0 minutes, excluding separately reportable procedures. There was a high probability of clinically significant/life threatening deterioration in the patient's condition which required my urgent intervention. CONSULTS:  None    PROCEDURES:  Unless otherwise noted below, none     Procedures        FINAL IMPRESSION      1. Abdominal pain, unspecified abdominal location    2. Constipation, unspecified constipation type          DISPOSITION/PLAN   DISPOSITION Decision To Discharge 02/25/2021 01:21:22 PM      PATIENT REFERRED TO:  Eduarda Correa MD  [address]  [phone]      As needed      DISCHARGE MEDICATIONS:  Discharge Medication List as of 2/25/2021  1:22 PM        Controlled Substances Monitoring:     No flowsheet data found.     (Please note that portions of this note were completed with a voice recognition program.  Efforts were made to edit the dictations but occasionally words are mis-transcribed.)    Danny Sauceda DO (electronically signed)  Attending Emergency Physician            Danny Sauceda DO  02/25/21 5858

## 2021-03-20 ENCOUNTER — APPOINTMENT (OUTPATIENT)
Dept: GENERAL RADIOLOGY | Age: 86
End: 2021-03-20
Payer: MEDICARE

## 2021-03-20 ENCOUNTER — APPOINTMENT (OUTPATIENT)
Dept: CT IMAGING | Age: 86
End: 2021-03-20
Payer: MEDICARE

## 2021-03-20 ENCOUNTER — HOSPITAL ENCOUNTER (EMERGENCY)
Age: 86
Discharge: HOME | End: 2021-03-20
Payer: MEDICARE

## 2021-03-20 VITALS
DIASTOLIC BLOOD PRESSURE: 55 MMHG | OXYGEN SATURATION: 97 % | RESPIRATION RATE: 15 BRPM | HEART RATE: 71 BPM | WEIGHT: 125 LBS | TEMPERATURE: 98 F | SYSTOLIC BLOOD PRESSURE: 112 MMHG | BODY MASS INDEX: 28.93 KG/M2 | HEIGHT: 55 IN

## 2021-03-20 DIAGNOSIS — R05.9 COUGH: Primary | ICD-10-CM

## 2021-03-20 DIAGNOSIS — R10.84 GENERALIZED ABDOMINAL PAIN: ICD-10-CM

## 2021-03-20 DIAGNOSIS — K59.00 CONSTIPATION, UNSPECIFIED CONSTIPATION TYPE: ICD-10-CM

## 2021-03-20 LAB
ALBUMIN SERPL-MCNC: 4.1 G/DL (ref 3.5–4.6)
ALP BLD-CCNC: 76 U/L (ref 40–130)
ALT SERPL-CCNC: 39 U/L (ref 0–33)
ANION GAP SERPL CALCULATED.3IONS-SCNC: 13 MEQ/L (ref 9–15)
APTT: 28.3 SEC (ref 24.4–36.8)
AST SERPL-CCNC: 40 U/L (ref 0–35)
BASOPHILS ABSOLUTE: 0.1 K/UL (ref 0–0.2)
BASOPHILS RELATIVE PERCENT: 0.9 %
BILIRUB SERPL-MCNC: 0.5 MG/DL (ref 0.2–0.7)
BUN BLDV-MCNC: 16 MG/DL (ref 8–23)
CALCIUM SERPL-MCNC: 10 MG/DL (ref 8.5–9.9)
CHLORIDE BLD-SCNC: 106 MEQ/L (ref 95–107)
CO2: 23 MEQ/L (ref 20–31)
CREAT SERPL-MCNC: 0.89 MG/DL (ref 0.5–0.9)
EKG ATRIAL RATE: 88 BPM
EKG P AXIS: 74 DEGREES
EKG P-R INTERVAL: 232 MS
EKG Q-T INTERVAL: 388 MS
EKG QRS DURATION: 134 MS
EKG QTC CALCULATION (BAZETT): 469 MS
EKG R AXIS: -23 DEGREES
EKG T AXIS: 120 DEGREES
EKG VENTRICULAR RATE: 88 BPM
EOSINOPHILS ABSOLUTE: 0.2 K/UL (ref 0–0.7)
EOSINOPHILS RELATIVE PERCENT: 2.7 %
GFR AFRICAN AMERICAN: >60
GFR NON-AFRICAN AMERICAN: >60
GLOBULIN: 3.1 G/DL (ref 2.3–3.5)
GLUCOSE BLD-MCNC: 161 MG/DL (ref 70–99)
HCT VFR BLD CALC: 40.5 % (ref 37–47)
HEMOGLOBIN: 13.3 G/DL (ref 12–16)
INR BLD: 1
LACTIC ACID: 3.1 MMOL/L (ref 0.5–2.2)
LIPASE: 25 U/L (ref 12–95)
LYMPHOCYTES ABSOLUTE: 1.5 K/UL (ref 1–4.8)
LYMPHOCYTES RELATIVE PERCENT: 24.1 %
MCH RBC QN AUTO: 30.6 PG (ref 27–31.3)
MCHC RBC AUTO-ENTMCNC: 32.8 % (ref 33–37)
MCV RBC AUTO: 93.3 FL (ref 82–100)
MONOCYTES ABSOLUTE: 0.3 K/UL (ref 0.2–0.8)
MONOCYTES RELATIVE PERCENT: 5.5 %
NEUTROPHILS ABSOLUTE: 4 K/UL (ref 1.4–6.5)
NEUTROPHILS RELATIVE PERCENT: 66.8 %
PDW BLD-RTO: 14.5 % (ref 11.5–14.5)
PLATELET # BLD: 197 K/UL (ref 130–400)
POTASSIUM SERPL-SCNC: 3.9 MEQ/L (ref 3.4–4.9)
PRO-BNP: 4866 PG/ML
PROTHROMBIN TIME: 13.7 SEC (ref 12.3–14.9)
RBC # BLD: 4.34 M/UL (ref 4.2–5.4)
SARS-COV-2, NAAT: NOT DETECTED
SODIUM BLD-SCNC: 142 MEQ/L (ref 135–144)
TOTAL CK: 56 U/L (ref 0–170)
TOTAL PROTEIN: 7.2 G/DL (ref 6.3–8)
TROPONIN: <0.01 NG/ML (ref 0–0.01)
WBC # BLD: 6 K/UL (ref 4.8–10.8)

## 2021-03-20 PROCEDURE — 85730 THROMBOPLASTIN TIME PARTIAL: CPT

## 2021-03-20 PROCEDURE — 93005 ELECTROCARDIOGRAM TRACING: CPT | Performed by: PHYSICIAN ASSISTANT

## 2021-03-20 PROCEDURE — 87635 SARS-COV-2 COVID-19 AMP PRB: CPT

## 2021-03-20 PROCEDURE — 83690 ASSAY OF LIPASE: CPT

## 2021-03-20 PROCEDURE — 74176 CT ABD & PELVIS W/O CONTRAST: CPT

## 2021-03-20 PROCEDURE — 99284 EMERGENCY DEPT VISIT MOD MDM: CPT

## 2021-03-20 PROCEDURE — 85610 PROTHROMBIN TIME: CPT

## 2021-03-20 PROCEDURE — 82550 ASSAY OF CK (CPK): CPT

## 2021-03-20 PROCEDURE — 36415 COLL VENOUS BLD VENIPUNCTURE: CPT

## 2021-03-20 PROCEDURE — 84484 ASSAY OF TROPONIN QUANT: CPT

## 2021-03-20 PROCEDURE — 85025 COMPLETE CBC W/AUTO DIFF WBC: CPT

## 2021-03-20 PROCEDURE — 71045 X-RAY EXAM CHEST 1 VIEW: CPT

## 2021-03-20 PROCEDURE — 83880 ASSAY OF NATRIURETIC PEPTIDE: CPT

## 2021-03-20 PROCEDURE — 87040 BLOOD CULTURE FOR BACTERIA: CPT

## 2021-03-20 PROCEDURE — 83605 ASSAY OF LACTIC ACID: CPT

## 2021-03-20 PROCEDURE — 80053 COMPREHEN METABOLIC PANEL: CPT

## 2021-03-20 RX ORDER — ALBUTEROL SULFATE 90 UG/1
2 AEROSOL, METERED RESPIRATORY (INHALATION) 4 TIMES DAILY PRN
Qty: 1 INHALER | Refills: 0 | Status: SHIPPED | OUTPATIENT
Start: 2021-03-20 | End: 2022-01-01

## 2021-03-20 RX ORDER — DICYCLOMINE HYDROCHLORIDE 10 MG/1
10 CAPSULE ORAL EVERY 6 HOURS PRN
Qty: 20 CAPSULE | Refills: 0 | Status: SHIPPED | OUTPATIENT
Start: 2021-03-20

## 2021-03-20 ASSESSMENT — ENCOUNTER SYMPTOMS
ABDOMINAL PAIN: 1
SHORTNESS OF BREATH: 1
CONSTIPATION: 0
COLOR CHANGE: 0
EYE DISCHARGE: 0
RHINORRHEA: 0
WHEEZING: 1
VOMITING: 0
SORE THROAT: 0
ABDOMINAL DISTENTION: 0
NAUSEA: 0

## 2021-03-20 NOTE — ED NOTES
Philip SOLORZANO in room to speak with patient and patients daughter.       Richard Doe RN  03/20/21 1394

## 2021-03-20 NOTE — ED TRIAGE NOTES
Patient presents to the ER with mid upper quardrant abdominal pain with shortness of breath since this morning. Duoneb given in squad. Patient reports relief and current abdominal pain 3/10. Patient remains nauseated.

## 2021-03-20 NOTE — ED NOTES
Patient states that abdominal cramping is improving. Patient reports shortness of breath while coughing. States improvement once she has stopped coughing.       Gaurav Garcia RN  03/20/21 3108

## 2021-03-20 NOTE — ED NOTES
Patient requesting coffee. Philip SOLORZANO informed and states that she may have a drink. Patient provided with coffee as requested.       Venus Amaya RN  03/20/21 7653

## 2021-03-20 NOTE — ED PROVIDER NOTES
[address] [facility] ED  eMERGENCY dEPARTMENT eNCOUnter      Pt Name: Homero Donaldson  MRN: [id number]  [place] 1934  Date of evaluation: 3/20/2021  Provider: Nano Hutton PA-C    CHIEF COMPLAINT       Chief Complaint   Patient presents with    Abdominal Pain    Shortness of Breath         HISTORY OF PRESENT ILLNESS   (Location/Symptom, Timing/Onset,Context/Setting, Quality, Duration, Modifying Factors, Severity)  Note limiting factors. Homero Donaldson is a 80 y.o. female who presents to the emergency department complaint abdominal pain which patient states been ongoing for last 2 to 3 days, along with nausea. Patient complains of shortness of breath which started this morning for her, she does cough, which is dry nonproductive, there is been no fevers no nausea vomiting, no chest pain. EMS states patient had history expiratory wheezes on arrival, she was given nebulized treatment, is much clear at this time, she states her breathing feels much better and her abdominal pain seemed to improve as well. HPI    NursingNotes were reviewed. REVIEW OF SYSTEMS    (2-9 systems for level 4, 10 or more for level 5)     Review of Systems   Constitutional: Negative for activity change and appetite change. HENT: Negative for congestion, ear discharge, ear pain, nosebleeds, rhinorrhea and sore throat. Eyes: Negative for discharge. Respiratory: Positive for shortness of breath and wheezing. Cardiovascular: Negative for chest pain, palpitations and leg swelling. Gastrointestinal: Positive for abdominal pain. Negative for abdominal distention, constipation, nausea and vomiting. Genitourinary: Negative for difficulty urinating and dysuria. Musculoskeletal: Negative for arthralgias. Skin: Negative for color change, pallor, rash and wound. Neurological: Negative for dizziness, tremors, syncope, weakness, numbness and headaches. Psychiatric/Behavioral: Negative for agitation and confusion. Except as noted above the remainder of the review of systems was reviewed and negative. PAST MEDICAL HISTORY     Past Medical History:   Diagnosis Date    Diverticulitis     Hyperlipidemia     Hypertension          SURGICALHISTORY       Past Surgical History:   Procedure Laterality Date    APPENDECTOMY      CARDIAC SURGERY      CHOLECYSTECTOMY      JOINT REPLACEMENT           CURRENT MEDICATIONS       Previous Medications    ACETAMINOPHEN (TYLENOL) 325 MG TABLET    Take 650 mg by mouth every 6 hours as needed for Pain    ASPIRIN 81 MG TABLET    Take 81 mg by mouth daily    BACILLUS COAGULANS-INULIN (ALIGN PREBIOTIC-PROBIOTIC PO)    Take by mouth Unsure of dose    CALCIUM CARBONATE (OSCAL) 500 MG TABS TABLET    Take 1,200 mg by mouth daily    DICLOFENAC SODIUM (VOLTAREN) 1 % GEL    Apply 2 g topically 2 times daily    DOCUSATE SODIUM (COLACE) 100 MG CAPSULE    Take 100 mg by mouth 2 times daily as needed for Constipation    FUROSEMIDE (LASIX) 20 MG TABLET    Take 1 tablet by mouth daily    LAMOTRIGINE (LAMICTAL) 100 MG TABLET    Take 100 mg by mouth 2 times daily     LORATADINE (CLARITIN) 10 MG CAPSULE    Take 10 mg by mouth daily    METOPROLOL SUCCINATE (TOPROL XL) 50 MG EXTENDED RELEASE TABLET    Take 50 mg by mouth daily    NALOXONE 4 MG/0.1ML LIQD NASAL SPRAY    1 spray by Nasal route as needed for Opioid Reversal    NITROGLYCERIN (NITROSTAT) 0.4 MG SL TABLET    Place 0.4 mg under the tongue every 5 minutes as needed for Chest pain up to max of 3 total doses. If no relief after 1 dose, call 911. PANTOPRAZOLE (PROTONIX) 40 MG TABLET    Take 1 tablet by mouth every morning (before breakfast)    ROSUVASTATIN (CRESTOR) 10 MG TABLET    Take 20 mg by mouth daily     SACUBITRIL-VALSARTAN (ENTRESTO) 24-26 MG PER TABLET    Take 1 tablet by mouth 2 times daily    TAPENTADOL (NUCYNTA) 50 MG TABS    Take 50 mg by mouth 3 times daily .     VITAMIN D (CHOLECALCIFEROL) 1000 UNIT TABS TABLET    Take 2,000 deficit. Motor: No weakness. Coordination: Coordination normal.   Psychiatric:         Mood and Affect: Mood normal.         DIAGNOSTIC RESULTS     EKG: All EKG's are interpreted by the Emergency Department Physician who either signs or Co-signsthis chart in the absence of a cardiologist.    EKG shows sinus rhythm with a first-degree AV block at 88 bpm there is T wave inversions in leads I aVL no other acute ST segment abnormality no ventricular ectopy QTC is 469 ms    RADIOLOGY:   Non-plain filmimages such as CT, Ultrasound and MRI are read by the radiologist. Plain radiographic images are visualized and preliminarily interpreted by the emergency physician with the below findings:    Chest x-ray shows no acute pulmonary process    Interpretation per the Radiologist below, if available at the time ofthis note:    CT ABDOMEN PELVIS WO CONTRAST Additional Contrast? None   Final Result      Constipation. Cardiomegaly. Hiatal hernia. Cholecystectomy. Appendectomy. Marked aortoiliac atherosclerotic disease. 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                XR CHEST PORTABLE    (Results Pending)         ED BEDSIDE ULTRASOUND:   Performed by ED Physician - none    LABS:  Labs Reviewed   COMPREHENSIVE METABOLIC PANEL - Abnormal; Notable for the following components:       Result Value    Glucose 161 (*)     Calcium 10.0 (*)     ALT 39 (*)     AST 40 (*)     All other components within normal limits   CBC WITH AUTO DIFFERENTIAL - Abnormal; Notable for the following components:    MCHC 32.8 (*)     All other components within normal limits   LACTIC ACID, PLASMA - Abnormal; Notable for the following components:    Lactic Acid 3.1 (*)     All other components within normal limits    Narrative:     CALL  [facility] tel. [phone],  Lactic acid called to and read back by Vale Haney ->Dr Heaven Henson, 03/20/2021  10:32, by Lizzy DUDLEY-19, RAPID   CULTURE, BLOOD 1   CULTURE, BLOOD 2   LIPASE   TROPONIN   CK   PROTIME-INR   APTT   URINE RT REFLEX TO CULTURE   BRAIN NATRIURETIC PEPTIDE       All other labs were within normal range or not returned as of this dictation. EMERGENCY DEPARTMENT COURSE and DIFFERENTIAL DIAGNOSIS/MDM:   Vitals:    Vitals:    03/20/21 0909 03/20/21 1002 03/20/21 1007 03/20/21 1030   BP: 122/81 (!) 115/97  103/60   Pulse: 93 80 81 74   Resp: 20 23 17 19   Temp:       TempSrc:       SpO2: 96% 98% 95% 98%   Weight:       Height:              MDM  Number of Diagnoses or Management Options  Constipation, unspecified constipation type  Cough  Generalized abdominal pain  Diagnosis management comments: Patient presented ED with a complaint of cough, shortness of breath which she states started this morning for her, she states this is very common for her to occur every morning, she states when she wakes up she feels like there is mucus stuck in her throat, she states she coughs, and then so she coughs a lot and that she feels much better. She was given respiratory treatment according to EMS due to wheezing some shortness of breath, after she received a respiratory treatment she felt much better, the wheezing the cough and shortness of breath had resolved. She also complained of abdominal pain which she has had for the last 2 to 3 days, she states she has chronic constipation secondary to medications that she takes on a daily basis. She has been using over-the-counter Colace, and MiraLAX. Her examination of her abdomen is fairly benign, CT scan of the abdomen pelvis was completed which shows no acute intra-abdominal process, but shows concerns for constipation.   She was advised to continue the use of the MiraLAX, as well as increase oral fluid intake. She was given a prescription for Bentyl for her cough, as well as albuterol metered-dose inhaler. She was advised if she has any worsening or change symptoms return to the ER, she is otherwise advised to contact her family physician for follow-up in the next 2 to 3 days. CRITICAL CARE TIME   Total Critical Care time was 0 minutes, excluding separately reportableprocedures. There was a high probability of clinicallysignificant/life threatening deterioration in the patient's condition which required my urgent intervention. CONSULTS:  None    PROCEDURES:  Unless otherwise noted below, none     Procedures    FINAL IMPRESSION      1. Cough    2. Generalized abdominal pain    3.  Constipation, unspecified constipation type          DISPOSITION/PLAN   DISPOSITION        PATIENT REFERRED TO:  Mahin Bradley MD  [address] [place] [address] [phone]    In 2 days        DISCHARGE MEDICATIONS:  New Prescriptions    ALBUTEROL SULFATE HFA (VENTOLIN HFA) 108 (90 BASE) MCG/ACT INHALER    Inhale 2 puffs into the lungs 4 times daily as needed for Wheezing    DICYCLOMINE (BENTYL) 10 MG CAPSULE    Take 1 capsule by mouth every 6 hours as needed (cramps)          (Please note that portions of this note were completed with a voice recognition program.  Efforts were made to edit the dictations but occasionally words are mis-transcribed.)    Nano Hutton PA-C (electronically signed)  Attending Emergency Physician         Nano Hutton PA-C  03/20/21 [address] [facility] [address] Lyle Davila PA-C  03/20/21 3105

## 2021-03-20 NOTE — ED NOTES
Discharge instructions explained to patient and patients daughter.       Georgina Clark RN  03/20/21 2758

## 2021-03-22 PROCEDURE — 93010 ELECTROCARDIOGRAM REPORT: CPT | Performed by: INTERNAL MEDICINE

## 2021-03-25 LAB
BLOOD CULTURE, ROUTINE: NORMAL
CULTURE, BLOOD 2: NORMAL

## 2021-04-13 ENCOUNTER — HOSPITAL ENCOUNTER (EMERGENCY)
Age: 86
Discharge: HOME | End: 2021-04-14
Payer: MEDICARE

## 2021-04-13 DIAGNOSIS — R10.9 ABDOMINAL PAIN, UNSPECIFIED ABDOMINAL LOCATION: Primary | ICD-10-CM

## 2021-04-13 PROCEDURE — 96372 THER/PROPH/DIAG INJ SC/IM: CPT

## 2021-04-13 PROCEDURE — 99284 EMERGENCY DEPT VISIT MOD MDM: CPT

## 2021-04-14 ENCOUNTER — APPOINTMENT (OUTPATIENT)
Dept: GENERAL RADIOLOGY | Age: 86
End: 2021-04-14
Payer: MEDICARE

## 2021-04-14 ENCOUNTER — APPOINTMENT (OUTPATIENT)
Dept: CT IMAGING | Age: 86
End: 2021-04-14
Payer: MEDICARE

## 2021-04-14 VITALS
BODY MASS INDEX: 30.14 KG/M2 | TEMPERATURE: 97.7 F | OXYGEN SATURATION: 96 % | HEART RATE: 68 BPM | DIASTOLIC BLOOD PRESSURE: 84 MMHG | WEIGHT: 125 LBS | SYSTOLIC BLOOD PRESSURE: 150 MMHG | RESPIRATION RATE: 20 BRPM

## 2021-04-14 LAB
ALBUMIN SERPL-MCNC: 4 G/DL (ref 3.5–4.6)
ALP BLD-CCNC: 89 U/L (ref 40–130)
ALT SERPL-CCNC: 51 U/L (ref 0–33)
ANION GAP SERPL CALCULATED.3IONS-SCNC: 19 MEQ/L (ref 9–15)
AST SERPL-CCNC: 44 U/L (ref 0–35)
BASOPHILS ABSOLUTE: 0 K/UL (ref 0–0.2)
BASOPHILS RELATIVE PERCENT: 0.6 %
BILIRUB SERPL-MCNC: 0.6 MG/DL (ref 0.2–0.7)
BILIRUBIN URINE: NEGATIVE
BLOOD, URINE: NEGATIVE
BUN BLDV-MCNC: 24 MG/DL (ref 8–23)
CALCIUM SERPL-MCNC: 9.9 MG/DL (ref 8.5–9.9)
CHLORIDE BLD-SCNC: 95 MEQ/L (ref 95–107)
CLARITY: CLEAR
CO2: 24 MEQ/L (ref 20–31)
COLOR: YELLOW
CREAT SERPL-MCNC: 1.03 MG/DL (ref 0.5–0.9)
EOSINOPHILS ABSOLUTE: 0.1 K/UL (ref 0–0.7)
EOSINOPHILS RELATIVE PERCENT: 2 %
GFR AFRICAN AMERICAN: >60
GFR NON-AFRICAN AMERICAN: 50.7
GLOBULIN: 2.7 G/DL (ref 2.3–3.5)
GLUCOSE BLD-MCNC: 148 MG/DL (ref 70–99)
GLUCOSE URINE: NEGATIVE MG/DL
HCT VFR BLD CALC: 41.1 % (ref 37–47)
HEMOGLOBIN: 13.5 G/DL (ref 12–16)
KETONES, URINE: ABNORMAL MG/DL
LACTIC ACID: 1.3 MMOL/L (ref 0.5–2.2)
LEUKOCYTE ESTERASE, URINE: NEGATIVE
LIPASE: 42 U/L (ref 12–95)
LYMPHOCYTES ABSOLUTE: 2 K/UL (ref 1–4.8)
LYMPHOCYTES RELATIVE PERCENT: 26.9 %
MAGNESIUM: 2.1 MG/DL (ref 1.7–2.4)
MCH RBC QN AUTO: 30.8 PG (ref 27–31.3)
MCHC RBC AUTO-ENTMCNC: 32.8 % (ref 33–37)
MCV RBC AUTO: 94.1 FL (ref 82–100)
MONOCYTES ABSOLUTE: 0.5 K/UL (ref 0.2–0.8)
MONOCYTES RELATIVE PERCENT: 6.7 %
NEUTROPHILS ABSOLUTE: 4.7 K/UL (ref 1.4–6.5)
NEUTROPHILS RELATIVE PERCENT: 63.8 %
NITRITE, URINE: NEGATIVE
PDW BLD-RTO: 14.7 % (ref 11.5–14.5)
PH UA: 5 (ref 5–9)
PLATELET # BLD: 199 K/UL (ref 130–400)
POTASSIUM SERPL-SCNC: 4.2 MEQ/L (ref 3.4–4.9)
PROCALCITONIN: 0.09 NG/ML (ref 0–0.15)
PROTEIN UA: ABNORMAL MG/DL
RBC # BLD: 4.37 M/UL (ref 4.2–5.4)
SODIUM BLD-SCNC: 138 MEQ/L (ref 135–144)
SPECIFIC GRAVITY UA: 1.06 (ref 1–1.03)
TOTAL PROTEIN: 6.7 G/DL (ref 6.3–8)
URINE REFLEX TO CULTURE: ABNORMAL
UROBILINOGEN, URINE: 0.2 E.U./DL
WBC # BLD: 7.4 K/UL (ref 4.8–10.8)

## 2021-04-14 PROCEDURE — 6360000004 HC RX CONTRAST MEDICATION: Performed by: PHYSICIAN ASSISTANT

## 2021-04-14 PROCEDURE — 74177 CT ABD & PELVIS W/CONTRAST: CPT

## 2021-04-14 PROCEDURE — 83605 ASSAY OF LACTIC ACID: CPT

## 2021-04-14 PROCEDURE — 85025 COMPLETE CBC W/AUTO DIFF WBC: CPT

## 2021-04-14 PROCEDURE — 6360000002 HC RX W HCPCS: Performed by: PHYSICIAN ASSISTANT

## 2021-04-14 PROCEDURE — 83690 ASSAY OF LIPASE: CPT

## 2021-04-14 PROCEDURE — 81003 URINALYSIS AUTO W/O SCOPE: CPT

## 2021-04-14 PROCEDURE — 83735 ASSAY OF MAGNESIUM: CPT

## 2021-04-14 PROCEDURE — 80053 COMPREHEN METABOLIC PANEL: CPT

## 2021-04-14 PROCEDURE — 71045 X-RAY EXAM CHEST 1 VIEW: CPT

## 2021-04-14 PROCEDURE — 84145 PROCALCITONIN (PCT): CPT

## 2021-04-14 PROCEDURE — 36415 COLL VENOUS BLD VENIPUNCTURE: CPT

## 2021-04-14 RX ORDER — DICYCLOMINE HYDROCHLORIDE 10 MG/ML
20 INJECTION INTRAMUSCULAR ONCE
Status: COMPLETED | OUTPATIENT
Start: 2021-04-14 | End: 2021-04-14

## 2021-04-14 RX ORDER — DICYCLOMINE HYDROCHLORIDE 10 MG/1
10 CAPSULE ORAL
Qty: 20 CAPSULE | Refills: 0 | Status: ON HOLD | OUTPATIENT
Start: 2021-04-14 | End: 2022-01-01 | Stop reason: HOSPADM

## 2021-04-14 RX ADMIN — DICYCLOMINE HYDROCHLORIDE 20 MG: 20 INJECTION INTRAMUSCULAR at 02:09

## 2021-04-14 RX ADMIN — IOPAMIDOL 100 ML: 612 INJECTION, SOLUTION INTRAVENOUS at 01:55

## 2021-04-14 ASSESSMENT — ENCOUNTER SYMPTOMS
COLOR CHANGE: 0
SHORTNESS OF BREATH: 0
APNEA: 0
TROUBLE SWALLOWING: 0
ABDOMINAL PAIN: 1
EYE PAIN: 0
ALLERGIC/IMMUNOLOGIC NEGATIVE: 1

## 2021-04-14 NOTE — ED NOTES
IV dc'd cath intact. No signs of redness, swelling, or bleeding. Dressing applied. Discharge instructions given to and explained to pt. Pt verbalized understanding and denies questions at this time. Pt stable at discharge.       Raisa Muñoz RN  04/14/21 3973

## 2021-04-14 NOTE — ED NOTES
Bed: 21  Expected date:   Expected time:   Means of arrival:   Comments:  86yr female abd pain/arm pain  LANNY Cunningham RN  04/13/21 4013

## 2021-04-14 NOTE — ED NOTES
Straight cath done for urine using sterile technique  Pt tolerated procedure well        Sulema Frias, RN  04/14/21 7881

## 2021-04-14 NOTE — ED NOTES
Daughter called from parking lot and said that the pt could not breath.   Pt was in front seat of car holding abdomen stating that she couldn't breath       Óscar Granda RN  04/14/21 4705

## 2021-04-14 NOTE — ED NOTES
Lizzy SOLORZANO at bedside with explanation of results and possible discharge. Discharge education reviewed. Patient instructed to follow up with PCP and come back to the ED with any new or worsening symptoms. No questions or concerns at this time.          Billy Chavez RN  04/14/21 9464

## 2021-04-14 NOTE — ED PROVIDER NOTES
[address] [facility] ED  EMERGENCY DEPARTMENT ENCOUNTER      Pt Name: Homero Matute  MRN: [id number]  [place] 1934  Date of evaluation: 4/13/2021  Provider: Jed Enriquez Dr       Chief Complaint   Patient presents with    Abdominal Pain         HISTORY OF PRESENT ILLNESS   (Location/Symptom, Timing/Onset, Context/Setting, Quality, Duration, Modifying Factors, Severity)  Note limiting factors. Homero Matute is a 80 y.o. female who presents to the emergency department with complaints of diffuse lower abdominal pain that began sudden onset earlier this evening. Patient states that she was watching TV when she felt a sharp, stabbing sensation in the right lower quadrant of the abdomen that spread to the left lower quadrant. Patient states that at symptom onset the pain was a 10 out of 10 though she states it has alleviated some and is currently a 5 out of 10. Patient states there was some nausea but no vomiting. Patient states her last bowel movement was yesterday and she does have a history of constipation. Patient states that she was seen in the emergency department last month with the same pain but this time it was much worse. Patient states that the pain was so severe it caused her to feel short of breath but she denies any actual chest pain. No recent cough or congestion. No hematuria or dysuria    HPI    Nursing Notes were reviewed. REVIEW OF SYSTEMS    (2-9 systems for level 4, 10 or more for level 5)     Review of Systems   Constitutional: Negative for diaphoresis and fever. HENT: Negative for hearing loss and trouble swallowing. Eyes: Negative for pain. Respiratory: Negative for apnea and shortness of breath. Cardiovascular: Negative for chest pain. Gastrointestinal: Positive for abdominal pain. Endocrine: Negative. Genitourinary: Negative for hematuria. Musculoskeletal: Negative for neck pain and neck stiffness. Skin: Negative for color change. Allergic/Immunologic: Negative. Neurological: Negative for dizziness and numbness. Hematological: Negative. Psychiatric/Behavioral: Negative. All other systems reviewed and are negative. Except as noted above the remainder of the review of systems was reviewed and negative. PAST MEDICAL HISTORY     Past Medical History:   Diagnosis Date    Diverticulitis     Hyperlipidemia     Hypertension          SURGICAL HISTORY       Past Surgical History:   Procedure Laterality Date    APPENDECTOMY      CARDIAC SURGERY      CHOLECYSTECTOMY      JOINT REPLACEMENT           CURRENT MEDICATIONS       Previous Medications    ACETAMINOPHEN (TYLENOL) 325 MG TABLET    Take 650 mg by mouth every 6 hours as needed for Pain    ALBUTEROL SULFATE HFA (VENTOLIN HFA) 108 (90 BASE) MCG/ACT INHALER    Inhale 2 puffs into the lungs 4 times daily as needed for Wheezing    ASPIRIN 81 MG TABLET    Take 81 mg by mouth daily    BACILLUS COAGULANS-INULIN (ALIGN PREBIOTIC-PROBIOTIC PO)    Take by mouth Unsure of dose    CALCIUM CARBONATE (OSCAL) 500 MG TABS TABLET    Take 1,200 mg by mouth daily    DICLOFENAC SODIUM (VOLTAREN) 1 % GEL    Apply 2 g topically 2 times daily    DICYCLOMINE (BENTYL) 10 MG CAPSULE    Take 1 capsule by mouth every 6 hours as needed (cramps)    DOCUSATE SODIUM (COLACE) 100 MG CAPSULE    Take 100 mg by mouth 2 times daily as needed for Constipation    FUROSEMIDE (LASIX) 20 MG TABLET    Take 1 tablet by mouth daily    LAMOTRIGINE (LAMICTAL) 100 MG TABLET    Take 100 mg by mouth 2 times daily     LORATADINE (CLARITIN) 10 MG CAPSULE    Take 10 mg by mouth daily    METOPROLOL SUCCINATE (TOPROL XL) 50 MG EXTENDED RELEASE TABLET    Take 50 mg by mouth daily    NALOXONE 4 MG/0.1ML LIQD NASAL SPRAY    1 spray by Nasal route as needed for Opioid Reversal    NITROGLYCERIN (NITROSTAT) 0.4 MG SL TABLET    Place 0.4 mg under the tongue every 5 minutes as needed for Chest pain up to max of 3 total doses.  If no relief after 1 dose, call 911. PANTOPRAZOLE (PROTONIX) 40 MG TABLET    Take 1 tablet by mouth every morning (before breakfast)    ROSUVASTATIN (CRESTOR) 10 MG TABLET    Take 20 mg by mouth daily     SACUBITRIL-VALSARTAN (ENTRESTO) 24-26 MG PER TABLET    Take 1 tablet by mouth 2 times daily    TAPENTADOL (NUCYNTA) 50 MG TABS    Take 50 mg by mouth 3 times daily . VITAMIN D (CHOLECALCIFEROL) 1000 UNIT TABS TABLET    Take 2,000 Units by mouth daily        ALLERGIES     Ezetimibe-simvastatin    FAMILY HISTORY     No family history on file.        SOCIAL HISTORY       Social History     Socioeconomic History    Marital status:      Spouse name: Not on file    Number of children: Not on file    Years of education: Not on file    Highest education level: Not on file   Occupational History    Not on file   Social Needs    Financial resource strain: Not on file    Food insecurity     Worry: Not on file     Inability: Not on file    Transportation needs     Medical: Not on file     Non-medical: Not on file   Tobacco Use    Smoking status: Never Smoker    Smokeless tobacco: Never Used   Substance and Sexual Activity    Alcohol use: No    Drug use: No    Sexual activity: Not on file   Lifestyle    Physical activity     Days per week: Not on file     Minutes per session: Not on file    Stress: Not on file   Relationships    Social connections     Talks on phone: Not on file     Gets together: Not on file     Attends Advent service: Not on file     Active member of club or organization: Not on file     Attends meetings of clubs or organizations: Not on file     Relationship status: Not on file    Intimate partner violence     Fear of current or ex partner: Not on file     Emotionally abused: Not on file     Physically abused: Not on file     Forced sexual activity: Not on file   Other Topics Concern    Not on file   Social History Narrative    Not on file       SCREENINGS        Luigi Coma cardiologist.        RADIOLOGY:   Non-plain film images such as CT, Ultrasound and MRI are read by the radiologist. Plain radiographic images are visualized and preliminarily interpreted by the emergency physician with the below findings:        Interpretation per the Radiologist below, if available at the time of this note:    CT ABDOMEN PELVIS W IV CONTRAST Additional Contrast? None    (Results Pending)   XR CHEST PORTABLE    (Results Pending)         ED BEDSIDE ULTRASOUND:   Performed by ED Physician - none    LABS:  Labs Reviewed   COMPREHENSIVE METABOLIC PANEL - Abnormal; Notable for the following components:       Result Value    Anion Gap 19 (*)     Glucose 148 (*)     BUN 24 (*)     CREATININE 1.03 (*)     GFR Non- 50.7 (*)     ALT 51 (*)     AST 44 (*)     All other components within normal limits   CBC WITH AUTO DIFFERENTIAL - Abnormal; Notable for the following components:    MCHC 32.8 (*)     RDW 14.7 (*)     All other components within normal limits   URINE RT REFLEX TO CULTURE - Abnormal; Notable for the following components:    Ketones, Urine TRACE (*)     Protein, UA TRACE (*)     All other components within normal limits   LACTIC ACID, PLASMA   LIPASE   MAGNESIUM   PROCALCITONIN       All other labs were within normal range or not returned as of this dictation. EMERGENCY DEPARTMENT COURSE and DIFFERENTIAL DIAGNOSIS/MDM:   Vitals:    Vitals:    04/14/21 0400 04/14/21 0430 04/14/21 0500 04/14/21 0600   BP: 121/87 114/88 125/74 113/73   Pulse:       Resp:       Temp:       TempSrc:       SpO2: 96% 98% 99% 96%   Weight:               MDM    Patient has nontoxic no acute distress she is afebrile hemodynamically stable pain has gradually been improving she is provided with Bentyl fluids and reevaluation patient's pain has resolved. CT abdomen pelvis shows no acute process at this time.   She has an appointment today with her primary care physician and has an appointment with

## 2021-05-28 PROBLEM — R10.13 EPIGASTRIC PAIN: Status: ACTIVE | Noted: 2021-01-01

## 2021-05-28 NOTE — ED NOTES
Bed: 11  Expected date:   Expected time:   Means of arrival:   Comments:  87yr male - epigastric pain  3 Nitro - pain free now  JACKELINE Gtz RN  05/28/21 [id number]

## 2021-05-28 NOTE — H&P
Hospital Medicine  History and Physical    Patient:  Kvng Quintero  MRN: [id number]    CHIEF COMPLAINT:    Chief Complaint   Patient presents with    Chest Pain       Primary Care Physician: Adelia Bosworth, MD    HISTORY OF PRESENT ILLNESS:   The patient is a 80 y.o. 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 Past Medical History:      Diagnosis Date    Diverticulitis     Hyperlipidemia     Hypertension        Past Surgical History:      Procedure Laterality Date    APPENDECTOMY      CARDIAC SURGERY      CHOLECYSTECTOMY      JOINT REPLACEMENT         Medications Prior to Admission:    Prior to Admission medications    Medication Sig Start Date End Date Taking?  Authorizing Provider   dicyclomine (BENTYL) 10 MG capsule Take 1 capsule by mouth 4 times daily (before meals and nightly) 4/14/21   Lizzy Dixon PA-C   dicyclomine (BENTYL) 10 MG capsule Take 1 capsule by mouth every 6 hours as needed (cramps) 3/20/21   August Alcazar PA-C   albuterol sulfate HFA (VENTOLIN HFA) 108 (90 Base) MCG/ACT inhaler Inhale 2 puffs into the lungs 4 times daily as needed for Wheezing 3/20/21   Crow Malone PA-C   furosemide (LASIX) 20 MG tablet Take 1 tablet by mouth daily 11/4/20   Milena Cali MD   pantoprazole (PROTONIX) 40 MG tablet Take 1 tablet by mouth every morning (before breakfast) 11/5/20   Charmaine Shelby DO   diclofenac sodium (VOLTAREN) 1 % GEL Apply 2 g topically 2 times daily    Historical Provider, MD   naloxone 4 MG/0.1ML LIQD nasal spray 1 spray by Nasal route as needed for Opioid Reversal    Historical Provider, MD   docusate sodium (COLACE) 100 MG capsule Take 100 mg by mouth 2 times daily as needed for Constipation    Historical Provider, MD   calcium carbonate (OSCAL) 500 MG TABS tablet Take 1,200 mg by mouth daily    Historical Provider, MD   Bacillus Coagulans-Inulin (ALIGN PREBIOTIC-PROBIOTIC PO) Take by mouth Unsure of dose    Historical Provider, MD   sacubitril-valsartan (ENTRESTO) 24-26 MG per tablet Take 1 tablet by mouth 2 times daily    Historical Provider, MD   aspirin 81 MG tablet Take 81 mg by mouth daily    Historical Provider, MD   acetaminophen (TYLENOL) 325 MG tablet Take 650 mg by mouth every 6 hours as needed for Pain    Historical Provider, MD   loratadine (CLARITIN) 10 MG capsule Take 10 mg by mouth daily    Historical Provider, MD   nitroGLYCERIN (NITROSTAT) 0.4 MG SL tablet Place 0.4 mg under the tongue every 5 minutes as needed for Chest pain up to max of 3 total doses. If no relief after 1 dose, call 911. Historical Provider, MD   rosuvastatin (CRESTOR) 10 MG tablet Take 20 mg by mouth daily     Historical Provider, MD   lamoTRIgine (LAMICTAL) 100 MG tablet Take 100 mg by mouth 2 times daily     Historical Provider, MD   metoprolol succinate (TOPROL XL) 50 MG extended release tablet Take 50 mg by mouth daily    Historical Provider, MD   vitamin D (CHOLECALCIFEROL) 1000 UNIT TABS tablet Take 2,000 Units by mouth daily     Historical Provider, MD   tapentadol (NUCYNTA) 50 MG TABS Take 50 mg by mouth 3 times daily . Historical Provider, MD       Allergies:  Ezetimibe-simvastatin    Social History:   TOBACCO:   reports that she has never smoked. She has never used smokeless tobacco.  ETOH:   reports no history of alcohol use. Family History:   History reviewed. No pertinent family history.     REVIEW OF SYSTEMS:  Ten systems reviewed and negative except for as mentioned in the HPI  Review of Systems     Physical Exam:      Vitals: /71   Pulse 74   Temp 98 °F (36.7 °C) (Oral)   Resp 18   Ht 4' 10\" (1.473 m)   Wt 125 lb (56.7 kg)   SpO2 95%   BMI 26.13 kg/m²     Physical Exam     General appearance: alert, cooperative and no distress. Obese  Mental Status: oriented to person, place and time and normal affect  Lungs: clear to auscultation bilaterally, normal effort  Heart: regular rate and rhythm, no murmur  Abdomen: soft, nontender, nondistended, bowel sounds present, no masses  Extremities: no edema or redness  Skin: no gross lesions, rashes    Recent Labs     05/28/21  0127   WBC 6.8   HGB 13.7        Recent Labs     05/28/21  0130      K 5.2*      CO2 23   BUN 22   CREATININE 1.08*   GLUCOSE 107*   AST 30   ALT 25   BILITOT 0.4   ALKPHOS 96     Troponin T:   Recent Labs     05/28/21  0130   TROPONINI <0.010     ABGs: No results found for: PHART, PO2ART, [id number]  INR: No results for input(s): INR in the last 72 hours. URINALYSIS:No results for input(s): NITRITE, COLORU, PHUR, LABCAST, WBCUA, RBCUA, MUCUS, TRICHOMONAS, YEAST, BACTERIA, CLARITYU, SPECGRAV, LEUKOCYTESUR, UROBILINOGEN, BILIRUBINUR, BLOODU, GLUCOSEU, AMORPHOUS in the last 72 hours. Invalid input(s): Aguila Crowley  -----------------------------------------------------------------   No results found.         Assessment and Plan     *Atypical/noncardiac chest pain  -Admit to [facility] with telemetry  -Observation status  -Continue to trend troponin  -Echo in a.m.  -Continue home medications that include aspirin and statin  -Defer cardiology consult to the daytime doctor    *Mild hyperkalemia  -This could be due to medication induced from the K[facility] Grumbles  -Hold this medication  -Repeat BMP in the morning    *History of chronic combined systolic and diastolic congestive heart failure  -Patient did not seem to be in acute exacerbation  -Continue her home medications that include Toprol-XL

## 2021-05-28 NOTE — CARE COORDINATION
[facility] [place] Case Management Initial Discharge Assessment    Met with Patient to discuss discharge plan. PCP: Robert Herron MD                                Date of Last Visit: APRIL      Discharge Planning    Living Arrangements: independently at home    Who do you live with? SPOUSE AND DTR, OTHER DTR ALSO HELPS    Who helps you with your care:  self    If lives at home:     Do you have any barriers navigating in your home? no    Patient can perform ADL? Yes    Current Services (outpatient and in home) :  None    Dialysis: No    Is transportation available to get to your appointments? Yes-FAMILY DRIVES    DME Equipment:  yes - [facility]     Respiratory equipment: None    Respiratory provider:  no     Pharmacy:  yes - Isela Garcia with Medication Assistance Program?  No      Patient agreeable to [place] [id number]? Declined    Patient agreeable to SNF/Rehab? Declined    Other discharge needs identified? N/A    Does Patient Have a High-Risk for Readmission Diagnosis (CHF, PN, MI, COPD)? No      Initial Discharge Plan? (Note: please see concurrent daily documentation for any updates after initial note). MET WITH PATIENT, [facility]. NO HOME NEEDS. PLANNING FOR DISCHARGE HOME TOMORROW.       Readmission Risk              Risk of Unplanned Readmission:  0         Electronically signed by Merlyn Gonzalez RN on 5/28/2021 at 4:07 PM

## 2021-05-28 NOTE — PROGRESS NOTES
AST 30   ALT 25   BILITOT 0.4   ALKPHOS 96       Current Facility-Administered Medications   Medication Dose Route Frequency Provider Last Rate Last Admin    aspirin chewable tablet 81 mg  81 mg Oral Daily Alessandro Echeverria MD   81 mg at 05/28/21 0817    lamoTRIgine (LAMICTAL) tablet 100 mg  100 mg Oral BID [facility] Lenard Fabian MD   100 mg at 05/28/21 0817    pantoprazole (PROTONIX) tablet 40 mg  40 mg Oral QAM AC Alessandro Rojas MD   40 mg at 05/28/21 0730    rosuvastatin (CRESTOR) tablet 20 mg  20 mg Oral Daily Alessandro Rojas MD   20 mg at 05/28/21 0817    sodium chloride flush 0.9 % injection 5-40 mL  5-40 mL Intravenous 2 times per day Alejo Ray MD   10 mL at 05/28/21 0817    sodium chloride flush 0.9 % injection 5-40 mL  5-40 mL Intravenous PRN Alessandro Rojas MD        0.9 % sodium chloride infusion  25 mL Intravenous PRN Alessandro Rojas MD        enoxaparin (LOVENOX) injection 40 mg  40 mg Subcutaneous Daily Alessandro Rojas MD   40 mg at 05/28/21 0817    promethazine (PHENERGAN) tablet 12.5 mg  12.5 mg Oral Q6H PRN Alessandro Rojas MD        Or    ondansetron [facility]) injection 4 mg  4 mg Intravenous Q6H PRN Alessandro Rojas MD        polyethylene glycol (GLYCOLAX) packet 17 g  17 g Oral Daily PRN Alessandro Rojas MD        acetaminophen (TYLENOL) tablet 650 mg  650 mg Oral Q6H PRN Alessandro Rojas MD        Or    acetaminophen (TYLENOL) suppository 650 mg  650 mg Rectal Q6H PRN Alessandro Rojas MD        0.9 % sodium chloride infusion   Intravenous Continuous Alessandro Echeverria MD 75 mL/hr at 05/28/21 1134 New Bag at 05/28/21 1134    furosemide (LASIX) tablet 20 mg  20 mg Oral BID John Tate MD        isosorbide mononitrate (IMDUR) extended release tablet 60 mg  60 mg Oral Daily John Tate MD        losartan (COZAAR) tablet 50 mg  50 mg Oral Daily John Tate MD        [START ON 5/29/2021]

## 2021-05-28 NOTE — FLOWSHEET NOTE
Patient arrived on floor from ER at 0330. Admission assessment completed. VSS. Patient denies complaints of chest pain. Short of breath with exertion. Patient placed on 2 L 02. Patient resting in bed. Voices no needs at this time.

## 2021-05-28 NOTE — ED PROVIDER NOTES
[address] [facility] ED  eMERGENCY dEPARTMENT eNCOUnter      Pt Name: Bard Shaw  MRN: [id number]  [place] 1934  Date of evaluation: 5/28/2021  Provider: Wang Lowe, [address] is a 80 y.o. female with PMHx of CAD, CABG, diverticulitis, hypertension, hyperlipidemia presents to the emergency department with epigastric abdominal pain. Patient states at rest around 9 PM last night she started with epigastric tightness associated with shortness of breath. She took 1 nitro which temporally helped with symptoms, then pain returned and she took a second and third nitro at a later time due to pain which temporarily helped. She feels the area is sore now. Was exertional, persisted with rest but improved. She denies fevers, her dizziness, cough, nausea, vomiting, diarrhea. She states today she had a hot pocket of pizza and ham and cheese and for dinner chicken noodle soup with white rice. She did not lay down after eating. She is supposed to see a GI doctor next week for lower abdominal pain she has been experiencing    HPI    Nursing Notes were reviewed. REVIEW OF SYSTEMS       Review of Systems   Constitutional: Negative for appetite change, chills and fever. HENT: Negative for congestion, rhinorrhea and sore throat. Respiratory: Positive for chest tightness and shortness of breath. Negative for cough. Cardiovascular: Negative for chest pain. Gastrointestinal: Positive for abdominal pain. Negative for blood in stool, diarrhea, nausea and vomiting. Genitourinary: Negative for difficulty urinating. Musculoskeletal: Negative for neck stiffness. Skin: Negative for color change and rash. Neurological: Negative for dizziness, syncope, weakness, light-headedness, numbness and headaches. All other systems reviewed and are negative.             PAST MEDICAL HISTORY     Past Medical History:   Diagnosis Date    Diverticulitis     Hyperlipidemia  Hypertension          SURGICAL HISTORY       Past Surgical History:   Procedure Laterality Date    APPENDECTOMY      CARDIAC SURGERY      CHOLECYSTECTOMY      JOINT REPLACEMENT           CURRENT MEDICATIONS       Previous Medications    ACETAMINOPHEN (TYLENOL) 325 MG TABLET    Take 650 mg by mouth every 6 hours as needed for Pain    ALBUTEROL SULFATE HFA (VENTOLIN HFA) 108 (90 BASE) MCG/ACT INHALER    Inhale 2 puffs into the lungs 4 times daily as needed for Wheezing    ASPIRIN 81 MG TABLET    Take 81 mg by mouth daily    BACILLUS COAGULANS-INULIN (ALIGN PREBIOTIC-PROBIOTIC PO)    Take by mouth Unsure of dose    CALCIUM CARBONATE (OSCAL) 500 MG TABS TABLET    Take 1,200 mg by mouth daily    DICLOFENAC SODIUM (VOLTAREN) 1 % GEL    Apply 2 g topically 2 times daily    DICYCLOMINE (BENTYL) 10 MG CAPSULE    Take 1 capsule by mouth every 6 hours as needed (cramps)    DICYCLOMINE (BENTYL) 10 MG CAPSULE    Take 1 capsule by mouth 4 times daily (before meals and nightly)    DOCUSATE SODIUM (COLACE) 100 MG CAPSULE    Take 100 mg by mouth 2 times daily as needed for Constipation    FUROSEMIDE (LASIX) 20 MG TABLET    Take 1 tablet by mouth daily    LAMOTRIGINE (LAMICTAL) 100 MG TABLET    Take 100 mg by mouth 2 times daily     LORATADINE (CLARITIN) 10 MG CAPSULE    Take 10 mg by mouth daily    METOPROLOL SUCCINATE (TOPROL XL) 50 MG EXTENDED RELEASE TABLET    Take 50 mg by mouth daily    NALOXONE 4 MG/0.1ML LIQD NASAL SPRAY    1 spray by Nasal route as needed for Opioid Reversal    NITROGLYCERIN (NITROSTAT) 0.4 MG SL TABLET    Place 0.4 mg under the tongue every 5 minutes as needed for Chest pain up to max of 3 total doses. If no relief after 1 dose, call 911.     PANTOPRAZOLE (PROTONIX) 40 MG TABLET    Take 1 tablet by mouth every morning (before breakfast)    ROSUVASTATIN (CRESTOR) 10 MG TABLET    Take 20 mg by mouth daily     SACUBITRIL-VALSARTAN (ENTRESTO) 24-26 MG PER TABLET    Take 1 tablet by mouth 2 times daily atraumatic. Eyes:      Conjunctiva/sclera: Conjunctivae normal.      Pupils: Pupils are equal, round, and reactive to light. Neck:      Trachea: No tracheal deviation. Cardiovascular:      Heart sounds: Normal heart sounds. Pulmonary:      Effort: Pulmonary effort is normal. No respiratory distress. Breath sounds: Normal breath sounds. No stridor. Abdominal:      General: Bowel sounds are normal. There is no distension. Palpations: Abdomen is soft. There is no mass. Tenderness: There is abdominal tenderness. There is no guarding or rebound. Comments: Mild epigastric abdominal tenderness to palpation, abdomen soft and nondistended, no rebound or rigidity, no pulsatile mass or bruit, no ecchymosis   Musculoskeletal:         General: Normal range of motion. Cervical back: Normal range of motion and neck supple. Skin:     General: Skin is warm and dry. Capillary Refill: Capillary refill takes less than 2 seconds. Findings: No rash. Neurological:      Mental Status: She is alert and oriented to person, place, and time. Deep Tendon Reflexes: Reflexes are normal and symmetric. Psychiatric:         Behavior: Behavior normal.         Thought Content: Thought content normal.         Judgment: Judgment normal.         DIAGNOSTIC RESULTS     EKG:All EKG's are interpreted by the Emergency Department Physician who either signs or Co-signs this chart in the absence of a cardiologist.    Sinus rhythm with first-degree AV block, NC interval 248, left ventricular hypertrophy, normal intervals, no ST segment changes.   New T wave inversions in lateral leads compared to March 2021    RADIOLOGY:   Non-plain film images such as CT, Ultrasound and MRI are read by theradiologist. Plain radiographic images are visualized and preliminarily interpreted by the emergency physician with the below findings:    Interpretation per theRadiologist below, if available at the time of this Efforts were made to edit the dictations but occasionally words are mis-transcribed. )    JEANINE Canales (electronically signed)  Emergency Physician Assistant         Jaquelin Leigh [email or web address]  05/28/21 0898

## 2021-05-28 NOTE — CONSULTS
[facility] Heart Consult Note      Patient:  Bard Shaw  YOB: 1934  MRN: [id number]   Acct: [de-identified]  Admit Date:  5/28/2021  Primary Cardiologist:Dr. Laxmi Smith     Reason for Consult: atypical chest pain    Rounding Cardiologist: Noé Mesa MD      Impression/Plan:     1. Atypical chest pain: Symptoms are not suggestive of angina. Serial enzymes are negative. ECG shows no acute changes. We will continue current cardiac medications no further testing as to coronary disease is indicated. Importantly patient does not wish invasive investigations at this point fortunately cardiac status seems stable  2. Ischemic cardiomyopathy chronic systolic CHF functional class III: She has been quite stable on her current medications. The home medication list entered from the emergency room is inaccurate. She has not been on Entresto for quite some time because of intolerance and instead is taking losartan. Her potassium was 5.2 but in the last several months potassium has been well within normal range. Would not stop her medication at this point in time unless there is evidence of increasing potassium levels are read used creatinine clearance. May need a potassium binder but I think that unlikely at this point  3. Epigastric discomfort: She has GI follow-up this coming week I suspect her symptoms are reflux and perhaps some esophageal spasm or perhaps transient gastritis. 4. Echocardiogram shows persistence of diminished ejection fraction at 10 to 15%. There is been no deterioration since last assessment. 5. Hyperkalemia: Quite mild and not seen previously on this medical regimen continue same and repeat in the morning may need to reduce spironolactone if potassium remains high  6. From cardiovascular standpoint as long as remains stable overnight would probably discharge home in the morning she has follow-up with me as an outpatient basis.   Continue current cardiac medications unless potassium significantly increases. 7. Outpatient med list I have entered below is her correct outpatient meds and I have communicated that to nursing such that it can be corrected in the chart  8. Discussed with her son    Chief Complaint/Active Symptoms: 80-year-old woman with longstanding severe advanced ischemic cardiomyopathy who is not wish further interventions. I have just seen her in the office a few weeks ago she was quite comfortable on the current medical regimen. He presented emergency department complaining of primarily epigastric discomfort. Although her English is quite good sometimes she likes translation and her son tells me that indeed her discomfort is epigastric. She is not had chest tightness or pressure. She leads a very sedentary lifestyle and currently has not had recent significant shortness of breath on the current medical regimen. Has not had dizziness, lightheadedness or syncope. She has had some loose stools and diarrhea. She said the discomfort occurred yesterday while she was sitting watching television and eating. She took a total of 3 nitroglycerin without effect. Emergency room vital signs were stable. Serial troponins have been negative. There have been no acute ST-T changes on EKG    Review of Systems:   12 point review of system essentially negative except for GI complaints as described above. She has had no bleeding issues. CARDIAC HISTORY:  Ischemic cardiomyopathy LVEF 10 to 15%  Remote coronary bypass graft surgery left internal mammary LAD vein graft to right coronary  Remote drug-eluting stents, multivessel  July 2020 perfusion study previous infarct LVEF 22% no inducible ischemia  Patient wished no interventions such as cath/angioplasty/AICD at last office visit earlier this month  Hyperlipidemia  Has been on chronic losartan therapy well-tolerated.   Last potassium earlier this month 4.3      Past Medical History:   Diagnosis Date    Diverticulitis     Hyperlipidemia     Hypertension      Past Surgical History:   Procedure Laterality Date    APPENDECTOMY      CARDIAC SURGERY      CHOLECYSTECTOMY      JOINT REPLACEMENT           Family History   1. Family history of cardiac disorder (V17.49) (Z82.49) : Mother   2. Family history of Throat cancer : Father    Social History   · Daily caffeine consumption, 2-3 servings a day   · Never a smoker   · No alcohol use   · No illicit drug use    Current Meds   1. Albuterol Sulfate (2.5 MG/3ML) 0.083% Inhalation Nebulization Solution; Therapy: 54Hrv3056 to Recorded   2. Albuterol Sulfate  (90 Base) MCG/ACT Inhalation Aerosol Solution; Therapy: 09Gar0978 to Recorded   3. Align 4 MG Oral Capsule; take 1 tablet daily; Therapy: (Recorded:15Mtx3841) to Recorded   4. Aspirin EC 81 MG Oral Tablet Delayed Release; 1 TABLET DAILY; Therapy: 02IGX0664 to (Evaluate:89Lnr5073)  Requested for: 83Mqr6866; Last   Rx:[id number] Ordered   5. Calcium TABS; take 1 daily; Therapy: (Recorded:40Rqy5539) to Recorded   6. Claritin 10 MG Oral Tablet; TAKE 1 TABLET DAILY AS NEEDED; Therapy: (Recorded:94Mvm3025) to Recorded   7. Diclofenac Sodium 1 % GEL; use as directed as needed; Therapy: 81QRG5979 to Recorded   8. Docusate Sodium 100 MG Oral Tablet; TAKE 1 TABLET DAILY AS DIRECTED; Therapy: (Recorded:08Pye0172) to Recorded   9. Furosemide 20 MG Oral Tablet; 1 tablet twice a day; Therapy: 45Vtl9965 to (Evaluate:21Lmn8750); Last Rx:22Vmf4548 Ordered   10. Isosorbide Mononitrate ER 30 MG Oral Tablet Extended Release 24 Hour; 1 TABLET    DAILY; Therapy: 87AFD2717 to (Evaluate:03Nll5530)  Requested for: 75XUI7350; Last    Rx:[id number] Ordered   11. lamoTRIgine 100 MG Oral Tablet; TAKE 2 TABLETS TWICE DAILY; Therapy: 68Rup0913 to Recorded   12. Losartan Potassium 50 MG Oral Tablet; TAKE 1 TABLET DAILY; Therapy: 41YIG7428 to (Alvina Acosta)  Requested for: 69Fal6065; Last    Rx:48Rxe4235 Ordered   13.  Metoprolol Succinate ER 50 MG Oral Tablet Extended Release 24 Hour; Takes 1 and 1/2    tablets daily  Requested for: 89Naa1729; Last Rx:53Sqc5242 Ordered   14. Narcan 4 MG/0.1ML Nasal Liquid; USE AS DIRECTED; Therapy: (Torsten Sandoval) to Recorded   15. Nitroglycerin 0.4 MG Sublingual Tablet Sublingual; PLACE 1 TABLET UNDER THE    TONGUE EVERY 5 MINUTES UP TO 3 DOSES AS NEEDED FOR CHEST PAIN. if no relief after third dose call 911  Requested for: 08Xmp2291; Last Rx:60Tnm8705    Ordered   16. Nucynta 50 MG Oral Tablet; take 1 tablet three times daily as needed; Therapy: (Recorded:67Wtk1271) to Recorded   17. Pantoprazole Sodium 40 MG Oral Tablet Delayed Release; TAKE 1 TABLET BY MOUTH    ONCE DAILY IN THE MORNING BEFORE BREAKFAST; Therapy: 85MVZ3824 to Recorded   18. Rosuvastatin Calcium 20 MG Oral Tablet; TAKE 1 TABLET DAILY; Therapy: 21Mhw2284 to Recorded   19. Spironolactone 25 MG Oral Tablet; 1 TABLET DAILY; Therapy: 64JMS8334 to (Keyana Flaherty)  Requested for: 29CSF7497; Last    Rx:[id number] Ordered   20. Tylenol 8 Hour Arthritis Pain 650 MG Oral Tablet Extended Release; take 1 tablet as    needed for pain; Therapy: (Recorded:05Own2675) to Recorded   21. Vitamin D3 50 MCG ( [place]) Oral Tablet; take 1 tablet daily; Therapy: (Recorded:98Wtc0252) to Recorded    Reviewed meds with PT List. DS MA     Allergies   1. No Known Drug Allergies   Recorded By: Pili Muse; 2444 36:16:03 AM    Immunizations  FLU --- Glory Dunlap: 08-Oct-2018;  Levi Friend: [id number] Rony: 07-Oct-2020   Influenza --- Glory Dunlap: [id number] Los: 01-Oct-2015; Preeti Amado: 2016; Series4:  23-Oct-2017   PCV --- Series1: 24-Oct-2017   PPSV --- Series1: 2014   Pfizer-BioNTech COVID-19 Vacc 30 MCG/0.3ML Intramuscular Suspension --- Glory Dunlap: 2021;  Levi Friend: 11-Mar-2021         Objective:   Vitals:    21 0220 21 0324 21 0358 21 0735   BP: 122/71 119/71 128/74 118/69   Pulse: 74 77 89 79   Resp: 18 23 18 16   Temp:   97.3 °F (36.3 °C) 99 °F (37.2 °C)   TempSrc:   Oral Oral   SpO2: 95% 98% 97% 97%   Weight:       Height:          Wt Readings from Last 3 Encounters:   05/28/21 125 lb (56.7 kg)   04/13/21 125 lb (56.7 kg)   03/20/21 125 lb (56.7 kg)       TELEMETRY: normal sinus                             Rhythm Strip: No ectopy    Physical Exam:  General Appearance: alert and oriented to person, place and time, in no acute distress  Cardiovascular: normal rate, regular rhythm, normal S1 and S2, 2-3/6 MR murmur, rubs, clicks, ,  no JVD, soft S3  Pulmonary/Chest: clear to auscultation bilaterally- no wheezes, rales or rhonchi, normal air movement, no respiratory distress  Abdomen: soft, very slight epigastric tenderness , non-distended, normal bowel sounds, no masses   Extremities: no cyanosis, clubbing or edema  Skin: warm and dry, no rash or erythema  Eyes: EOMI  Neck: supple and non-tender without mass, no thyromegaly   Neurological: alert, oriented, normal speech, no focal findings or movement disorder noted    Allergies: Allergies   Allergen Reactions    Ezetimibe-Simvastatin Other (See Comments)        Medications:    aspirin  81 mg Oral Daily    furosemide  20 mg Oral Daily    lamoTRIgine  100 mg Oral BID    metoprolol succinate  50 mg Oral Daily    pantoprazole  40 mg Oral QAM AC    rosuvastatin  20 mg Oral Daily    sodium chloride flush  5-40 mL Intravenous 2 times per day    enoxaparin  40 mg Subcutaneous Daily      sodium chloride      sodium chloride 75 mL/hr at 05/28/21 1134     sodium chloride flush, 5-40 mL, PRN  sodium chloride, 25 mL, PRN  promethazine, 12.5 mg, Q6H PRN   Or  ondansetron, 4 mg, Q6H PRN  polyethylene glycol, 17 g, Daily PRN  acetaminophen, 650 mg, Q6H PRN   Or  acetaminophen, 650 mg, Q6H PRN        Diagnostics:  EKG: Normal sinus rhythm. LVH with repolarization abnormality no acute changes    Echo: 10-15% LVEF. 3+ MR.  RVSP 48 mmHg.   No change from previously    CXR:               Portable: Results for orders placed during the hospital encounter of 05/28/21    XR CHEST PORTABLE    Narrative  Exam: XR CHEST PORTABLE    History:  chest pain    Technique: AP portable view of the chest obtained. Comparison: none    Chest x-ray portable  Findings:  Status post median sternotomy. The cardiac silhouette is enlarged, similar to the prior study. There are no infiltrates, consolidations or effusions. Bones of the thorax appear intact. Impression  No radiographic evidence of acute intrathoracic process. There is cardiomegaly, similar to previous studies as well as evidence of prior surgery. Lab Data:    Cardiac Enzymes:  Recent Labs     05/28/21  0130 05/28/21  0730 05/28/21  1135   TROPONINI <0.010 <0.010 <0.010     ProBNP:   Lab Results   Component Value Date    PROBNP 4,866 03/20/2021       CBC:   Recent Labs     05/28/21  0127   WBC 6.8   RBC 4.61   HGB 13.7   HCT 42.3          CMP:    Recent Labs     05/28/21  0130      K 5.2*      CO2 23   BUN 22   CREATININE 1.08*   GFRAA 58.1*   LABGLOM 48.0*   GLUCOSE 107*   CALCIUM 9.8       Hepatic Function Panel:    Recent Labs     05/28/21  0130   ALKPHOS 96   ALT 25   AST 30   PROT 7.4   BILITOT 0.4   LABALBU 4.4       Magnesium:  No results for input(s): MG in the last 72 hours. PT/INR:  No results for input(s): PROTIME, INR in the last 72 hours. Lipids:  No results for input(s): CHOL, TRIG, HDL, LDLCALC, LABVLDL in the last 72 hours. Active Problems:    Epigastric pain  Resolved Problems:    * No resolved hospital problems.  *           Electronically signed by Ac Meyer MD on 5/28/2021 at 2:01 PM

## 2021-05-28 NOTE — ED TRIAGE NOTES
Arrived to the ER by [facility] for c/o CP. States had pain in the chest around the epigastric area that was relieved by 3 NTG that she took prior to calling the squad. States pain was non radiating and sharp not associated with n/v, diaphoresis. Reports is scheduled to see a GI next week for concerns regarding epigastric and stomach pain.

## 2021-05-29 NOTE — DISCHARGE INSTR - DIET

## 2021-05-29 NOTE — PROGRESS NOTES
[facility] Heart Progress Note      DATE: 5/29/2021      Patient:  Mecca Cummings     YOB: 1934  MRN: [id number]   Acct: [de-identified]  Admit Date:  5/28/2021     Primary Cardiologist:Dr. Dilcia Cabezas     Reason for Consult: atypical chest pain    Rounding Cardiologist: Carolina Murphy MD    Subjective:     Doing well. Denies any chest pain currently. No congestive heart failure symptoms currently. No Changes overall. Potassium 3.6.    14 system General and Cardiac ROS otherwise negative or unchanged. Impression:     1. Chest pain, atypical  2. Negative troponins to date, ruled out for acute coronary syndrome. 3. Hyperkalemia, corrected  4. Epigastric discomfort / Gastritis / GERD  5. Ischemic cardiomyopathy, LVEF 10 to 15%  6. Congestive heart failure, class III, chronic systolic  7. Coronary artery disease status post previous coronary bypass surgery  8. Negative Myoview perfusion stress test for ischemia 7/2020.  9. Hypertension  10. Hyperlipidemia  11. Otherwise as below    Plan:     1. Okay to discharge home from cardiovascular standpoint as outlined below. 2. Patient is wishing conservative medical treatment only and is not interested in repeat testing and/or invasive treatment at this time. 3. We will continue current medications. 4. Follow-up with Dr. Darius Lal in 1 to 2 weeks as noted. 5. Gastroenterology evaluation as outpatient as noted. 6. Cardiology sign off.  7. See orders.    ===========================================    Dr Chad Carver Impression and plan 5/28/21:    1. Atypical chest pain: Symptoms are not suggestive of angina. Serial enzymes are negative. ECG shows no acute changes. We will continue current cardiac medications no further testing as to coronary disease is indicated. Importantly patient does not wish invasive investigations at this point fortunately cardiac status seems stable  2.  Ischemic cardiomyopathy chronic systolic CHF functional class III: She has been quite stable on her current medications. The home medication list entered from the emergency room is inaccurate. She has not been on Entresto for quite some time because of intolerance and instead is taking losartan. Her potassium was 5.2 but in the last several months potassium has been well within normal range. Would not stop her medication at this point in time unless there is evidence of increasing potassium levels are read used creatinine clearance. May need a potassium binder but I think that unlikely at this point  3. Epigastric discomfort: She has GI follow-up this coming week I suspect her symptoms are reflux and perhaps some esophageal spasm or perhaps transient gastritis. 4. Echocardiogram shows persistence of diminished ejection fraction at 10 to 15%. There is been no deterioration since last assessment. 5. Hyperkalemia: Quite mild and not seen previously on this medical regimen continue same and repeat in the morning may need to reduce spironolactone if potassium remains high  6. From cardiovascular standpoint as long as remains stable overnight would probably discharge home in the morning she has follow-up with me as an outpatient basis. Continue current cardiac medications unless potassium significantly increases. 7. Outpatient med list I have entered below is her correct outpatient meds and I have communicated that to nursing such that it can be corrected in the chart  8. Discussed with her son        Chief Complaint/Active Symptoms: 51-year-old woman with longstanding severe advanced ischemic cardiomyopathy who is not wish further interventions. I have just seen her in the office a few weeks ago she was quite comfortable on the current medical regimen. He presented emergency department complaining of primarily epigastric discomfort. Although her English is quite good sometimes she likes translation and her son tells me that indeed her discomfort is epigastric.   She is not had 1 tablet daily; Therapy: (Recorded:24Cpp7103) to Recorded   4. Aspirin EC 81 MG Oral Tablet Delayed Release; 1 TABLET DAILY; Therapy: 29QCQ2728 to (Evaluate:24Ote2215)  Requested for: 31Omd4262; Last   Rx:[id number] Ordered   5. Calcium TABS; take 1 daily; Therapy: (Recorded:02Qcx1038) to Recorded   6. Claritin 10 MG Oral Tablet; TAKE 1 TABLET DAILY AS NEEDED; Therapy: (Recorded:14Wez4478) to Recorded   7. Diclofenac Sodium 1 % GEL; use as directed as needed; Therapy: 35PHG7893 to Recorded   8. Docusate Sodium 100 MG Oral Tablet; TAKE 1 TABLET DAILY AS DIRECTED; Therapy: (Recorded:58Jxt9567) to Recorded   9. Furosemide 20 MG Oral Tablet; 1 tablet twice a day; Therapy: 68Vux7720 to (Evaluate:33Zsv7250); Last Rx:[id number] Ordered   10. Isosorbide Mononitrate ER 30 MG Oral Tablet Extended Release 24 Hour; 1 TABLET    DAILY; Therapy: 82GGW8829 to (Evaluate:60Xwx7659)  Requested for: 33ISH6577; Last    Rx:[id number] Ordered   11. lamoTRIgine 100 MG Oral Tablet; TAKE 2 TABLETS TWICE DAILY; Therapy: 76Yaa2685 to Recorded   12. Losartan Potassium 50 MG Oral Tablet; TAKE 1 TABLET DAILY; Therapy: 37DPU2178 to (Tia Leija)  Requested for: 12Nov2020; Last    Rx:78Yhj9961 Ordered   13. Metoprolol Succinate ER 50 MG Oral Tablet Extended Release 24 Hour; Takes 1 and 1/2    tablets daily  Requested for: 16Ycl8999; Last Rx:26Kbd1558 Ordered   14. Narcan 4 MG/0.1ML Nasal Liquid; USE AS DIRECTED; Therapy: (Jess Panchal) to Recorded   15. Nitroglycerin 0.4 MG Sublingual Tablet Sublingual; PLACE 1 TABLET UNDER THE    TONGUE EVERY 5 MINUTES UP TO 3 DOSES AS NEEDED FOR CHEST PAIN. if no relief after third dose call 911  Requested for: 53Cry6228; Last Rx:63Mfk5211    Ordered   16. Nucynta 50 MG Oral Tablet; take 1 tablet three times daily as needed;     Therapy: (Recorded:88Zcr3058) to Recorded   17. Pantoprazole Sodium 40 MG Oral Tablet Delayed Release; TAKE 1 TABLET BY MOUTH    ONCE DAILY IN THE distress  Abdomen: soft, very slight epigastric tenderness , non-distended, normal bowel sounds, no masses   Extremities: no cyanosis, clubbing or edema  Skin: warm and dry, no rash or erythema  Eyes: EOMI  Neck: supple and non-tender without mass, no thyromegaly   Neurological: alert, oriented, normal speech, no focal findings or movement disorder noted    Allergies: Allergies   Allergen Reactions    Ezetimibe-Simvastatin Other (See Comments)        Medications:    aspirin  81 mg Oral Daily    lamoTRIgine  100 mg Oral BID    pantoprazole  40 mg Oral QAM AC    rosuvastatin  20 mg Oral Daily    sodium chloride flush  5-40 mL Intravenous 2 times per day    enoxaparin  40 mg Subcutaneous Daily    furosemide  20 mg Oral BID    isosorbide mononitrate  60 mg Oral Daily    losartan  50 mg Oral Daily    metoprolol succinate  75 mg Oral Daily    spironolactone  25 mg Oral Daily      sodium chloride       sodium chloride flush, 5-40 mL, PRN  sodium chloride, 25 mL, PRN  promethazine, 12.5 mg, Q6H PRN   Or  ondansetron, 4 mg, Q6H PRN  polyethylene glycol, 17 g, Daily PRN  acetaminophen, 650 mg, Q6H PRN   Or  acetaminophen, 650 mg, Q6H PRN        Diagnostics:  EKG: Normal sinus rhythm. LVH with repolarization abnormality no acute changes    Echo: 10-15% LVEF. 3+ MR.  RVSP 48 mmHg. No change from previously    CXR:               Portable: Results for orders placed during the hospital encounter of 05/28/21    XR CHEST PORTABLE    Narrative  Exam: XR CHEST PORTABLE    History:  chest pain    Technique: AP portable view of the chest obtained. Comparison: none    Chest x-ray portable  Findings:  Status post median sternotomy. The cardiac silhouette is enlarged, similar to the prior study. There are no infiltrates, consolidations or effusions. Bones of the thorax appear intact. Impression  No radiographic evidence of acute intrathoracic process.   There is cardiomegaly, similar to previous studies as well as evidence of prior surgery. Lab Data:    Cardiac Enzymes:  Recent Labs     05/28/21  0730 05/28/21  1135 05/28/21 2012   TROPONINI <0.010 <0.010 <0.010     ProBNP:   Lab Results   Component Value Date    PROBNP 4,866 03/20/2021       CBC:   Recent Labs     05/28/21  0127 05/29/21  0537   WBC 6.8 5.1   RBC 4.61 4.24   HGB 13.7 12.7   HCT 42.3 38.8    174       CMP:    Recent Labs     05/28/21  0130 05/29/21  0537    141   K 5.2* 3.6    104   CO2 23 26   BUN 22 15   CREATININE 1.08* 0.91*   GFRAA 58.1* >60.0   LABGLOM 48.0* 58.5*   GLUCOSE 107* 85   CALCIUM 9.8 9.4       Hepatic Function Panel:    Recent Labs     05/28/21 0130   ALKPHOS 96   ALT 25   AST 30   PROT 7.4   BILITOT 0.4   LABALBU 4.4       Magnesium:  No results for input(s): MG in the last 72 hours. PT/INR:  No results for input(s): PROTIME, INR in the last 72 hours. Lipids:  No results for input(s): CHOL, TRIG, HDL, LDLCALC, LABVLDL in the last 72 hours. Active Problems:    Epigastric pain  Resolved Problems:    * No resolved hospital problems.  *           Electronically signed by Marie Huber MD on 5/29/2021 at 7:40 AM

## 2021-05-29 NOTE — DISCHARGE INSTR - COC
Continuity of Care Form    Patient Name: Iqra Saravia   :  1934  MRN:  [id number]    Admit date:  2021  Discharge date:  ***    Code Status Order: Full Code   Advance Directives:   Advance Care Flowsheet Documentation       Date/Time Healthcare Directive Type of Healthcare Directive Copy in [address] Agent's Name Healthcare Agent's Phone Number    [phone]  Yes, patient has an advance directive for healthcare treatment  Living will  --  --  --  --            Admitting Physician:  Howard Rice MD  PCP: Anita Ernst MD    Discharging Nurse: [facility] Unit/Room#: D924/T402-50  Discharging Unit Phone Number: ***    Emergency Contact:   Extended Emergency Contact Information  Primary Emergency Contact: Ricardo Santillan  Address: [address] [place] [address] Phone: [phone]  Relation: Child  Secondary Emergency Contact: [address] [place] [address] Phone: [phone]  Relation: Child    Past Surgical History:  Past Surgical History:   Procedure Laterality Date    APPENDECTOMY      CARDIAC SURGERY      CHOLECYSTECTOMY      JOINT REPLACEMENT         Immunization History:   Immunization History   Administered Date(s) Administered    COVID-19, GRECIA Caro, 30mcg/0.3mL 2021, 2021       Active Problems:  Patient Active Problem List   Diagnosis Code    Chronic coronary artery disease I25.10    Dyslipidemia E78.5    Colitis K52.9    Essential hypertension I10    Central abdominal pain R10.9    Chest pain R07.9    Epigastric pain R10.13       Isolation/Infection:   Isolation            C Diff Contact          Patient Infection Status       Infection Onset Added Last Indicated Last Indicated By Review Planned Expiration Resolved Resolved By    C-diff Rule Out 21 Gastrointestinal Panel by DNA (Ordered)        Resolved    COVID-19 Rule Out 21 03/20/21 COVID-19, Rapid (Ordered)   03/20/21 Rule-Out Test Resulted            Nurse Assessment:  Last Vital Signs: /62   Pulse 73   Temp 98.2 °F (36.8 °C) (Oral)   Resp 17   Ht 4' 10\" (1.473 m)   Wt 124 lb (56.2 kg)   SpO2 95%   BMI 25.92 kg/m²     Last documented pain score (0-10 scale):    Last Weight:   Wt Readings from Last 1 Encounters:   05/29/21 124 lb (56.2 kg)     Mental Status:  {IP PT MENTAL STATUS:20030:::0}    IV Access:  { KATALINA IV ACCESS:242632642:::0}    Nursing Mobility/ADLs:  Walking   {CHP DME ADLs:790150292:::0}  Transfer  {CHP DME ADLs:128942146:::0}  Bathing  {CHP DME ADLs:812247160:::0}  Dressing  {CHP DME ADLs:477086790:::0}  Toileting  {CHP DME ADLs:324766267:::0}  Feeding  {CHP DME ADLs:392850096:::0}  Med Admin  {CHP DME ADLs:120908043:::0}  Med Delivery   { KATALINA MED Delivery:377446448:::0}    Wound Care Documentation and Therapy:        Elimination:  Continence: Bowel: {YES / KV:39097}  Bladder: {YES / LB:24470}  Urinary Catheter: {Urinary Catheter:567800805:::0}   Colostomy/Ileostomy/Ileal Conduit: {YES / XF:97114}       Date of Last BM: ***    Intake/Output Summary (Last 24 hours) at 5/29/2021 1140  Last data filed at 5/28/2021 2350  Gross per 24 hour   Intake 120 ml   Output --   Net 120 ml     I/O last 3 completed shifts:   In: 120 [P.O.:120]  Out: -     Safety Concerns:     [employer] Safety Concerns:517000344:::0}    Impairments/Disabilities:      [employer] Impairments/Disabilities:436148614:::0}    Nutrition Therapy:  Current Nutrition Therapy:   [employer] Diet List:883452263:::0}    Routes of Feeding: {CHP DME Other Feedings:945283163:::0}  Liquids: {Slp liquid thickness:53010}  Daily Fluid Restriction: {CHP DME Yes amt example:180497439:::0}  Last Modified Barium Swallow with Video (Video Swallowing Test): {Done Not Done HBCU:847768260:::0}    Treatments at the Time of Hospital Discharge:   Respiratory Treatments: ***  Oxygen Therapy:  {Therapy; copd oxygen:04924:::0}  Ventilator:    { CC Vent List:046947288:::0}    Rehab Therapies: {THERAPEUTIC INTERVENTION:0505291945}  Weight Bearing Status/Restrictions: 50Rosalinda ABREU Weight Bearin:::0}  Other Medical Equipment (for information only, NOT a DME order):  {EQUIPMENT:062482320}  Other Treatments: ***    Patient's personal belongings (please select all that are sent with patient):  {CHP DME Belongings:988549267:::0}    RN SIGNATURE:  {Esignature:205119384:::0}    CASE MANAGEMENT/SOCIAL WORK SECTION    Inpatient Status Date: ***    Readmission Risk Assessment Score:  Readmission Risk              Risk of Unplanned Readmission:  0           Discharging to Facility/ Agency   Name:   Address:  Phone:  Fax:    Dialysis Facility (if applicable)   Name:  Address:  Dialysis Schedule:  Phone:  Fax:    / signature: {Esignature:587226567:::0}    PHYSICIAN SECTION    Prognosis: {Prognosis:0120946459:::0}    Condition at Discharge: 50Rosalinda Guadarrama Patient Condition:691867541:::0}    Rehab Potential (if transferring to Rehab): {Prognosis:2784113294:::0}    Recommended Labs or Other Treatments After Discharge: ***    Physician Certification: I certify the above information and transfer of Shahana Garcia  is necessary for the continuing treatment of the diagnosis listed and that she requires {Admit to Appropriate Level of Care:24469:::0} for {GREATER/LESS:564056737} 30 days.      Update Admission H&P: {CHP DME Changes in HandP:586532748:::0}    PHYSICIAN SIGNATURE:  {Esignature:680035115:::0}

## 2021-07-22 NOTE — ED NOTES
Pt awake and alert. Family at bedside. No s/s of distress noted at this time. No concerns or needs at this time. Will continue to monitor.         Kristin Moore RN  07/22/21 9062

## 2021-07-22 NOTE — ED PROVIDER NOTES
[facility] ED  eMERGENCY dEPARTMENT eNCOUnter      Pt Name: Brenda Miller  MRN: [id number]  [place] 1934  Date of evaluation: 7/22/2021  Provider: Brittany Mckeon, [address] is a 80 y.o. female with PMHx of hyperlipidemia, hypertension, diverticulitis, focal seizures presents to the emergency department with change in mental status. Patient was eating dinner at the table, when family noticed she had her head down almost looking at her feet, to get her attention and she seemed to stare for 5 minutes unresponsive. When she came to she was slightly disoriented, and now seems at baseline. They states she has had this in the past with her seizures. daughters deny any slurred speech, facial asymmetry, weakness. She has had chronic abdominal pain, usually with history of constipation. She denies headaches, dizziness. She had 12 hours of a cough which has subsided. She denies chest pain. She feels she has been having to do more breathing treatments at home lately. She denies nausea, vomiting, diarrhea, urinary symptoms. She does have increased lower extremity swelling. She did have her Lasix and metoprolol increased a couple weeks ago. Did have a small episode of bilateral blurry vision a couple weeks ago. No issues since. No calf pain. Normal appetite. She does take aspirin at home. HPI    Nursing Notes were reviewed. REVIEW OF SYSTEMS       Review of Systems   Constitutional: Negative for appetite change, chills and fever. HENT: Negative for congestion, rhinorrhea and sore throat. Respiratory: Negative for cough and shortness of breath. Cardiovascular: Positive for leg swelling. Negative for chest pain. Gastrointestinal: Positive for abdominal pain and constipation. Negative for blood in stool, diarrhea, nausea and vomiting. Genitourinary: Negative for difficulty urinating. Musculoskeletal: Negative for neck stiffness.    Skin: Negative for color change and rash. Neurological: Positive for seizures. Negative for dizziness, syncope, weakness, light-headedness, numbness and headaches. Psychiatric/Behavioral: Positive for confusion. All other systems reviewed and are negative.             PAST MEDICAL HISTORY     Past Medical History:   Diagnosis Date    Diverticulitis     Hyperlipidemia     Hypertension          SURGICAL HISTORY       Past Surgical History:   Procedure Laterality Date    APPENDECTOMY      CARDIAC SURGERY      CHOLECYSTECTOMY      JOINT REPLACEMENT           CURRENT MEDICATIONS       Previous Medications    ACETAMINOPHEN (TYLENOL) 325 MG TABLET    Take 650 mg by mouth every 6 hours as needed for Pain    ALBUTEROL SULFATE HFA (VENTOLIN HFA) 108 (90 BASE) MCG/ACT INHALER    Inhale 2 puffs into the lungs 4 times daily as needed for Wheezing    ASPIRIN 81 MG TABLET    Take 81 mg by mouth daily    BACILLUS COAGULANS-INULIN (ALIGN PREBIOTIC-PROBIOTIC PO)    Take by mouth Unsure of dose    CALCIUM CARBONATE (OSCAL) 500 MG TABS TABLET    Take 1,200 mg by mouth daily    DICLOFENAC SODIUM (VOLTAREN) 1 % GEL    Apply 2 g topically 2 times daily    DICYCLOMINE (BENTYL) 10 MG CAPSULE    Take 1 capsule by mouth every 6 hours as needed (cramps)    DICYCLOMINE (BENTYL) 10 MG CAPSULE    Take 1 capsule by mouth 4 times daily (before meals and nightly)    DOCUSATE SODIUM (COLACE) 100 MG CAPSULE    Take 100 mg by mouth 2 times daily as needed for Constipation    FUROSEMIDE (LASIX) 20 MG TABLET    Take 1 tablet by mouth daily    ISOSORBIDE MONONITRATE (IMDUR) 60 MG EXTENDED RELEASE TABLET    Take 1 tablet by mouth daily    LAMOTRIGINE (LAMICTAL) 100 MG TABLET    Take 100 mg by mouth 2 times daily     LORATADINE (CLARITIN) 10 MG CAPSULE    Take 10 mg by mouth daily    LOSARTAN (COZAAR) 50 MG TABLET    Take 1 tablet by mouth daily    METOPROLOL SUCCINATE (TOPROL XL) 25 MG EXTENDED RELEASE TABLET    Take 3 tablets by mouth daily NALOXONE 4 MG/0.1ML LIQD NASAL SPRAY    1 spray by Nasal route as needed for Opioid Reversal    NITROGLYCERIN (NITROSTAT) 0.4 MG SL TABLET    Place 0.4 mg under the tongue every 5 minutes as needed for Chest pain up to max of 3 total doses. If no relief after 1 dose, call 911. PANTOPRAZOLE (PROTONIX) 40 MG TABLET    Take 1 tablet by mouth every morning (before breakfast)    ROSUVASTATIN (CRESTOR) 10 MG TABLET    Take 20 mg by mouth daily     SPIRONOLACTONE (ALDACTONE) 25 MG TABLET    Take 1 tablet by mouth daily    TAPENTADOL (NUCYNTA) 50 MG TABS    Take 50 mg by mouth 3 times daily . VITAMIN D (CHOLECALCIFEROL) 1000 UNIT TABS TABLET    Take 2,000 Units by mouth daily        ALLERGIES     Ezetimibe-simvastatin    FAMILY HISTORY     History reviewed. No pertinent family history. SOCIAL HISTORY       Social History     Socioeconomic History    Marital status:      Spouse name: None    Number of children: None    Years of education: None    Highest education level: None   Occupational History    None   Tobacco Use    Smoking status: Never Smoker    Smokeless tobacco: Never Used   Substance and Sexual Activity    Alcohol use: No    Drug use: No    Sexual activity: None   Other Topics Concern    None   Social History Narrative    None     Social Determinants of Health     Financial Resource Strain:     Difficulty of Paying Living Expenses:    Food Insecurity:     Worried About Running Out of Food in the Last Year:     Ran Out of Food in the Last Year:    Transportation Needs:     Lack of Transportation (Medical):      Lack of Transportation (Non-Medical):    Physical Activity:     Days of Exercise per Week:     Minutes of Exercise per Session:    Stress:     Feeling of Stress :    Social Connections:     Frequency of Communication with Friends and Family:     Frequency of Social Gatherings with Friends and Family:     Attends Orthodoxy Services:     Active Member of Clubs or Organizations:     Attends Club or Organization Meetings:     Marital Status:    Intimate Partner Violence:     Fear of Current or Ex-Partner:     Emotionally Abused:     Physically Abused:     Sexually Abused:          PHYSICAL EXAM         ED Triage Vitals   BP Temp Temp src Pulse Resp SpO2 Height Weight   07/22/21 1745 07/22/21 1745 -- 07/22/21 1745 07/22/21 1745 07/22/21 1745 07/22/21 1758 07/22/21 1758   116/69 98.2 °F (36.8 °C)  79 18 96 % 4' 11\" (1.499 m) 126 lb (57.2 kg)       Physical Exam  Constitutional:       Appearance: She is well-developed. HENT:      Head: Normocephalic and atraumatic. Eyes:      Conjunctiva/sclera: Conjunctivae normal.      Pupils: Pupils are equal, round, and reactive to light. Neck:      Trachea: No tracheal deviation. Cardiovascular:      Heart sounds: Normal heart sounds. Comments: +1 pitting edema in LLE, trace edema in RLE  Pulmonary:      Effort: Pulmonary effort is normal. No respiratory distress. Breath sounds: Normal breath sounds. No stridor. Abdominal:      General: Bowel sounds are normal. There is no distension. Palpations: Abdomen is soft. There is no mass. Tenderness: There is no abdominal tenderness. There is no guarding or rebound. Musculoskeletal:         General: Normal range of motion. Cervical back: Normal range of motion and neck supple. Skin:     General: Skin is warm and dry. Capillary Refill: Capillary refill takes less than 2 seconds. Findings: No rash. Neurological:      Mental Status: She is alert and oriented to person, place, and time. Deep Tendon Reflexes: Reflexes are normal and symmetric. Psychiatric:         Behavior: Behavior normal.         Thought Content:  Thought content normal.         Judgment: Judgment normal.         DIAGNOSTIC RESULTS     EKG:All EKG's are interpreted by the Emergency Department Physician who either signs or Co-signs this chart in the absence of a cardiologist.    Sinus rhythm with first-degree AV block with PVC, ID interval 212, normal intervals, no ST segment changes    RADIOLOGY:   Non-plain film images such as CT, Ultrasound and MRI are read by theradiologist. Plain radiographic images are visualized and preliminarily interpreted by the emergency physician with the below findings:    Interpretation per theRadiologist below, if available at the time of this note:    CT HEAD WO CONTRAST   Final Result      No acute intracranial process. XR CHEST PORTABLE    (Results Pending)   XR ABDOMEN (2 VIEWS)    (Results Pending)           LABS:  Labs Reviewed   APTT - Abnormal; Notable for the following components:       Result Value    aPTT 21.8 (*)     All other components within normal limits   CBC WITH AUTO DIFFERENTIAL - Abnormal; Notable for the following components:    MCHC 32.4 (*)     RDW 15.8 (*)     All other components within normal limits   COMPREHENSIVE METABOLIC PANEL - Abnormal; Notable for the following components:    Potassium 5.2 (*)     Glucose 122 (*)     CREATININE 1.04 (*)     GFR Non- 50.1 (*)     All other components within normal limits   COVID-19, RAPID   CK   TROPONIN   URINE RT REFLEX TO CULTURE   PROLACTIN   LAMOTRIGINE LEVEL       All other labs were within normal range or not returned as of this dictation. EMERGENCY DEPARTMENT COURSE and DIFFERENTIAL DIAGNOSIS/MDM:   Vitals:    Vitals:    07/22/21 1758 07/22/21 1830 07/22/21 2000 07/22/21 2020   BP:  112/75 119/78 113/72   Pulse:  80 74 73   Resp:   18 18   Temp:       SpO2:  98% 100% 95%   Weight: 126 lb (57.2 kg)      Height: 4' 11\" (1.499 m)            MDM    CT head shows no acute process. Chest x-ray shows no acute changes. Abdominal x-ray shows no acute process. Potassium 5.2, creatinine 1.04, prolactin 48.9. Patient has had no change in mental status while in ER. Back to baseline. Seizure most likely occurred today.   Lamictal levels pending. Patient be sent home with Kayexalate and aware to follow-up with family doctor for repeat potassium levels. Also made aware to follow-up with neurologist to check Lamictal levels and reevaluation. Daughter is at bedside. She denies being on Lamictal standard anticipatory guidance given to patient upon discharge. Have given them a specific time frame in which to follow-up and who to follow-up with. I have also advised them that they should return to the emergency department if they get worse, or not getting better or develop any new or concerning symptoms. Patient demonstrates understanding. CRITICAL CARE TIME   Total Critical Caretime was 0 minutes, excluding separately reportable procedures. There was a high probability of clinically significant/life threatening deterioration in the patient's condition which required my urgent intervention. Procedures    FINAL IMPRESSION      1. Seizure ([address]    2. Hyperkalemia          DISPOSITION/PLAN   DISPOSITION Decision To Discharge 07/22/2021 08:32:19 PM      PATIENT REFERRED TO:  Austin Tomas MD  [address] [facility] [address]  [phone]      for potassium recheck    Follow up with neurologist for lamictal level results            DISCHARGE MEDICATIONS:  New Prescriptions    No medications on file          (Please notethat portions of this note were completed with a voice recognition program.  Efforts were made to edit the dictations but occasionally words are mis-transcribed. )    JEANINE Barajas (electronically signed)  Emergency Physician Assistant         [place], [place]  07/22/21 0805

## 2021-07-22 NOTE — ED NOTES
Provider at bedside at this time. Pt cooperative. Will continue to monitor.         Eloy Francis RN  07/22/21 5591

## 2021-07-22 NOTE — ED NOTES
Pt to CT. Per CT will take pt to xray when scan is complete. Pt awake and alert. No s/s of distress noted at this time. No concerns or needs at this time. Will continue to monitor.         Mark Haney RN  07/22/21 3055

## 2021-07-22 NOTE — ED NOTES
Pt in bed with eyes closed. Respirations are even and unlabored. No s/s of distress noted at this time. Will continue to monitor.         Alexa Tee RN  07/22/21 1012

## 2021-07-23 NOTE — ED NOTES
Provider at bedside at this time. Pt cooperative. Will continue to monitor.         Anna Toro, VALENTÍN  07/22/21 2027

## 2021-07-23 NOTE — ED NOTES
Pt in bed with eyes closed. Respirations are even and unlabored. No s/s of distress noted at this time. Will continue to monitor.         Prateek Alfaro RN  07/22/21 2001

## 2021-08-01 NOTE — ED TRIAGE NOTES
Pt states that she has been having Epi- gastric abd pain. Pt states that this episode started at 1900 tonight and has not changed since it started. Pt states that her pain is \"pulling\" and then she will have \"sharp\" pains off and on. Pt states that she has been under the care of a doctor for her pain and has been seen in the er multiple times.

## 2021-08-01 NOTE — ED NOTES
Pt states pain in abd is \"better\" but now she has pain in hips, back, shoulders, etc.  Pt repositioned, given more pillows. Call bell in reach. Daughters at bedside.   Lizzy SOLORZANO aware     Loreta Rivera RN  08/01/21 9050

## 2021-08-01 NOTE — ED NOTES
Bed: 02  Expected date: 8/1/21  Expected time: 1:06 AM  Means of arrival: [facility]  Comments:  80 m  Abd edgar Ramey RN  08/01/21 6791

## 2021-08-01 NOTE — ED PROVIDER NOTES
[address] [facility] ED  eMERGENCY dEPARTMENTeNCOUnter      Pt Name: Calli Garcia  MRN: [id number]  [place] 1934  Date ofevaluation: 8/1/2021  Provider: Dottie Edouard PA-C    CHIEF COMPLAINT       Chief Complaint   Patient presents with    Abdominal Pain     epi-gastric          HISTORY OF PRESENT ILLNESS   (Location/Symptom, Timing/Onset,Context/Setting, Quality, Duration, Modifying Factors, Severity)  Note limiting factors. Calli Garcia is a 80 y.o. female who presents to the emergency department history of abdominal pain that is been bothering her all day today but got worse after eating dinner. Patient is constant. No shortness of breath or difficulty with breathing. She states that the pain sometimes goes into her chest.  She does see gastroenterology for this as well as seen Dr. Garcia Villalpando her cardiologist.  She had recent GI studies done that were negative but she is unable to get a endoscopy due to her cardiac history so they just treat with meds. She denies any fever shortness of breath or difficulty with breathing. She has chronic pain to her right hip due to chronic right hip dislocation. HPI    NursingNotes were reviewed. REVIEW OF SYSTEMS    (2-9 systems for level 4, 10 or more for level 5)     Review of Systems   Constitutional: Negative for chills, diaphoresis, fatigue and fever. HENT: Negative for congestion, rhinorrhea and sore throat. Eyes: Negative for photophobia and pain. Respiratory: Negative for cough and shortness of breath. Cardiovascular: Negative for chest pain and palpitations. Gastrointestinal: Positive for abdominal pain. Negative for diarrhea, nausea and vomiting. Genitourinary: Negative for dysuria and flank pain. Musculoskeletal: Positive for arthralgias. Negative for back pain. Skin: Negative for rash. Neurological: Negative for dizziness, light-headedness and headaches. Psychiatric/Behavioral: Negative.     All other systems reviewed and are ConstipationHistorical Med      calcium carbonate (OSCAL) 500 MG TABS tablet Take 1,200 mg by mouth dailyHistorical Med      Bacillus Coagulans-Inulin (ALIGN PREBIOTIC-PROBIOTIC PO) Take by mouth Unsure of doseHistorical Med      aspirin 81 MG tablet Take 81 mg by mouth dailyHistorical Med      acetaminophen (TYLENOL) 325 MG tablet Take 650 mg by mouth every 6 hours as needed for PainHistorical Med      loratadine (CLARITIN) 10 MG capsule Take 10 mg by mouth dailyHistorical Med      nitroGLYCERIN (NITROSTAT) 0.4 MG SL tablet Place 0.4 mg under the tongue every 5 minutes as needed for Chest pain up to max of 3 total doses. If no relief after 1 dose, call 911. Historical Med      rosuvastatin (CRESTOR) 10 MG tablet Take 20 mg by mouth daily Historical Med      lamoTRIgine (LAMICTAL) 100 MG tablet Take 100 mg by mouth 2 times daily Historical Med      vitamin D (CHOLECALCIFEROL) 1000 UNIT TABS tablet Take 2,000 Units by mouth daily Historical Med      tapentadol (NUCYNTA) 50 MG TABS Take 50 mg by mouth 3 times daily . Historical Med       !! - Potential duplicate medications found. Please discuss with provider. ALLERGIES     Ezetimibe-simvastatin    FAMILY HISTORY     History reviewed. No pertinent family history.        SOCIAL HISTORY       Social History     Socioeconomic History    Marital status:      Spouse name: None    Number of children: None    Years of education: None    Highest education level: None   Occupational History    None   Tobacco Use    Smoking status: Never Smoker    Smokeless tobacco: Never Used   Substance and Sexual Activity    Alcohol use: No    Drug use: No    Sexual activity: None   Other Topics Concern    None   Social History Narrative    None     Social Determinants of Health     Financial Resource Strain:     Difficulty of Paying Living Expenses:    Food Insecurity:     Worried About Running Out of Food in the Last Year:     [address] in the Last Year: Transportation Needs:     Lack of Transportation (Medical):  Lack of Transportation (Non-Medical):    Physical Activity:     Days of Exercise per Week:     Minutes of Exercise per Session:    Stress:     Feeling of Stress :    Social Connections:     Frequency of Communication with Friends and Family:     Frequency of Social Gatherings with Friends and Family:     Attends Yarsanism Services:     Active Member of Clubs or Organizations:     Attends Club or Organization Meetings:     Marital Status:    Intimate Partner Violence:     Fear of Current or Ex-Partner:     Emotionally Abused:     Physically Abused:     Sexually Abused:        SCREENINGS      @FLOW([id number])@      PHYSICAL EXAM    (up to 7 for level 4, 8 or more for level 5)     ED Triage Vitals [08/01/21 0120]   BP Temp Temp Source Pulse Resp SpO2 Height Weight   (!) 134/96 97.6 °F (36.4 °C) Oral 93 20 98 % 4' 11\" (1.499 m) 126 lb (57.2 kg)       Physical Exam  Abdominal:      Tenderness: There is abdominal tenderness in the epigastric area.              DIAGNOSTIC RESULTS     EKG: All EKG's are interpreted by the Emergency Department Physician who either signs or Co-signsthis chart in the absence of a cardiologist.    EKG 1 shows sinus rhythm with first-degree AV block rate 82 bpm no acute ST changes  EKG shows sinus rhythm with first-degree AV back 84 bpm no STEMI    RADIOLOGY:   Non-plain filmimages such as CT, Ultrasound and MRI are read by the radiologist. Plain radiographic images are visualized and preliminarily interpreted by the emergency physician with the below findings:        Interpretation per the Radiologist below, if available at the time ofthis note:    XR ACUTE ABD SERIES CHEST 1 VW    (Results Pending)         ED BEDSIDE ULTRASOUND:   Performed by ED Physician - none    LABS:  Labs Reviewed   COMPREHENSIVE METABOLIC PANEL - Abnormal; Notable for the following components:       Result Value    Potassium 5.1 (*) Glucose 155 (*)     CREATININE 0.96 (*)     GFR Non- 54.9 (*)     Calcium 10.2 (*)     ALT 44 (*)     AST 36 (*)     All other components within normal limits   CBC WITH AUTO DIFFERENTIAL - Abnormal; Notable for the following components:    MCHC 32.2 (*)     RDW 16.4 (*)     All other components within normal limits   MAGNESIUM   TROPONIN   TROPONIN       All other labs were within normal range or not returned as of this dictation. EMERGENCY DEPARTMENT COURSE and DIFFERENTIAL DIAGNOSIS/MDM:   Vitals:    Vitals:    08/01/21 0400 08/01/21 0422 08/01/21 0430 08/01/21 0500   BP: 97/75 (!) 128/93 (!) 140/82 (!) 116/94   Pulse: 83  81 79   Resp: 21 20 27   Temp:       TempSrc:       SpO2: 94%  96% 97%   Weight:       Height:               MDM    Patient is nontoxic no acute distress she is afebrile and hemodynamically stable she presents with epigastric pain that is not new to her that she has had intermittently over the last several months that she is seeing cardiology for as well as gastroenterology. She is currently being worked up more that this is gastro with GERD breath test being prescribed PPIs. Patient received IV analgesic while in the emergency department with significant improvement in her pain. Patient and daughters are asking if she could leave. Patient's daughter states that she can get endoscopy due to she can get cardiac clearance as well as they do not want any further invasive cardiac testing they are here just for some pain control. Cardiac enzymes are negative x2 with unchanged EKGs and her pain is epigastric and I do not think that this is ACS at this time. Case was reviewed with attending Dr. Harris Risk patient is stable and ready for discharge. REASSESSMENT          CRITICAL CARE TIME   Total Critical Care time was  minutes, excluding separatelyreportable procedures.   There was a high probability ofclinically significant/life threatening deterioration in the patient's condition which required my urgent intervention. CONSULTS:  None    PROCEDURES:  Unless otherwise noted below, none     Procedures    FINAL IMPRESSION      1.  Abdominal pain, epigastric          DISPOSITION/PLAN   DISPOSITION Decision To Discharge 08/01/2021 04:39:59 AM      PATIENT REFERREDTO:  Catrina Vivar MD  [address]  [phone]    Schedule an appointment as soon as possible for a visit in 2 days        DISCHARGEMEDICATIONS:  Discharge Medication List as of 8/1/2021  5:30 AM             (Please note that portions of this note were completed with a voice recognition program.  Efforts were made to edit the dictations but occasionally words are mis-transcribed.)    Mustapha Patricia (electronically signed)  Attending Emergency Physician         Mini Gar PA-C  08/01/21 0602

## 2021-08-01 NOTE — ED NOTES
Lizzy SOLORZANO at [facility], [facility]  08/01/21 7377

## 2021-08-01 NOTE — ED NOTES
Pt states she feels much better, states pain in chest and abdomen is \"all gone\". States she wants to go home. Hope PA notified. Pt and family given DC instructions and RX, verbalized understanding. Pt assisted to car in wheelchair. Skin warm and dry, resps even and unlabored. No acute distress noted at time of DC.      Mikel Ny RN  08/01/21 5751

## 2021-08-12 NOTE — ED TRIAGE NOTES
Patient arrived from home via daughter with complaint of left hand numbness. Patient had seizure last month which she has hx of and family is concerned that the seizure is causing brain trouble. Patient A&OX4. Skin pink, warm, and dry.

## 2021-08-12 NOTE — ED NOTES
After infusion of Keppra was started, pt c/o of burning sensation. IV infusion slowed to 200ml/hr.  Provider made aware     Chiqui Lang RN  08/12/21 3616

## 2021-08-12 NOTE — ED PROVIDER NOTES
[address] [facility] ED  EMERGENCY DEPARTMENT ENCOUNTER      Pt Name: Kassy Aragon  MRN: [id number]  [place] 1934  Date of evaluation: 8/12/2021  Provider: Maritza Adan, [address] [place] [address]       Chief Complaint   Patient presents with    Numbness     left hand. HISTORY OF PRESENT ILLNESS   (Location/Symptom, Timing/Onset, Context/Setting, Quality, Duration, Modifying Factors, Severity)  Note limiting factors. Kassy Aragon is a 80 y.o. female who presents to the emergency department . Patient comes in because she felt like her left hand was moving without her wanting it to. Patient has history of tonic-clonic seizures. She has been on Lamictal for many many years. In the last few months she had one episode that son states was like she was just staring off. Today she had the left hand moving. Patient seizures have been well controlled on Lamictal.  Daughter believes that she may have not been taking the full dose twice a day. Apparently she is supposed to take 2 caps twice a day and she may have been only taking 1 capsule twice a day. HPI    Nursing Notes were reviewed. REVIEW OF SYSTEMS    (2-9 systems for level 4, 10 or more for level 5)     Review of Systems   Constitutional: Negative for activity change, appetite change and fatigue. HENT: Negative for congestion and sore throat. Eyes: Negative for pain and visual disturbance. Respiratory: Negative for chest tightness and shortness of breath. Cardiovascular: Negative for chest pain. Gastrointestinal: Negative for abdominal pain, nausea and vomiting. Endocrine: Negative for polydipsia. Genitourinary: Negative for flank pain and urgency. Musculoskeletal: Negative for gait problem and neck stiffness. Skin: Negative for rash. Neurological: Positive for tremors. Negative for weakness, light-headedness and headaches. Psychiatric/Behavioral: Negative for confusion and sleep disturbance.        Except as noted above the remainder of the review of systems was reviewed and negative.        PAST MEDICAL HISTORY     Past Medical History:   Diagnosis Date    Diverticulitis     Hyperlipidemia     Hypertension          SURGICAL HISTORY       Past Surgical History:   Procedure Laterality Date    APPENDECTOMY      CARDIAC SURGERY      CHOLECYSTECTOMY      JOINT REPLACEMENT           CURRENT MEDICATIONS       Previous Medications    ACETAMINOPHEN (TYLENOL) 325 MG TABLET    Take 650 mg by mouth every 6 hours as needed for Pain    ALBUTEROL SULFATE HFA (VENTOLIN HFA) 108 (90 BASE) MCG/ACT INHALER    Inhale 2 puffs into the lungs 4 times daily as needed for Wheezing    ASPIRIN 81 MG TABLET    Take 81 mg by mouth daily    BACILLUS COAGULANS-INULIN (ALIGN PREBIOTIC-PROBIOTIC PO)    Take by mouth Unsure of dose    CALCIUM CARBONATE (OSCAL) 500 MG TABS TABLET    Take 1,200 mg by mouth daily    DICLOFENAC SODIUM (VOLTAREN) 1 % GEL    Apply 2 g topically 2 times daily    DICYCLOMINE (BENTYL) 10 MG CAPSULE    Take 1 capsule by mouth every 6 hours as needed (cramps)    DICYCLOMINE (BENTYL) 10 MG CAPSULE    Take 1 capsule by mouth 4 times daily (before meals and nightly)    DOCUSATE SODIUM (COLACE) 100 MG CAPSULE    Take 100 mg by mouth 2 times daily as needed for Constipation    FUROSEMIDE (LASIX) 20 MG TABLET    Take 1 tablet by mouth daily    ISOSORBIDE MONONITRATE (IMDUR) 60 MG EXTENDED RELEASE TABLET    Take 1 tablet by mouth daily    LAMOTRIGINE (LAMICTAL) 100 MG TABLET    Take 100 mg by mouth 2 times daily     LORATADINE (CLARITIN) 10 MG CAPSULE    Take 10 mg by mouth daily    LOSARTAN (COZAAR) 50 MG TABLET    Take 1 tablet by mouth daily    METOPROLOL SUCCINATE (TOPROL XL) 25 MG EXTENDED RELEASE TABLET    Take 3 tablets by mouth daily    NALOXONE 4 MG/0.1ML LIQD NASAL SPRAY    1 spray by Nasal route as needed for Opioid Reversal    NITROGLYCERIN (NITROSTAT) 0.4 MG SL TABLET    Place 0.4 mg under the tongue every 5 minutes as needed for Chest pain up to max of 3 total doses. If no relief after 1 dose, call 911. PANTOPRAZOLE (PROTONIX) 40 MG TABLET    Take 1 tablet by mouth every morning (before breakfast)    ROSUVASTATIN (CRESTOR) 10 MG TABLET    Take 20 mg by mouth daily     TAPENTADOL (NUCYNTA) 50 MG TABS    Take 50 mg by mouth 3 times daily . VITAMIN D (CHOLECALCIFEROL) 1000 UNIT TABS TABLET    Take 2,000 Units by mouth daily        ALLERGIES     Ezetimibe-simvastatin    FAMILY HISTORY     History reviewed. No pertinent family history. SOCIAL HISTORY       Social History     Socioeconomic History    Marital status:      Spouse name: None    Number of children: None    Years of education: None    Highest education level: None   Occupational History    None   Tobacco Use    Smoking status: Never Smoker    Smokeless tobacco: Never Used   Substance and Sexual Activity    Alcohol use: No    Drug use: No    Sexual activity: None   Other Topics Concern    None   Social History Narrative    None     Social Determinants of Health     Financial Resource Strain:     Difficulty of Paying Living Expenses:    Food Insecurity:     Worried About Running Out of Food in the Last Year:     Ran Out of Food in the Last Year:    Transportation Needs:     Lack of Transportation (Medical):      Lack of Transportation (Non-Medical):    Physical Activity:     Days of Exercise per Week:     Minutes of Exercise per Session:    Stress:     Feeling of Stress :    Social Connections:     Frequency of Communication with Friends and Family:     Frequency of Social Gatherings with Friends and Family:     Attends Hoahaoism Services:     Active Member of Clubs or Organizations:     Attends Club or Organization Meetings:     Marital Status:    Intimate Partner Violence:     Fear of Current or Ex-Partner:     Emotionally Abused:     Physically Abused:     Sexually Abused:        SCREENINGS   NIH Stroke Scale  Interval: Skin:     General: Skin is warm and dry. Findings: No rash. Neurological:      Mental Status: She is alert and oriented to person, place, and time. Cranial Nerves: No cranial nerve deficit. Comments: No seizure activity seen   Psychiatric:         Behavior: Behavior normal.         DIAGNOSTIC RESULTS     EKG: All EKG's are interpreted by the Emergency Department Physician who either signs or Co-signs this chart in the absence of a cardiologist.        RADIOLOGY:   Non-plain film images such as CT, Ultrasound and MRI are read by the radiologist. Plain radiographic images are visualized and preliminarily interpreted by the emergency physician with the below findings:        Interpretation per the Radiologist below, if available at the time of this note:    CT Head WO Contrast   Final Result   Impression:      Mild cerebral atrophy. Chronic ischemic white matter disease. Remote left basal ganglia lacunar infarct. All CT scans at this facility use dose modulation, iterative reconstruction, and/or weight based dosing when appropriate to reduce radiation dose to as low as reasonably achievable. ED BEDSIDE ULTRASOUND:   Performed by ED Physician - none    LABS:  Labs Reviewed   CBC WITH AUTO DIFFERENTIAL - Abnormal; Notable for the following components:       Result Value    MCHC 32.4 (*)     RDW 16.2 (*)     All other components within normal limits   COMPREHENSIVE METABOLIC PANEL - Abnormal; Notable for the following components:    Glucose 107 (*)     BUN 25 (*)     Total Bilirubin 0.9 (*)     All other components within normal limits   MAGNESIUM   PROTIME-INR   LAMOTRIGINE LEVEL       All other labs were within normal range or not returned as of this dictation.     EMERGENCY DEPARTMENT COURSE and DIFFERENTIAL DIAGNOSIS/MDM:   Vitals:    Vitals:    08/12/21 1126 08/12/21 1130 08/12/21 1200 08/12/21 1308   BP: (!) 99/58 97/66  121/67   Pulse: 69 68 65 67   Resp: 21 22 22 18 Temp:       TempSrc:       SpO2: 96% 96% 96% 96%   Weight:       Height:           Patient came in with concerns about some tremors in her left hand. Likely partial complex seizure. She may not be taking this right dose of the Lamictal.  Family will make sure she is taking at least 200 mg twice a day. I explained to the family that if she has been taking 200 twice a day actually, she should bump up her nighttime dose to 300 mg. Case was discussed with Dr. Fabian Riggins time was 0 minutes, excluding separately reportable procedures. There was a high probability of clinically significant/life threatening deterioration in the patient's condition which required my urgent intervention. CONSULTS:  None    PROCEDURES:  Unless otherwise noted below, none     Procedures        FINAL IMPRESSION      1. 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     (Please note that portions of this note were completed with a voice recognition program.  Efforts were made to edit the dictations but occasionally words are mis-transcribed.)    Karen Gunn DO (electronically signed)  Attending Emergency Physician            Karen Gunn DO  08/12/21 1113 [place] DO Carmen  08/12/21 1417

## 2021-08-12 NOTE — ED NOTES
Pt resting in bed, 0 c/o, 0 distress, skin w/d/tan, pulses palp, 0 sob, 0 pain, a&ox4. Pt's daughters at bedside.      Nirali Garcia RN  08/12/21 3344

## 2021-08-26 NOTE — ED NOTES
D/C instructions and follow up care discussed with pt.  Pt agreed to d/c plan  Pt taken out in wheelchair by her son     Temi Merino RN  08/26/21 0036

## 2021-08-26 NOTE — ED PROVIDER NOTES
[address] [facility] ED  eMERGENCY dEPARTMENT eNCOUnter      Pt Name: Charmaine Wild  MRN: [id number]  Teagangfshruti 1934  Date of evaluation: 8/26/2021  Provider: Marybel Cat PA-C    CHIEF COMPLAINT       Chief Complaint   Patient presents with    Seizures         HISTORY OF PRESENT ILLNESS   (Location/Symptom, Timing/Onset,Context/Setting, Quality, Duration, Modifying Factors, Severity)  Note limiting factors. Charmaine Wild is a 80 y.o. female who presents to the emergency department with complaint of seizure type activity. Family states that approximately 1 PM today, patient was sitting with them, they states she began staring off, and then became unresponsive, they state after she woke up, she had some slurring of her speech this lasted for a period of about 15 or 20 minutes, and then patient became alert and oriented and back to her normal status. Family states this is outpatient generally presents after having a seizure. She has a known seizure history, she is on lamotrigine. Family states that she is noncompliant with her medications, they have noticed that there are days where she has not taken it. She is only taken 200 mg today, and not her usual 400. Patient was in the emergency department proximally 1 month ago according to family for the same issue. Patient at this time, is alert and oriented, without any specific complaints, she feels that she is at her baseline. Patient past medical history significant for diverticulitis, hyperlipidemia, hypertension, seizures. HPI    NursingNotes were reviewed. REVIEW OF SYSTEMS    (2-9 systems for level 4, 10 or more for level 5)     Review of Systems   Constitutional: Negative for activity change and appetite change. HENT: Negative for congestion, ear discharge, ear pain, nosebleeds, rhinorrhea and sore throat. Eyes: Negative for discharge. Respiratory: Negative for shortness of breath.     Cardiovascular: Negative for chest pain, palpitations and leg swelling. Gastrointestinal: Negative for abdominal distention, abdominal pain and constipation. Genitourinary: Negative for difficulty urinating and dysuria. Musculoskeletal: Negative for arthralgias. Skin: Negative for color change. Neurological: Negative for dizziness, syncope, numbness and headaches. Psychiatric/Behavioral: Negative for agitation and confusion. Except as noted above the remainder of the review of systems was reviewed and negative.        PAST MEDICAL HISTORY     Past Medical History:   Diagnosis Date    Diverticulitis     Hyperlipidemia     Hypertension          SURGICALHISTORY       Past Surgical History:   Procedure Laterality Date    APPENDECTOMY      CARDIAC SURGERY      CHOLECYSTECTOMY      JOINT REPLACEMENT           CURRENT MEDICATIONS       Discharge Medication List as of 8/26/2021  4:45 PM      CONTINUE these medications which have NOT CHANGED    Details   isosorbide mononitrate (IMDUR) 60 MG extended release tablet Take 1 tablet by mouth daily, Disp-30 tablet, R-3Normal      metoprolol succinate (TOPROL XL) 25 MG extended release tablet Take 3 tablets by mouth daily, Disp-30 tablet, R-3Normal      losartan (COZAAR) 50 MG tablet Take 1 tablet by mouth daily, Disp-30 tablet, R-3Normal      !! dicyclomine (BENTYL) 10 MG capsule Take 1 capsule by mouth 4 times daily (before meals and nightly), Disp-20 capsule, R-0Print      !! dicyclomine (BENTYL) 10 MG capsule Take 1 capsule by mouth every 6 hours as needed (cramps), Disp-20 capsule, R-0Print      albuterol sulfate HFA (VENTOLIN HFA) 108 (90 Base) MCG/ACT inhaler Inhale 2 puffs into the lungs 4 times daily as needed for Wheezing, Disp-1 Inhaler, R-0Print      furosemide (LASIX) 20 MG tablet Take 1 tablet by mouth daily, Disp-60 tablet,R-3Normal      pantoprazole (PROTONIX) 40 MG tablet Take 1 tablet by mouth every morning (before breakfast), Disp-30 tablet,R-3Normal      diclofenac sodium (VOLTAREN) 1 % GEL Apply 2 g topically 2 times daily, Topical, 2 TIMES DAILY, Historical Med      naloxone 4 MG/0.1ML LIQD nasal spray 1 spray by Nasal route as needed for Opioid ReversalHistorical Med      docusate sodium (COLACE) 100 MG capsule Take 100 mg by mouth 2 times daily as needed for ConstipationHistorical Med      calcium carbonate (OSCAL) 500 MG TABS tablet Take 1,200 mg by mouth dailyHistorical Med      Bacillus Coagulans-Inulin (ALIGN PREBIOTIC-PROBIOTIC PO) Take by mouth Unsure of doseHistorical Med      aspirin 81 MG tablet Take 81 mg by mouth dailyHistorical Med      acetaminophen (TYLENOL) 325 MG tablet Take 650 mg by mouth every 6 hours as needed for PainHistorical Med      loratadine (CLARITIN) 10 MG capsule Take 10 mg by mouth dailyHistorical Med      nitroGLYCERIN (NITROSTAT) 0.4 MG SL tablet Place 0.4 mg under the tongue every 5 minutes as needed for Chest pain up to max of 3 total doses. If no relief after 1 dose, call 911. Historical Med      rosuvastatin (CRESTOR) 10 MG tablet Take 20 mg by mouth daily Historical Med      lamoTRIgine (LAMICTAL) 100 MG tablet Take 100 mg by mouth 2 times daily Historical Med      vitamin D (CHOLECALCIFEROL) 1000 UNIT TABS tablet Take 2,000 Units by mouth daily Historical Med      tapentadol (NUCYNTA) 50 MG TABS Take 50 mg by mouth 3 times daily . Historical Med       !! - Potential duplicate medications found. Please discuss with provider. ALLERGIES     Ezetimibe-simvastatin    FAMILY HISTORY     No family history on file.        SOCIAL HISTORY       Social History     Socioeconomic History    Marital status:      Spouse name: Not on file    Number of children: Not on file    Years of education: Not on file    Highest education level: Not on file   Occupational History    Not on file   Tobacco Use    Smoking status: Never Smoker    Smokeless tobacco: Never Used   Substance and Sexual Activity    Alcohol use: No    Drug use: No    Sexual activity: Not on file   Other Topics Concern    Not on file   Social History Narrative    Not on file     Social Determinants of Health     Financial Resource Strain:     Difficulty of Paying Living Expenses:    Food Insecurity:     Worried About Running Out of Food in the Last Year:     [address] in the Last Year:    Transportation Needs:     Lack of Transportation (Medical):  Lack of Transportation (Non-Medical):    Physical Activity:     Days of Exercise per Week:     Minutes of Exercise per Session:    Stress:     Feeling of Stress :    Social Connections:     Frequency of Communication with Friends and Family:     Frequency of Social Gatherings with Friends and Family:     Attends Anab[employer]ist Services:     Active Member of Clubs or Organizations:     Attends Club or Organization Meetings:     Marital Status:    Intimate Partner Violence:     Fear of Current or Ex-Partner:     Emotionally Abused:     Physically Abused:     Sexually Abused:        SCREENINGS   NIH Stroke Scale  NIH Stroke Scale Assessed: Yes  Interval: Baseline  Level of Consciousness (1a. ): Alert  LOC Questions (1b. ):  Answers both correctly  LOC Commands (1c. ): Performs both tasks correctly  Best Gaze (2. ): Normal  Visual (3. ): No visual loss  Facial Palsy (4. ): Normal symmetrical movement  Motor Arm, Left (5a. ): No drift  Motor Arm, Right (5b. ): No drift  Motor Leg, Left (6a. ): No drift  Motor Leg, Right (6b. ): No drift  Limb Ataxia (7. ): Absent  Sensory (8. ): Normal  Best Language (9. ): No aphasia  Dysarthria (10. ): Normal  Extinction and Inattention (11): No abnormality  Total: 0Glasgow Coma Scale  Eye Opening: Spontaneous  Best Verbal Response: Oriented  Best Motor Response: Obeys commands  [place] Coma Scale Score: 15 @FLOW([id number])@      PHYSICAL EXAM    (up to 7 for level 4, 8 or more for level 5)     ED Triage Vitals [08/26/21 1350]   BP Temp Temp Source Pulse Resp SpO2 Height Weight   (!) 99/53 98.4 °F (36.9 °C) Oral 75 15 93 % -- 125 lb (56.7 kg)       Physical Exam  Vitals and nursing note reviewed. Constitutional:       General: She is not in acute distress. Appearance: She is well-developed. She is not ill-appearing, toxic-appearing or diaphoretic. HENT:      Head: Normocephalic. Nose: No congestion. Mouth/Throat:      Mouth: Mucous membranes are moist.      Pharynx: No oropharyngeal exudate or posterior oropharyngeal erythema. Eyes:      Extraocular Movements: Extraocular movements intact. Conjunctiva/sclera: Conjunctivae normal.      Pupils: Pupils are equal, round, and reactive to light. Neck:      Vascular: No JVD. Trachea: No tracheal deviation. Cardiovascular:      Rate and Rhythm: Normal rate. Pulses: Normal pulses. Heart sounds: Normal heart sounds. No murmur heard. No friction rub. No gallop. Pulmonary:      Effort: Pulmonary effort is normal. No tachypnea, accessory muscle usage, respiratory distress or retractions. Breath sounds: No stridor. No wheezing, rhonchi or rales. Chest:      Chest wall: No tenderness. Abdominal:      General: Abdomen is flat. Bowel sounds are normal. There is no distension or abdominal bruit. Palpations: There is no shifting dullness, fluid wave, hepatomegaly, splenomegaly, mass or pulsatile mass. Tenderness: There is no abdominal tenderness. There is no right CVA tenderness, left CVA tenderness, guarding or rebound. Negative signs include Dixon's sign, Rovsing's sign and McBurney's sign. Musculoskeletal:         General: No deformity. Cervical back: Normal range of motion and neck supple. No rigidity. Skin:     General: Skin is warm and dry. Capillary Refill: Capillary refill takes less than 2 seconds. Coloration: Skin is not jaundiced. Neurological:      General: No focal deficit present. Mental Status: She is alert and oriented to person, place, and time.  Mental status is at baseline. Cranial Nerves: No cranial nerve deficit. Sensory: No sensory deficit. Motor: No weakness. Coordination: Coordination normal.      Comments: Patient is alert and oriented, she is neurologically intact, she has no facial droop or slurred speech, there is no drift upper or lower extremities. Psychiatric:         Mood and Affect: Mood normal.         DIAGNOSTIC RESULTS     EKG: All EKG's are interpreted by the Emergency Department Physician who either signs or Co-signsthis chart in the absence of a cardiologist.    EKG shows a sinus rhythm with a first-degree AV block at 70 bpm there is occasional premature ventricular complexes, T wave inversions in leads I aVL QTC is 469 ms    RADIOLOGY:   Non-plain filmimages such as CT, Ultrasound and MRI are read by the radiologist. Plain radiographic images are visualized and preliminarily interpreted by the emergency physician with the below findings:        Interpretation per the Radiologist below, if available at the time ofthis note:    XR CHEST PORTABLE   Final Result    [facility]. RADIOGRAPHIC FINDINGS SUGGESTIVE COPD. CORRELATE CLINICALLY      CT Head WO Contrast   Final Result   Impression:      Mild cerebral atrophy. Chronic ischemic white matter disease. Remote right basal ganglia infarct. All CT scans at this facility use dose modulation, iterative reconstruction, and/or weight based dosing when appropriate to reduce radiation dose to as low as reasonably achievable. ED BEDSIDE ULTRASOUND:   Performed by ED Physician - none    LABS:  Labs Reviewed   CBC WITH AUTO DIFFERENTIAL - Abnormal; Notable for the following components:       Result Value    RDW 15.8 (*)     All other components within normal limits   PROTIME-INR   APTT   URINE RT REFLEX TO CULTURE       All other labs were within normal range or not returned as of this dictation.     EMERGENCY DEPARTMENT COURSE and DIFFERENTIAL DIAGNOSIS/MDM:   Vitals:    Vitals:    08/26/21 1500 08/26/21 1515 08/26/21 1645 08/26/21 1700   BP: 127/79  139/85 (!) 133/99   Pulse: 75 75 86 79   Resp: 23 21 17 22   Temp:       TempSrc:       SpO2: 97% 96% 96% 97%   Weight:            MDM  Number of Diagnoses or Management Options  H/O noncompliance with medical treatment, presenting hazards to health  Seizure disorder [facility])  Diagnosis management comments: Patient presented to the ED after having a witnessed seizure by family. Family states that patient's presentation today was similar to her other presentation she has had for seizures in the past.  She has had seizures for many years. She is on Lamictal, but family states she does not like taking it and does not take it regularly. Her pill dispenser was full of pills, which she has not taken for the last 3 days. At the time of evaluation in the emerge department patient is alert and oriented, she denies any complaints, no headache, no dizziness, no weakness, no chest pain or shortness of breath. No recent or acute falls or injuries. NIH stroke scale is 0. CT scan of the brain shows no acute intracranial process, labs show no specific acute findings. Patient was given a loading dose of Lamictal while in the ER. She was advised to resume taking her medications as recommended by her neurologist.  She was advised of the risks of not taking medications including potential for permanent disability or death. She was advised to contact her family physician for follow-up in the next 2 to 3 days, schedule appointment with a neurologist.  If she should have any worsening or changes symptoms, she was advised to return to the ER. CRITICAL CARE TIME   Total Critical Care time was 0 minutes, excluding separately reportableprocedures. There was a high probability of clinicallysignificant/life threatening deterioration in the patient's condition which required my urgent intervention. CONSULTS:  None    PROCEDURES:  Unless otherwise noted below, none     Procedures    FINAL IMPRESSION      1. Seizure disorder ([address]    2.  H/O noncompliance with medical treatment, presenting hazards to health          DISPOSITION/PLAN   DISPOSITION        PATIENT REFERRED TO:  Thanh Quintana MD  [address]  [phone]    In 2 days      Thierry Breaux MD  [address] [place] [address] [address]  [address] [place] [address] [phone]    In 1 week        DISCHARGE MEDICATIONS:  Discharge Medication List as of 8/26/2021  4:45 PM             (Please note that portions of this note were completed with a voice recognition program.  Efforts were made to edit the dictations but occasionally words are mis-transcribed.)    Tabitha Brice PA-C (electronically signed)  Attending Emergency Physician         Tabitha Brice PA-C  08/26/21 [id number] [facility]  08/27/21 [address] [facility] Danny[facility] anderson  08/31/21 6889

## 2021-10-17 PROBLEM — K57.92 DIVERTICULITIS: Status: ACTIVE | Noted: 2021-01-01

## 2021-10-17 NOTE — ED NOTES
Dr. Praful Salazar aware of pt's bp, 0 new orders, will monitor.      Aviva Gutierrez RN  10/17/21 6131

## 2021-10-17 NOTE — Clinical Note
Patient Class: Inpatient [101]   REQUIRED: Diagnosis: Diverticulitis [976308]   Estimated Length of Stay: Estimated stay of more than 2 midnights   Admitting Provider: Bolivar Finch [[id number]]   Telemetry/Cardiac Monitoring Required?: Yes

## 2021-10-17 NOTE — ED TRIAGE NOTES
Pt c/o mid lower abd pain since last night, pt c/o bm x3 today, hard, brown in color, pt reported emesis x 3 pta. Pt a&ox4, skin w/d/tan, pulses palp. msp's intact, 0 dsitress, 0 sob, calm, cooperative.

## 2021-10-18 NOTE — CARE COORDINATION
[facility] Case Management Initial Discharge Assessment    Met with Patient to discuss discharge plan. SITTING UP IN CHAIR IN ROOM      PCP: Alejandro Root MD                                Date of Last Visit: APPROX 3 MONTHS AGO  If no PCP, list provided? N/A    Discharge Planning    Living Arrangements: independently at home, at home dependent on family care and RECEIVES HELP FROM DTR WHO LIVES W HER AND DTR NEXT DOOR WHO IS RETIRED    Who do you live with? DTR, SPOUSE    Who helps you with your care:  self, family or spouse    If lives at home:     Do you have any barriers navigating in your home? no    Patient can perform ADL? Yes DOES REQUIRE SOME HELP AT TIMES FROM DTR SHE HAS 2 DTRS THAT HELP W MED SET-UP + MEALS AND GR[employer] SHOPPING    Current Services (outpatient and in home) :  None    Dialysis: No    Is transportation available to get to your appointments? Yes    DME Equipment:  yes - WHEELED WALKER & W/C    Respiratory equipment: None    Respiratory provider:  no     Pharmacy:  yes - [facility]      Consult with Medication Assistance Program?  No      Patient agreeable to Joanne Segal? Declined    Patient agreeable to SNF/Rehab? N/A    Other discharge needs identified? Other TBD CM TO FOLLOW AT PRESENT PT DECLINING SERVICES HAS [facility]  SUPPORT SYSTEM    Does Patient Have a High-Risk for Readmission Diagnosis (CHF, PN, MI, COPD)? No      Initial Discharge Plan? (Note: please see concurrent daily documentation for any updates after initial note).     [id number]   Readmission Risk              Risk of Unplanned Readmission:  21         Electronically signed by Chastity Dick RN on 10/18/2021 at 2:43 PM

## 2021-10-18 NOTE — PROGRESS NOTES
4 eyes admission skin assessment completed with [facility]. I agree with the assessment charted and noted on.      Electronically signed by Anastasia Medellin RN on 10/18/2021 at 1:23 AM

## 2021-10-18 NOTE — PROGRESS NOTES
Home medication list verified with daughter Jing Reveles. No questions or concerns voiced at this time.

## 2021-10-18 NOTE — PROGRESS NOTES
Pharmacy Dose Adjustment Per Protocol    Timoteo Brice is a 80 y.o. female. Recent Labs     10/17/21  1830   BUN 41*   CREATININE 1.85*   CrCl cannot be calculated (Unknown ideal weight.). [facility] Height: 4' 11\" (149.9 cm), Weight: 118 lb (53.5 kg), Body mass index is 23.83 kg/m².       Manually calc Cr Cl= 15.4mL/min    Piperacillin/Tazobactam [Zosyn]      Medication Ordered:   Traditional Dosing Change to Extended Infusion:   CrCl ?20 ml/min [] Zosyn 2.25 gm q6-8 hr [] Zosyn 3.375 gm IV q8h, infuse over 4 hr    [] Zosyn 3.375 gm q6-8 hr     [] Zosyn 4.5 gm q6-8 hr    CrCl <20 ml/min or ESRD [] Zosyn 2.25 gm q6-8 hr [x] Zosyn 3.375 gm IV q12h, infuse over 4 hr    [x] Zosyn 3.375 gm q6-8 hr     [] Zosyn 4.5 gm q6-8 hr      Thank Yuri Akhtar [facility]  10/17/21  11:41 PM

## 2021-10-18 NOTE — H&P
Hospitalist Group   History and Physical      CHIEF COMPLAINT: Abdominal pain    History of Present Illness:  80 y.o. female with a history of hypertension, hyperlipidemia presents with abdominal pain. Patient said that the pain started yesterday but was mild. However, today it was very severe that she had to come to the ER. Pain is mainly in the lower abdomen and more on the left lower quadrant. No nausea or vomiting. No bowel movement changes. Patient said that she tried to eat with no changes to the pain. No fever, chest pain, dizziness. No bleeding from anywhere. Patient eats well but does not drink enough fluids. REVIEW OF SYSTEMS:  no fevers, chills, cp, sob, n/v, ha, vision/hearing changes, wt changes, hot/cold flashes, other open skin lesions, diarrhea, constipation, dysuria/hematuria unless noted in HPI. Complete ROS performed with the patient and is otherwise negative. PMH:  Past Medical History:   Diagnosis Date    Diverticulitis     Hyperlipidemia     Hypertension        Surgical History:  Past Surgical History:   Procedure Laterality Date    APPENDECTOMY      CARDIAC SURGERY      CHOLECYSTECTOMY      JOINT REPLACEMENT         Medications Prior to Admission:    Prior to Admission medications    Medication Sig Start Date End Date Taking?  Authorizing Provider   isosorbide mononitrate (IMDUR) 60 MG extended release tablet Take 1 tablet by mouth daily 5/29/21   Alice Guzman MD   metoprolol succinate (TOPROL XL) 25 MG extended release tablet Take 3 tablets by mouth daily 5/29/21   Alice Guzman MD   losartan (COZAAR) 50 MG tablet Take 1 tablet by mouth daily 5/29/21   Alice Guzman MD   dicyclomine (BENTYL) 10 MG capsule Take 1 capsule by mouth 4 times daily (before meals and nightly) 4/14/21   Lizzy Dixon PA-C   dicyclomine (BENTYL) 10 MG capsule Take 1 capsule by mouth every 6 hours as needed (cramps) 3/20/21   Sabrina Bryant PA-C   albuterol sulfate used smokeless tobacco. She reports that she does not drink alcohol and does not use drugs. Family History:   History reviewed. No pertinent family history. PHYSICAL EXAM:  Vitals:  BP (!) 117/48   Pulse 67   Temp 98 °F (36.7 °C) (Oral)   Resp 24   Ht 4' 11\" (1.499 m)   Wt 118 lb (53.5 kg)   SpO2 98%   BMI 23.83 kg/m²   General Appearance: alert and oriented to person, place and time, well developed and well- nourished, in no acute distress  Skin: warm and dry, no rash or erythema  Head: normocephalic and atraumatic  Eyes: pupils equal, round, and reactive to light, extraocular eye movements intact, conjunctivae normal  ENT: tympanic membrane, external ear and ear canal normal bilaterally, nose without deformity, nasal mucosa and turbinates normal without polyps  Neck: supple and non-tender without mass, no thyromegaly or thyroid nodules, no cervical lymphadenopathy  Pulmonary/Chest: clear to auscultation bilaterally- no wheezes  normal S1 and S2, no murmurs   Abdomen: soft, mild left lower quadrant tenderness, non-distended, normal bowel sounds, no masses or organomegaly  Extremities: no cyanosis, clubbing or edema  Musculoskeletal: normal range of motion, no joint swelling, deformity or tenderness  Neurologic: reflexes normal and symmetric, no cranial nerve deficit,  coordination and speech normal       LABS:  Recent Labs     10/17/21  1830   *   K 3.3*   CL 93*   CO2 28   BUN 41*   CREATININE 1.85*   GLUCOSE 178*   CALCIUM 9.9       Recent Labs     10/17/21  1830   WBC 10.2   RBC 4.27   HGB 13.0   HCT 39.0   MCV 91.3   MCH 30.4   MCHC 33.3   RDW 14.1          No results for input(s): POCGLU in the last 72 hours.     CBC with Differential:    Lab Results   Component Value Date    WBC 10.2 10/17/2021    RBC 4.27 10/17/2021    HGB 13.0 10/17/2021    HCT 39.0 10/17/2021     10/17/2021    MCV 91.3 10/17/2021    MCH 30.4 10/17/2021    MCHC 33.3 10/17/2021    RDW 14.1 10/17/2021 LYMPHOPCT 9.0 10/17/2021    MONOPCT 5.9 10/17/2021    BASOPCT 0.4 10/17/2021    MONOSABS 0.6 10/17/2021    LYMPHSABS 0.9 10/17/2021    EOSABS 0.1 10/17/2021    BASOSABS 0.0 10/17/2021     CMP:    Lab Results   Component Value Date     10/17/2021    K 3.3 10/17/2021    CL 93 10/17/2021    CO2 28 10/17/2021    BUN 41 10/17/2021    CREATININE 1.85 10/17/2021    GFRAA 31.2 10/17/2021    LABGLOM 25.8 10/17/2021    GLUCOSE 178 10/17/2021    PROT 6.9 10/17/2021    LABALBU 4.0 10/17/2021    CALCIUM 9.9 10/17/2021    BILITOT 0.5 10/17/2021    ALKPHOS 57 10/17/2021    AST 15 10/17/2021    ALT 6 10/17/2021       Radiology: CT ABDOMEN PELVIS WO CONTRAST Additional Contrast? None    Result Date: 10/17/2021  Examination: CT ABDOMEN PELVIS WO CONTRAST Indication:   ab pain Technique: Multiple serial axial images was performed through the abdomen and pelvis without intravenous or oral administration of contrast Images were reconstructed in the axial and coronal and sagittal planes. Exam is limited as portions of the pelvis are not seen due to bilateral streak artifact from patient's bilateral total hip arthroplasties. Comparison:  No comparison is available. Findings: Small bibasilar areas of atelectasis, scarring in the bases left greater than right. The liver, spleen, pancreas, adrenals, kidneys are unremarkable. The gallbladder surgically absent. Large and small bowel show no sign of obstruction. There is some circumferential inflammatory stranding of the mid descending colon and extending to the distal descending colon. The sigmoid is not well seen due to artifact from bilateral hip prostheses. No extraluminal air. No significant pericolonic fluid. Question possible focal thickening within the distal descending colon versus artifact from incomplete distention. The appendix is surgically absent. .  No diverticulitis. Small hiatal hernia. No free air. No free fluid.   The visualized abdominal aorta is of normal size and caliber. No significant retroperitoneal adenopathy. Again note is made of dislocation of the acetabular component inferiorly of the right total hip arthroplasty. 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 1. DISLOCATION OF THE ACETABULAR COMPONENT INFERIORLY OF THE RIGHT TOTAL HIP ARTHROPLASTY. 2. SMALL HIATAL HERNIA 3. THERE IS SOME CIRCUMFERENTIAL INFLAMMATORY STRANDING OF THE MID DESCENDING COLON AND EXTENDING TO THE DISTAL DESCENDING COLON. THE SIGMOID IS NOT WELL SEEN. QUESTION POSSIBLE FOCAL THICKENING WITHIN THE DISTAL DESCENDING COLON VERSUS ARTIFACT FROM INCOMPLETE DISTENTION DIFFERENTIAL CONSIDERATIONS MAY BE THAT OF ENTERITIS, COLITIS, DIVERTICULITIS. UNDERLYING INFILTRATIVE PROCESS IS NOT EXCLUDED. FURTHER EVALUATION WARRANTED All CT scans at this facility use dose modulation, iterative reconstruction, and/or weight based dosing when appropriate to reduce radiation dose to as low as reasonably achievable. ASSESSMENT/ PLAN[de-identified]      Active Problems:    Diverticulitis  Resolved Problems:    * No resolved hospital problems. *      1. Colitis versus acute diverticulitis   Left lower quad abdominal pain   CT scan of the abdomen showed questionable diverticulitis (not clear from the radiologist report) versus colitis   Will start patient on Zosyn given her advanced age   IV fluids  2. Dehydration   Patient does not drink enough fluids   Will hydrate and monitor  3. FLORENTINO   Creatinine 1.85-baseline below 1   Questionable dehydration   Will hydrate and monitor renal function closely  4. Hypokalemia   Replace and monitor    Code Status: Full  DVT prophylaxis: Lovenox         Electronically signed by Danielle Franco MD on 10/17/2021 at 10:10 PM      NOTE: This report was transcribed using voice recognition software.  Every effort was made to ensure accuracy; however, inadvertent computerized transcription errors may be present.

## 2021-10-18 NOTE — PROGRESS NOTES
Admission assessment completed. Patient alert and oriented x 4, Latvian speaking, understands and speaks some [place]. Rates pain 4/10 to LLQ, bowel sounds active. Skin clean, dry and intact. Fall precautions maintained. Bedside commode at bedside.

## 2021-10-18 NOTE — PROGRESS NOTES
Pharmacy Dose Adjustment Per Protocol    Cody Munoz is a 80 y.o. female. Recent Labs     10/17/21  1830   BUN 41*   CREATININE 1.85*   CrCl cannot be calculated (Unknown ideal weight.). Lisa Mercado Height: 4' 11\" (149.9 cm), Weight: 118 lb (53.5 kg)    Drug Ordered Therapeutic Interchange (CrCL ?  30 mL/min)   [x] Enoxaparin 40 mg subq daily [x] Enoxaparin 30 mg subq daily   [] Enoxaparin 1 mg/kg subq BID [] Enoxaparin ________ (1 mg/kg) subq daily    [] Enoxaparin 30 subq BID [] Enoxaparin 30 mg subq daily     Manually calculated Cr Cl= 15.4 mL/min    Thank Candace Sumner [facility]  10/17/21  11:37 PM

## 2021-10-18 NOTE — PROGRESS NOTES
Progress Note  Date:10/18/2021       Room:W484/W484-01  Patient Pavithra Wylie     YOB: 1934     Age:87 y.o. Subjective    Subjective:  Symptoms:  No shortness of breath, malaise, cough, chest pain, weakness, headache, chest pressure, anorexia, diarrhea or anxiety. Diet:  No nausea or vomiting. Review of Systems   Respiratory: Negative for cough and shortness of breath. Cardiovascular: Negative for chest pain. Gastrointestinal: Negative for anorexia, diarrhea, nausea and vomiting. Neurological: Negative for weakness. Objective         Vitals Last 24 Hours:  TEMPERATURE:  Temp  Av.9 °F (36.6 °C)  Min: 97.7 °F (36.5 °C)  Max: 98.1 °F (36.7 °C)  RESPIRATIONS RANGE: Resp  Av.6  Min: 13  Max: 24  PULSE OXIMETRY RANGE: SpO2  Av.2 %  Min: 94 %  Max: 100 %  PULSE RANGE: Pulse  Av.3  Min: 60  Max: 77  BLOOD PRESSURE RANGE: Systolic (69RZP), DCH:423 , Min:76 , CHEN:206   ; Diastolic (94WQU), FZJ:70, Min:27, Max:97    I/O (24Hr): Intake/Output Summary (Last 24 hours) at 10/18/2021 1210  Last data filed at 10/18/2021 0449  Gross per 24 hour   Intake 978 ml   Output --   Net 978 ml     Objective:  General Appearance:  Comfortable, well-appearing and in no acute distress. Vital signs: (most recent): Blood pressure (!) 121/42, pulse 65, temperature 97.7 °F (36.5 °C), resp. rate 19, height 4' 11\" (1.499 m), weight 118 lb (53.5 kg), SpO2 100 %. HEENT: Normal HEENT exam.    Lungs:  Normal effort. Heart: Normal rate. S1 normal and S2 normal.    Abdomen: Abdomen is soft. Bowel sounds are normal.   There is no epigastric area or suprapubic area tenderness. Extremities: There is no deformity. Pulses: Distal pulses are intact. Neurological: Patient is alert. Pupils:  Pupils are equal, round, and reactive to light. Skin:  Warm and dry.       Labs/Imaging/Diagnostics    Labs:  CBC:  Recent Labs     10/17/21  1830 10/18/21  0645   WBC 10.2 9.1   RBC significant retroperitoneal adenopathy. Again note is made of dislocation of the acetabular component inferiorly of the right total hip arthroplasty. 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 1. DISLOCATION OF THE ACETABULAR COMPONENT INFERIORLY OF THE RIGHT TOTAL HIP ARTHROPLASTY. 2. SMALL HIATAL HERNIA 3. THERE IS SOME CIRCUMFERENTIAL INFLAMMATORY STRANDING OF THE MID DESCENDING COLON AND EXTENDING TO THE DISTAL DESCENDING COLON. THE SIGMOID IS NOT WELL SEEN. QUESTION POSSIBLE FOCAL THICKENING WITHIN THE DISTAL DESCENDING COLON VERSUS ARTIFACT FROM INCOMPLETE DISTENTION DIFFERENTIAL CONSIDERATIONS MAY BE THAT OF ENTERITIS, COLITIS, DIVERTICULITIS. UNDERLYING INFILTRATIVE PROCESS IS NOT EXCLUDED. FURTHER EVALUATION WARRANTED All CT scans at this facility use dose modulation, iterative reconstruction, and/or weight based dosing when appropriate to reduce radiation dose to as low as reasonably achievable. Assessment//Plan           Hospital Problems         Last Modified POA    Diverticulitis 10/17/2021 Yes        Assessment & Plan  10/18: Feeling a lot better compared to before. Denies any diarrhea, nausea or vomiting. We are orthopedic evaluation for abnormal CT scan of the abdomen showing dislocation. No overnight events. If cleared by Ortho patient would like to be discharged home.   Electronically signed by Aiden Noel MD on 10/18/21 at 12:10 PM EDT

## 2021-10-18 NOTE — ED PROVIDER NOTES
[address] [facility] ED  eMERGENCY dEPARTMENT eNCOUnter      Pt Name: Tamara Jean  MRN: [id number]  [place] 1934  Date of evaluation: 10/17/2021  Provider: Marco Hoyt MD    CHIEF COMPLAINT       Chief Complaint   Patient presents with    Abdominal Pain     pt c/o mid loweer abd pain since yesterday         HISTORY OF PRESENT ILLNESS   (Location/Symptom, Timing/Onset,Context/Setting, Quality, Duration, Modifying Factors, Severity)  Note limiting factors. Tamara Jean is a 80 y.o. female who presents to the emergency department      The history is provided by the patient. Abdominal Pain  Pain location:  RLQ  Pain quality: aching    Pain radiates to:  Does not radiate  Pain severity:  Moderate  Onset quality:  Gradual  Timing:  Constant  Chronicity:  New  Context: not alcohol use, not awakening from sleep, not diet changes, not eating, not laxative use, not medication withdrawal, not previous surgeries, not recent illness, not recent sexual activity, not recent travel, not retching, not sick contacts, not suspicious food intake and not trauma    Relieved by:  Eating  Worsened by:  Nothing  Ineffective treatments:  None tried  Associated symptoms: anorexia, flatus and nausea    Risk factors: being elderly    Risk factors: no alcohol abuse, no aspirin use, has not had multiple surgeries, no NSAID use and not obese        NursingNotes were reviewed. REVIEW OF SYSTEMS    (2-9 systems for level 4, 10 or more for level 5)     Review of Systems   Constitutional: Negative. HENT: Negative. Respiratory: Negative. Cardiovascular: Negative. Gastrointestinal: Positive for abdominal pain, anorexia, flatus and nausea. Endocrine: Negative. Genitourinary: Negative. Musculoskeletal: Negative. Skin: Negative. Neurological: Negative. Hematological: Negative. Psychiatric/Behavioral: Negative. Except as noted above the remainder of the review of systems was reviewed and negative. PAST MEDICAL HISTORY     Past Medical History:   Diagnosis Date    Diverticulitis     Hyperlipidemia     Hypertension          SURGICALHISTORY       Past Surgical History:   Procedure Laterality Date    APPENDECTOMY      CARDIAC SURGERY      CHOLECYSTECTOMY      JOINT REPLACEMENT           CURRENT MEDICATIONS       Previous Medications    ACETAMINOPHEN (TYLENOL) 325 MG TABLET    Take 650 mg by mouth every 6 hours as needed for Pain    ALBUTEROL SULFATE HFA (VENTOLIN HFA) 108 (90 BASE) MCG/ACT INHALER    Inhale 2 puffs into the lungs 4 times daily as needed for Wheezing    ASPIRIN 81 MG TABLET    Take 81 mg by mouth daily    BACILLUS COAGULANS-INULIN (ALIGN PREBIOTIC-PROBIOTIC PO)    Take by mouth Unsure of dose    CALCIUM CARBONATE (OSCAL) 500 MG TABS TABLET    Take 1,200 mg by mouth daily    DICLOFENAC SODIUM (VOLTAREN) 1 % GEL    Apply 2 g topically 2 times daily    DICYCLOMINE (BENTYL) 10 MG CAPSULE    Take 1 capsule by mouth every 6 hours as needed (cramps)    DICYCLOMINE (BENTYL) 10 MG CAPSULE    Take 1 capsule by mouth 4 times daily (before meals and nightly)    DOCUSATE SODIUM (COLACE) 100 MG CAPSULE    Take 100 mg by mouth 2 times daily as needed for Constipation    FUROSEMIDE (LASIX) 20 MG TABLET    Take 1 tablet by mouth daily    ISOSORBIDE MONONITRATE (IMDUR) 60 MG EXTENDED RELEASE TABLET    Take 1 tablet by mouth daily    LAMOTRIGINE (LAMICTAL) 100 MG TABLET    Take 100 mg by mouth 2 times daily     LORATADINE (CLARITIN) 10 MG CAPSULE    Take 10 mg by mouth daily    LOSARTAN (COZAAR) 50 MG TABLET    Take 1 tablet by mouth daily    METOPROLOL SUCCINATE (TOPROL XL) 25 MG EXTENDED RELEASE TABLET    Take 3 tablets by mouth daily    NALOXONE 4 MG/0.1ML LIQD NASAL SPRAY    1 spray by Nasal route as needed for Opioid Reversal    NITROGLYCERIN (NITROSTAT) 0.4 MG SL TABLET    Place 0.4 mg under the tongue every 5 minutes as needed for Chest pain up to max of 3 total doses.  If no relief after 1 dose, call 911. PANTOPRAZOLE (PROTONIX) 40 MG TABLET    Take 1 tablet by mouth every morning (before breakfast)    ROSUVASTATIN (CRESTOR) 10 MG TABLET    Take 20 mg by mouth daily     TAPENTADOL (NUCYNTA) 50 MG TABS    Take 50 mg by mouth 3 times daily . VITAMIN D (CHOLECALCIFEROL) 1000 UNIT TABS TABLET    Take 2,000 Units by mouth daily        ALLERGIES     Ezetimibe-simvastatin    FAMILY HISTORY     History reviewed. No pertinent family history. SOCIAL HISTORY       Social History     Socioeconomic History    Marital status:      Spouse name: None    Number of children: None    Years of education: None    Highest education level: None   Occupational History    None   Tobacco Use    Smoking status: Never Smoker    Smokeless tobacco: Never Used   Substance and Sexual Activity    Alcohol use: No    Drug use: No    Sexual activity: None   Other Topics Concern    None   Social History Narrative    None     Social Determinants of Health     Financial Resource Strain:     Difficulty of Paying Living Expenses:    Food Insecurity:     Worried About Running Out of Food in the Last Year:     Ran Out of Food in the Last Year:    Transportation Needs:     Lack of Transportation (Medical):      Lack of Transportation (Non-Medical):    Physical Activity:     Days of Exercise per Week:     Minutes of Exercise per Session:    Stress:     Feeling of Stress :    Social Connections:     Frequency of Communication with Friends and Family:     Frequency of Social Gatherings with Friends and Family:     Attends Nondenominational Services:     Active Member of Clubs or Organizations:     Attends Club or Organization Meetings:     Marital Status:    Intimate Partner Violence:     Fear of Current or Ex-Partner:     Emotionally Abused:     Physically Abused:     Sexually Abused:        SCREENINGS      @FLOW([id number])@      PHYSICAL EXAM    (up to 7 for level 4, 8 or more for level 5)     ED Triage Vitals [10/17/21 1817]   BP Temp Temp Source Pulse Resp SpO2 Height Weight   (!) 98/43 98 °F (36.7 °C) Oral 60 16 94 % -- --       Physical Exam  Vitals and nursing note reviewed. Constitutional:       Appearance: She is well-developed. HENT:      Head: Normocephalic and atraumatic. Right Ear: External ear normal.      Left Ear: External ear normal.      Nose: Nose normal.   Eyes:      Extraocular Movements: Extraocular movements intact. Pupils: Pupils are equal, round, and reactive to light. Cardiovascular:      Rate and Rhythm: Normal rate and regular rhythm. Heart sounds: Normal heart sounds. Pulmonary:      Effort: Pulmonary effort is normal. No respiratory distress. Breath sounds: No stridor. No wheezing, rhonchi or rales. Chest:      Chest wall: No tenderness. Abdominal:      General: Abdomen is flat. Bowel sounds are normal.      Palpations: Abdomen is soft. Tenderness: There is abdominal tenderness in the right lower quadrant. Musculoskeletal:         General: Normal range of motion. Cervical back: Normal range of motion and neck supple. Skin:     General: Skin is warm and dry. Neurological:      General: No focal deficit present. Mental Status: She is alert and oriented to person, place, and time. Cranial Nerves: No cranial nerve deficit. Sensory: No sensory deficit. Motor: No abnormal muscle tone. Coordination: Coordination normal.      Deep Tendon Reflexes: Reflexes normal.   Psychiatric:         Mood and Affect: Mood normal.         Behavior: Behavior normal.         Thought Content:  Thought content normal.         Judgment: Judgment normal.         DIAGNOSTIC RESULTS     EKG: All EKG's are interpreted by the Emergency Department Physician who either signs or Co-signsthis chart in the absence of a cardiologist.        RADIOLOGY:   Non-plain filmimages such as CT, Ultrasound and MRI are read by the radiologist. Plain radiographic images are visualized and preliminarily interpreted by the emergency physician with the below findings:        Interpretation per the Radiologist below, if available at the time ofthis note:    CT ABDOMEN PELVIS WO CONTRAST Additional Contrast? None   Final Result   1. DISLOCATION OF THE ACETABULAR COMPONENT INFERIORLY OF THE RIGHT TOTAL HIP ARTHROPLASTY. 2. SMALL HIATAL HERNIA   3. THERE IS SOME CIRCUMFERENTIAL INFLAMMATORY STRANDING OF THE MID DESCENDING COLON AND EXTENDING TO THE DISTAL DESCENDING COLON. THE SIGMOID IS NOT WELL SEEN. QUESTION POSSIBLE FOCAL THICKENING WITHIN THE DISTAL DESCENDING COLON VERSUS ARTIFACT FROM    INCOMPLETE DISTENTION DIFFERENTIAL CONSIDERATIONS MAY BE THAT OF ENTERITIS, COLITIS, DIVERTICULITIS. UNDERLYING INFILTRATIVE PROCESS IS NOT EXCLUDED. FURTHER EVALUATION WARRANTED         All CT scans at this facility use dose modulation, iterative reconstruction, and/or weight based dosing when appropriate to reduce radiation dose to as low as reasonably achievable.             ED BEDSIDE ULTRASOUND:   Performed by ED Physician - none    LABS:  Labs Reviewed   COMPREHENSIVE METABOLIC PANEL W/ REFLEX TO MG FOR LOW K - Abnormal; Notable for the following components:       Result Value    Sodium 130 (*)     Potassium reflex Magnesium 3.3 (*)     Chloride 93 (*)     Glucose 178 (*)     BUN 41 (*)     CREATININE 1.85 (*)     GFR Non- 25.8 (*)     GFR  31.2 (*)     All other components within normal limits   CBC WITH AUTO DIFFERENTIAL - Abnormal; Notable for the following components:    Neutrophils Absolute 8.5 (*)     Lymphocytes Absolute 0.9 (*)     All other components within normal limits   AMYLASE - Abnormal; Notable for the following components:    Amylase 131 (*)     All other components within normal limits   URINE RT REFLEX TO CULTURE - Abnormal; Notable for the following components:    Color, UA DARK YELLOW (*)     Ketones, Urine TRACE (*)     Protein, UA TRACE (*) Physician         Yuliet Mulligan MD  10/17/21 [id number]

## 2021-10-18 NOTE — PROGRESS NOTES
ORTHO CONSULT CALLED TO DR Omid Martinez VIA OFFICE LEFT MESSAGE ON VM Electronically signed by Kristie Patel on 10/18/2021 at 12:55 PM

## 2021-10-19 NOTE — PROGRESS NOTES
PHARMACY NOTE:    Renal Adjustment Per Protocol: Zosyn 3.375 g IV every 12 hours changed to Zosyn 3.375 g IV every 8 hours based on    Recent Labs     10/19/21  0705   CREATININE 0.63   CrCl cannot be calculated (Unknown ideal weight.). Natasha Shane Calculated CrCl = 53.13 mL/min    Pharmacy will continue to monitor renal function daily and make dose adjustments as needed.     Naveen Justin, PharmD   10/19/2021 9:55 AM

## 2021-10-19 NOTE — CONSULTS
Consult Note  Patient: Navarro Rodriguez  Unit/Bed: N095/I039-54  YOB: 1934  MRN: [id number]  Acct: [de-identified]   Admitting Diagnosis: Dehydration [E86.0]  Diverticulitis [K57.92]  Acute diverticulitis [K57.92]  Date:  10/17/2021  Hospital Day: 2    Complaint:  Abdominal pain     History of Present Illness:  Patient is a 80year old female patient with past medical history of diverticulitis, hyperlipidemia, and HTN. Patient presented to the emergency room with complaints of right lower quadrant pain in which ct abdomen was performed and found to have a dislocation of the acetabular component inferiorly of the right total hip arthroplasty. PMHx:  Past Medical History:   Diagnosis Date    Diverticulitis     Hyperlipidemia     Hypertension        PSHx:  Past Surgical History:   Procedure Laterality Date    APPENDECTOMY      CARDIAC SURGERY      CHOLECYSTECTOMY      JOINT REPLACEMENT         Social Hx:  Social History     Socioeconomic History    Marital status:      Spouse name: None    Number of children: None    Years of education: None    Highest education level: None   Occupational History    None   Tobacco Use    Smoking status: Never Smoker    Smokeless tobacco: Never Used   Substance and Sexual Activity    Alcohol use: No    Drug use: No    Sexual activity: None   Other Topics Concern    None   Social History Narrative    None     Social Determinants of Health     Financial Resource Strain:     Difficulty of Paying Living Expenses:    Food Insecurity:     Worried About Running Out of Food in the Last Year:     Ran Out of Food in the Last Year:    Transportation Needs:     Lack of Transportation (Medical):      Lack of Transportation (Non-Medical):    Physical Activity:     Days of Exercise per Week:     Minutes of Exercise per Session:    Stress:     Feeling of Stress :    Social Connections:     Frequency of Communication with Friends and Family:     Frequency of Social Gatherings with Friends and Family:     Attends Mandaen Services:     Active Member of Clubs or Organizations:     Attends Club or Organization Meetings:     Marital Status:    Intimate Partner Violence:     Fear of Current or Ex-Partner:     Emotionally Abused:     Physically Abused:     Sexually Abused:        Family Hx:  History reviewed. No pertinent family history. Physical Examination:    BP (!) 123/58   Pulse 82   Temp 98.6 °F (37 °C) (Oral)   Resp 16   Ht 4' 11\" (1.499 m)   Wt 118 lb (53.5 kg)   SpO2 97%   BMI 23.83 kg/m²    Physical Exam     Patient seen sitting on side of bed during consultation with family at bedside. The patient has no complaints of right hip pain at the moment. There is no deformity noted to RLE. Extremity is neurovascular intact with intact sensation and distal pulses.      LABS:  CBC:   Lab Results   Component Value Date    WBC 7.4 10/19/2021    RBC 4.14 10/19/2021    HGB 13.0 10/19/2021    HCT 38.2 10/19/2021    MCV 92.2 10/19/2021    MCH 31.4 10/19/2021    MCHC 34.0 10/19/2021    RDW 14.1 10/19/2021     10/19/2021    MPV 10.1 06/26/2015     CBC with Differential:    Lab Results   Component Value Date    WBC 7.4 10/19/2021    RBC 4.14 10/19/2021    HGB 13.0 10/19/2021    HCT 38.2 10/19/2021     10/19/2021    MCV 92.2 10/19/2021    MCH 31.4 10/19/2021    MCHC 34.0 10/19/2021    RDW 14.1 10/19/2021    LYMPHOPCT 14.4 10/19/2021    MONOPCT 8.0 10/19/2021    BASOPCT 0.4 10/19/2021    MONOSABS 0.6 10/19/2021    LYMPHSABS 1.1 10/19/2021    EOSABS 0.1 10/19/2021    BASOSABS 0.0 10/19/2021     CMP:    Lab Results   Component Value Date     10/19/2021    K 3.0 10/19/2021    K 3.3 10/17/2021    CL 98 10/19/2021    CO2 28 10/19/2021    BUN 11 10/19/2021    CREATININE 0.63 10/19/2021    GFRAA >60.0 10/19/2021    LABGLOM >60.0 10/19/2021    GLUCOSE 121 10/19/2021    PROT 6.4 10/19/2021    LABALBU 3.9 10/19/2021    CALCIUM 9.7 10/19/2021 BILITOT 0.7 10/19/2021    ALKPHOS 60 10/19/2021    AST 11 10/19/2021    ALT <5 10/19/2021     BMP:    Lab Results   Component Value Date     10/19/2021    K 3.0 10/19/2021    K 3.3 10/17/2021    CL 98 10/19/2021    CO2 28 10/19/2021    BUN 11 10/19/2021    LABALBU 3.9 10/19/2021    CREATININE 0.63 10/19/2021    CALCIUM 9.7 10/19/2021    GFRAA >60.0 10/19/2021    LABGLOM >60.0 10/19/2021    GLUCOSE 121 10/19/2021     Magnesium:  Lab Results   Component Value Date    MG 1.9 10/19/2021     Troponin:    Lab Results   Component Value Date    TROPONINI <0.010 08/01/2021           Assessment:    Active Hospital Problems    Diagnosis Date Noted    Diverticulitis [K57.92] 10/17/2021      Patient admitted to hospital for diverticulitis. CT of abdomen showed patient to have dislocation of acetabular component inferiorly of the right total hip arthroplasty. 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 There is no acute fracture present. This dislocation was present in 2018 and she has been ambulating with walker or using wheelchair to get around. Given patients medical complexities and condition of bone to right hip it is recommended to treat this chronic dislocation conservatively. The patient may continue to ambulate per her baseline with walker. If the patient were to experience significant pain in the future further options can be explored at that time. The patient and patient family are in agreement of non-surgical treatment. Thank you for this consultation and allowing us to be a part of this patients care. Orthopedics to sign off. Please call for any questions or concerns. Plan:  1. PT/OT: NWB to right lower extremity   2.  Follow up with orthopedics PRN             Electronically signed by HUMPHREY Gaona CNP on 10/19/2021 at 1:41 PM

## 2021-10-19 NOTE — PROGRESS NOTES
10am Monitor room called and said pt converted to A-fib with heart rate in the 140s came down to 120. Now in 90's. EKG obtained. VSS. 1010am pt converted back to Sinus. [place] aware. Home meds restarted. Electronically signed by Bailey Braden RN on 10/19/2021 at 10:15 AM    1034 am pt had a 7 beat run of [id number] [facility]. [place] aware.      Electronically signed by Bailey Braden RN on 10/19/2021 at 11:25 AM

## 2021-10-19 NOTE — PROGRESS NOTES
Shift assessment complete. VSS. Pt is AxOx4. meds given per mar. Lungs clear. abd is soft, bowel sounds present. Call light within reach. Will continue to monitor. Lab called and said pts potassium was 3.0, 40 PO given to replace.      Electronically signed by Kanwal Damon RN on 10/19/2021 at 9:54 AM

## 2021-10-19 NOTE — PROGRESS NOTES
PHARMACY NOTE:    Tapentadol (Nucynta) 50 mg TID changed to oxycodone IR 5 mg TID per therapeutic interchange.     Chel Cheung, PharmD   10/19/2021 10:14 AM

## 2021-10-19 NOTE — PROGRESS NOTES
Pt A&ox4 [place] speaking but understands and speaks some english, VSS.  Medicated per MAR, no needs voiced, will continue to monitor

## 2021-10-19 NOTE — PROGRESS NOTES
Discharge instructions provided. IV removed. Pt called her daughter. She is on her way to get the pt. Pt has no questions at this time.  Electronically signed by Fidelina Pradhan RN on 10/19/2021 at 2:57 PM

## 2021-10-19 NOTE — DISCHARGE SUMMARY
Discharge Summary    Date: 10/19/2021  Patient Name: Tamara Jean YOB: 1934 Age: 80 y.o. Admit Date: 10/17/2021  Discharge Date: 10/19/2021  Discharge Condition: [address]    Admission Diagnosis  Dehydration (E86.0); Diverticulitis (K57.92); Acute diverticulitis (K57.92)     Discharge Diagnosis  Active Problems: DiverticulitisResolved Problems: * No resolved hospital problems. [facility] Stay  Narrative of Hospital Course:  Patient comes with abdominal pain found to have diverticulitis received IV Zosyn with improvement of her symptoms. Patient's potassium was replaced. Patient denies pain at home and has history of A. fib as per patient. Had episode of A. fib which converted back to sinus because of did not get home dose Lopressor. Potassium was replaced prior to discharge. Consultants:  IP CONSULT TO ORTHOPEDIC SURGERY    Surgeries/procedures Performed:       Treatments:    Antibiotics    Zosyn    Discharge Plan/Disposition:  Home    Hospital/Incidental Findings Requiring Follow Up:    Patient Instructions:    Diet:    Activity:Activity as Tolerated  For number of days (if applicable): Other Instructions:    Provider Follow-Up:   No follow-ups on file.      Significant Diagnostic Studies:    Recent Labs:  Admission on 10/17/2021Sodium                                        Date: 10/17/2021Value: 130*        Ref range: 135 - 144 mEq/L    Status: FinalPotassium reflex Magnesium                    Date: 10/17/2021Value: 3.3*        Ref range: 3.4 - 4.9 mEq/L    Status: FinalChloride                                      Date: 10/17/2021Value: 93*         Ref range: 95 - 107 mEq/L     Status: FinalCO2                                           Date: 10/17/2021Value: 28          Ref range: 20 - 31 mEq/L      Status: FinalAnion Gap                                     Date: 10/17/2021Value: 9           Ref range: 9 - 15 mEq/L       Status: FinalGlucose                                       Date: 10/17/2021Value: 178*        Ref range: 70 - 99 mg/dL      Status: FinalBUN                                           Date: 10/17/2021Value: 39*         Ref range: 8 - 23 mg/dL       Status: FinalCREATININE                                    Date: 10/17/2021Value: 1.85*       Ref range: 0.50 - 0.90 mg/dL  Status: FinalGFR Non-                      Date: 10/17/2021Value: 25.8*       Ref range: >60                Status: Final              Comment: >60 mL/min/1.73m2 EGFR, calc. for ages 25 and older using theMDRD formula (not corrected for weight), is valid for stablerenal function. GFR                           Date: 10/17/2021Value: 31.2*       Ref range: >60                Status: Final              Comment: >60 mL/min/1.73m2 EGFR, calc. for ages 25 and older using theMDRD formula (not corrected for weight), is valid for stablerenal function. Calcium                                       Date: 10/17/2021Value: 9.9         Ref range: 8.5 - 9.9 mg/dL    Status: FinalTotal Protein                                 Date: 10/17/2021Value: 6.9         Ref range: 6.3 - 8.0 g/dL     Status: FinalAlbumin                                       Date: 10/17/2021Value: 4.0         Ref range: 3.5 - 4.6 g/dL     Status: FinalTotal Bilirubin                               Date: 10/17/2021Value: 0.5         Ref range: 0.2 - 0.7 mg/dL    Status: FinalAlkaline Phosphatase                          Date: 10/17/2021Value: 57          Ref range: 40 - 130 U/L       Status: FinalALT                                           Date: 10/17/2021Value: 6           Ref range: 0 - 33 U/L         Status: FinalAST                                           Date: 10/17/2021Value: 15          Ref range: 0 - 35 U/L         Status: Final              Comment: Specimen hemolysis has exceeded the interference as definedby Roche. Value may be falsely increased. Suggestrecollection if clinically indicated. Globulin Status: FinalLymphocytes Absolute                          Date: 10/17/2021Value: 0.9*        Ref range: 1.0 - 4.8 K/uL     Status: FinalMonocytes Absolute                            Date: 10/17/2021Value: 0.6         Ref range: 0.2 - 0.8 K/uL     Status: FinalEosinophils Absolute                          Date: 10/17/2021Value: 0.1         Ref range: 0.0 - 0.7 K/uL     Status: FinalBasophils Absolute                            Date: 10/17/2021Value: 0.0         Ref range: 0.0 - 0.2 K/uL     Status: FinalLipase                                        Date: 10/17/2021Value: 34          Ref range: 12 - 95 U/L        Status: FinalAmylase                                       Date: 10/17/2021Value: 131*        Ref range: 22 - 93 U/L        Status: FinalColor, UA                                     Date: 10/17/2021Value: DARK YELLOW*Ref range: Straw/Yellow       Status: FinalClarity, UA                                   Date: 10/17/2021Value: Clear       Ref range: Clear              Status: FinalGlucose, Ur                                   Date: 10/17/2021Value: Negative    Ref range: Negative mg/dL     Status: FinalBilirubin Urine                               Date: 10/17/2021Value: Negative    Ref range: Negative           Status: FinalKetones, Urine                                Date: 10/17/2021Value: TRACE*      Ref range: Negative mg/dL     Status: FinalSpecific Gravity, UA                          Date: 10/17/2021Value: 1.016       Ref range: 1.005 - 1.030      Status: FinalBlood, Urine                                  Date: 10/17/2021Value: Negative    Ref range: Negative           Status: FinalpH, UA                                        Date: 10/17/2021Value: 5.0         Ref range: 5.0 - 9.0          Status: FinalProtein, UA                                   Date: 10/17/2021Value: TRACE*      Ref range: Negative mg/dL     Status: FinalUrobilinogen, Urine                           Date: 10/17/2021Value: 1.0 Ref range: <2.0 E.U./dL       Status: FinalNitrite, Urine                                Date: 10/17/2021Value: Negative    Ref range: Negative           Status: FinalLeukocyte Esterase, Urine                     Date: 10/17/2021Value: SMALL*      Ref range: Negative           Status: FinalUrine Reflex to Culture                       Date: 10/17/2021Value: Not Indicated                     Status: FinalMagnesium                                     Date: 10/17/2021Value: 2.4         Ref range: 1.7 - 2.4 mg/dL    Status: FinalHyaline Casts, UA                             Date: 10/17/2021Value: 0-1         Ref range: 0 - 5 /LPF         Status: FinalBacteria, UA                                  Date: 10/17/2021Value: Negative    Ref range: Negative /HPF      Status: [address] [place] [address], UA                                       Date: 10/17/2021Value: 6-9*        Ref range: 0 - 5 /HPF         Status: FinalEpithelial Cells, UA                          Date: 10/17/2021Value: 3-5         Ref range: 0 - 5 /HPF         Status: [address] [place] [address]                                           Date: 10/18/2021Value: 9.1         Ref range: 4.8 - 10.8 K/uL    Status: FinalRBC                                           Date: 10/18/2021Value: 3.90*       Ref range: 4.20 - 5.40 M/uL   Status: FinalHemoglobin                                    Date: 10/18/2021Value: 12.0        Ref range: 12.0 - 16.0 g/dL   Status: FinalHematocrit                                    Date: 10/18/2021Value: 35.8*       Ref range: 37.0 - 47.0 %      Status: FinalMCV                                           Date: 10/18/2021Value: 91.8        Ref range: 82.0 - 100.0 fL    Status: [address]                                           Date: 10/18/2021Value: 30.7        Ref range: 27.0 - 31.3 pg     Status: [address]                                          Date: 10/18/2021Value: 33.4        Ref range: 33.0 - 37.0 %      Status: FinalRDW Date: 10/18/2021Value: 14.5        Ref range: 11.5 - 14.5 %      Status: FinalPlatelets                                     Date: 10/18/2021Value: 213         Ref range: 130 - 400 K/uL     Status: FinalNeutrophils %                                 Date: 10/18/2021Value: 75.9        Ref range: %                  Status: FinalLymphocytes %                                 Date: 10/18/2021Value: 14.4        Ref range: %                  Status: FinalMonocytes %                                   Date: 10/18/2021Value: 7.9         Ref range: %                  Status: FinalEosinophils %                                 Date: 10/18/2021Value: 1.5         Ref range: %                  Status: FinalBasophils %                                   Date: 10/18/2021Value: 0.3         Ref range: %                  Status: FinalNeutrophils Absolute                          Date: 10/18/2021Value: 6.9*        Ref range: 1.4 - 6.5 K/uL     Status: FinalLymphocytes Absolute                          Date: 10/18/2021Value: 1.3         Ref range: 1.0 - 4.8 K/uL     Status: FinalMonocytes Absolute                            Date: 10/18/2021Value: 0.7         Ref range: 0.2 - 0.8 K/uL     Status: FinalEosinophils Absolute                          Date: 10/18/2021Value: 0.1         Ref range: 0.0 - 0.7 K/uL     Status: FinalBasophils Absolute                            Date: 10/18/2021Value: 0.0         Ref range: 0.0 - 0.2 K/uL     Status: FinalSodium                                        Date: 10/18/2021Value: 137         Ref range: 135 - 144 mEq/L    Status: FinalPotassium                                     Date: 10/18/2021Value: 3.5         Ref range: 3.4 - 4.9 mEq/L    Status: FinalChloride                                      Date: 10/18/2021Value: 98          Ref range: 95 - 107 mEq/L     Status: FinalCO2                                           Date: 10/18/2021Value: 27          Ref range: 20 - 31 mEq/L      Status: FinalAnion Gap Date: 10/18/2021Value: 12          Ref range: 9 - 15 mEq/L       Status: FinalGlucose                                       Date: 10/18/2021Value: 96          Ref range: 70 - 99 mg/dL      Status: FinalBUN                                           Date: 10/18/2021Value: 32*         Ref range: 8 - 23 mg/dL       Status: FinalCREATININE                                    Date: 10/18/2021Value: 1.10*       Ref range: 0.50 - 0.90 mg/dL  Status: FinalGFR Non-                      Date: 10/18/2021Value: 46.9*       Ref range: >60                Status: Final              Comment: >60 mL/min/1.73m2 EGFR, calc. for ages 25 and older using theMDRD formula (not corrected for weight), is valid for stablerenal function. GFR                           Date: 10/18/2021Value: 56.8*       Ref range: >60                Status: Final              Comment: >60 mL/min/1.73m2 EGFR, calc. for ages 25 and older using theMDRD formula (not corrected for weight), is valid for stablerenal function. Calcium                                       Date: 10/18/2021Value: 9.1         Ref range: 8.5 - 9.9 mg/dL    Status: FinalTotal Protein                                 Date: 10/18/2021Value: 6.0*        Ref range: 6.3 - 8.0 g/dL     Status: FinalAlbumin                                       Date: 10/18/2021Value: 3.6         Ref range: 3.5 - 4.6 g/dL     Status: FinalTotal Bilirubin                               Date: 10/18/2021Value: 0.7         Ref range: 0.2 - 0.7 mg/dL    Status: FinalAlkaline Phosphatase                          Date: 10/18/2021Value: 55          Ref range: 40 - 130 U/L       Status: FinalALT                                           Date: 10/18/2021Value: <5          Ref range: 0 - 33 U/L         Status: FinalAST                                           Date: 10/18/2021Value: 11          Ref range: 0 - 35 U/L         Status: FinalGlobulin Date: 10/18/2021Value: 2.4         Ref range: 2.3 - 3.5 g/dL     Status: FinalMagnesium                                     Date: 10/18/2021Value: 1.9         Ref range: 1.7 - 2.4 mg/dL    Status: FinalPOC Glucose                                   Date: 10/18/2021Value: 123*        Ref range: 60 - 115 mg/dl     Status: FinalPerformed on                                  Date: 10/18/2021Value: ACCU-CHEK     Status: [address] Glucose                                   Date: 10/18/2021Value: 103         Ref range: 60 - 115 mg/dl     Status: FinalPerformed on                                  Date: 10/18/2021Value: ACCU-CHEK     Status: FinalPOC Creatinine                                Date: 10/17/2021Value: 2.1*        Ref range: 0.6 - 1.2 mg/dL    Status: FinalGFR Non-                      Date: 10/17/2021Value: 22*         Ref range: >60                Status: Final              Comment: >60 mL/min/1.73m2 EGFR, calc. for ages 25 and older using theMDRD formula (not corrected for weight), is valid for stablerenal function. GFR                           Date: 10/17/2021Value: 27*         Ref range: >60                Status: Final              Comment: >60 mL/min/1.73m2 EGFR, calc. for ages 25 and older using theMDRD formula (not corrected for weight), is valid for stablerenal function. Sample Type                                   Date: 10/17/2021Value: SHERIF           Status: FinalPerformed on                                  Date: 10/17/2021Value: SEE BELOW     Status: Final              Comment: Performed on POCPOC Glucose                                   Date: 10/18/2021Value: 106         Ref range: 60 - 115 mg/dl     Status: FinalPerformed on                                  Date: 10/18/2021Value: ACCU-CHEK     Status: [address] Glucose                                   Date: 10/18/2021Value: 108         Ref range: 60 - 115 mg/dl     Status: FinalPerformed on Date: 10/18/2021Value: ACCU-CHEK     Status: [address]                                           Date: 10/19/2021Value: 7.4         Ref range: 4.8 - 10.8 K/uL    Status: FinalRBC                                           Date: 10/19/2021Value: 4.14*       Ref range: 4.20 - 5.40 M/uL   Status: FinalHemoglobin                                    Date: 10/19/2021Value: 13.0        Ref range: 12.0 - 16.0 g/dL   Status: FinalHematocrit                                    Date: 10/19/2021Value: 38.2        Ref range: 37.0 - 47.0 %      Status: FinalMCV                                           Date: 10/19/2021Value: 92.2        Ref range: 82.0 - 100.0 fL    Status: [address] [place]                                           Date: 10/19/2021Value: 31.4*       Ref range: 27.0 - 31.3 pg     Status: [address]                                          Date: 10/19/2021Value: 34.0        Ref range: 33.0 - 37.0 %      Status: FinalRDW                                           Date: 10/19/2021Value: 14.1        Ref range: 11.5 - 14.5 %      Status: FinalPlatelets                                     Date: 10/19/2021Value: 216         Ref range: 130 - 400 K/uL     Status: FinalNeutrophils %                                 Date: 10/19/2021Value: 75.8        Ref range: %                  Status: FinalLymphocytes %                                 Date: 10/19/2021Value: 14.4        Ref range: %                  Status: FinalMonocytes %                                   Date: 10/19/2021Value: 8.0         Ref range: %                  Status: FinalEosinophils %                                 Date: 10/19/2021Value: 1.4         Ref range: %                  Status: FinalBasophils %                                   Date: 10/19/2021Value: 0.4         Ref range: %                  Status: FinalNeutrophils Absolute                          Date: 10/19/2021Value: 5.6         Ref range: 1.4 - 6.5 K/uL     Status: FinalLymphocytes Absolute Date: 10/19/2021Value: 1.1         Ref range: 1.0 - 4.8 K/uL     Status: FinalMonocytes Absolute                            Date: 10/19/2021Value: 0.6         Ref range: 0.2 - 0.8 K/uL     Status: FinalEosinophils Absolute                          Date: 10/19/2021Value: 0.1         Ref range: 0.0 - 0.7 K/uL     Status: FinalBasophils Absolute                            Date: 10/19/2021Value: 0.0         Ref range: 0.0 - 0.2 K/uL     Status: FinalSodium                                        Date: 10/19/2021Value: 140         Ref range: 135 - 144 mEq/L    Status: FinalPotassium                                     Date: 10/19/2021Value: 3.0*        Ref range: 3.4 - 4.9 mEq/L    Status: FinalChloride                                      Date: 10/19/2021Value: 98          Ref range: 95 - 107 mEq/L     Status: FinalCO2                                           Date: 10/19/2021Value: 28          Ref range: 20 - 31 mEq/L      Status: FinalAnion Gap                                     Date: 10/19/2021Value: 14          Ref range: 9 - 15 mEq/L       Status: FinalGlucose                                       Date: 10/19/2021Value: 121*        Ref range: 70 - 99 mg/dL      Status: FinalBUN                                           Date: 10/19/2021Value: 11          Ref range: 8 - 23 mg/dL       Status: FinalCREATININE                                    Date: 10/19/2021Value: 0.63        Ref range: 0.50 - 0.90 mg/dL  Status: FinalGFR Non-                      Date: 10/19/2021Value: >60.0       Ref range: >60                Status: Final              Comment: >60 mL/min/1.73m2 EGFR, calc. for ages 25 and older using theMDRD formula (not corrected for weight), is valid for stablerenal function. GFR                           Date: 10/19/2021Value: >60.0       Ref range: >60                Status: Final              Comment: >60 mL/min/1.73m2 EGFR, calc. for ages 25 and older using theMDRD formula (not corrected for weight), is valid for stablerenal function. Calcium                                       Date: 10/19/2021Value: 9.7         Ref range: 8.5 - 9.9 mg/dL    Status: FinalTotal Protein                                 Date: 10/19/2021Value: 6.4         Ref range: 6.3 - 8.0 g/dL     Status: FinalAlbumin                                       Date: 10/19/2021Value: 3.9         Ref range: 3.5 - 4.6 g/dL     Status: FinalTotal Bilirubin                               Date: 10/19/2021Value: 0.7         Ref range: 0.2 - 0.7 mg/dL    Status: FinalAlkaline Phosphatase                          Date: 10/19/2021Value: 60          Ref range: 40 - 130 U/L       Status: FinalALT                                           Date: 10/19/2021Value: <5          Ref range: 0 - 33 U/L         Status: FinalAST                                           Date: 10/19/2021Value: 11          Ref range: 0 - 35 U/L         Status: FinalGlobulin                                      Date: 10/19/2021Value: 2.5         Ref range: 2.3 - 3.5 g/dL     Status: FinalMagnesium                                     Date: 10/19/2021Value: 1.9         Ref range: 1.7 - 2.4 mg/dL    Status: FinalPOC Glucose                                   Date: 10/18/2021Value: 118*        Ref range: 60 - 115 mg/dl     Status: FinalPerformed on                                  Date: 10/18/2021Value: ACCU-CHEK     Status: FinalPOC Glucose                                   Date: 10/19/2021Value: 120*        Ref range: 60 - 115 mg/dl     Status: FinalPerformed on                                  Date: 10/19/2021Value: ACCU-CHEK     Status: FinalPOC Glucose                                   Date: 10/19/2021Value: 108         Ref range: 60 - 115 mg/dl     Status: FinalPerformed on                                  Date: 10/19/2021Value: ACCU-CHEK     Status: Final------------    Radiology last 7 days:  CT ABDOMEN PELVIS WO CONTRAST Additional Contrast? NoneResult Date: 10/17/65103. DISLOCATION OF THE ACETABULAR COMPONENT INFERIORLY OF THE RIGHT TOTAL HIP ARTHROPLASTY. 2. SMALL HIATAL HERNIA 3. THERE IS SOME CIRCUMFERENTIAL INFLAMMATORY STRANDING OF THE MID DESCENDING COLON AND EXTENDING TO THE DISTAL DESCENDING COLON. THE SIGMOID IS NOT WELL SEEN. QUESTION POSSIBLE FOCAL THICKENING WITHIN THE DISTAL DESCENDING COLON VERSUS ARTIFACT FROM INCOMPLETE DISTENTION DIFFERENTIAL CONSIDERATIONS MAY BE THAT OF ENTERITIS, COLITIS, DIVERTICULITIS. UNDERLYING INFILTRATIVE PROCESS IS NOT EXCLUDED. FURTHER EVALUATION WARRANTED All CT scans at this facility use dose modulation, iterative reconstruction, and/or weight based dosing when appropriate to reduce radiation dose to as low as reasonably achievable. 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 No acute fracture identified.      [unfilled]    Discharge Medications    Current Discharge Medication ListSTART taking these medicationsamoxicillin-clavulanate (AUGMENTIN) 875-125 MG per tabletTake 1 tablet by mouth 2 times daily for 7 daysQty: 14 tablet Refills: 0    Current Discharge Medication List    Current Discharge Medication ListCONTINUE these medications which have NOT CHANGEDisosorbide mononitrate (IMDUR) 60 MG extended release tabletTake 1 tablet by mouth dailyQty: 30 tablet Refills: 3metoprolol succinate (TOPROL XL) 25 MG extended release tabletTake 3 tablets by mouth dailyQty: 30 tablet Refills: 3losartan (COZAAR) 50 MG tabletTake 1 tablet by mouth dailyQty: 30 tablet Refills: 3albuterol sulfate HFA (VENTOLIN HFA) 108 (90 Base) MCG/ACT inhalerInhale 2 puffs into the lungs 4 times daily as needed for [facility] THE [facility]: 1 Inhaler Refills: 0furosemide (LASIX) 20 MG tabletTake 1 tablet by mouth dailyQty: 60 tablet Refills: 3diclofenac sodium (VOLTAREN) 1 % GELApply 2 g topically 2 times dailydocusate sodium (COLACE) 100 MG capsuleTake 100 mg by mouth 2 times daily as needed for ConstipationBacillus Coagulans-Inulin (ALIGN PREBIOTIC-PROBIOTIC PO)Take by mouth Unsure of doseaspirin 81 MG tabletTake 81 mg by mouth dailyacetaminophen (TYLENOL) 325 MG tabletTake 650 mg by mouth every 6 hours as needed for Painloratadine (CLARITIN) 10 MG capsuleTake 10 mg by mouth dailynitroGLYCERIN (NITROSTAT) 0.4 MG SL tabletPlace 0.4 mg under the tongue every 5 minutes as needed for Chest pain up to max of 3 total doses. If no relief after 1 dose, call 911. rosuvastatin (CRESTOR) 10 MG tabletTake 20 mg by mouth daily lamoTRIgine (LAMICTAL) 100 MG tabletTake 100 mg by mouth 2 times daily vitamin D (CHOLECALCIFEROL) 1000 UNIT TABS tabletTake 2,000 Units by mouth daily tapentadol (NUCYNTA) 50 MG TABSTake 50 mg by mouth 3 times daily . !! dicyclomine (BENTYL) 10 MG capsuleTake 1 capsule by mouth 4 times daily (before meals and nightly)Qty: 20 capsule Refills: 0!! dicyclomine (BENTYL) 10 MG capsuleTake 1 capsule by mouth every 6 hours as needed (cramps)Qty: 20 capsule Refills: 0pantoprazole (PROTONIX) 40 MG tabletTake 1 tablet by mouth every morning (before breakfast)Qty: 30 tablet Refills: 3naloxone 4 MG/0.1ML LIQD nasal spray1 spray by Nasal route as needed for Opioid Reversal!! - Potential duplicate medications found. Please discuss with provider. Current Discharge Medication ListSTOP taking these medicationscalcium carbonate (OSCAL) 500 MG TABS tabletComments:Reason for Stopping:    Time Spent on Discharge:E] minutes were spent in patient examination, evaluation, counseling as well as medication reconciliation, prescriptions for required medications, discharge plan, and follow up.     Electronically signed by Tate Leonard MD on 10/19/21 at 12:27 PM EDT   Overtime on dc summary was 45 min

## 2021-10-19 NOTE — PROGRESS NOTES
[facility] Respiratory Therapy Evaluation   Current Order:  Albuterol 2 Puffs QID PRN    Home Regimen: Yes      Ordering Physician: Gem Pierre  Re-evaluation Date:       Diagnosis: Dehydration      Patient Status: Stable    The following MDI Criteria must be met in order to convert aerosol to MDI with spacer. If unable to meet, MDI will be converted to aerosol:  []  Patient able to demonstrate the ability to use MDI effectively  []  Patient alert and cooperative  []  Patient able to take deep breath with 5-10 second hold  []  Medication(s) available in this delivery method   []  Peak flow greater than or equal to 200 ml/min            Current Order Substituted To  (same drug, same frequency)   Aerosol to MDI [] Albuterol Sulfate 0.083% unit dose by aerosol Albuterol Sulfate MDI 2 puffs by inhalation with spacer    [] Levalbuterol 1.25 mg unit dose by aerosol Levalbuterol MDI 2 puffs by inhalation with spacer    [] Levalbuterol 0.63 mg unit dose by aerosol Levalbuterol MDI 2 puffs by inhalation with spacer    [] Ipratropium Bromide 0.02% unit dose by aerosol Ipratropium Bromide MDI 2 puffs by inhalation with spacer    [] Duoneb (Ipratropium + Albuterol) unit dose by aerosol Ipratropium MDI + Albuterol MDI 2 puffs by inhalation w/spacer   MDI to Aerosol [] Albuterol Sulfate MDI Albuterol Sulfate 0.083% unit dose by aerosol    [] Levalbuterol MDI 2 puffs by inhalation Levalbuterol 1.25 mg unit dose by aerosol    [] Ipratropium Bromide MDI by inhalation Ipratropium Bromide 0.02% unit dose by aerosol    [] Combivent (Ipratropium + Albuterol) MDI by inhalation Duoneb (Ipratropium + Albuterol) unit dose by aerosol       Treatment Assessment [Frequency/Schedule]:  Change frequency to: ________No Changes______________________________________per Protocol, P&T, MEC      Points 0 1 2 3 4   Pulmonary Status  Non-Smoker  [x]   Smoking history   < 20 pack years  []   Smoking history  ?  20 pack years  []   Pulmonary Disorder  (acute or chronic)  []   Severe or Chronic w/ Exacerbation  []     Surgical Status No [x]   Surgeries     General []   Surgery Lower []   Abdominal Thoracic or []   Upper Abdominal Thoracic with  PulmonaryDisorder  []     Chest X-ray Clear/Not  Ordered     [x]  Chronic Changes  Results Pending  []  Infiltrates, atelectasis, pleural effusion, or edema  []  Infiltrates in more than one lobe []  Infiltrate + Atelectasis, &/or pleural effusion  []    Respiratory Pattern Regular,  RR = 12-20 [x]  Increased,  RR = 21-25 []  ROMAN, irregular,  or RR = 26-30 []  Decreased FEV1  or RR = 31-35 []  Severe SOB, use  of accessory muscles, or RR ? 35  []    Mental Status Alert, oriented,  Cooperative [x]  Confused but Follows commands []  Lethargic or unable to follow commands []  Obtunded  []  Comatose  []    Breath Sounds Clear to  auscultation  [x]  Decreased unilaterally or  in bases only []  Decreased  bilaterally  []  Crackles or intermittent wheezes []  Wheezes []    Cough Strong, Spontan., & nonproductive [x]  Strong,  spontaneous, &  productive []  Weak,  Nonproductive []  Weak, productive or  with wheezes []  No spontaneous  cough or may require suctioning []    Level of Activity Ambulatory []  Ambulatory w/ Assist  [x]  Non-ambulatory []  Paraplegic []  Quadriplegic []    Total    Score:___1____     Triage Score:____5____      Tri       Triage:     1. (>20) Freq: Q3    2. (16-20) Freq: Q4   3. (11-15) Freq: QID & Albuterol Q2 PRN    4. (6-10) Freq: TID & Albuterol Q2 PRN    5. (0-5) Freq Q4prn

## 2021-10-19 NOTE — PROGRESS NOTES
PHARMACY NOTE:    Renal Adjustment Per Protocol: Lovenox 30 mg daily changed to Lovenox 40 mg daily based on    Recent Labs     10/19/21  0705   CREATININE 0.63   CrCl cannot be calculated (Unknown ideal weight.). Kathryn Bagley Calculated CrCl = 53.13 mL/min    Pharmacy will continue to monitor renal function daily and make dose adjustments as needed.     Federica Abdul, PharmD   10/19/2021 9:51 AM

## 2022-01-01 ENCOUNTER — APPOINTMENT (OUTPATIENT)
Dept: GENERAL RADIOLOGY | Age: 87
DRG: 560 | End: 2022-01-01
Payer: MEDICARE

## 2022-01-01 ENCOUNTER — APPOINTMENT (OUTPATIENT)
Dept: GENERAL RADIOLOGY | Age: 87
End: 2022-01-01
Payer: MEDICARE

## 2022-01-01 ENCOUNTER — OFFICE VISIT (OUTPATIENT)
Dept: GERIATRIC MEDICINE | Age: 87
End: 2022-01-01
Payer: MEDICARE

## 2022-01-01 ENCOUNTER — APPOINTMENT (OUTPATIENT)
Dept: CT IMAGING | Age: 87
DRG: 871 | End: 2022-01-01
Payer: MEDICARE

## 2022-01-01 ENCOUNTER — APPOINTMENT (OUTPATIENT)
Dept: GENERAL RADIOLOGY | Age: 87
DRG: 871 | End: 2022-01-01
Payer: MEDICARE

## 2022-01-01 ENCOUNTER — OFFICE VISIT (OUTPATIENT)
Dept: GERIATRIC MEDICINE | Age: 87
End: 2022-01-01

## 2022-01-01 ENCOUNTER — OFFICE VISIT (OUTPATIENT)
Dept: ORTHOPEDIC SURGERY | Age: 87
End: 2022-01-01
Payer: MEDICARE

## 2022-01-01 ENCOUNTER — APPOINTMENT (OUTPATIENT)
Dept: CT IMAGING | Age: 87
DRG: 560 | End: 2022-01-01
Payer: MEDICARE

## 2022-01-01 ENCOUNTER — HOSPITAL ENCOUNTER (EMERGENCY)
Age: 87
Discharge: NURSING, REHAB OR LONG-TERM CARE | End: 2022-05-01
Attending: FAMILY MEDICINE
Payer: MEDICARE

## 2022-01-01 ENCOUNTER — HOSPITAL ENCOUNTER (INPATIENT)
Age: 87
LOS: 6 days | Discharge: NURSING, REHAB OR LONG-TERM CARE | DRG: 560 | End: 2022-04-04
Attending: STUDENT IN AN ORGANIZED HEALTH CARE EDUCATION/TRAINING PROGRAM | Admitting: INTERNAL MEDICINE
Payer: MEDICARE

## 2022-01-01 ENCOUNTER — TELEPHONE (OUTPATIENT)
Dept: OTHER | Facility: CLINIC | Age: 87
End: 2022-01-01

## 2022-01-01 ENCOUNTER — HOSPITAL ENCOUNTER (INPATIENT)
Age: 87
LOS: 2 days | Discharge: HOSPICE | DRG: 871 | End: 2022-05-09
Attending: INTERNAL MEDICINE | Admitting: INTERNAL MEDICINE
Payer: MEDICARE

## 2022-01-01 VITALS
DIASTOLIC BLOOD PRESSURE: 68 MMHG | TEMPERATURE: 97.7 F | OXYGEN SATURATION: 97 % | HEIGHT: 59 IN | SYSTOLIC BLOOD PRESSURE: 113 MMHG | HEART RATE: 85 BPM | WEIGHT: 113.1 LBS | BODY MASS INDEX: 22.8 KG/M2 | RESPIRATION RATE: 18 BRPM

## 2022-01-01 VITALS
DIASTOLIC BLOOD PRESSURE: 96 MMHG | TEMPERATURE: 98.5 F | WEIGHT: 120 LBS | RESPIRATION RATE: 16 BRPM | SYSTOLIC BLOOD PRESSURE: 128 MMHG | BODY MASS INDEX: 24.19 KG/M2 | OXYGEN SATURATION: 100 % | HEART RATE: 90 BPM | HEIGHT: 59 IN

## 2022-01-01 VITALS
BODY MASS INDEX: 24.8 KG/M2 | DIASTOLIC BLOOD PRESSURE: 63 MMHG | WEIGHT: 123 LBS | HEIGHT: 59 IN | RESPIRATION RATE: 18 BRPM | TEMPERATURE: 97.7 F | OXYGEN SATURATION: 98 % | SYSTOLIC BLOOD PRESSURE: 118 MMHG | HEART RATE: 88 BPM

## 2022-01-01 VITALS
BODY MASS INDEX: 24.19 KG/M2 | HEIGHT: 59 IN | TEMPERATURE: 96.8 F | WEIGHT: 120 LBS | HEART RATE: 103 BPM | OXYGEN SATURATION: 89 %

## 2022-01-01 DIAGNOSIS — R11.0 NAUSEA: ICD-10-CM

## 2022-01-01 DIAGNOSIS — R07.89 ATYPICAL CHEST PAIN: Primary | ICD-10-CM

## 2022-01-01 DIAGNOSIS — R29.6 FREQUENT FALLS: ICD-10-CM

## 2022-01-01 DIAGNOSIS — R10.9 CENTRAL ABDOMINAL PAIN: Primary | ICD-10-CM

## 2022-01-01 DIAGNOSIS — I10 ESSENTIAL HYPERTENSION: Chronic | ICD-10-CM

## 2022-01-01 DIAGNOSIS — I50.9 CHRONIC HEART FAILURE, UNSPECIFIED HEART FAILURE TYPE (HCC): Chronic | ICD-10-CM

## 2022-01-01 DIAGNOSIS — E78.5 DYSLIPIDEMIA: Chronic | ICD-10-CM

## 2022-01-01 DIAGNOSIS — I50.22 CHRONIC SYSTOLIC (CONGESTIVE) HEART FAILURE (HCC): Primary | ICD-10-CM

## 2022-01-01 DIAGNOSIS — M25.559 HIP PAIN: ICD-10-CM

## 2022-01-01 DIAGNOSIS — Z96.641 HX OF TOTAL HIP ARTHROPLASTY, RIGHT: ICD-10-CM

## 2022-01-01 DIAGNOSIS — S73.004S: Primary | ICD-10-CM

## 2022-01-01 DIAGNOSIS — R63.0 DECREASED APPETITE: ICD-10-CM

## 2022-01-01 DIAGNOSIS — S73.004S: ICD-10-CM

## 2022-01-01 DIAGNOSIS — I10 ESSENTIAL HYPERTENSION: Primary | Chronic | ICD-10-CM

## 2022-01-01 DIAGNOSIS — Z74.01 BEDRIDDEN: ICD-10-CM

## 2022-01-01 DIAGNOSIS — M17.11 ARTHRITIS OF RIGHT KNEE: Primary | ICD-10-CM

## 2022-01-01 DIAGNOSIS — R26.2 INABILITY TO AMBULATE DUE TO HIP: ICD-10-CM

## 2022-01-01 DIAGNOSIS — R41.82 ALTERED MENTAL STATUS, UNSPECIFIED ALTERED MENTAL STATUS TYPE: Primary | ICD-10-CM
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
## 2022-01-01 DIAGNOSIS — G40.909 SEIZURE DISORDER (HCC): ICD-10-CM

## 2022-01-01 DIAGNOSIS — N18.32 STAGE 3B CHRONIC KIDNEY DISEASE (HCC): Chronic | ICD-10-CM

## 2022-01-01 DIAGNOSIS — N17.9 ACUTE RENAL FAILURE, UNSPECIFIED ACUTE RENAL FAILURE TYPE (HCC): ICD-10-CM

## 2022-01-01 DIAGNOSIS — R10.9 CENTRAL ABDOMINAL PAIN: ICD-10-CM

## 2022-01-01 DIAGNOSIS — T84.020S DISLOCATION OF INTERNAL RIGHT HIP PROSTHESIS, SEQUELA: ICD-10-CM

## 2022-01-01 LAB
ABO/RH: NORMAL
ALBUMIN SERPL-MCNC: 3.4 G/DL (ref 3.5–4.6)
ALBUMIN SERPL-MCNC: 3.7 G/DL (ref 3.5–4.6)
ALBUMIN SERPL-MCNC: 3.7 G/DL (ref 3.5–4.6)
ALBUMIN SERPL-MCNC: 3.8 G/DL (ref 3.5–4.6)
ALBUMIN SERPL-MCNC: 4.1 G/DL (ref 3.5–4.6)
ALBUMIN SERPL-MCNC: 4.2 G/DL (ref 3.5–4.6)
ALBUMIN SERPL-MCNC: 4.3 G/DL (ref 3.5–4.6)
ALBUMIN SERPL-MCNC: 4.5 G/DL (ref 3.5–4.6)
ALP BLD-CCNC: 160 U/L (ref 40–130)
ALP BLD-CCNC: 233 U/L (ref 40–130)
ALP BLD-CCNC: 56 U/L (ref 40–130)
ALP BLD-CCNC: 59 U/L (ref 40–130)
ALP BLD-CCNC: 60 U/L (ref 40–130)
ALP BLD-CCNC: 61 U/L (ref 40–130)
ALP BLD-CCNC: 61 U/L (ref 40–130)
ALP BLD-CCNC: 62 U/L (ref 40–130)
ALP BLD-CCNC: 62 U/L (ref 40–130)
ALP BLD-CCNC: 64 U/L (ref 40–130)
ALT SERPL-CCNC: 1339 U/L (ref 0–33)
ALT SERPL-CCNC: 6 U/L (ref 0–33)
ALT SERPL-CCNC: 7 U/L (ref 0–33)
ALT SERPL-CCNC: 8 U/L (ref 0–33)
ALT SERPL-CCNC: 8 U/L (ref 0–33)
ALT SERPL-CCNC: 9 U/L (ref 0–33)
ALT SERPL-CCNC: <5 U/L (ref 0–33)
ANION GAP SERPL CALCULATED.3IONS-SCNC: 10 MEQ/L (ref 9–15)
ANION GAP SERPL CALCULATED.3IONS-SCNC: 12 MEQ/L (ref 9–15)
ANION GAP SERPL CALCULATED.3IONS-SCNC: 14 MEQ/L (ref 9–15)
ANION GAP SERPL CALCULATED.3IONS-SCNC: 14 MEQ/L (ref 9–15)
ANION GAP SERPL CALCULATED.3IONS-SCNC: 16 MEQ/L (ref 9–15)
ANION GAP SERPL CALCULATED.3IONS-SCNC: 16 MEQ/L (ref 9–15)
ANION GAP SERPL CALCULATED.3IONS-SCNC: 17 MEQ/L (ref 9–15)
ANTIBODY SCREEN: NORMAL
APTT: 22.9 SEC (ref 24.4–36.8)
AST SERPL-CCNC: 11 U/L (ref 0–35)
AST SERPL-CCNC: 12 U/L (ref 0–35)
AST SERPL-CCNC: 12 U/L (ref 0–35)
AST SERPL-CCNC: 14 U/L (ref 0–35)
AST SERPL-CCNC: 14 U/L (ref 0–35)
AST SERPL-CCNC: 1499 U/L (ref 0–35)
AST SERPL-CCNC: 15 U/L (ref 0–35)
AST SERPL-CCNC: 15 U/L (ref 0–35)
AST SERPL-CCNC: 16 U/L (ref 0–35)
AST SERPL-CCNC: 19 U/L (ref 0–35)
BACTERIA: NEGATIVE /HPF
BANDED NEUTROPHILS RELATIVE PERCENT: 8 % (ref 5–11)
BASOPHILS ABSOLUTE: 0 K/UL (ref 0–0.2)
BASOPHILS ABSOLUTE: 0.1 K/UL (ref 0–0.2)
BASOPHILS RELATIVE PERCENT: 0.2 %
BASOPHILS RELATIVE PERCENT: 0.3 %
BASOPHILS RELATIVE PERCENT: 0.4 %
BASOPHILS RELATIVE PERCENT: 0.7 %
BASOPHILS RELATIVE PERCENT: 1 %
BASOPHILS RELATIVE PERCENT: 2.3 %
BILIRUB SERPL-MCNC: 0.4 MG/DL (ref 0.2–0.7)
BILIRUB SERPL-MCNC: 0.4 MG/DL (ref 0.2–0.7)
BILIRUB SERPL-MCNC: 0.5 MG/DL (ref 0.2–0.7)
BILIRUB SERPL-MCNC: 0.6 MG/DL (ref 0.2–0.7)
BILIRUB SERPL-MCNC: 0.6 MG/DL (ref 0.2–0.7)
BILIRUB SERPL-MCNC: 0.7 MG/DL (ref 0.2–0.7)
BILIRUB SERPL-MCNC: 0.8 MG/DL (ref 0.2–0.7)
BILIRUB SERPL-MCNC: 1.2 MG/DL (ref 0.2–0.7)
BILIRUBIN URINE: ABNORMAL
BLOOD, URINE: ABNORMAL
BUN BLDV-MCNC: 21 MG/DL (ref 8–23)
BUN BLDV-MCNC: 25 MG/DL (ref 8–23)
BUN BLDV-MCNC: 25 MG/DL (ref 8–23)
BUN BLDV-MCNC: 28 MG/DL (ref 8–23)
BUN BLDV-MCNC: 29 MG/DL (ref 8–23)
BUN BLDV-MCNC: 29 MG/DL (ref 8–23)
BUN BLDV-MCNC: 33 MG/DL (ref 8–23)
BUN BLDV-MCNC: 44 MG/DL (ref 8–23)
BUN BLDV-MCNC: 63 MG/DL (ref 8–23)
BUN BLDV-MCNC: 9 MG/DL (ref 8–23)
CALCIUM SERPL-MCNC: 10.1 MG/DL (ref 8.5–9.9)
CALCIUM SERPL-MCNC: 10.2 MG/DL (ref 8.5–9.9)
CALCIUM SERPL-MCNC: 10.3 MG/DL (ref 8.5–9.9)
CALCIUM SERPL-MCNC: 10.8 MG/DL (ref 8.5–9.9)
CALCIUM SERPL-MCNC: 8.7 MG/DL (ref 8.5–9.9)
CALCIUM SERPL-MCNC: 9 MG/DL (ref 8.5–9.9)
CALCIUM SERPL-MCNC: 9.4 MG/DL (ref 8.5–9.9)
CALCIUM SERPL-MCNC: 9.6 MG/DL (ref 8.5–9.9)
CHLORIDE BLD-SCNC: 100 MEQ/L (ref 95–107)
CHLORIDE BLD-SCNC: 102 MEQ/L (ref 95–107)
CHLORIDE BLD-SCNC: 102 MEQ/L (ref 95–107)
CHLORIDE BLD-SCNC: 107 MEQ/L (ref 95–107)
CHLORIDE BLD-SCNC: 93 MEQ/L (ref 95–107)
CHLORIDE BLD-SCNC: 94 MEQ/L (ref 95–107)
CHLORIDE BLD-SCNC: 94 MEQ/L (ref 95–107)
CHLORIDE BLD-SCNC: 95 MEQ/L (ref 95–107)
CHLORIDE BLD-SCNC: 95 MEQ/L (ref 95–107)
CHLORIDE BLD-SCNC: 96 MEQ/L (ref 95–107)
CLARITY: ABNORMAL
CO2: 22 MEQ/L (ref 20–31)
CO2: 23 MEQ/L (ref 20–31)
CO2: 23 MEQ/L (ref 20–31)
CO2: 26 MEQ/L (ref 20–31)
CO2: 27 MEQ/L (ref 20–31)
CO2: 28 MEQ/L (ref 20–31)
CO2: 28 MEQ/L (ref 20–31)
CO2: 29 MEQ/L (ref 20–31)
COLOR: ABNORMAL
CREAT SERPL-MCNC: 0.78 MG/DL (ref 0.5–0.9)
CREAT SERPL-MCNC: 0.86 MG/DL (ref 0.5–0.9)
CREAT SERPL-MCNC: 0.89 MG/DL (ref 0.5–0.9)
CREAT SERPL-MCNC: 0.95 MG/DL (ref 0.5–0.9)
CREAT SERPL-MCNC: 0.96 MG/DL (ref 0.5–0.9)
CREAT SERPL-MCNC: 1.03 MG/DL (ref 0.5–0.9)
CREAT SERPL-MCNC: 1.05 MG/DL (ref 0.5–0.9)
CREAT SERPL-MCNC: 1.22 MG/DL (ref 0.5–0.9)
CREAT SERPL-MCNC: 1.74 MG/DL (ref 0.5–0.9)
CREAT SERPL-MCNC: 3.31 MG/DL (ref 0.5–0.9)
EKG ATRIAL RATE: 66 BPM
EKG ATRIAL RATE: 66 BPM
EKG ATRIAL RATE: 79 BPM
EKG ATRIAL RATE: 81 BPM
EKG ATRIAL RATE: 82 BPM
EKG ATRIAL RATE: 96 BPM
EKG ATRIAL RATE: 98 BPM
EKG P AXIS: 115 DEGREES
EKG P AXIS: 16 DEGREES
EKG P AXIS: 67 DEGREES
EKG P AXIS: 74 DEGREES
EKG P AXIS: 8 DEGREES
EKG P-R INTERVAL: 152 MS
EKG P-R INTERVAL: 152 MS
EKG P-R INTERVAL: 192 MS
EKG P-R INTERVAL: 216 MS
EKG P-R INTERVAL: 232 MS
EKG P-R INTERVAL: 232 MS
EKG P-R INTERVAL: 260 MS
EKG Q-T INTERVAL: 394 MS
EKG Q-T INTERVAL: 410 MS
EKG Q-T INTERVAL: 430 MS
EKG Q-T INTERVAL: 434 MS
EKG Q-T INTERVAL: 436 MS
EKG Q-T INTERVAL: 458 MS
EKG Q-T INTERVAL: 476 MS
EKG QRS DURATION: 136 MS
EKG QRS DURATION: 140 MS
EKG QRS DURATION: 140 MS
EKG QRS DURATION: 150 MS
EKG QRS DURATION: 152 MS
EKG QRS DURATION: 154 MS
EKG QRS DURATION: 194 MS
EKG QTC CALCULATION (BAZETT): 454 MS
EKG QTC CALCULATION (BAZETT): 480 MS
EKG QTC CALCULATION (BAZETT): 481 MS
EKG QTC CALCULATION (BAZETT): 499 MS
EKG QTC CALCULATION (BAZETT): 503 MS
EKG QTC CALCULATION (BAZETT): 550 MS
EKG QTC CALCULATION (BAZETT): 582 MS
EKG R AXIS: -11 DEGREES
EKG R AXIS: -17 DEGREES
EKG R AXIS: -2 DEGREES
EKG R AXIS: -9 DEGREES
EKG R AXIS: 106 DEGREES
EKG R AXIS: 12 DEGREES
EKG R AXIS: 95 DEGREES
EKG T AXIS: -58 DEGREES
EKG T AXIS: -70 DEGREES
EKG T AXIS: 110 DEGREES
EKG T AXIS: 115 DEGREES
EKG T AXIS: 115 DEGREES
EKG T AXIS: 131 DEGREES
EKG T AXIS: 133 DEGREES
EKG VENTRICULAR RATE: 66 BPM
EKG VENTRICULAR RATE: 66 BPM
EKG VENTRICULAR RATE: 81 BPM
EKG VENTRICULAR RATE: 83 BPM
EKG VENTRICULAR RATE: 90 BPM
EKG VENTRICULAR RATE: 96 BPM
EKG VENTRICULAR RATE: 98 BPM
EOSINOPHILS ABSOLUTE: 0 K/UL (ref 0–0.7)
EOSINOPHILS ABSOLUTE: 0.1 K/UL (ref 0–0.7)
EOSINOPHILS ABSOLUTE: 0.2 K/UL (ref 0–0.7)
EOSINOPHILS ABSOLUTE: 0.3 K/UL (ref 0–0.7)
EOSINOPHILS RELATIVE PERCENT: 0 %
EOSINOPHILS RELATIVE PERCENT: 0.1 %
EOSINOPHILS RELATIVE PERCENT: 0.2 %
EOSINOPHILS RELATIVE PERCENT: 0.4 %
EOSINOPHILS RELATIVE PERCENT: 0.6 %
EOSINOPHILS RELATIVE PERCENT: 0.7 %
EOSINOPHILS RELATIVE PERCENT: 0.8 %
EOSINOPHILS RELATIVE PERCENT: 1.6 %
EOSINOPHILS RELATIVE PERCENT: 3.4 %
EOSINOPHILS RELATIVE PERCENT: 3.5 %
EPITHELIAL CELLS, UA: ABNORMAL /HPF (ref 0–5)
ETHANOL PERCENT: NORMAL G/DL
ETHANOL: <10 MG/DL (ref 0–0.08)
GFR AFRICAN AMERICAN: 14
GFR AFRICAN AMERICAN: 15.9
GFR AFRICAN AMERICAN: 33.4
GFR AFRICAN AMERICAN: 50.3
GFR AFRICAN AMERICAN: 59.8
GFR AFRICAN AMERICAN: >60
GFR NON-AFRICAN AMERICAN: 12
GFR NON-AFRICAN AMERICAN: 13.1
GFR NON-AFRICAN AMERICAN: 27.6
GFR NON-AFRICAN AMERICAN: 41.6
GFR NON-AFRICAN AMERICAN: 49.5
GFR NON-AFRICAN AMERICAN: 50.6
GFR NON-AFRICAN AMERICAN: 54.8
GFR NON-AFRICAN AMERICAN: 55.5
GFR NON-AFRICAN AMERICAN: 59.9
GFR NON-AFRICAN AMERICAN: >60
GFR NON-AFRICAN AMERICAN: >60
GLOBULIN: 2.7 G/DL (ref 2.3–3.5)
GLOBULIN: 2.9 G/DL (ref 2.3–3.5)
GLOBULIN: 3 G/DL (ref 2.3–3.5)
GLOBULIN: 3.2 G/DL (ref 2.3–3.5)
GLOBULIN: 3.2 G/DL (ref 2.3–3.5)
GLOBULIN: 3.3 G/DL (ref 2.3–3.5)
GLOBULIN: 3.4 G/DL (ref 2.3–3.5)
GLUCOSE BLD-MCNC: 106 MG/DL (ref 70–99)
GLUCOSE BLD-MCNC: 112 MG/DL (ref 70–99)
GLUCOSE BLD-MCNC: 118 MG/DL (ref 70–99)
GLUCOSE BLD-MCNC: 126 MG/DL (ref 70–99)
GLUCOSE BLD-MCNC: 127 MG/DL (ref 70–99)
GLUCOSE BLD-MCNC: 140 MG/DL (ref 70–99)
GLUCOSE BLD-MCNC: 141 MG/DL (ref 70–99)
GLUCOSE BLD-MCNC: 143 MG/DL (ref 70–99)
GLUCOSE BLD-MCNC: 175 MG/DL (ref 70–99)
GLUCOSE BLD-MCNC: 93 MG/DL (ref 70–99)
GLUCOSE URINE: NEGATIVE MG/DL
HCT VFR BLD CALC: 30 % (ref 37–47)
HCT VFR BLD CALC: 35.7 % (ref 37–47)
HCT VFR BLD CALC: 36.5 % (ref 37–47)
HCT VFR BLD CALC: 36.6 % (ref 37–47)
HCT VFR BLD CALC: 37 % (ref 37–47)
HCT VFR BLD CALC: 38.6 % (ref 37–47)
HCT VFR BLD CALC: 39.4 % (ref 37–47)
HCT VFR BLD CALC: 40.5 % (ref 37–47)
HCT VFR BLD CALC: 40.5 % (ref 37–47)
HCT VFR BLD CALC: 40.9 % (ref 37–47)
HEMOGLOBIN: 11.9 G/DL (ref 12–16)
HEMOGLOBIN: 11.9 G/DL (ref 12–16)
HEMOGLOBIN: 12.3 G/DL (ref 12–16)
HEMOGLOBIN: 12.4 G/DL (ref 12–16)
HEMOGLOBIN: 13.1 G/DL (ref 12–16)
HEMOGLOBIN: 13.1 G/DL (ref 12–16)
HEMOGLOBIN: 13.3 G/DL (ref 12–16)
HEMOGLOBIN: 13.4 G/DL (ref 12–16)
HEMOGLOBIN: 13.4 G/DL (ref 12–16)
HEMOGLOBIN: 9.9 G/DL (ref 12–16)
HYALINE CASTS: ABNORMAL /LPF (ref 0–5)
INR BLD: 1.1
KETONES, URINE: ABNORMAL MG/DL
LACTIC ACID: 3.4 MMOL/L (ref 0.5–2.2)
LEUKOCYTE ESTERASE, URINE: ABNORMAL
LYMPHOCYTES ABSOLUTE: 0.1 K/UL (ref 1–4.8)
LYMPHOCYTES ABSOLUTE: 0.6 K/UL (ref 1–4.8)
LYMPHOCYTES ABSOLUTE: 0.9 K/UL (ref 1–4.8)
LYMPHOCYTES ABSOLUTE: 1.1 K/UL (ref 1–4.8)
LYMPHOCYTES ABSOLUTE: 1.1 K/UL (ref 1–4.8)
LYMPHOCYTES ABSOLUTE: 1.2 K/UL (ref 1–4.8)
LYMPHOCYTES ABSOLUTE: 1.2 K/UL (ref 1–4.8)
LYMPHOCYTES ABSOLUTE: 1.3 K/UL (ref 1–4.8)
LYMPHOCYTES ABSOLUTE: 1.3 K/UL (ref 1–4.8)
LYMPHOCYTES ABSOLUTE: 1.4 K/UL (ref 1–4.8)
LYMPHOCYTES RELATIVE PERCENT: 10.2 %
LYMPHOCYTES RELATIVE PERCENT: 12.2 %
LYMPHOCYTES RELATIVE PERCENT: 13.8 %
LYMPHOCYTES RELATIVE PERCENT: 15.1 %
LYMPHOCYTES RELATIVE PERCENT: 21.8 %
LYMPHOCYTES RELATIVE PERCENT: 3 %
LYMPHOCYTES RELATIVE PERCENT: 7.4 %
LYMPHOCYTES RELATIVE PERCENT: 8.5 %
LYMPHOCYTES RELATIVE PERCENT: 8.9 %
LYMPHOCYTES RELATIVE PERCENT: 9.6 %
MAGNESIUM: 2.3 MG/DL (ref 1.7–2.4)
MAGNESIUM: 2.3 MG/DL (ref 1.7–2.4)
MAGNESIUM: 2.5 MG/DL (ref 1.7–2.4)
MAGNESIUM: 2.6 MG/DL (ref 1.7–2.4)
MAGNESIUM: 3.3 MG/DL (ref 1.7–2.4)
MCH RBC QN AUTO: 30.1 PG (ref 27–31.3)
MCH RBC QN AUTO: 30.2 PG (ref 27–31.3)
MCH RBC QN AUTO: 30.4 PG (ref 27–31.3)
MCH RBC QN AUTO: 30.4 PG (ref 27–31.3)
MCH RBC QN AUTO: 30.5 PG (ref 27–31.3)
MCH RBC QN AUTO: 30.6 PG (ref 27–31.3)
MCH RBC QN AUTO: 30.7 PG (ref 27–31.3)
MCH RBC QN AUTO: 30.8 PG (ref 27–31.3)
MCHC RBC AUTO-ENTMCNC: 32.4 % (ref 33–37)
MCHC RBC AUTO-ENTMCNC: 32.5 % (ref 33–37)
MCHC RBC AUTO-ENTMCNC: 33 % (ref 33–37)
MCHC RBC AUTO-ENTMCNC: 33 % (ref 33–37)
MCHC RBC AUTO-ENTMCNC: 33.1 % (ref 33–37)
MCHC RBC AUTO-ENTMCNC: 33.3 % (ref 33–37)
MCHC RBC AUTO-ENTMCNC: 33.3 % (ref 33–37)
MCHC RBC AUTO-ENTMCNC: 33.5 % (ref 33–37)
MCHC RBC AUTO-ENTMCNC: 33.7 % (ref 33–37)
MCHC RBC AUTO-ENTMCNC: 33.8 % (ref 33–37)
MCV RBC AUTO: 90.9 FL (ref 82–100)
MCV RBC AUTO: 91.4 FL (ref 82–100)
MCV RBC AUTO: 91.4 FL (ref 82–100)
MCV RBC AUTO: 91.5 FL (ref 82–100)
MCV RBC AUTO: 91.8 FL (ref 82–100)
MCV RBC AUTO: 91.9 FL (ref 82–100)
MCV RBC AUTO: 92.3 FL (ref 82–100)
MCV RBC AUTO: 92.7 FL (ref 82–100)
MCV RBC AUTO: 92.8 FL (ref 82–100)
MCV RBC AUTO: 93.5 FL (ref 82–100)
METAMYELOCYTES RELATIVE PERCENT: 1 %
MONOCYTES ABSOLUTE: 0.4 K/UL (ref 0.2–0.8)
MONOCYTES ABSOLUTE: 0.4 K/UL (ref 0.2–0.8)
MONOCYTES ABSOLUTE: 0.5 K/UL (ref 0.2–0.8)
MONOCYTES ABSOLUTE: 0.6 K/UL (ref 0.2–0.8)
MONOCYTES ABSOLUTE: 0.7 K/UL (ref 0.2–0.8)
MONOCYTES ABSOLUTE: 0.9 K/UL (ref 0.2–0.8)
MONOCYTES ABSOLUTE: 1 K/UL (ref 0.2–0.8)
MONOCYTES ABSOLUTE: 1.2 K/UL (ref 0.2–0.8)
MONOCYTES ABSOLUTE: 1.3 K/UL (ref 0.2–0.8)
MONOCYTES ABSOLUTE: 1.3 K/UL (ref 0.2–0.8)
MONOCYTES RELATIVE PERCENT: 10.2 %
MONOCYTES RELATIVE PERCENT: 10.7 %
MONOCYTES RELATIVE PERCENT: 11.5 %
MONOCYTES RELATIVE PERCENT: 4 %
MONOCYTES RELATIVE PERCENT: 6.3 %
MONOCYTES RELATIVE PERCENT: 7.6 %
MONOCYTES RELATIVE PERCENT: 7.7 %
MONOCYTES RELATIVE PERCENT: 9.1 %
MONOCYTES RELATIVE PERCENT: 9.5 %
MONOCYTES RELATIVE PERCENT: 9.7 %
NEUTROPHILS ABSOLUTE: 10.1 K/UL (ref 1.4–6.5)
NEUTROPHILS ABSOLUTE: 10.5 K/UL (ref 1.4–6.5)
NEUTROPHILS ABSOLUTE: 3.3 K/UL (ref 1.4–6.5)
NEUTROPHILS ABSOLUTE: 3.6 K/UL (ref 1.4–6.5)
NEUTROPHILS ABSOLUTE: 6.6 K/UL (ref 1.4–6.5)
NEUTROPHILS ABSOLUTE: 7 K/UL (ref 1.4–6.5)
NEUTROPHILS ABSOLUTE: 7.2 K/UL (ref 1.4–6.5)
NEUTROPHILS ABSOLUTE: 7.6 K/UL (ref 1.4–6.5)
NEUTROPHILS ABSOLUTE: 8.7 K/UL (ref 1.4–6.5)
NEUTROPHILS ABSOLUTE: 9.8 K/UL (ref 1.4–6.5)
NEUTROPHILS RELATIVE PERCENT: 63.4 %
NEUTROPHILS RELATIVE PERCENT: 74.7 %
NEUTROPHILS RELATIVE PERCENT: 75 %
NEUTROPHILS RELATIVE PERCENT: 75.9 %
NEUTROPHILS RELATIVE PERCENT: 78 %
NEUTROPHILS RELATIVE PERCENT: 79.6 %
NEUTROPHILS RELATIVE PERCENT: 80.6 %
NEUTROPHILS RELATIVE PERCENT: 80.9 %
NEUTROPHILS RELATIVE PERCENT: 81.5 %
NEUTROPHILS RELATIVE PERCENT: 87.5 %
NITRITE, URINE: NEGATIVE
NUCLEATED RED BLOOD CELLS: 15 /100 WBC
PDW BLD-RTO: 13.3 % (ref 11.5–14.5)
PDW BLD-RTO: 13.5 % (ref 11.5–14.5)
PDW BLD-RTO: 13.6 % (ref 11.5–14.5)
PDW BLD-RTO: 13.7 % (ref 11.5–14.5)
PDW BLD-RTO: 13.7 % (ref 11.5–14.5)
PDW BLD-RTO: 13.8 % (ref 11.5–14.5)
PDW BLD-RTO: 14.8 % (ref 11.5–14.5)
PDW BLD-RTO: 15.2 % (ref 11.5–14.5)
PERFORMED ON: ABNORMAL
PH UA: 5 (ref 5–9)
PLATELET # BLD: 217 K/UL (ref 130–400)
PLATELET # BLD: 219 K/UL (ref 130–400)
PLATELET # BLD: 220 K/UL (ref 130–400)
PLATELET # BLD: 228 K/UL (ref 130–400)
PLATELET # BLD: 234 K/UL (ref 130–400)
PLATELET # BLD: 258 K/UL (ref 130–400)
PLATELET # BLD: 262 K/UL (ref 130–400)
PLATELET # BLD: 266 K/UL (ref 130–400)
PLATELET # BLD: 267 K/UL (ref 130–400)
PLATELET # BLD: 278 K/UL (ref 130–400)
PLATELET SLIDE REVIEW: NORMAL
POC CREATININE WHOLE BLOOD: 3.7
POC CREATININE: 3.7 MG/DL (ref 0.6–1.2)
POC SAMPLE TYPE: ABNORMAL
POTASSIUM REFLEX MAGNESIUM: 2.6 MEQ/L (ref 3.4–4.9)
POTASSIUM REFLEX MAGNESIUM: 3.2 MEQ/L (ref 3.4–4.9)
POTASSIUM REFLEX MAGNESIUM: 3.7 MEQ/L (ref 3.4–4.9)
POTASSIUM REFLEX MAGNESIUM: 3.8 MEQ/L (ref 3.4–4.9)
POTASSIUM REFLEX MAGNESIUM: 4 MEQ/L (ref 3.4–4.9)
POTASSIUM REFLEX MAGNESIUM: 4.5 MEQ/L (ref 3.4–4.9)
POTASSIUM REFLEX MAGNESIUM: 4.6 MEQ/L (ref 3.4–4.9)
POTASSIUM SERPL-SCNC: 3.5 MEQ/L (ref 3.4–4.9)
POTASSIUM SERPL-SCNC: 4.2 MEQ/L (ref 3.4–4.9)
POTASSIUM SERPL-SCNC: 4.5 MEQ/L (ref 3.4–4.9)
POTASSIUM SERPL-SCNC: 4.6 MEQ/L (ref 3.4–4.9)
POTASSIUM SERPL-SCNC: 5.3 MEQ/L (ref 3.4–4.9)
PRO-BNP: 9424 PG/ML
PROCALCITONIN: 1.65 NG/ML (ref 0–0.15)
PROTEIN UA: 30 MG/DL
PROTHROMBIN TIME: 13.9 SEC (ref 12.3–14.9)
RBC # BLD: 3.23 M/UL (ref 4.2–5.4)
RBC # BLD: 3.89 M/UL (ref 4.2–5.4)
RBC # BLD: 3.9 M/UL (ref 4.2–5.4)
RBC # BLD: 4 M/UL (ref 4.2–5.4)
RBC # BLD: 4.03 M/UL (ref 4.2–5.4)
RBC # BLD: 4.25 M/UL (ref 4.2–5.4)
RBC # BLD: 4.32 M/UL (ref 4.2–5.4)
RBC # BLD: 4.38 M/UL (ref 4.2–5.4)
RBC # BLD: 4.41 M/UL (ref 4.2–5.4)
RBC # BLD: 4.43 M/UL (ref 4.2–5.4)
RBC # BLD: NORMAL 10*6/UL
RBC UA: ABNORMAL /HPF (ref 0–2)
REASON FOR REJECTION: NORMAL
REASON FOR REJECTION: NORMAL
REJECTED TEST: NORMAL
REJECTED TEST: NORMAL
RENAL EPITHELIAL, UA: ABNORMAL /HPF
SARS-COV-2, NAAT: NOT DETECTED
SARS-COV-2, NAAT: NOT DETECTED
SODIUM BLD-SCNC: 137 MEQ/L (ref 135–144)
SODIUM BLD-SCNC: 138 MEQ/L (ref 135–144)
SODIUM BLD-SCNC: 139 MEQ/L (ref 135–144)
SODIUM BLD-SCNC: 139 MEQ/L (ref 135–144)
SODIUM BLD-SCNC: 141 MEQ/L (ref 135–144)
SODIUM BLD-SCNC: 144 MEQ/L (ref 135–144)
SPECIFIC GRAVITY UA: 1.02 (ref 1–1.03)
T4 FREE: 1.56 NG/DL (ref 0.84–1.68)
TOTAL CK: 38 U/L (ref 0–170)
TOTAL CK: 40 U/L (ref 0–170)
TOTAL CK: 41 U/L (ref 0–170)
TOTAL CK: 43 U/L (ref 0–170)
TOTAL PROTEIN: 6.3 G/DL (ref 6.3–8)
TOTAL PROTEIN: 6.4 G/DL (ref 6.3–8)
TOTAL PROTEIN: 7 G/DL (ref 6.3–8)
TOTAL PROTEIN: 7 G/DL (ref 6.3–8)
TOTAL PROTEIN: 7.3 G/DL (ref 6.3–8)
TOTAL PROTEIN: 7.4 G/DL (ref 6.3–8)
TOTAL PROTEIN: 7.5 G/DL (ref 6.3–8)
TOTAL PROTEIN: 7.5 G/DL (ref 6.3–8)
TOTAL PROTEIN: 7.6 G/DL (ref 6.3–8)
TOTAL PROTEIN: 7.9 G/DL (ref 6.3–8)
TROPONIN: 0.01 NG/ML (ref 0–0.01)
TROPONIN: 0.02 NG/ML (ref 0–0.01)
TROPONIN: 0.02 NG/ML (ref 0–0.01)
TROPONIN: 0.15 NG/ML (ref 0–0.01)
TROPONIN: <0.01 NG/ML (ref 0–0.01)
TSH REFLEX: 0.34 UIU/ML (ref 0.44–3.86)
URINE CULTURE, ROUTINE: NORMAL
URINE REFLEX TO CULTURE: YES
UROBILINOGEN, URINE: 1 E.U./DL
WBC # BLD: 11.4 K/UL (ref 4.8–10.8)
WBC # BLD: 12 K/UL (ref 4.8–10.8)
WBC # BLD: 12.5 K/UL (ref 4.8–10.8)
WBC # BLD: 13 K/UL (ref 4.8–10.8)
WBC # BLD: 4.1 K/UL (ref 4.8–10.8)
WBC # BLD: 5.2 K/UL (ref 4.8–10.8)
WBC # BLD: 8.7 K/UL (ref 4.8–10.8)
WBC # BLD: 8.7 K/UL (ref 4.8–10.8)
WBC # BLD: 9 K/UL (ref 4.8–10.8)
WBC # BLD: 9.4 K/UL (ref 4.8–10.8)
WBC UA: ABNORMAL /HPF (ref 0–5)

## 2022-01-01 PROCEDURE — 83735 ASSAY OF MAGNESIUM: CPT

## 2022-01-01 PROCEDURE — 80053 COMPREHEN METABOLIC PANEL: CPT

## 2022-01-01 PROCEDURE — 6360000002 HC RX W HCPCS: Performed by: INTERNAL MEDICINE

## 2022-01-01 PROCEDURE — 1210000000 HC MED SURG R&B

## 2022-01-01 PROCEDURE — 83605 ASSAY OF LACTIC ACID: CPT

## 2022-01-01 PROCEDURE — 99308 SBSQ NF CARE LOW MDM 20: CPT | Performed by: NURSE PRACTITIONER

## 2022-01-01 PROCEDURE — 2700000000 HC OXYGEN THERAPY PER DAY

## 2022-01-01 PROCEDURE — 6370000000 HC RX 637 (ALT 250 FOR IP): Performed by: INTERNAL MEDICINE

## 2022-01-01 PROCEDURE — 70450 CT HEAD/BRAIN W/O DYE: CPT

## 2022-01-01 PROCEDURE — 85025 COMPLETE CBC W/AUTO DIFF WBC: CPT

## 2022-01-01 PROCEDURE — 93005 ELECTROCARDIOGRAM TRACING: CPT | Performed by: PHYSICIAN ASSISTANT

## 2022-01-01 PROCEDURE — 97535 SELF CARE MNGMENT TRAINING: CPT

## 2022-01-01 PROCEDURE — 6370000000 HC RX 637 (ALT 250 FOR IP): Performed by: NURSE PRACTITIONER

## 2022-01-01 PROCEDURE — 2580000003 HC RX 258: Performed by: PHYSICIAN ASSISTANT

## 2022-01-01 PROCEDURE — 2580000003 HC RX 258

## 2022-01-01 PROCEDURE — 85730 THROMBOPLASTIN TIME PARTIAL: CPT

## 2022-01-01 PROCEDURE — 72125 CT NECK SPINE W/O DYE: CPT

## 2022-01-01 PROCEDURE — 83880 ASSAY OF NATRIURETIC PEPTIDE: CPT

## 2022-01-01 PROCEDURE — 85610 PROTHROMBIN TIME: CPT

## 2022-01-01 PROCEDURE — 36415 COLL VENOUS BLD VENIPUNCTURE: CPT

## 2022-01-01 PROCEDURE — 74176 CT ABD & PELVIS W/O CONTRAST: CPT

## 2022-01-01 PROCEDURE — 2580000003 HC RX 258: Performed by: INTERNAL MEDICINE

## 2022-01-01 PROCEDURE — 97166 OT EVAL MOD COMPLEX 45 MIN: CPT

## 2022-01-01 PROCEDURE — 6360000002 HC RX W HCPCS: Performed by: STUDENT IN AN ORGANIZED HEALTH CARE EDUCATION/TRAINING PROGRAM

## 2022-01-01 PROCEDURE — 93005 ELECTROCARDIOGRAM TRACING: CPT | Performed by: INTERNAL MEDICINE

## 2022-01-01 PROCEDURE — 72131 CT LUMBAR SPINE W/O DYE: CPT

## 2022-01-01 PROCEDURE — 6360000002 HC RX W HCPCS

## 2022-01-01 PROCEDURE — 99285 EMERGENCY DEPT VISIT HI MDM: CPT

## 2022-01-01 PROCEDURE — 71045 X-RAY EXAM CHEST 1 VIEW: CPT

## 2022-01-01 PROCEDURE — 96361 HYDRATE IV INFUSION ADD-ON: CPT

## 2022-01-01 PROCEDURE — 87086 URINE CULTURE/COLONY COUNT: CPT

## 2022-01-01 PROCEDURE — 99214 OFFICE O/P EST MOD 30 MIN: CPT | Performed by: ORTHOPAEDIC SURGERY

## 2022-01-01 PROCEDURE — 84484 ASSAY OF TROPONIN QUANT: CPT

## 2022-01-01 PROCEDURE — 84443 ASSAY THYROID STIM HORMONE: CPT

## 2022-01-01 PROCEDURE — 84145 PROCALCITONIN (PCT): CPT

## 2022-01-01 PROCEDURE — 6370000000 HC RX 637 (ALT 250 FOR IP)

## 2022-01-01 PROCEDURE — 86901 BLOOD TYPING SEROLOGIC RH(D): CPT

## 2022-01-01 PROCEDURE — 93005 ELECTROCARDIOGRAM TRACING: CPT | Performed by: FAMILY MEDICINE

## 2022-01-01 PROCEDURE — 87635 SARS-COV-2 COVID-19 AMP PRB: CPT

## 2022-01-01 PROCEDURE — 2580000003 HC RX 258: Performed by: FAMILY MEDICINE

## 2022-01-01 PROCEDURE — 6370000000 HC RX 637 (ALT 250 FOR IP): Performed by: PHYSICIAN ASSISTANT

## 2022-01-01 PROCEDURE — 93010 ELECTROCARDIOGRAM REPORT: CPT | Performed by: INTERNAL MEDICINE

## 2022-01-01 PROCEDURE — 93005 ELECTROCARDIOGRAM TRACING: CPT

## 2022-01-01 PROCEDURE — 20610 DRAIN/INJ JOINT/BURSA W/O US: CPT | Performed by: ORTHOPAEDIC SURGERY

## 2022-01-01 PROCEDURE — 86900 BLOOD TYPING SEROLOGIC ABO: CPT

## 2022-01-01 PROCEDURE — 97116 GAIT TRAINING THERAPY: CPT

## 2022-01-01 PROCEDURE — 99231 SBSQ HOSP IP/OBS SF/LOW 25: CPT | Performed by: ORTHOPAEDIC SURGERY

## 2022-01-01 PROCEDURE — 82550 ASSAY OF CK (CPK): CPT

## 2022-01-01 PROCEDURE — 87040 BLOOD CULTURE FOR BACTERIA: CPT

## 2022-01-01 PROCEDURE — 99211 OFF/OP EST MAY X REQ PHY/QHP: CPT

## 2022-01-01 PROCEDURE — A4216 STERILE WATER/SALINE, 10 ML: HCPCS | Performed by: FAMILY MEDICINE

## 2022-01-01 PROCEDURE — 81001 URINALYSIS AUTO W/SCOPE: CPT

## 2022-01-01 PROCEDURE — 96360 HYDRATION IV INFUSION INIT: CPT

## 2022-01-01 PROCEDURE — 6830039000 HC L3 TRAUMA ALERT

## 2022-01-01 PROCEDURE — 84439 ASSAY OF FREE THYROXINE: CPT

## 2022-01-01 PROCEDURE — 71250 CT THORAX DX C-: CPT

## 2022-01-01 PROCEDURE — 73502 X-RAY EXAM HIP UNI 2-3 VIEWS: CPT

## 2022-01-01 PROCEDURE — 97162 PT EVAL MOD COMPLEX 30 MIN: CPT

## 2022-01-01 PROCEDURE — 73552 X-RAY EXAM OF FEMUR 2/>: CPT

## 2022-01-01 PROCEDURE — 2500000003 HC RX 250 WO HCPCS: Performed by: FAMILY MEDICINE

## 2022-01-01 PROCEDURE — 82077 ASSAY SPEC XCP UR&BREATH IA: CPT

## 2022-01-01 PROCEDURE — 97110 THERAPEUTIC EXERCISES: CPT

## 2022-01-01 PROCEDURE — 96374 THER/PROPH/DIAG INJ IV PUSH: CPT

## 2022-01-01 PROCEDURE — 72128 CT CHEST SPINE W/O DYE: CPT

## 2022-01-01 PROCEDURE — 86850 RBC ANTIBODY SCREEN: CPT

## 2022-01-01 RX ORDER — 0.9 % SODIUM CHLORIDE 0.9 %
30 INTRAVENOUS SOLUTION INTRAVENOUS ONCE
Status: DISCONTINUED | OUTPATIENT
Start: 2022-01-01 | End: 2022-01-01

## 2022-01-01 RX ORDER — NITROGLYCERIN 0.4 MG/1
0.4 TABLET SUBLINGUAL EVERY 5 MIN PRN
Status: DISCONTINUED | OUTPATIENT
Start: 2022-01-01 | End: 2022-01-01 | Stop reason: HOSPADM

## 2022-01-01 RX ORDER — CETIRIZINE HYDROCHLORIDE 10 MG/1
5 TABLET ORAL DAILY
Status: DISCONTINUED | OUTPATIENT
Start: 2022-01-01 | End: 2022-01-01 | Stop reason: HOSPADM

## 2022-01-01 RX ORDER — ONDANSETRON 4 MG/1
4 TABLET, ORALLY DISINTEGRATING ORAL EVERY 8 HOURS PRN
Status: CANCELLED | OUTPATIENT
Start: 2022-01-01

## 2022-01-01 RX ORDER — SODIUM CHLORIDE 0.9 % (FLUSH) 0.9 %
5-40 SYRINGE (ML) INJECTION PRN
Status: DISCONTINUED | OUTPATIENT
Start: 2022-01-01 | End: 2022-01-01 | Stop reason: HOSPADM

## 2022-01-01 RX ORDER — POLYETHYLENE GLYCOL 3350 17 G/17G
17 POWDER, FOR SOLUTION ORAL DAILY PRN
Status: DISCONTINUED | OUTPATIENT
Start: 2022-01-01 | End: 2022-01-01 | Stop reason: HOSPADM

## 2022-01-01 RX ORDER — METHYLPREDNISOLONE ACETATE 80 MG/ML
80 INJECTION, SUSPENSION INTRA-ARTICULAR; INTRALESIONAL; INTRAMUSCULAR; SOFT TISSUE ONCE
Status: COMPLETED | OUTPATIENT
Start: 2022-01-01 | End: 2022-01-01

## 2022-01-01 RX ORDER — METOPROLOL SUCCINATE 50 MG/1
50 TABLET, EXTENDED RELEASE ORAL DAILY
Qty: 30 TABLET | Refills: 3 | Status: SHIPPED | OUTPATIENT
Start: 2022-01-01

## 2022-01-01 RX ORDER — TAPENTADOL HYDROCHLORIDE 50 MG/1
50 TABLET, FILM COATED ORAL EVERY 8 HOURS
Qty: 60 TABLET | Refills: 0 | Status: SHIPPED | OUTPATIENT
Start: 2022-01-01 | End: 2022-05-20

## 2022-01-01 RX ORDER — METOLAZONE 2.5 MG/1
2.5 TABLET ORAL
Status: DISCONTINUED | OUTPATIENT
Start: 2022-01-01 | End: 2022-01-01 | Stop reason: HOSPADM

## 2022-01-01 RX ORDER — POLYETHYLENE GLYCOL 3350 17 G/17G
17 POWDER, FOR SOLUTION ORAL DAILY PRN
Status: DISCONTINUED | OUTPATIENT
Start: 2022-01-01 | End: 2022-01-01

## 2022-01-01 RX ORDER — OXYCODONE HYDROCHLORIDE 5 MG/1
2.5 TABLET ORAL EVERY 6 HOURS PRN
Qty: 14 TABLET | Refills: 0 | Status: SHIPPED | OUTPATIENT
Start: 2022-01-01 | End: 2022-01-01

## 2022-01-01 RX ORDER — TAPENTADOL HYDROCHLORIDE 50 MG/1
50 TABLET, FILM COATED ORAL EVERY 8 HOURS
Qty: 42 TABLET | Refills: 0 | Status: SHIPPED | OUTPATIENT
Start: 2022-01-01 | End: 2022-01-01 | Stop reason: SDUPTHER

## 2022-01-01 RX ORDER — SODIUM CHLORIDE 0.9 % (FLUSH) 0.9 %
5-40 SYRINGE (ML) INJECTION PRN
Status: CANCELLED | OUTPATIENT
Start: 2022-01-01

## 2022-01-01 RX ORDER — ANALGESIC BALM 1.74; 4.06 G/29G; G/29G
OINTMENT TOPICAL ONCE
Status: COMPLETED | OUTPATIENT
Start: 2022-01-01 | End: 2022-01-01

## 2022-01-01 RX ORDER — ACETAMINOPHEN 650 MG/1
650 SUPPOSITORY RECTAL EVERY 6 HOURS PRN
Status: CANCELLED | OUTPATIENT
Start: 2022-01-01

## 2022-01-01 RX ORDER — FUROSEMIDE 20 MG/1
20 TABLET ORAL DAILY
Status: DISCONTINUED | OUTPATIENT
Start: 2022-01-01 | End: 2022-01-01 | Stop reason: HOSPADM

## 2022-01-01 RX ORDER — DICYCLOMINE HYDROCHLORIDE 10 MG/1
10 CAPSULE ORAL EVERY 6 HOURS PRN
Status: DISCONTINUED | OUTPATIENT
Start: 2022-01-01 | End: 2022-01-01 | Stop reason: HOSPADM

## 2022-01-01 RX ORDER — POLYETHYLENE GLYCOL 3350 17 G/17G
17 POWDER, FOR SOLUTION ORAL DAILY
Status: DISCONTINUED | OUTPATIENT
Start: 2022-01-01 | End: 2022-01-01 | Stop reason: HOSPADM

## 2022-01-01 RX ORDER — FENTANYL CITRATE 50 UG/ML
50 INJECTION, SOLUTION INTRAMUSCULAR; INTRAVENOUS ONCE
Status: COMPLETED | OUTPATIENT
Start: 2022-01-01 | End: 2022-01-01

## 2022-01-01 RX ORDER — LOSARTAN POTASSIUM 25 MG/1
25 TABLET ORAL DAILY
Status: DISCONTINUED | OUTPATIENT
Start: 2022-01-01 | End: 2022-01-01 | Stop reason: HOSPADM

## 2022-01-01 RX ORDER — SODIUM CHLORIDE 0.9 % (FLUSH) 0.9 %
5-40 SYRINGE (ML) INJECTION EVERY 12 HOURS SCHEDULED
Status: CANCELLED | OUTPATIENT
Start: 2022-01-01

## 2022-01-01 RX ORDER — LORAZEPAM 2 MG/ML
0.5 INJECTION INTRAMUSCULAR ONCE
Status: COMPLETED | OUTPATIENT
Start: 2022-01-01 | End: 2022-01-01

## 2022-01-01 RX ORDER — SODIUM CHLORIDE 0.9 % (FLUSH) 0.9 %
5-40 SYRINGE (ML) INJECTION EVERY 12 HOURS SCHEDULED
Status: DISCONTINUED | OUTPATIENT
Start: 2022-01-01 | End: 2022-01-01 | Stop reason: HOSPADM

## 2022-01-01 RX ORDER — BISACODYL 10 MG
10 SUPPOSITORY, RECTAL RECTAL ONCE
Status: DISCONTINUED | OUTPATIENT
Start: 2022-01-01 | End: 2022-01-01 | Stop reason: HOSPADM

## 2022-01-01 RX ORDER — OXYCODONE HYDROCHLORIDE 5 MG/1
2.5 TABLET ORAL EVERY 6 HOURS PRN
Qty: 14 TABLET | Refills: 0 | Status: SHIPPED | OUTPATIENT
Start: 2022-01-01 | End: 2022-01-01 | Stop reason: SDUPTHER

## 2022-01-01 RX ORDER — SODIUM CHLORIDE 9 MG/ML
INJECTION, SOLUTION INTRAVENOUS PRN
Status: DISCONTINUED | OUTPATIENT
Start: 2022-01-01 | End: 2022-01-01 | Stop reason: HOSPADM

## 2022-01-01 RX ORDER — POTASSIUM CHLORIDE 20 MEQ/1
20 TABLET, EXTENDED RELEASE ORAL 2 TIMES DAILY WITH MEALS
Status: DISCONTINUED | OUTPATIENT
Start: 2022-01-01 | End: 2022-01-01 | Stop reason: HOSPADM

## 2022-01-01 RX ORDER — ONDANSETRON 2 MG/ML
4 INJECTION INTRAMUSCULAR; INTRAVENOUS EVERY 6 HOURS PRN
Status: CANCELLED | OUTPATIENT
Start: 2022-01-01

## 2022-01-01 RX ORDER — LIDOCAINE HYDROCHLORIDE 10 MG/ML
5 INJECTION, SOLUTION INFILTRATION; PERINEURAL ONCE
Status: COMPLETED | OUTPATIENT
Start: 2022-01-01 | End: 2022-01-01

## 2022-01-01 RX ORDER — LORAZEPAM 2 MG/ML
0.5 INJECTION INTRAMUSCULAR EVERY 4 HOURS PRN
Status: DISCONTINUED | OUTPATIENT
Start: 2022-01-01 | End: 2022-01-01 | Stop reason: HOSPADM

## 2022-01-01 RX ORDER — ISOSORBIDE MONONITRATE 30 MG/1
90 TABLET, EXTENDED RELEASE ORAL DAILY
Qty: 30 TABLET | Refills: 3 | Status: SHIPPED | OUTPATIENT
Start: 2022-01-01

## 2022-01-01 RX ORDER — ACETAMINOPHEN 325 MG/1
650 TABLET ORAL EVERY 6 HOURS PRN
Status: DISCONTINUED | OUTPATIENT
Start: 2022-01-01 | End: 2022-01-01 | Stop reason: HOSPADM

## 2022-01-01 RX ORDER — ISOSORBIDE MONONITRATE 60 MG/1
30 TABLET, EXTENDED RELEASE ORAL ONCE
Status: COMPLETED | OUTPATIENT
Start: 2022-01-01 | End: 2022-01-01

## 2022-01-01 RX ORDER — METOPROLOL SUCCINATE 50 MG/1
50 TABLET, EXTENDED RELEASE ORAL DAILY
Status: DISCONTINUED | OUTPATIENT
Start: 2022-01-01 | End: 2022-01-01 | Stop reason: HOSPADM

## 2022-01-01 RX ORDER — FUROSEMIDE 20 MG/1
20 TABLET ORAL 2 TIMES DAILY
Status: DISCONTINUED | OUTPATIENT
Start: 2022-01-01 | End: 2022-01-01

## 2022-01-01 RX ORDER — METOPROLOL SUCCINATE 25 MG/1
25 TABLET, EXTENDED RELEASE ORAL 2 TIMES DAILY
Status: DISCONTINUED | OUTPATIENT
Start: 2022-01-01 | End: 2022-01-01

## 2022-01-01 RX ORDER — CITALOPRAM 10 MG/1
10 TABLET ORAL DAILY
COMMUNITY

## 2022-01-01 RX ORDER — POTASSIUM CHLORIDE 750 MG/1
40 CAPSULE, EXTENDED RELEASE ORAL EVERY 4 HOURS
Status: COMPLETED | OUTPATIENT
Start: 2022-01-01 | End: 2022-01-01

## 2022-01-01 RX ORDER — ACETAMINOPHEN 650 MG/1
650 SUPPOSITORY RECTAL EVERY 6 HOURS PRN
Status: DISCONTINUED | OUTPATIENT
Start: 2022-01-01 | End: 2022-01-01 | Stop reason: HOSPADM

## 2022-01-01 RX ORDER — POTASSIUM CHLORIDE 750 MG/1
10 CAPSULE, EXTENDED RELEASE ORAL 2 TIMES DAILY
Status: DISCONTINUED | OUTPATIENT
Start: 2022-01-01 | End: 2022-01-01

## 2022-01-01 RX ORDER — ONDANSETRON 2 MG/ML
4 INJECTION INTRAMUSCULAR; INTRAVENOUS EVERY 6 HOURS PRN
Status: DISCONTINUED | OUTPATIENT
Start: 2022-01-01 | End: 2022-01-01 | Stop reason: HOSPADM

## 2022-01-01 RX ORDER — FUROSEMIDE 10 MG/ML
20 INJECTION INTRAMUSCULAR; INTRAVENOUS ONCE
Status: DISCONTINUED | OUTPATIENT
Start: 2022-01-01 | End: 2022-01-01

## 2022-01-01 RX ORDER — METOPROLOL SUCCINATE 50 MG/1
50 TABLET, EXTENDED RELEASE ORAL 2 TIMES DAILY
Status: DISCONTINUED | OUTPATIENT
Start: 2022-01-01 | End: 2022-01-01

## 2022-01-01 RX ORDER — ACETAMINOPHEN 80 MG
TABLET,CHEWABLE ORAL ONCE
Status: COMPLETED | OUTPATIENT
Start: 2022-01-01 | End: 2022-01-01

## 2022-01-01 RX ORDER — ATORVASTATIN CALCIUM 40 MG/1
40 TABLET, FILM COATED ORAL NIGHTLY
COMMUNITY

## 2022-01-01 RX ORDER — FAMOTIDINE 20 MG/1
20 TABLET, FILM COATED ORAL 2 TIMES DAILY
COMMUNITY

## 2022-01-01 RX ORDER — POLYETHYLENE GLYCOL 3350 17 G/17G
17 POWDER, FOR SOLUTION ORAL DAILY PRN
COMMUNITY

## 2022-01-01 RX ORDER — OXYCODONE HYDROCHLORIDE 5 MG/1
2.5 TABLET ORAL ONCE
Status: COMPLETED | OUTPATIENT
Start: 2022-01-01 | End: 2022-01-01

## 2022-01-01 RX ORDER — CITALOPRAM 10 MG/1
10 TABLET ORAL DAILY
Status: DISCONTINUED | OUTPATIENT
Start: 2022-01-01 | End: 2022-01-01 | Stop reason: HOSPADM

## 2022-01-01 RX ORDER — FUROSEMIDE 20 MG/1
20 TABLET ORAL DAILY
Status: DISCONTINUED | OUTPATIENT
Start: 2022-01-01 | End: 2022-01-01

## 2022-01-01 RX ORDER — PANTOPRAZOLE SODIUM 40 MG/1
40 TABLET, DELAYED RELEASE ORAL
Status: DISCONTINUED | OUTPATIENT
Start: 2022-01-01 | End: 2022-01-01 | Stop reason: HOSPADM

## 2022-01-01 RX ORDER — ONDANSETRON 4 MG/1
4 TABLET, ORALLY DISINTEGRATING ORAL EVERY 8 HOURS PRN
Status: DISCONTINUED | OUTPATIENT
Start: 2022-01-01 | End: 2022-01-01 | Stop reason: HOSPADM

## 2022-01-01 RX ORDER — SODIUM CHLORIDE 9 MG/ML
INJECTION, SOLUTION INTRAVENOUS CONTINUOUS
Status: DISPENSED | OUTPATIENT
Start: 2022-01-01 | End: 2022-01-01

## 2022-01-01 RX ORDER — MAGNESIUM SULFATE IN WATER 40 MG/ML
2000 INJECTION, SOLUTION INTRAVENOUS ONCE
Status: COMPLETED | OUTPATIENT
Start: 2022-01-01 | End: 2022-01-01

## 2022-01-01 RX ORDER — LOSARTAN POTASSIUM 25 MG/1
25 TABLET ORAL DAILY
Qty: 30 TABLET | Refills: 3 | Status: SHIPPED | OUTPATIENT
Start: 2022-01-01

## 2022-01-01 RX ORDER — METOPROLOL SUCCINATE 25 MG/1
25 TABLET, EXTENDED RELEASE ORAL DAILY
Status: DISCONTINUED | OUTPATIENT
Start: 2022-01-01 | End: 2022-01-01

## 2022-01-01 RX ORDER — LORAZEPAM 2 MG/ML
0.5 INJECTION INTRAMUSCULAR EVERY 4 HOURS PRN
Status: CANCELLED | OUTPATIENT
Start: 2022-01-01

## 2022-01-01 RX ORDER — SODIUM CHLORIDE 9 MG/ML
INJECTION, SOLUTION INTRAVENOUS CONTINUOUS
Status: DISCONTINUED | OUTPATIENT
Start: 2022-01-01 | End: 2022-01-01

## 2022-01-01 RX ORDER — ASPIRIN 81 MG/1
81 TABLET, CHEWABLE ORAL DAILY
Status: DISCONTINUED | OUTPATIENT
Start: 2022-01-01 | End: 2022-01-01 | Stop reason: HOSPADM

## 2022-01-01 RX ORDER — AMIODARONE HYDROCHLORIDE 200 MG/1
200 TABLET ORAL 2 TIMES DAILY
Status: DISCONTINUED | OUTPATIENT
Start: 2022-01-01 | End: 2022-01-01 | Stop reason: HOSPADM

## 2022-01-01 RX ORDER — 0.9 % SODIUM CHLORIDE 0.9 %
250 INTRAVENOUS SOLUTION INTRAVENOUS ONCE
Status: COMPLETED | OUTPATIENT
Start: 2022-01-01 | End: 2022-01-01

## 2022-01-01 RX ORDER — OXYCODONE HYDROCHLORIDE 5 MG/1
2.5 TABLET ORAL EVERY 6 HOURS PRN
Status: DISCONTINUED | OUTPATIENT
Start: 2022-01-01 | End: 2022-01-01 | Stop reason: HOSPADM

## 2022-01-01 RX ORDER — AMIODARONE HYDROCHLORIDE 200 MG/1
200 TABLET ORAL DAILY
Qty: 30 TABLET | Refills: 3 | Status: SHIPPED | OUTPATIENT
Start: 2022-01-01

## 2022-01-01 RX ORDER — SENNA PLUS 8.6 MG/1
1 TABLET ORAL NIGHTLY
Status: DISCONTINUED | OUTPATIENT
Start: 2022-01-01 | End: 2022-01-01 | Stop reason: HOSPADM

## 2022-01-01 RX ORDER — SODIUM CHLORIDE 9 MG/ML
INJECTION, SOLUTION INTRAVENOUS PRN
Status: DISCONTINUED | OUTPATIENT
Start: 2022-01-01 | End: 2022-01-01

## 2022-01-01 RX ORDER — ACETAMINOPHEN 325 MG/1
650 TABLET ORAL EVERY 6 HOURS PRN
Status: CANCELLED | OUTPATIENT
Start: 2022-01-01

## 2022-01-01 RX ORDER — POTASSIUM CHLORIDE 7.45 MG/ML
10 INJECTION INTRAVENOUS ONCE
Status: DISCONTINUED | OUTPATIENT
Start: 2022-01-01 | End: 2022-01-01

## 2022-01-01 RX ORDER — METOLAZONE 2.5 MG/1
2.5 TABLET ORAL
COMMUNITY

## 2022-01-01 RX ORDER — POTASSIUM CHLORIDE 7.45 MG/ML
10 INJECTION INTRAVENOUS
Status: COMPLETED | OUTPATIENT
Start: 2022-01-01 | End: 2022-01-01

## 2022-01-01 RX ORDER — DOCUSATE SODIUM 100 MG/1
100 CAPSULE, LIQUID FILLED ORAL 2 TIMES DAILY PRN
Status: DISCONTINUED | OUTPATIENT
Start: 2022-01-01 | End: 2022-01-01 | Stop reason: HOSPADM

## 2022-01-01 RX ORDER — ISOSORBIDE MONONITRATE 60 MG/1
60 TABLET, EXTENDED RELEASE ORAL DAILY
Status: DISCONTINUED | OUTPATIENT
Start: 2022-01-01 | End: 2022-01-01

## 2022-01-01 RX ORDER — CYCLOBENZAPRINE HCL 10 MG
5 TABLET ORAL ONCE
Status: COMPLETED | OUTPATIENT
Start: 2022-01-01 | End: 2022-01-01

## 2022-01-01 RX ORDER — ISOSORBIDE MONONITRATE 30 MG/1
30 TABLET, EXTENDED RELEASE ORAL ONCE
Status: COMPLETED | OUTPATIENT
Start: 2022-01-01 | End: 2022-01-01

## 2022-01-01 RX ORDER — ROSUVASTATIN CALCIUM 20 MG/1
20 TABLET, COATED ORAL DAILY
Status: DISCONTINUED | OUTPATIENT
Start: 2022-01-01 | End: 2022-01-01 | Stop reason: HOSPADM

## 2022-01-01 RX ORDER — LAMOTRIGINE 100 MG/1
100 TABLET ORAL 2 TIMES DAILY
Status: DISCONTINUED | OUTPATIENT
Start: 2022-01-01 | End: 2022-01-01 | Stop reason: HOSPADM

## 2022-01-01 RX ORDER — POLYETHYLENE GLYCOL 3350 17 G/17G
17 POWDER, FOR SOLUTION ORAL DAILY PRN
Status: CANCELLED | OUTPATIENT
Start: 2022-01-01

## 2022-01-01 RX ORDER — LOSARTAN POTASSIUM 50 MG/1
50 TABLET ORAL DAILY
Status: DISCONTINUED | OUTPATIENT
Start: 2022-01-01 | End: 2022-01-01

## 2022-01-01 RX ORDER — POTASSIUM CHLORIDE 20 MEQ/1
40 TABLET, EXTENDED RELEASE ORAL EVERY 4 HOURS
Status: COMPLETED | OUTPATIENT
Start: 2022-01-01 | End: 2022-01-01

## 2022-01-01 RX ORDER — POTASSIUM CHLORIDE 750 MG/1
10 CAPSULE, EXTENDED RELEASE ORAL 2 TIMES DAILY
COMMUNITY

## 2022-01-01 RX ORDER — 0.9 % SODIUM CHLORIDE 0.9 %
500 INTRAVENOUS SOLUTION INTRAVENOUS ONCE
Status: COMPLETED | OUTPATIENT
Start: 2022-01-01 | End: 2022-01-01

## 2022-01-01 RX ORDER — FAMOTIDINE 20 MG/1
20 TABLET, FILM COATED ORAL 2 TIMES DAILY
Status: DISCONTINUED | OUTPATIENT
Start: 2022-01-01 | End: 2022-01-01 | Stop reason: HOSPADM

## 2022-01-01 RX ADMIN — ACETAMINOPHEN 650 MG: 325 TABLET ORAL at 18:16

## 2022-01-01 RX ADMIN — CETIRIZINE HYDROCHLORIDE 5 MG: 10 TABLET, FILM COATED ORAL at 08:40

## 2022-01-01 RX ADMIN — LOSARTAN POTASSIUM 50 MG: 50 TABLET, FILM COATED ORAL at 08:40

## 2022-01-01 RX ADMIN — ENOXAPARIN SODIUM 40 MG: 100 INJECTION SUBCUTANEOUS at 08:25

## 2022-01-01 RX ADMIN — FUROSEMIDE 20 MG: 20 TABLET ORAL at 07:55

## 2022-01-01 RX ADMIN — MAGNESIUM SULFATE HEPTAHYDRATE 2000 MG: 40 INJECTION, SOLUTION INTRAVENOUS at 11:04

## 2022-01-01 RX ADMIN — SODIUM CHLORIDE, PRESERVATIVE FREE 10 ML: 5 INJECTION INTRAVENOUS at 21:00

## 2022-01-01 RX ADMIN — POTASSIUM CHLORIDE 20 MEQ: 1500 TABLET, EXTENDED RELEASE ORAL at 09:52

## 2022-01-01 RX ADMIN — ACETAMINOPHEN 650 MG: 325 TABLET ORAL at 01:37

## 2022-01-01 RX ADMIN — OXYCODONE 2.5 MG: 5 TABLET ORAL at 21:22

## 2022-01-01 RX ADMIN — LAMOTRIGINE 100 MG: 100 TABLET ORAL at 21:00

## 2022-01-01 RX ADMIN — HYDROMORPHONE HYDROCHLORIDE 0.25 MG: 1 INJECTION, SOLUTION INTRAMUSCULAR; INTRAVENOUS; SUBCUTANEOUS at 03:38

## 2022-01-01 RX ADMIN — CYCLOBENZAPRINE 5 MG: 10 TABLET, FILM COATED ORAL at 00:45

## 2022-01-01 RX ADMIN — LAMOTRIGINE 100 MG: 100 TABLET ORAL at 08:08

## 2022-01-01 RX ADMIN — AMIODARONE HYDROCHLORIDE 200 MG: 200 TABLET ORAL at 17:08

## 2022-01-01 RX ADMIN — SODIUM CHLORIDE, PRESERVATIVE FREE 10 ML: 5 INJECTION INTRAVENOUS at 08:38

## 2022-01-01 RX ADMIN — FUROSEMIDE 20 MG: 20 TABLET ORAL at 16:25

## 2022-01-01 RX ADMIN — POTASSIUM CHLORIDE 40 MEQ: 1500 TABLET, EXTENDED RELEASE ORAL at 08:41

## 2022-01-01 RX ADMIN — ROSUVASTATIN CALCIUM 20 MG: 20 TABLET, FILM COATED ORAL at 08:37

## 2022-01-01 RX ADMIN — METOPROLOL SUCCINATE 75 MG: 50 TABLET, EXTENDED RELEASE ORAL at 08:37

## 2022-01-01 RX ADMIN — SODIUM CHLORIDE, PRESERVATIVE FREE 10 ML: 5 INJECTION INTRAVENOUS at 08:04

## 2022-01-01 RX ADMIN — ASPIRIN 81 MG 81 MG: 81 TABLET ORAL at 08:38

## 2022-01-01 RX ADMIN — LORAZEPAM 0.5 MG: 2 INJECTION INTRAMUSCULAR; INTRAVENOUS at 18:37

## 2022-01-01 RX ADMIN — PANTOPRAZOLE SODIUM 40 MG: 40 TABLET, DELAYED RELEASE ORAL at 05:48

## 2022-01-01 RX ADMIN — POTASSIUM CHLORIDE 20 MEQ: 1500 TABLET, EXTENDED RELEASE ORAL at 17:08

## 2022-01-01 RX ADMIN — ENOXAPARIN SODIUM 40 MG: 100 INJECTION SUBCUTANEOUS at 09:53

## 2022-01-01 RX ADMIN — POLYETHYLENE GLYCOL 3350 17 G: 17 POWDER, FOR SOLUTION ORAL at 08:04

## 2022-01-01 RX ADMIN — SODIUM CHLORIDE, PRESERVATIVE FREE 10 ML: 5 INJECTION INTRAVENOUS at 08:25

## 2022-01-01 RX ADMIN — ROSUVASTATIN CALCIUM 20 MG: 20 TABLET, FILM COATED ORAL at 08:39

## 2022-01-01 RX ADMIN — PSYLLIUM HUSK 1 PACKET: 3.4 POWDER ORAL at 21:03

## 2022-01-01 RX ADMIN — ISOSORBIDE MONONITRATE 30 MG: 30 TABLET, EXTENDED RELEASE ORAL at 10:32

## 2022-01-01 RX ADMIN — METOPROLOL SUCCINATE 75 MG: 50 TABLET, EXTENDED RELEASE ORAL at 07:56

## 2022-01-01 RX ADMIN — LAMOTRIGINE 100 MG: 100 TABLET ORAL at 21:12

## 2022-01-01 RX ADMIN — PSYLLIUM HUSK 1 PACKET: 3.4 POWDER ORAL at 09:53

## 2022-01-01 RX ADMIN — LIDOCAINE HYDROCHLORIDE 5 ML: 10 INJECTION, SOLUTION INFILTRATION; PERINEURAL at 11:04

## 2022-01-01 RX ADMIN — STANDARDIZED SENNA CONCENTRATE 8.6 MG: 8.6 TABLET ORAL at 20:57

## 2022-01-01 RX ADMIN — LAMOTRIGINE 100 MG: 100 TABLET ORAL at 21:03

## 2022-01-01 RX ADMIN — OXYCODONE 2.5 MG: 5 TABLET ORAL at 05:50

## 2022-01-01 RX ADMIN — DEXTROSE MONOHYDRATE 150 MG: 50 INJECTION, SOLUTION INTRAVENOUS at 13:05

## 2022-01-01 RX ADMIN — POLYETHYLENE GLYCOL 3350 17 G: 17 POWDER, FOR SOLUTION ORAL at 09:53

## 2022-01-01 RX ADMIN — FAMOTIDINE 20 MG: 20 TABLET ORAL at 09:53

## 2022-01-01 RX ADMIN — LAMOTRIGINE 100 MG: 100 TABLET ORAL at 08:37

## 2022-01-01 RX ADMIN — OXYCODONE 2.5 MG: 5 TABLET ORAL at 00:46

## 2022-01-01 RX ADMIN — Medication 10 ML: at 01:07

## 2022-01-01 RX ADMIN — STANDARDIZED SENNA CONCENTRATE 8.6 MG: 8.6 TABLET ORAL at 21:20

## 2022-01-01 RX ADMIN — CETIRIZINE HYDROCHLORIDE 5 MG: 10 TABLET, FILM COATED ORAL at 08:09

## 2022-01-01 RX ADMIN — SODIUM CHLORIDE, PRESERVATIVE FREE 10 ML: 5 INJECTION INTRAVENOUS at 08:49

## 2022-01-01 RX ADMIN — POTASSIUM CHLORIDE 10 MEQ: 750 CAPSULE, EXTENDED RELEASE ORAL at 21:00

## 2022-01-01 RX ADMIN — LAMOTRIGINE 100 MG: 100 TABLET ORAL at 09:52

## 2022-01-01 RX ADMIN — HYDROMORPHONE HYDROCHLORIDE 0.25 MG: 1 INJECTION, SOLUTION INTRAMUSCULAR; INTRAVENOUS; SUBCUTANEOUS at 18:12

## 2022-01-01 RX ADMIN — OXYCODONE 2.5 MG: 5 TABLET ORAL at 11:10

## 2022-01-01 RX ADMIN — FUROSEMIDE 20 MG: 20 TABLET ORAL at 09:43

## 2022-01-01 RX ADMIN — POTASSIUM CHLORIDE 20 MEQ: 1500 TABLET, EXTENDED RELEASE ORAL at 08:09

## 2022-01-01 RX ADMIN — HYDROMORPHONE HYDROCHLORIDE 0.25 MG: 1 INJECTION, SOLUTION INTRAMUSCULAR; INTRAVENOUS; SUBCUTANEOUS at 22:44

## 2022-01-01 RX ADMIN — PSYLLIUM HUSK 1 PACKET: 3.4 POWDER ORAL at 21:00

## 2022-01-01 RX ADMIN — FUROSEMIDE 20 MG: 20 TABLET ORAL at 08:38

## 2022-01-01 RX ADMIN — POTASSIUM CHLORIDE 10 MEQ: 7.46 INJECTION, SOLUTION INTRAVENOUS at 10:34

## 2022-01-01 RX ADMIN — ROSUVASTATIN CALCIUM 20 MG: 20 TABLET, FILM COATED ORAL at 09:43

## 2022-01-01 RX ADMIN — SODIUM CHLORIDE: 9 INJECTION, SOLUTION INTRAVENOUS at 01:04

## 2022-01-01 RX ADMIN — ASPIRIN 81 MG 81 MG: 81 TABLET ORAL at 07:55

## 2022-01-01 RX ADMIN — FUROSEMIDE 20 MG: 20 TABLET ORAL at 08:09

## 2022-01-01 RX ADMIN — LAMOTRIGINE 100 MG: 100 TABLET ORAL at 08:40

## 2022-01-01 RX ADMIN — POTASSIUM CHLORIDE 10 MEQ: 7.46 INJECTION, SOLUTION INTRAVENOUS at 09:23

## 2022-01-01 RX ADMIN — ASPIRIN 81 MG 81 MG: 81 TABLET ORAL at 08:40

## 2022-01-01 RX ADMIN — LOSARTAN POTASSIUM 50 MG: 50 TABLET, FILM COATED ORAL at 07:55

## 2022-01-01 RX ADMIN — POTASSIUM CHLORIDE 10 MEQ: 750 CAPSULE, EXTENDED RELEASE ORAL at 10:52

## 2022-01-01 RX ADMIN — ASPIRIN 81 MG 81 MG: 81 TABLET ORAL at 08:09

## 2022-01-01 RX ADMIN — LAMOTRIGINE 100 MG: 100 TABLET ORAL at 08:25

## 2022-01-01 RX ADMIN — POTASSIUM CHLORIDE 40 MEQ: 10 CAPSULE, COATED, EXTENDED RELEASE ORAL at 12:32

## 2022-01-01 RX ADMIN — ISOSORBIDE MONONITRATE 90 MG: 60 TABLET, EXTENDED RELEASE ORAL at 08:37

## 2022-01-01 RX ADMIN — ONDANSETRON 4 MG: 2 INJECTION INTRAMUSCULAR; INTRAVENOUS at 05:57

## 2022-01-01 RX ADMIN — POTASSIUM CHLORIDE 40 MEQ: 1500 TABLET, EXTENDED RELEASE ORAL at 16:25

## 2022-01-01 RX ADMIN — FAMOTIDINE 20 MG: 20 TABLET ORAL at 08:09

## 2022-01-01 RX ADMIN — FUROSEMIDE 20 MG: 20 TABLET ORAL at 08:39

## 2022-01-01 RX ADMIN — CETIRIZINE HYDROCHLORIDE 5 MG: 10 TABLET, FILM COATED ORAL at 08:38

## 2022-01-01 RX ADMIN — LAMOTRIGINE 100 MG: 100 TABLET ORAL at 21:20

## 2022-01-01 RX ADMIN — HYDROMORPHONE HYDROCHLORIDE 0.25 MG: 1 INJECTION, SOLUTION INTRAMUSCULAR; INTRAVENOUS; SUBCUTANEOUS at 07:38

## 2022-01-01 RX ADMIN — ENOXAPARIN SODIUM 40 MG: 100 INJECTION SUBCUTANEOUS at 08:36

## 2022-01-01 RX ADMIN — FAMOTIDINE 20 MG: 20 TABLET ORAL at 21:11

## 2022-01-01 RX ADMIN — CETIRIZINE HYDROCHLORIDE 5 MG: 10 TABLET, FILM COATED ORAL at 09:53

## 2022-01-01 RX ADMIN — ASPIRIN 81 MG 81 MG: 81 TABLET ORAL at 09:42

## 2022-01-01 RX ADMIN — AMIODARONE HYDROCHLORIDE 1 MG/MIN: 50 INJECTION, SOLUTION INTRAVENOUS at 13:17

## 2022-01-01 RX ADMIN — Medication: at 12:51

## 2022-01-01 RX ADMIN — AMIODARONE HYDROCHLORIDE 200 MG: 200 TABLET ORAL at 08:09

## 2022-01-01 RX ADMIN — STANDARDIZED SENNA CONCENTRATE 8.6 MG: 8.6 TABLET ORAL at 21:12

## 2022-01-01 RX ADMIN — LAMOTRIGINE 100 MG: 100 TABLET ORAL at 20:57

## 2022-01-01 RX ADMIN — CITALOPRAM HYDROBROMIDE 10 MG: 10 TABLET ORAL at 08:37

## 2022-01-01 RX ADMIN — CETIRIZINE HYDROCHLORIDE 5 MG: 10 TABLET, FILM COATED ORAL at 09:44

## 2022-01-01 RX ADMIN — CETIRIZINE HYDROCHLORIDE 5 MG: 10 TABLET, FILM COATED ORAL at 08:24

## 2022-01-01 RX ADMIN — POTASSIUM CHLORIDE 10 MEQ: 750 CAPSULE, EXTENDED RELEASE ORAL at 08:25

## 2022-01-01 RX ADMIN — PANTOPRAZOLE SODIUM 40 MG: 40 TABLET, DELAYED RELEASE ORAL at 06:12

## 2022-01-01 RX ADMIN — SODIUM CHLORIDE, PRESERVATIVE FREE 10 ML: 5 INJECTION INTRAVENOUS at 09:43

## 2022-01-01 RX ADMIN — ROSUVASTATIN CALCIUM 20 MG: 20 TABLET, FILM COATED ORAL at 09:53

## 2022-01-01 RX ADMIN — FAMOTIDINE 20 MG: 10 INJECTION, SOLUTION INTRAVENOUS at 21:07

## 2022-01-01 RX ADMIN — METOLAZONE 2.5 MG: 2.5 TABLET ORAL at 08:09

## 2022-01-01 RX ADMIN — ASPIRIN 81 MG 81 MG: 81 TABLET ORAL at 09:53

## 2022-01-01 RX ADMIN — POLYETHYLENE GLYCOL 3350 17 G: 17 POWDER, FOR SOLUTION ORAL at 08:09

## 2022-01-01 RX ADMIN — ENOXAPARIN SODIUM 30 MG: 100 INJECTION SUBCUTANEOUS at 07:56

## 2022-01-01 RX ADMIN — LAMOTRIGINE 100 MG: 100 TABLET ORAL at 09:56

## 2022-01-01 RX ADMIN — FAMOTIDINE 20 MG: 20 TABLET ORAL at 08:25

## 2022-01-01 RX ADMIN — ISOSORBIDE MONONITRATE 90 MG: 60 TABLET, EXTENDED RELEASE ORAL at 08:25

## 2022-01-01 RX ADMIN — HYDROMORPHONE HYDROCHLORIDE 0.25 MG: 1 INJECTION, SOLUTION INTRAMUSCULAR; INTRAVENOUS; SUBCUTANEOUS at 10:59

## 2022-01-01 RX ADMIN — ISOSORBIDE MONONITRATE 30 MG: 60 TABLET, EXTENDED RELEASE ORAL at 09:53

## 2022-01-01 RX ADMIN — METOPROLOL SUCCINATE 25 MG: 25 TABLET, FILM COATED, EXTENDED RELEASE ORAL at 09:52

## 2022-01-01 RX ADMIN — FAMOTIDINE 20 MG: 20 TABLET ORAL at 20:57

## 2022-01-01 RX ADMIN — SODIUM CHLORIDE, PRESERVATIVE FREE 10 ML: 5 INJECTION INTRAVENOUS at 21:22

## 2022-01-01 RX ADMIN — SODIUM CHLORIDE 250 ML: 9 INJECTION, SOLUTION INTRAVENOUS at 18:15

## 2022-01-01 RX ADMIN — POTASSIUM CHLORIDE 20 MEQ: 1500 TABLET, EXTENDED RELEASE ORAL at 20:56

## 2022-01-01 RX ADMIN — POLYETHYLENE GLYCOL 3350 17 G: 17 POWDER, FOR SOLUTION ORAL at 08:36

## 2022-01-01 RX ADMIN — ISOSORBIDE MONONITRATE 90 MG: 60 TABLET, EXTENDED RELEASE ORAL at 10:50

## 2022-01-01 RX ADMIN — ISOSORBIDE MONONITRATE 60 MG: 60 TABLET, EXTENDED RELEASE ORAL at 08:40

## 2022-01-01 RX ADMIN — PANTOPRAZOLE SODIUM 40 MG: 40 TABLET, DELAYED RELEASE ORAL at 06:19

## 2022-01-01 RX ADMIN — ENOXAPARIN SODIUM 30 MG: 100 INJECTION SUBCUTANEOUS at 08:09

## 2022-01-01 RX ADMIN — POLYETHYLENE GLYCOL 3350 17 G: 17 POWDER, FOR SOLUTION ORAL at 08:24

## 2022-01-01 RX ADMIN — PSYLLIUM HUSK 1 PACKET: 3.4 POWDER ORAL at 21:12

## 2022-01-01 RX ADMIN — SODIUM CHLORIDE: 9 INJECTION, SOLUTION INTRAVENOUS at 11:00

## 2022-01-01 RX ADMIN — SODIUM CHLORIDE, PRESERVATIVE FREE 10 ML: 5 INJECTION INTRAVENOUS at 21:04

## 2022-01-01 RX ADMIN — PSYLLIUM HUSK 1 PACKET: 3.4 POWDER ORAL at 20:57

## 2022-01-01 RX ADMIN — POTASSIUM CHLORIDE 40 MEQ: 10 CAPSULE, COATED, EXTENDED RELEASE ORAL at 16:47

## 2022-01-01 RX ADMIN — CETIRIZINE HYDROCHLORIDE 5 MG: 10 TABLET, FILM COATED ORAL at 07:56

## 2022-01-01 RX ADMIN — ISOSORBIDE MONONITRATE 60 MG: 60 TABLET, EXTENDED RELEASE ORAL at 09:42

## 2022-01-01 RX ADMIN — CITALOPRAM HYDROBROMIDE 10 MG: 10 TABLET ORAL at 09:53

## 2022-01-01 RX ADMIN — ENOXAPARIN SODIUM 30 MG: 100 INJECTION SUBCUTANEOUS at 09:43

## 2022-01-01 RX ADMIN — ACETAMINOPHEN 650 MG: 325 TABLET ORAL at 08:37

## 2022-01-01 RX ADMIN — SODIUM CHLORIDE, PRESERVATIVE FREE 10 ML: 5 INJECTION INTRAVENOUS at 21:20

## 2022-01-01 RX ADMIN — METOPROLOL SUCCINATE 75 MG: 50 TABLET, EXTENDED RELEASE ORAL at 09:42

## 2022-01-01 RX ADMIN — LOSARTAN POTASSIUM 50 MG: 50 TABLET, FILM COATED ORAL at 08:24

## 2022-01-01 RX ADMIN — ROSUVASTATIN CALCIUM 20 MG: 20 TABLET, FILM COATED ORAL at 08:24

## 2022-01-01 RX ADMIN — PANTOPRAZOLE SODIUM 40 MG: 40 TABLET, DELAYED RELEASE ORAL at 07:56

## 2022-01-01 RX ADMIN — LORAZEPAM 0.5 MG: 2 INJECTION INTRAMUSCULAR; INTRAVENOUS at 09:37

## 2022-01-01 RX ADMIN — Medication 10 ML: at 22:46

## 2022-01-01 RX ADMIN — ENOXAPARIN SODIUM 30 MG: 100 INJECTION SUBCUTANEOUS at 08:39

## 2022-01-01 RX ADMIN — LOSARTAN POTASSIUM 50 MG: 50 TABLET, FILM COATED ORAL at 09:57

## 2022-01-01 RX ADMIN — OXYCODONE 2.5 MG: 5 TABLET ORAL at 18:29

## 2022-01-01 RX ADMIN — SODIUM CHLORIDE, PRESERVATIVE FREE 10 ML: 5 INJECTION INTRAVENOUS at 08:10

## 2022-01-01 RX ADMIN — NITROGLYCERIN 0.4 MG: 0.4 TABLET, ORALLY DISINTEGRATING SUBLINGUAL at 07:45

## 2022-01-01 RX ADMIN — OXYCODONE 2.5 MG: 5 TABLET ORAL at 04:15

## 2022-01-01 RX ADMIN — AMIODARONE HYDROCHLORIDE 0.5 MG/MIN: 50 INJECTION, SOLUTION INTRAVENOUS at 03:17

## 2022-01-01 RX ADMIN — MENTHOL AND METHYL SALICYLATE: 7.6; 29 OINTMENT TOPICAL at 01:37

## 2022-01-01 RX ADMIN — FUROSEMIDE 20 MG: 20 TABLET ORAL at 08:25

## 2022-01-01 RX ADMIN — PSYLLIUM HUSK 1 PACKET: 3.4 POWDER ORAL at 08:36

## 2022-01-01 RX ADMIN — LORAZEPAM 0.5 MG: 2 INJECTION INTRAMUSCULAR; INTRAVENOUS at 13:54

## 2022-01-01 RX ADMIN — FENTANYL CITRATE 50 MCG: 50 INJECTION, SOLUTION INTRAMUSCULAR; INTRAVENOUS at 21:31

## 2022-01-01 RX ADMIN — METOPROLOL SUCCINATE 25 MG: 25 TABLET, FILM COATED, EXTENDED RELEASE ORAL at 20:57

## 2022-01-01 RX ADMIN — POTASSIUM CHLORIDE 10 MEQ: 750 CAPSULE, EXTENDED RELEASE ORAL at 21:12

## 2022-01-01 RX ADMIN — SODIUM CHLORIDE, PRESERVATIVE FREE 10 ML: 5 INJECTION INTRAVENOUS at 21:12

## 2022-01-01 RX ADMIN — PANTOPRAZOLE SODIUM 40 MG: 40 TABLET, DELAYED RELEASE ORAL at 09:42

## 2022-01-01 RX ADMIN — ACETAMINOPHEN 650 MG: 325 TABLET ORAL at 13:59

## 2022-01-01 RX ADMIN — HYDROMORPHONE HYDROCHLORIDE 0.25 MG: 1 INJECTION, SOLUTION INTRAMUSCULAR; INTRAVENOUS; SUBCUTANEOUS at 14:26

## 2022-01-01 RX ADMIN — CITALOPRAM HYDROBROMIDE 10 MG: 10 TABLET ORAL at 08:25

## 2022-01-01 RX ADMIN — SODIUM CHLORIDE 500 ML: 9 INJECTION, SOLUTION INTRAVENOUS at 15:22

## 2022-01-01 RX ADMIN — FAMOTIDINE 20 MG: 20 TABLET ORAL at 21:00

## 2022-01-01 RX ADMIN — ROSUVASTATIN CALCIUM 20 MG: 20 TABLET, FILM COATED ORAL at 07:55

## 2022-01-01 RX ADMIN — POTASSIUM CHLORIDE 20 MEQ: 1500 TABLET, EXTENDED RELEASE ORAL at 18:29

## 2022-01-01 RX ADMIN — METOLAZONE 2.5 MG: 2.5 TABLET ORAL at 08:25

## 2022-01-01 RX ADMIN — FAMOTIDINE 20 MG: 20 TABLET ORAL at 21:03

## 2022-01-01 RX ADMIN — METOPROLOL SUCCINATE 75 MG: 50 TABLET, EXTENDED RELEASE ORAL at 08:24

## 2022-01-01 RX ADMIN — ROSUVASTATIN CALCIUM 20 MG: 20 TABLET, FILM COATED ORAL at 08:08

## 2022-01-01 RX ADMIN — PANTOPRAZOLE SODIUM 40 MG: 40 TABLET, DELAYED RELEASE ORAL at 05:52

## 2022-01-01 RX ADMIN — STANDARDIZED SENNA CONCENTRATE 8.6 MG: 8.6 TABLET ORAL at 21:03

## 2022-01-01 RX ADMIN — OXYCODONE 2.5 MG: 5 TABLET ORAL at 12:51

## 2022-01-01 RX ADMIN — POTASSIUM CHLORIDE 10 MEQ: 7.46 INJECTION, SOLUTION INTRAVENOUS at 13:10

## 2022-01-01 RX ADMIN — PANTOPRAZOLE SODIUM 40 MG: 40 TABLET, DELAYED RELEASE ORAL at 06:34

## 2022-01-01 RX ADMIN — METOPROLOL SUCCINATE 75 MG: 50 TABLET, EXTENDED RELEASE ORAL at 08:40

## 2022-01-01 RX ADMIN — METHYLPREDNISOLONE ACETATE 80 MG: 80 INJECTION, SUSPENSION INTRA-ARTICULAR; INTRALESIONAL; INTRAMUSCULAR; SOFT TISSUE at 10:43

## 2022-01-01 RX ADMIN — METOPROLOL SUCCINATE 25 MG: 25 TABLET, FILM COATED, EXTENDED RELEASE ORAL at 08:09

## 2022-01-01 RX ADMIN — LAMOTRIGINE 100 MG: 100 TABLET ORAL at 07:55

## 2022-01-01 RX ADMIN — POLYETHYLENE GLYCOL 3350 17 G: 17 POWDER, FOR SOLUTION ORAL at 10:32

## 2022-01-01 RX ADMIN — CITALOPRAM HYDROBROMIDE 10 MG: 10 TABLET ORAL at 08:09

## 2022-01-01 RX ADMIN — ACETAMINOPHEN 650 MG: 325 TABLET ORAL at 06:12

## 2022-01-01 RX ADMIN — POTASSIUM CHLORIDE 10 MEQ: 750 CAPSULE, EXTENDED RELEASE ORAL at 08:37

## 2022-01-01 RX ADMIN — LAMOTRIGINE 100 MG: 100 TABLET ORAL at 21:22

## 2022-01-01 RX ADMIN — OXYCODONE 2.5 MG: 5 TABLET ORAL at 21:25

## 2022-01-01 RX ADMIN — PSYLLIUM HUSK 1 PACKET: 3.4 POWDER ORAL at 08:09

## 2022-01-01 RX ADMIN — ASPIRIN 81 MG 81 MG: 81 TABLET ORAL at 08:25

## 2022-01-01 RX ADMIN — CITALOPRAM HYDROBROMIDE 10 MG: 10 TABLET ORAL at 10:51

## 2022-01-01 RX ADMIN — PSYLLIUM HUSK 1 PACKET: 3.4 POWDER ORAL at 08:24

## 2022-01-01 RX ADMIN — FAMOTIDINE 20 MG: 20 TABLET ORAL at 08:38

## 2022-01-01 RX ADMIN — OXYCODONE 2.5 MG: 5 TABLET ORAL at 10:32

## 2022-01-01 RX ADMIN — LOSARTAN POTASSIUM 50 MG: 50 TABLET, FILM COATED ORAL at 08:37

## 2022-01-01 RX ADMIN — POTASSIUM CHLORIDE 10 MEQ: 7.46 INJECTION, SOLUTION INTRAVENOUS at 11:42

## 2022-01-01 RX ADMIN — Medication 5 ML: at 07:42

## 2022-01-01 RX ADMIN — STANDARDIZED SENNA CONCENTRATE 8.6 MG: 8.6 TABLET ORAL at 21:00

## 2022-01-01 RX ADMIN — POTASSIUM CHLORIDE 40 MEQ: 1500 TABLET, EXTENDED RELEASE ORAL at 11:42

## 2022-01-01 ASSESSMENT — PAIN DESCRIPTION - LOCATION
LOCATION: RIB CAGE;HIP
LOCATION: HIP
LOCATION: CHEST
LOCATION: HIP
LOCATION: KNEE
LOCATION: HIP
LOCATION: KNEE
LOCATION: HIP
LOCATION: KNEE
LOCATION: HIP
LOCATION: KNEE
LOCATION: HIP
LOCATION: KNEE

## 2022-01-01 ASSESSMENT — PAIN - FUNCTIONAL ASSESSMENT
PAIN_FUNCTIONAL_ASSESSMENT: NONE - DENIES PAIN
PAIN_FUNCTIONAL_ASSESSMENT: NONE - DENIES PAIN
PAIN_FUNCTIONAL_ASSESSMENT: PREVENTS OR INTERFERES SOME ACTIVE ACTIVITIES AND ADLS
PAIN_FUNCTIONAL_ASSESSMENT: INTOLERABLE, UNABLE TO DO ANY ACTIVE OR PASSIVE ACTIVITIES
PAIN_FUNCTIONAL_ASSESSMENT: NONE - DENIES PAIN
PAIN_FUNCTIONAL_ASSESSMENT: NONE - DENIES PAIN
PAIN_FUNCTIONAL_ASSESSMENT: PREVENTS OR INTERFERES SOME ACTIVE ACTIVITIES AND ADLS
PAIN_FUNCTIONAL_ASSESSMENT: 0-10

## 2022-01-01 ASSESSMENT — PAIN SCALES - GENERAL
PAINLEVEL_OUTOF10: 6
PAINLEVEL_OUTOF10: 2
PAINLEVEL_OUTOF10: 4
PAINLEVEL_OUTOF10: 7
PAINLEVEL_OUTOF10: 5
PAINLEVEL_OUTOF10: 7
PAINLEVEL_OUTOF10: 7
PAINLEVEL_OUTOF10: 8
PAINLEVEL_OUTOF10: 9
PAINLEVEL_OUTOF10: 2
PAINLEVEL_OUTOF10: 8
PAINLEVEL_OUTOF10: 5
PAINLEVEL_OUTOF10: 3
PAINLEVEL_OUTOF10: 0
PAINLEVEL_OUTOF10: 8
PAINLEVEL_OUTOF10: 8
PAINLEVEL_OUTOF10: 6
PAINLEVEL_OUTOF10: 4
PAINLEVEL_OUTOF10: 0
PAINLEVEL_OUTOF10: 3
PAINLEVEL_OUTOF10: 8
PAINLEVEL_OUTOF10: 0
PAINLEVEL_OUTOF10: 8
PAINLEVEL_OUTOF10: 4
PAINLEVEL_OUTOF10: 0
PAINLEVEL_OUTOF10: 3
PAINLEVEL_OUTOF10: 3
PAINLEVEL_OUTOF10: 4
PAINLEVEL_OUTOF10: 3
PAINLEVEL_OUTOF10: 6
PAINLEVEL_OUTOF10: 0
PAINLEVEL_OUTOF10: 5
PAINLEVEL_OUTOF10: 3

## 2022-01-01 ASSESSMENT — ENCOUNTER SYMPTOMS
SHORTNESS OF BREATH: 1
NAUSEA: 0
PHOTOPHOBIA: 0
BACK PAIN: 0
EYES NEGATIVE: 1
TROUBLE SWALLOWING: 0
ABDOMINAL DISTENTION: 0
CHEST TIGHTNESS: 0
ABDOMINAL PAIN: 0
CHEST TIGHTNESS: 0
ABDOMINAL PAIN: 0
COLOR CHANGE: 0
COUGH: 0
TROUBLE SWALLOWING: 0
VOMITING: 0
VOMITING: 1
ALLERGIC/IMMUNOLOGIC NEGATIVE: 1
ABDOMINAL PAIN: 1
SINUS PRESSURE: 0
COUGH: 0
DIARRHEA: 0
ABDOMINAL DISTENTION: 0
VOMITING: 0
SORE THROAT: 0
NAUSEA: 1
CONSTIPATION: 1
CHEST TIGHTNESS: 0
DIARRHEA: 0
RHINORRHEA: 0
SHORTNESS OF BREATH: 0
SHORTNESS OF BREATH: 0
BACK PAIN: 0
GASTROINTESTINAL NEGATIVE: 1

## 2022-01-01 ASSESSMENT — PAIN DESCRIPTION - DESCRIPTORS
DESCRIPTORS: ACHING
DESCRIPTORS: BURNING
DESCRIPTORS: ACHING

## 2022-01-01 ASSESSMENT — PAIN DESCRIPTION - PAIN TYPE
TYPE: ACUTE PAIN

## 2022-01-01 ASSESSMENT — PAIN DESCRIPTION - DIRECTION
RADIATING_TOWARDS: DOWN
RADIATING_TOWARDS: CHEST AND ABDOMEN
RADIATING_TOWARDS: RIGHT

## 2022-01-01 ASSESSMENT — PAIN DESCRIPTION - ORIENTATION
ORIENTATION: RIGHT
ORIENTATION: RIGHT
ORIENTATION: RIGHT;LEFT;INNER;LOWER;MID;UPPER
ORIENTATION: RIGHT

## 2022-01-01 ASSESSMENT — PAIN DESCRIPTION - FREQUENCY
FREQUENCY: INTERMITTENT
FREQUENCY: INTERMITTENT
FREQUENCY: CONTINUOUS

## 2022-01-01 ASSESSMENT — PAIN DESCRIPTION - ONSET
ONSET: ON-GOING
ONSET: SUDDEN
ONSET: SUDDEN
ONSET: ON-GOING

## 2022-01-01 ASSESSMENT — PAIN DESCRIPTION - PROGRESSION: CLINICAL_PROGRESSION: GRADUALLY IMPROVING

## 2022-03-28 NOTE — ED TRIAGE NOTES
Pt arrived via EMS from home with C/O right hip pain, at 1400 today the pt fell at home from standing but wasn't complaining of pain so family helped her into bed. Later the pt needed to use the restroom and then wasn't able to get off the toilet D/T severe right hip pain. EMS was called to transport to ER. EMS advised pt had a right hip injury years ago that still causes her discomfort. Pt's breathing is unlabored with equal rise, pulses are good, and color is normal.  Pt is A&O x4.

## 2022-03-29 PROBLEM — I42.0 DILATED CARDIOMYOPATHY (HCC): Status: ACTIVE | Noted: 2020-07-14

## 2022-03-29 PROBLEM — N18.9 CKD (CHRONIC KIDNEY DISEASE): Chronic | Status: ACTIVE | Noted: 2022-01-01

## 2022-03-29 PROBLEM — I10 ESSENTIAL HYPERTENSION: Chronic | Status: ACTIVE | Noted: 2017-08-30

## 2022-03-29 PROBLEM — Z95.810 PRESENCE OF AUTOMATIC (IMPLANTABLE) CARDIAC DEFIBRILLATOR: Status: ACTIVE | Noted: 2020-12-03

## 2022-03-29 PROBLEM — R29.6 FREQUENT FALLS: Status: ACTIVE | Noted: 2022-01-01

## 2022-03-29 PROBLEM — I50.9 CHRONIC HEART FAILURE (HCC): Chronic | Status: ACTIVE | Noted: 2022-01-01

## 2022-03-29 PROBLEM — R29.6 FALLS FREQUENTLY: Status: ACTIVE | Noted: 2022-01-01

## 2022-03-29 NOTE — PROGRESS NOTES
[facility] OCCUPATIONAL THERAPY EVALUATION - ACUTE     NAME: Milady Guerra  : 1934 (6 y.o.)  MRN: [id number]  CODE STATUS: DNR-CCA  Room: N226/N226-01    Date of Service: 3/29/2022    Patient Diagnosis(es): Falls frequently [R29.6]  Frequent falls [R29.6]   Chief Complaint   Patient presents with    Fall     Patient Active Problem List    Diagnosis Date Noted    Falls frequently 2022    Chronic heart failure ([facility] 2022    CKD (chronic kidney disease) 2022    Frequent falls 2022    Diverticulitis 10/17/2021    Epigastric pain 2021    Presence of automatic (implantable) cardiac defibrillator 2020    Chest pain 10/20/2020    Dilated cardiomyopathy ([facility] 2020    Central abdominal pain     Colitis 2018    Essential hypertension 2017    Chronic coronary artery disease 2015    Dyslipidemia 2015        Past Medical History:   Diagnosis Date    Diverticulitis     Hyperlipidemia     Hypertension      Past Surgical History:   Procedure Laterality Date    APPENDECTOMY      CARDIAC SURGERY      CHOLECYSTECTOMY      JOINT REPLACEMENT          Restrictions  Restrictions/Precautions: Weight Bearing (R hip dislocated- pt is not surgical candidate)  Lower Extremity Weight Bearing Restrictions  Right Lower Extremity Weight Bearing: Weight Bearing As Tolerated     Safety Devices: Safety Devices  Safety Devices in place: Yes  Type of devices:  All fall risk precautions in place        Subjective  Pre Treatment Pain Screening  Pain at present: 6  Scale Used: Numeric Score  Intervention List: Patient able to continue with treatment,Patient declined any intervention  Comments / Details: Pt states pain increases with movement but decreases when sitting    Pain Reassessment:   Pain Assessment  Patient Currently in Pain: Yes  Pain Assessment: 0-10  Pain Level: 8  Pain Type: Acute pain  Pain Location: Knee  Pain Orientation: Right  Pain Descriptors: Aching       Prior Level of Function:  Social/Functional History  Lives With: Daughter (dtr works; another dtr lives next door and assists pt 2xs/day and son assists during the day on Tues, Wed, Thurs; pt is home alone for approx 1-2 hrs and she rests in bed during the times when she is home alone)  Type of Home: House  Home Layout: Two level,Able to Live on Main level with bedroom/bathroom  Home Access: Stairs to enter with rails  Entrance Stairs - Number of Steps: 2+1  Entrance Stairs - Rails: Left  Bathroom Shower/Tub: Walk-in shower  Bathroom Equipment: Shower chair,3-in-1 commode  Home Equipment: Rolling walker,4 wheeled walker,Wheelchair-manual  ADL Assistance: Independent (intermittent assistance for pants and socks)  Homemaking Assistance: Needs assistance  Homemaking Responsibilities: No  Ambulation Assistance: Independent (uses w/c or amb with rollator inside the home and walker outside the home)  Transfer Assistance: Independent  Active : No  Patient's  Info: son  Additional Comments: family assists with grocery shopping    OBJECTIVE:     Orientation Status:  Orientation  Overall Orientation Status: Within Functional Limits    Observation:  Observation/Palpation  Posture: Fair  Observation: pt screaming out in pain with R hip movement; pt with long standing compensatory strategy of hooking L LE on R to assist with R LE movement; no c/o dizziness    Cognition Status:  Cognition  Overall Cognitive Status: WFL    Perception Status:  Perception  Overall Perceptual Status: WFL    Sensation Status:  Sensation  Overall Sensation Status: WFL    Vision and Hearing Status:  Vision  Vision: Within Functional Limits  Hearing  Hearing: Within functional limits     ROM:   LUE AROM (degrees)  LUE AROM : WFL  Left Hand AROM (degrees)  Left Hand AROM: WFL  RUE AROM (degrees)  RUE AROM : WFL  Right Hand AROM (degrees)  Right Hand AROM: WFL    Strength:  LUE Strength  Gross LUE Strength: Exceptions to [facility] Hand General: 3/5  LUE Strength Comment: 3/5 all planes  RUE Strength  Gross RUE Strength: Exceptions to [facility]  R Hand General: 3/5  RUE Strength Comment: 3/5 all planes    Coordination, Tone, Quality of Movement: Tone RUE  RUE Tone: Normotonic  Tone LUE  LUE Tone: Normotonic  Coordination  Movements Are Fluid And Coordinated: Yes    Hand Dominance:  Hand Dominance  Hand Dominance: Right    ADL Status:  ADL  Feeding: Setup  Grooming: Setup  UE Bathing: Minimal assistance  LE Bathing: Maximum assistance  UE Dressing: Minimal assistance  LE Dressing: Maximum assistance  Toileting: Maximum assistance  Additional Comments: Simulated ADLs as above. Unable to stand unsupported, limited d/t pain and fatigue. Unable to perform figure 4-long standing d/t long standing hip dislocation  Toilet Transfers  Toilet Transfer: Unable to assess  Toilet Transfers Comments: Anticipate mod A     Educated pt. and daughter on positioning of BSC and use of w/c for safest mobility at home. Pt and daughter report feeling they will be able to perform mobility with [employer] and w/c at home, and will have ample family assist with ADLs.      Therapy key for assistance levels    Independent = Pt. is able to perform task with no assistance but may require a device   Stand by assistance = Pt. does not perform task at an independent level but does not need physical assistance, requires verbal cues  Minimal, Moderate, Maximal Assistance = Pt. requires physical assistance (25%, 50%, 75% assist from helper) for task but is able to actively participate in task   Dependent = Pt. requires total assistance with task and is not able to actively participate with task completion     Functional Mobility:  Functional Mobility  Functional - Mobility Device: Rolling Walker  Activity: Other  Assist Level: Minimal assistance  Functional Mobility Comments: Min A for step pivot only to bedside chair; pt. and daughter state that there is space at home for pivoting only if needed and can use w/c as primary  Transfers  Sit to stand: Minimal assistance  Stand to sit: Minimal assistance  Transfer Comments: Increased time and effort    Bed Mobility  Bed mobility  Supine to Sit: Maximum assistance,2 Person assistance  Sit to Supine: Maximum assistance,2 Person assistance  Comment: HOB elevated; increased time and effort d/t R hip pain    Seated and Standing Balance:  Balance  Sitting Balance: Supervision  Standing Balance: Minimal assistance    Functional Endurance:  Activity Tolerance  Activity Tolerance: Patient limited by pain    D/C Recommendations:  OT D/C RECOMMENDATIONS  REQUIRES OT FOLLOW UP: Yes    Equipment Recommendations:  OT Equipment Recommendations  Other: Continue to assess    OT Education:   OT Education  OT Education: [facility] Number of Care  Patient Education: Educated pt on role of acute care OT  Barriers to Learning: None    OT Follow Up:  OT D/C RECOMMENDATIONS  REQUIRES OT FOLLOW UP: Yes       Assessment/Discharge Disposition:  Assessment: Pt is an 80year old woman from home who presents to [facility] with the above deficits which impact her ability to perform ADLs and IADLs. Pt. limited d/t pain and fatigue. Pt would benefit from continued OT to maximize independence and safety with ADL tasks.   Performance deficits / Impairments: Decreased functional mobility ,Decreased ADL status,Decreased endurance,Decreased balance,Decreased strength  Prognosis: Good  Discharge Recommendations: Continue to assess pending progress  Decision Making: Medium Complexity  History: Pt's medical history is moderately complex  Exam: Pt has 5 performance deficits  Assistance / Modification: Pt. requires mod-max A    Six Click Score   How much help for putting on and taking off regular lower body clothing?: A Lot  How much help for Bathing?: A Lot  How much help for Toileting?: A Lot  How much help for putting on and taking off regular upper body clothing?: A Little  How much help for taking care of personal grooming?: A Little  How much help for eating meals?: A Little  AM-PAC Inpatient Daily Activity Raw Score: 15  AM-PAC Inpatient ADL T-Scale Score : 34.69  ADL Inpatient CMS 0-100% Score: 56.46    Plan:  Plan  Times per week: 1-4x  Plan weeks: Length of acute stay  Current Treatment Recommendations: Hussain Merrill Mobility Training,Endurance Training,Pain Management,Safety Education & Training,Patient/Caregiver Education & Training,Equipment Evaluation, Education, & procurement,Self-Care / ADL,Positioning    Goals:   Patient will:    - Improve functional endurance to tolerate/complete 30 mins of ADL's  - Be Setup in UB ADLs   - Be Mod A in LB ADLs  - Be Min A in ADL transfers without LOB  - Be Min A in toileting tasks  - Improve B UE strength and endurance to 3+/5 in order to participate in self-care activities as projected. - Access appropriate D/C site with as few architectural barriers as possible. - Sequence self-care tasks with no verbal cues for safety    Patient Goal: Patient goals : \"I want to go home\"     Discussed and agreed upon: Yes Comments:     Therapy Time:   OT Individual Minutes  Time In: 3980  Time Out: 1603  Minutes: 32    ADL/IADL training: 10 minutes  Eval: 22 minutes     Electronically signed by:     NAVI Duffy/IFEOMA, NAVI/L  3/29/2022, 4:37 PM

## 2022-03-29 NOTE — H&P
[facility]    HISTORY AND PHYSICAL EXAM    PATIENT NAME:  Juliette Jimenes    MRN:  [id number]  SERVICE DATE:  3/29/2022   SERVICE TIME:  3:01 AM    Primary Care Physician: Debora Dong MD         SUBJECTIVE  CHIEF COMPLAINT:  Fall       HPI: The patient is a 80 y.o. female who presents with right hip pain S/P fall. She does have chronic hip pain on the right side. She has a history of right hip replacement. She was also reviewed for a surgical repair but has chronic heart failure with an EF of 10% and is not a candidate. Fall  The accident occurred 6 to 12 hours ago. The fall occurred from a bed. The pain is present in the right hip. The pain is at a severity of 6/10. The pain is moderate. The symptoms are aggravated by movement, pressure on injury and rotation. Pertinent negatives include no abdominal pain, fever, headaches, hematuria, loss of consciousness, nausea, numbness or vomiting. Treatments tried: medicated in ED. The treatment provided mild relief. PAST MEDICAL HISTORY:    Past Medical History:   Diagnosis Date    Diverticulitis     Hyperlipidemia     Hypertension      PAST SURGICAL HISTORY:    Past Surgical History:   Procedure Laterality Date    APPENDECTOMY      CARDIAC SURGERY      CHOLECYSTECTOMY      JOINT REPLACEMENT       FAMILY HISTORY:  History reviewed. No pertinent family history.   SOCIAL HISTORY:    Social History     Socioeconomic History    Marital status:      Spouse name: Not on file    Number of children: Not on file    Years of education: Not on file    Highest education level: Not on file   Occupational History    Not on file   Tobacco Use    Smoking status: Never Smoker    Smokeless tobacco: Never Used   Substance and Sexual Activity    Alcohol use: No    Drug use: No    Sexual activity: Not on file   Other Topics Concern    Not on file   Social History Narrative    Not on file     Social Determinants of Health     Financial Resource Strain:     Difficulty of Paying Living Expenses: Not on file   Food Insecurity:     Worried About Running Out of Food in the Last Year: Not on file    Alyse of Food in the Last Year: Not on file   Transportation Needs:     Lack of Transportation (Medical): Not on file    Lack of Transportation (Non-Medical): Not on file   Physical Activity:     Days of Exercise per Week: Not on file    Minutes of Exercise per Session: Not on file   Stress:     Feeling of Stress : Not on file   Social Connections:     Frequency of Communication with Friends and Family: Not on file    Frequency of Social Gatherings with Friends and Family: Not on file    Attends Gnosticism Services: Not on file    Active Member of [address] [place] [address] or Organizations: Not on file    Attends Club or Organization Meetings: Not on file    Marital Status: Not on file   Intimate Partner Violence:     Fear of Current or Ex-Partner: Not on file    Emotionally Abused: Not on file    Physically Abused: Not on file    Sexually Abused: Not on file   Housing Stability:     Unable to Pay for Housing in the Last Year: Not on file    Number of [place] in the Last Year: Not on file    Unstable Housing in the Last Year: Not on file     MEDICATIONS:   Prior to Admission medications    Medication Sig Start Date End Date Taking?  Authorizing Provider   isosorbide mononitrate (IMDUR) 60 MG extended release tablet Take 1 tablet by mouth daily 5/29/21   Zaid Sarkar MD   metoprolol succinate (TOPROL XL) 25 MG extended release tablet Take 3 tablets by mouth daily 5/29/21   Zaid Sarkar MD   losartan (COZAAR) 50 MG tablet Take 1 tablet by mouth daily 5/29/21   Zaid Sarkar MD   dicyclomine (BENTYL) 10 MG capsule Take 1 capsule by mouth 4 times daily (before meals and nightly) 4/14/21   Lizzy Dixon PA-C   dicyclomine (BENTYL) 10 MG capsule Take 1 capsule by mouth every 6 hours as needed (cramps) 3/20/21   Zenaida Essex, PA-C albuterol sulfate HFA (VENTOLIN HFA) 108 (90 Base) MCG/ACT inhaler Inhale 2 puffs into the lungs 4 times daily as needed for Wheezing 3/20/21   Cosme Guevara PA-C   furosemide (LASIX) 20 MG tablet Take 1 tablet by mouth daily 11/4/20   Morris Blanco MD   pantoprazole (PROTONIX) 40 MG tablet Take 1 tablet by mouth every morning (before breakfast) 11/5/20   Emma Mccoy DO   diclofenac sodium (VOLTAREN) 1 % GEL Apply 2 g topically 2 times daily    Historical Provider, MD   naloxone 4 MG/0.1ML LIQD nasal spray 1 spray by Nasal route as needed for Opioid Reversal    Historical Provider, MD   docusate sodium (COLACE) 100 MG capsule Take 100 mg by mouth 2 times daily as needed for Constipation    Historical Provider, MD   Bacillus Coagulans-Inulin (ALIGN PREBIOTIC-PROBIOTIC PO) Take by mouth Unsure of dose    Historical Provider, MD   aspirin 81 MG tablet Take 81 mg by mouth daily    Historical Provider, MD   acetaminophen (TYLENOL) 325 MG tablet Take 650 mg by mouth every 6 hours as needed for Pain    Historical Provider, MD   loratadine (CLARITIN) 10 MG capsule Take 10 mg by mouth daily    Historical Provider, MD   nitroGLYCERIN (NITROSTAT) 0.4 MG SL tablet Place 0.4 mg under the tongue every 5 minutes as needed for Chest pain up to max of 3 total doses. If no relief after 1 dose, call 911. Historical Provider, MD   rosuvastatin (CRESTOR) 10 MG tablet Take 20 mg by mouth daily     Historical Provider, MD   lamoTRIgine (LAMICTAL) 100 MG tablet Take 100 mg by mouth 2 times daily     Historical Provider, MD   vitamin D (CHOLECALCIFEROL) 1000 UNIT TABS tablet Take 2,000 Units by mouth daily     Historical Provider, MD   tapentadol (NUCYNTA) 50 MG TABS Take 50 mg by mouth 3 times daily . Historical Provider, MD       ALLERGIES: Ezetimibe-simvastatin    REVIEW OF SYSTEM:   Review of Systems   Constitutional: Negative for chills, fatigue and fever.    HENT: Negative for congestion, rhinorrhea, sore throat and trouble swallowing. Eyes: Negative for photophobia and visual disturbance. Respiratory: Negative for cough, chest tightness and shortness of breath. Cardiovascular: Negative for chest pain, palpitations and leg swelling. Gastrointestinal: Positive for constipation. Negative for abdominal distention, abdominal pain, diarrhea, nausea and vomiting. Genitourinary: Negative for difficulty urinating, flank pain and hematuria. Musculoskeletal: Negative for back pain and gait problem. Skin: Negative for color change and wound. Neurological: Negative for dizziness, loss of consciousness, syncope, speech difficulty, weakness, light-headedness, numbness and headaches. Psychiatric/Behavioral: Negative for behavioral problems and confusion. OBJECTIVE  PHYSICAL EXAM: BP (!) 129/54   Pulse 66   Temp 97.6 °F (36.4 °C)   Resp 19   Ht 4' 11\" (1.499 m)   Wt 116 lb (52.6 kg)   SpO2 97%   BMI 23.43 kg/m²     Physical Exam  Vitals and nursing note reviewed. Constitutional:       General: She is awake. Appearance: Normal appearance. She is normal weight. Interventions: Nasal cannula in place. HENT:      Head: Normocephalic and atraumatic. Nose: Nose normal.      Mouth/Throat:      Mouth: Mucous membranes are dry. Pharynx: Oropharynx is clear. Eyes:      Extraocular Movements: Extraocular movements intact. Conjunctiva/sclera: Conjunctivae normal.   Cardiovascular:      Rate and Rhythm: Normal rate and regular rhythm. Pulses: Normal pulses. Heart sounds: Normal heart sounds. Pulmonary:      Effort: Pulmonary effort is normal.      Breath sounds: Normal breath sounds. Abdominal:      General: Abdomen is flat. Bowel sounds are normal.      Palpations: Abdomen is soft. Musculoskeletal:         General: Tenderness and signs of injury present. Cervical back: Normal range of motion and neck supple. Right hip: Tenderness present. Decreased range of motion. 61 40 - 130 U/L    ALT 7 0 - 33 U/L    AST 15 0 - 35 U/L    Globulin 3.0 2.3 - 3.5 g/dL   Protime-INR    Collection Time: 03/28/22  8:15 PM   Result Value Ref Range    Protime 13.9 12.3 - 14.9 sec    INR 1.1    APTT    Collection Time: 03/28/22  8:15 PM   Result Value Ref Range    aPTT 22.9 (L) 24.4 - 36.8 sec   Ethanol    Collection Time: 03/28/22  8:30 PM   Result Value Ref Range    Ethanol Lvl <10 mg/dL    Ethanol percent Not indicated G/dL   SPECIMEN REJECTION    Collection Time: 03/28/22  8:46 PM   Result Value Ref Range    Rejected Test ts3c     Reason for Rejection see below    TYPE AND SCREEN    Collection Time: 03/28/22  9:12 PM   Result Value Ref Range    ABO/Rh A POS     Antibody Screen NEG        IMAGING:   No results found. VTE Prophylaxis: low molecular weight heparin -  start     ASSESSMENT AND PLAN    Principal Problem:    Falls frequently - Christinatrentonnick Christensen today, right hip pain, historical right hip replacement, sees pain management for knee pain, takes pain medication, missed the bed and fell between bed and dresser. Plan: admit to medical, manage pain, consult to ortho    Active Problems:    CKD - BUN 44, Scr 1.74, GFR 27.6, near baseline. Is on diuretics. Plan: monitor labs. Dyslipidemia - takes statin at home, no history of MI or Stroke found  Plan: Will resume home meds, as tolerated. Essential hypertension - /54, HX of CKD, takes BB, cozaar,  denies blurred vision, or headache. Plan: Will resume home meds, as tolerated. Chronic heart failure (HCC) - takes imdur, diuretics, EF 10%,   Plan: Will resume home meds, as tolerated.            Plan of care discussed with: patient    SIGNATURE: HUMPHREY Linares CNP  DATE: March 29, 2022  TIME: 3:01 AM     Akiko Chapa DO - supervising physician

## 2022-03-29 NOTE — PLAN OF CARE
See OT evaluation for all goals and OT POC.  Electronically signed by NAVI Kang/L on 3/29/2022 at 4:39 PM

## 2022-03-29 NOTE — ED NOTES
Patient denies any SOB. Has poor peripheral vascular system. Nail beds are cyanotic but no SOB. Hx arthritis.   Placed back on 2L [facility]  03/28/22 2106

## 2022-03-29 NOTE — FLOWSHEET NOTE
0230    Pt admitted to room 226 via cot, transferred using maxi slide. Alert and oriented x4, VSS, swallow evaluation completed.   Visible displacement of right hip  Skin assessment completed c RN Lissy Segovia, documented in [facility]

## 2022-03-29 NOTE — PROGRESS NOTES
Spoke with Dr. Lyndon Libman office and they said to have Dr. Omayra Parr group see the patient.  Lupe served Dr. Grey Nicholas about the consult

## 2022-03-29 NOTE — PROGRESS NOTES
Physical Therapy Missed Treatment   Facility/Department: [facility] [place] MED SURG N226/N226-01    NAME: Adam Sun    : 1934 (3 y.o.)  MRN: [id number]    Account: [de-identified]  Gender: female      PT evaluation and treatment orders received. Chart reviewed. Pt admitted with dislocation of R hip. Will hold PT evaluation pending med/surg plan. Nursing staff aware. Will follow and attempt PT evaluation again at earliest availability.        Nik Toney, PT, 22 at 8:57 AM

## 2022-03-29 NOTE — CARE COORDINATION
[facility] Case Management Initial Discharge Assessment    Met with Family to discuss discharge plan. PCP: Daisy Saldana MD                                Date of Last Visit: N/A    VA Patient: No        VA Notified: no    If no PCP, list provided? N/A    Discharge Planning    Living Arrangements: at home dependent on family care    Who do you live with? Family: Daughter, Laine Carbone    Who helps you with your care:  family    If lives at home:     Do you have any barriers navigating in your home? no    Patient can perform ADL? Yes    Current Services (outpatient and in home) :  None    Dialysis: No    Is transportation available to get to your appointments? Yes, Son  Friendly able to transfer patient to Dr. Lev Menon. DME Equipment:  yes - Standard walker, Wheelchair. Respiratory equipment: None    Respiratory provider:  no     Pharmacy:  yes - Mary with Medication Assistance Program?  No      Patient agreeable to Jo[facility]? TBD    Patient agreeable to SNF/Rehab? TBD    Other discharge needs identified? TBD    Does Patient Have a High-Risk for Readmission Diagnosis (CHF, PN, MI, COPD)? No      Initial Discharge Plan? (Note: please see concurrent daily documentation for any updates after initial note). Patient is from home with daughter. Other daughter lives right next door. Patient has walker and wheelchair at home. Family is very supportive. Currently waiting for Ortho to determine DC plan. Readmission Risk              Risk of Unplanned Readmission:  Bal [place].    Electronically signed by MACY Oconnell on 3/29/2022 at 10:19 AM

## 2022-03-29 NOTE — PROGRESS NOTES
DVT / VTE PROPHYLAXIS EVALUATION    CrCl cannot be calculated (Unknown ideal weight. ). Recent Labs     03/28/22 2015   BUN 44*   CREATININE 1.74*      HGB 11.9*   HCT 35.7*   INR 1.1     ADMITTING DX OR CHIEF COMPLAINT? fall  WARFARIN? DOAC'S? no  ANY APPARENT BLEEDING? no  SCHEDULED SURGERY? no     Current order:  Enoxaparin 40 mg SUBQ once daily      Plan:  Calculated CrCl 18.9 ml/min. Pharmacologic VTE prophylaxis modified based on patient renal function per [facility]/P&T approved protocol. Will continue to monitor for adjustments. Patient Weight (kg)      50.9 and below .9 101-150.9 151-174.9 175 or greater   Estimated   CrCl  (ml/min) 30 or greater []   30 mg   SUBQ daily   []   40 mg   SUBQ daily []  30 mg SUBQ   BID  []  40 mg   SUBQ   BID []  60mg SUBQ BID    15-29.9 []  UFH 5000   units SUBQ BID [x]  30 mg   SUBQ daily [] 30 mg SUBQ   daily []  40 mg SUBQ   daily [] 60 mg SUBQ   daily    Less than 15 or dialysis []  UFH 5000   units SUBQ BID [] UFH 5000 units SUBQ TID []  UFH 7500   units   SUBQ TID     JULIO Cox. Ph.  3/29/2022  5:46 AM

## 2022-03-29 NOTE — PROGRESS NOTES
Physician Progress Note      PATIENTDarci Frame  CSN #:                  [id number]  :                       1934  ADMIT DATE:       3/28/2022 7:14 PM  [address] DATE:  RESPONDING  PROVIDER #:        Geraldo Torres MD          QUERY TEXT:    Pt has CHF documented. If possible, please document in progress notes and   discharge summary further specificity regarding the type and acuity of CHF:    The medical record reflects the following:  Risk Factors: 87  CHF  CKD  Clinical Indicators: echo 21 Left ventricular size is severely increased. LVEF is visually estimated at 10-15%. H&P: chronic heart failure with an EF of 10%  Treatment: CT chest  PO Lasix  PO Imdur    Sathya Newberry RN CCDS  [phone]  Options provided:  -- Chronic Systolic CHF/HFrEF  -- Chronic Systolic and Diastolic CHF  -- Other - I will add my own diagnosis  -- Disagree - Not applicable / Not valid  -- Disagree - Clinically unable to determine / Unknown  -- Refer to Clinical Documentation Reviewer    PROVIDER RESPONSE TEXT:    This patient has chronic systolic CHF/HFrEF. Query created by: Dre Troncoso on 3/29/2022 12:37 PM      QUERY TEXT:    Patient noted to have CKD. If possible, please document in progress notes and   discharge summary if you are evaluating and/or treating any of the following: The medical record reflects the following:  Risk Factors:  87  CHF  CKD  Clinical Indicators:  BUN/Cr/GFR  22  44/1.74/27.6  10/12/21  41/1.85/25.8  21  21/0.96/54.9  21  24/1.03/50.7  H&P: CKD - BUN 44, Scr 1.74, GFR 27.6, near baseline. Is on diuretics.   Treatment: monitoring  daily weights    Sathya Newberry RN CCDS  [phone]  Options provided:  -- CKD Stage 2 GFR 60-90  -- CKD Stage 3a GFR 45-59  -- CKD Stage 3b GFR 30-44  -- Other - I will add my own diagnosis  -- Disagree - Not applicable / Not valid  -- Disagree - Clinically unable to determine / Unknown  -- Refer to Clinical Documentation Reviewer    PROVIDER RESPONSE TEXT:    This patient has CKD Stage 3b.     Query created by: Candice Corral on 3/29/2022 12:50 PM      Electronically signed by:  Gretchen Cortes MD 3/29/2022 2:45 PM

## 2022-03-29 NOTE — PROGRESS NOTES
Hospitalist Progress Note      PCP: Mayur Paris MD    Date of Admission: 3/28/2022    Chief Complaint:  No acute events, afebrile, stable HD    Medications:  Reviewed    Infusion Medications    sodium chloride       Scheduled Medications    sodium chloride flush  5-40 mL IntraVENous 2 times per day    enoxaparin  30 mg SubCUTAneous Daily     PRN Meds: sodium chloride flush, sodium chloride, ondansetron **OR** ondansetron, polyethylene glycol, oxyCODONE      Intake/Output Summary (Last 24 hours) at 3/29/2022 0810  Last data filed at 3/29/2022 0245  Gross per 24 hour   Intake 20 ml   Output    Net 20 ml       Exam:    BP (!) 122/44   Pulse 60   Temp 97.5 °F (36.4 °C) (Oral)   Resp 16   Ht 4' 11\" (1.499 m)   Wt 116 lb (52.6 kg)   SpO2 99%   BMI 23.43 kg/m²     General appearance: appears stated age and cooperative. Respiratory:  clear to auscultation, bilaterally   Cardiovascular: Regular rate and rhythm, S1/S2. Abdomen: Soft, active bowel sounds. Musculoskeletal: No edema bilaterally. Labs:   Recent Labs     03/28/22 2015 03/29/22  0608   WBC 8.7 9.0   HGB 11.9* 12.4   HCT 35.7* 37.0    228     Recent Labs     03/28/22 2015 03/29/22  0608    138   K 3.5 4.0   CL 96 95   CO2 29 27   BUN 44* 33*   CREATININE 1.74* 1.22*   CALCIUM 9.6 10.2*     Recent Labs     03/28/22 2015 03/29/22  0608   AST 15 19   ALT 7 7   BILITOT 0.4 0.6   ALKPHOS 61 62     Recent Labs     03/28/22 2015   INR 1.1     No results for input(s): Serene Jerardo in the last 72 hours.     Urinalysis:      Lab Results   Component Value Date    NITRU Negative 10/17/2021    WBCUA 6-9 10/17/2021    BACTERIA Negative 10/17/2021    RBCUA 3-5 11/03/2020    BLOODU Negative 10/17/2021    SPECGRAV 1.016 10/17/2021    GLUCOSEU Negative 10/17/2021       Radiology:  XR HIP 2-3 VW W PELVIS RIGHT    (Results Pending)   XR FEMUR RIGHT (MIN 2 VIEWS)    (Results Pending)   CT HEAD WO CONTRAST    (Results Pending)   CT CERVICAL SPINE WO CONTRAST    (Results Pending)   CT THORACIC SPINE WO CONTRAST    (Results Pending)   CT CHEST WO CONTRAST    (Results Pending)   CT ABDOMEN PELVIS WO CONTRAST Additional Contrast? None    (Results Pending)   CT LUMBAR SPINE WO CONTRAST    (Results Pending)           Assessment/Plan:    81 y/o female with history of CAD s/p remote CABG/PCI, ischemic CMP/ chronic systolic HF, CKD3, GERD, CORRIE, seizure disorder, chronic right hip pain s/p right YESSY with chronic dislocation / loosening of hardware who presented with:     Acute/chronic right hip pain, inability to ambulate s/p fall  - ortho consulted  - pain control  - PT/OT    CAD s/p remote CABG/PCI, ischemic CMP/ chronic systolic HF  - continue home meds    CKD3  - monitor renal function    Seizure disorder  - continue home regimen    Diet: ADULT DIET; Easy to Chew; Low Sodium (2 gm)    Code Status: DNR-CCA              Electronically signed by Susi Rivas MD on 3/29/2022 at 8:10 AM

## 2022-03-29 NOTE — PROGRESS NOTES
[facility] Occupational Therapy      Date: 3/29/2022  Patient Name: Sommer Cancer        MRN: [id number]  Account: [de-identified]   : 1934  (80 y.o.)  Room: [place] [address]    Chart reviewed, attempted OT at [phone] for eval. Patient not seen 2° to: Other: hold pending full workup and surgical plan    Spoke to VALENTÍN Mcdermott RN aware. Will attempt again when able.     Electronically signed by ROSA Friend on 3/29/2022 at 10:34 AM

## 2022-03-29 NOTE — CONSULTS
Full consult to follow for this patient  Chronic dislocation of right total hip, with mechanical loosening of the acetabular component  The patient has been mobilizing at home with a walker for short distances without too much discomfort  She has been taking Nucynta for pain    PT recommendations-is for the patient to begin mobilizing, weightbearing as tolerated, with assistance    Discussion with her family member, son, the due to her advanced age and to her cardiac issues, no surgical intervention is planned for this dislocated hip. The hip could have been dislocated for many years    Department of Orthopedic Surgery  Attending       CHIEF COMPLAINT: Pain discomfort in the right hip area    Reason for Admission: History of a fall and injury to the right hip  Brenda Miller is a 80 y.o. female who presents to the emergency department with c/o fall. Patient fell at 4 PM today. Patient has history of chronic right hip pain.   Family helped her to bed and later in the afternoon developed severe right hip pain.       History Obtained From:  patient, family member -and from the chart    HISTORY OF PRESENT ILLNESS:      The patient is a 80 y.o. female with significant past history of having had right total hip replacement in [place], many years ago who presents with history of a fall  The patient's family member, son, states that the patient had fallen long ago when she returned back to [place] from [place]  She was noted to have mechanical dissociation of the acetabular complement  The femoral component had associated and displaced superiorly  This required a complex revision, however due to her significant cardiac history, the patient's son states that it is approximately 10%, precluded her from any surgical intervention    Past Medical History:        Diagnosis Date    Diverticulitis     Hyperlipidemia     Hypertension      Past Surgical History:        Procedure Laterality Date    APPENDECTOMY      CARDIAC SURGERY  CHOLECYSTECTOMY      JOINT REPLACEMENT       Immunizations:              Influenza:  Up-to-date            Pneumococcal Polysaccharide:  Up-to-date;   Medications Prior to Admission:   Medications Prior to Admission: isosorbide mononitrate (IMDUR) 60 MG extended release tablet, Take 1 tablet by mouth daily  metoprolol succinate (TOPROL XL) 25 MG extended release tablet, Take 3 tablets by mouth daily  losartan (COZAAR) 50 MG tablet, Take 1 tablet by mouth daily  dicyclomine (BENTYL) 10 MG capsule, Take 1 capsule by mouth 4 times daily (before meals and nightly)  dicyclomine (BENTYL) 10 MG capsule, Take 1 capsule by mouth every 6 hours as needed (cramps)  albuterol sulfate HFA (VENTOLIN HFA) 108 (90 Base) MCG/ACT inhaler, Inhale 2 puffs into the lungs 4 times daily as needed for Wheezing  furosemide (LASIX) 20 MG tablet, Take 1 tablet by mouth daily  pantoprazole (PROTONIX) 40 MG tablet, Take 1 tablet by mouth every morning (before breakfast)  diclofenac sodium (VOLTAREN) 1 % GEL, Apply 2 g topically 2 times daily  naloxone 4 MG/0.1ML LIQD nasal spray, 1 spray by Nasal route as needed for Opioid Reversal  docusate sodium (COLACE) 100 MG capsule, Take 100 mg by mouth 2 times daily as needed for Constipation  Bacillus Coagulans-Inulin (ALIGN PREBIOTIC-PROBIOTIC PO), Take by mouth Unsure of dose  aspirin 81 MG tablet, Take 81 mg by mouth daily  acetaminophen (TYLENOL) 325 MG tablet, Take 650 mg by mouth every 6 hours as needed for Pain  loratadine (CLARITIN) 10 MG capsule, Take 10 mg by mouth daily  nitroGLYCERIN (NITROSTAT) 0.4 MG SL tablet, Place 0.4 mg under the tongue every 5 minutes as needed for Chest pain up to max of 3 total doses. If no relief after 1 dose, call 911.   rosuvastatin (CRESTOR) 10 MG tablet, Take 20 mg by mouth daily   lamoTRIgine (LAMICTAL) 100 MG tablet, Take 100 mg by mouth 2 times daily   vitamin D (CHOLECALCIFEROL) 1000 UNIT TABS tablet, Take 2,000 Units by mouth daily   tapentadol (NUCYNTA) 50 MG TABS, Take 50 mg by mouth 3 times daily . Allergies:  Ezetimibe-simvastatin    Social History:   Reviewed from the chart  Family History:   History reviewed. No pertinent family history.   REVIEW OF SYSTEMS:  MUSCULOSKELETAL:      The patient is lying supine in bed  She appears comfortable, and responds well to questions  Right lower extremity shortened and externally rotated  She can move her toes and ankle-actively  Capillary refill in the toes are satisfactory  Passive motion of the right hip causes significant pain  Motor power in the hip could not be tested due to the discomfort  There is no bruising ecchymosis noticed in the right hip region    Passive motion of the left hip does not cause any pain      PHYSICAL EXAM:  VITALS:  BP (!) 106/54   Pulse 67   Temp 98.2 °F (36.8 °C) (Oral)   Resp 16   Ht 4' 11\" (1.499 m)   Wt 116 lb (52.6 kg)   SpO2 100%   BMI 23.43 kg/m²   CONSTITUTIONAL:  awake, alert, cooperative, no apparent distress, and appears stated age    DATA:  Radiology Review:      X-ray of the pelvis and right hip lateral view    There shows a total hip arthroplasty with complete displacement of the acetabular component from within the pelvis  It is now lying vertical  The floor of the pelvis is intact  The femoral component is dislocated and displaced superiorly  It appears to have formed false joint above the acetabular region  There is sclerosis at the level where it is articulating with the pelvis  The femoral component appears well fixed and does not show any signs of loosening  This is a modular femoral component      ASSESSMENT AND PLAN:      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    I have explained in detail to the patient's son, the treatment options at this point in time  He states that the patient was able to get around with a walker weightbearing as tolerated on the right hip and would mobilize short distances  Due to her medical condition and significant cardiac issues, surgical intervention in the form of complex revision has a high complication rate  Therefore agreed, that physical therapy can evaluate the patient and she may mobilize weightbearing as tolerated on the right hip, using a walker and with assistance  No surgical intervention is currently considered    She may follow-up in the orthopedic office, in about 4 to 6 weeks  If she changes or gets to her preinjury status, the patient may be discharged  She has been living with her daughter-in-law as well as another family member who is there to help her

## 2022-03-29 NOTE — ED PROVIDER NOTES
[facility] [facility]  eMERGENCY dEPARTMENT eNCOUnter      Pt Name: Cristo Wiley  MRN: [id number]  [place] 1934  Date of evaluation: 3/28/2022  Provider: Timothy Cleveland, [address] [place] [address]       Chief Complaint   Patient presents with    Fall         HISTORY OF PRESENT ILLNESS   (Location/Symptom, Timing/Onset,Context/Setting, Quality, Duration, Modifying Factors, Severity)  Note limiting factors. Cristo Wiley is a 80 y.o. female who presents to the emergency department with c/o fall. Patient fell at 4 PM today. Patient has history of chronic right hip pain. Family helped her to bed and later in the afternoon developed severe right hip pain. Patient is shortened right lower extremity. X-ray imaging shows a hip that is all of his prosthetic. Image from previous x-ray dated October 18, 2021 shows the exact same pattern. Chart review indicates that patient has had loosening the hardware dating all the way back to 2016. Patient was not a surgical candidate because she has a 10% ejection fraction and chronic heart failure. The history is provided by the patient and the EMS personnel. NursingNotes were reviewed. REVIEW OF SYSTEMS    (2-9 systems for level 4, 10 or more for level 5)     Review of Systems   Constitutional: Negative for activity change, appetite change, chills, fever and unexpected weight change. HENT: Negative for drooling, ear pain, nosebleeds, sinus pressure and trouble swallowing. Respiratory: Negative for cough, chest tightness and shortness of breath. Cardiovascular: Negative for chest pain and leg swelling. Gastrointestinal: Negative for abdominal pain, diarrhea and vomiting. Endocrine: Negative for polydipsia and polyphagia. Genitourinary: Negative for dysuria, flank pain and frequency. Musculoskeletal: Positive for arthralgias. Negative for back pain and myalgias. Skin: Negative for pallor and rash.    Neurological: Negative for syncope, weakness and headaches. Hematological: Does not bruise/bleed easily. All other systems reviewed and are negative. Except as noted above the remainder of the review of systems was reviewed and negative.        PAST MEDICAL HISTORY     Past Medical History:   Diagnosis Date    Diverticulitis     Hyperlipidemia     Hypertension          SURGICALHISTORY       Past Surgical History:   Procedure Laterality Date    APPENDECTOMY      CARDIAC SURGERY      CHOLECYSTECTOMY      JOINT REPLACEMENT           CURRENT MEDICATIONS       Current Discharge Medication List      CONTINUE these medications which have NOT CHANGED    Details   isosorbide mononitrate (IMDUR) 60 MG extended release tablet Take 1 tablet by mouth daily  Qty: 30 tablet, Refills: 3      metoprolol succinate (TOPROL XL) 25 MG extended release tablet Take 3 tablets by mouth daily  Qty: 30 tablet, Refills: 3      losartan (COZAAR) 50 MG tablet Take 1 tablet by mouth daily  Qty: 30 tablet, Refills: 3      !! dicyclomine (BENTYL) 10 MG capsule Take 1 capsule by mouth 4 times daily (before meals and nightly)  Qty: 20 capsule, Refills: 0      !! dicyclomine (BENTYL) 10 MG capsule Take 1 capsule by mouth every 6 hours as needed (cramps)  Qty: 20 capsule, Refills: 0      albuterol sulfate HFA (VENTOLIN HFA) 108 (90 Base) MCG/ACT inhaler Inhale 2 puffs into the lungs 4 times daily as needed for Wheezing  Qty: 1 Inhaler, Refills: 0      furosemide (LASIX) 20 MG tablet Take 1 tablet by mouth daily  Qty: 60 tablet, Refills: 3      pantoprazole (PROTONIX) 40 MG tablet Take 1 tablet by mouth every morning (before breakfast)  Qty: 30 tablet, Refills: 3      diclofenac sodium (VOLTAREN) 1 % GEL Apply 2 g topically 2 times daily      naloxone 4 MG/0.1ML LIQD nasal spray 1 spray by Nasal route as needed for Opioid Reversal      docusate sodium (COLACE) 100 MG capsule Take 100 mg by mouth 2 times daily as needed for Constipation      Bacillus Coagulans-Inulin (ALIGN PREBIOTIC-PROBIOTIC PO) Take by mouth Unsure of dose      aspirin 81 MG tablet Take 81 mg by mouth daily      acetaminophen (TYLENOL) 325 MG tablet Take 650 mg by mouth every 6 hours as needed for Pain      loratadine (CLARITIN) 10 MG capsule Take 10 mg by mouth daily      nitroGLYCERIN (NITROSTAT) 0.4 MG SL tablet Place 0.4 mg under the tongue every 5 minutes as needed for Chest pain up to max of 3 total doses. If no relief after 1 dose, call 911. rosuvastatin (CRESTOR) 10 MG tablet Take 20 mg by mouth daily       lamoTRIgine (LAMICTAL) 100 MG tablet Take 100 mg by mouth 2 times daily       vitamin D (CHOLECALCIFEROL) 1000 UNIT TABS tablet Take 2,000 Units by mouth daily       tapentadol (NUCYNTA) 50 MG TABS Take 50 mg by mouth 3 times daily . !! - Potential duplicate medications found. Please discuss with provider. ALLERGIES     Ezetimibe-simvastatin    FAMILY HISTORY     History reviewed. No pertinent family history. SOCIAL HISTORY       Social History     Socioeconomic History    Marital status:      Spouse name: None    Number of children: None    Years of education: None    Highest education level: None   Occupational History    None   Tobacco Use    Smoking status: Never Smoker    Smokeless tobacco: Never Used   Substance and Sexual Activity    Alcohol use: No    Drug use: No    Sexual activity: Not Currently   Other Topics Concern    None   Social History Narrative    None     Social Determinants of Health     Financial Resource Strain:     Difficulty of Paying Living Expenses: Not on file   Food Insecurity:     Worried About Running Out of Food in the Last Year: Not on file    Alyse of Food in the Last Year: Not on file   Transportation Needs:     Lack of Transportation (Medical): Not on file    Lack of Transportation (Non-Medical):  Not on file   Physical Activity:     Days of Exercise per Week: Not on file    Minutes of Exercise per Session: Not on file Stress:     Feeling of Stress : Not on file   Social Connections:     Frequency of Communication with Friends and Family: Not on file    Frequency of Social Gatherings with Friends and Family: Not on file    Attends Buddhism Services: Not on file    Active Member of [address] [place] [address] or Organizations: Not on file    Attends Club or Organization Meetings: Not on file    Marital Status: Not on file   Intimate Partner Violence:     Fear of Current or Ex-Partner: Not on file    Emotionally Abused: Not on file    Physically Abused: Not on file    Sexually Abused: Not on file   Housing Stability:     Unable to Pay for Housing in the Last Year: Not on file    Number of [place] in the Last Year: Not on file    Unstable Housing in the Last Year: Not on file       SCREENINGS    [place] Coma Scale  Eye Opening: Spontaneous  Best Verbal Response: Oriented  Best Motor Response: Obeys commands  Luigi Coma Scale Score: 15 @FLOW([id number])@      PHYSICAL EXAM    (up to 7 for level 4, 8 or more for level 5)     ED Triage Vitals   BP Temp Temp src Pulse Resp SpO2 Height Weight   03/28/22 1917 03/28/22 1917 -- 03/28/22 1917 03/28/22 1917 03/28/22 1917 03/28/22 1918 03/28/22 1918   (!) 100/54 97.6 °F (36.4 °C)  56 16 (!) 88 % 4' 11\" (1.499 m) 116 lb (52.6 kg)       Physical Exam  Vitals and nursing note reviewed. Constitutional:       General: She is awake. She is not in acute distress. Appearance: Normal appearance. She is well-developed and normal weight. She is not ill-appearing, toxic-appearing or diaphoretic. Comments: No photophobia. No phonophobia. HENT:      Head: Normocephalic and atraumatic. No Sheridan's sign. Comments: No sheridan sign. No scalp step-off. No raccoon eyes. Right Ear: External ear normal.      Left Ear: External ear normal.      Nose: Nose normal. No congestion or rhinorrhea. Mouth/Throat:      Mouth: Mucous membranes are moist.      Pharynx: Oropharynx is clear.  No oropharyngeal exudate or posterior oropharyngeal erythema. Eyes:      General: No scleral icterus. Right eye: No foreign body or discharge. Left eye: No discharge. Extraocular Movements: Extraocular movements intact. Conjunctiva/sclera: Conjunctivae normal.      Left eye: No exudate. Pupils: Pupils are equal, round, and reactive to light. Neck:      Vascular: No JVD. Trachea: No tracheal deviation. Comments: No meningismus. Cardiovascular:      Rate and Rhythm: Normal rate and regular rhythm. Pulses: Normal pulses. Heart sounds: Normal heart sounds. Heart sounds not distant. No murmur heard. No friction rub. No gallop. Pulmonary:      Effort: Pulmonary effort is normal. No respiratory distress. Breath sounds: Normal breath sounds. No stridor. No wheezing, rhonchi or rales. Chest:      Chest wall: No tenderness. Abdominal:      General: Abdomen is flat. Bowel sounds are normal. There is no distension. Palpations: Abdomen is soft. There is no mass. Tenderness: There is no abdominal tenderness. There is no right CVA tenderness, left CVA tenderness, guarding or rebound. Hernia: No hernia is present. Musculoskeletal:         General: Tenderness present. No swelling, deformity or signs of injury. Cervical back: Normal range of motion and neck supple. No rigidity. Right hip: Bony tenderness present. Decreased range of motion. Left hip: Normal.        Legs:    Lymphadenopathy:      Head:      Right side of head: No submental adenopathy. Left side of head: No submental adenopathy. Skin:     General: Skin is warm and dry. Capillary Refill: Capillary refill takes less than 2 seconds. Coloration: Skin is not jaundiced or pale. Findings: No bruising, erythema, lesion or rash. Neurological:      General: No focal deficit present. Mental Status: She is alert and oriented to person, place, and time.  Mental status is at baseline. Cranial Nerves: No cranial nerve deficit. Sensory: No sensory deficit. Motor: No weakness. Coordination: Coordination normal.      Deep Tendon Reflexes: Reflexes are normal and symmetric. Psychiatric:         Mood and Affect: Mood normal.         Behavior: Behavior normal. Behavior is cooperative. Thought Content: Thought content normal.         Judgment: Judgment normal.         DIAGNOSTIC RESULTS     EKG: All EKG's are interpreted by the Emergency Department Physician who either signs or Co-signsthis chart in the absence of a cardiologist.        RADIOLOGY:   Maged [place] such as CT, Ultrasound and MRI are read by the radiologist. 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 Unchanged from x-ray dated October 18, 2021.     Interpretation per the Radiologist below, if available at the time ofthis note:    XR HIP 2-3 VW W PELVIS RIGHT    (Results Pending)   XR FEMUR RIGHT (MIN 2 VIEWS)    (Results Pending)   CT HEAD WO CONTRAST    (Results Pending)   CT CERVICAL SPINE WO CONTRAST    (Results Pending)   CT THORACIC SPINE WO CONTRAST    (Results Pending)   CT CHEST WO CONTRAST    (Results Pending)   CT ABDOMEN PELVIS WO CONTRAST Additional Contrast? None    (Results Pending)   CT LUMBAR SPINE WO CONTRAST    (Results Pending)         ED BEDSIDE ULTRASOUND:   Performed by ED Physician - none    LABS:  Labs Reviewed   CBC WITH AUTO DIFFERENTIAL - Abnormal; Notable for the following components:       Result Value    RBC 3.89 (*)     Hemoglobin 11.9 (*)     Hematocrit 35.7 (*)     Neutrophils Absolute 6.6 (*)     All other components within normal limits   COMPREHENSIVE METABOLIC PANEL - Abnormal; Notable for the following components:    Glucose 141 (*)     BUN 44 (*) CREATININE 1.74 (*)     GFR Non- 27.6 (*)     GFR  33.4 (*)     All other components within normal limits   APTT - Abnormal; Notable for the following components:    aPTT 22.9 (*)     All other components within normal limits   COMPREHENSIVE METABOLIC PANEL W/ REFLEX TO MG FOR LOW K - Abnormal; Notable for the following components:    Anion Gap 16 (*)     Glucose 140 (*)     BUN 33 (*)     CREATININE 1.22 (*)     GFR Non- 41.6 (*)     GFR  50.3 (*)     Calcium 10.2 (*)     All other components within normal limits   CBC WITH AUTO DIFFERENTIAL - Abnormal; Notable for the following components:    RBC 4.03 (*)     Neutrophils Absolute 7.2 (*)     Lymphocytes Absolute 0.9 (*)     All other components within normal limits   PROTIME-INR   ETHANOL   SPECIMEN REJECTION   TYPE AND SCREEN   TYPE AND SCREEN       All other labs were within normal range or not returned as of this dictation. EMERGENCY DEPARTMENT COURSE and DIFFERENTIAL DIAGNOSIS/MDM:   Vitals:    Vitals:    03/28/22 2045 03/28/22 2330 03/29/22 0030 03/29/22 0245   BP: 109/60 120/64 (!) 129/54 (!) 129/56   Pulse: 65 67 66 65   Resp: 17 16 19 16   Temp:    97.9 °F (36.6 °C)   TempSrc:    Axillary   SpO2: 94% 95% 97% 100%   Weight:       Height:           Patient has been signed out to [employer] who will follow up on CT scans. MDM  This was discussed with the trauma attending Dr. Armando Ziegler. Await CTs. CT wet reads show no acute findings. Initially, the patient states she fell landing only on her hip and did not hit her head. Later when the family had arrived the daughters admit that the patient had not only fallen onto her head but fell back hitting her head. There is no loss of consciousness. Patient is a 10% ejection fraction. He states that she was not a candidate to have hip surgery because her heart probably would not handle it.           CONSULTS:  [address] [place] [address] SURGERY    PROCEDURES:  Unless otherwise noted below, none     Procedures    FINAL IMPRESSION      1. Fall from bed, initial encounter    2. Inability to ambulate due to hip          DISPOSITION/PLAN   DISPOSITION Admitted 03/29/2022 12:45:25 AM      PATIENT REFERRED TO:  No follow-up provider specified.     DISCHARGE MEDICATIONS:  Current Discharge Medication List             (Please note that portions of this note were completed with a voice recognition program.  Efforts were made to edit the dictations but occasionally words are mis-transcribed.)    Sean Torres DO (electronically signed)  Attending Emergency Physician          Sean Torres DO  03/29/22 0752

## 2022-03-29 NOTE — PROGRESS NOTES
Physical Therapy Med Surg Initial Assessment  Facility/Department: [facility]  Room: N226/N226-       NAME: Calli Garcia  : 1934 (28 y.o.)  MRN: [id number]  CODE STATUS: DNR-CCA    Date of Service: 3/29/2022    Patient Diagnosis(es): Falls frequently [R29.6]  Frequent falls [R29.6]   Chief Complaint   Patient presents with    Fall     Patient Active Problem List    Diagnosis Date Noted    Falls frequently 2022    Chronic heart failure ([facility] [address] 2022    CKD (chronic kidney disease) 2022    Frequent falls 2022    Diverticulitis 10/17/2021    Epigastric pain 2021    Presence of automatic (implantable) cardiac defibrillator 2020    Chest pain 10/20/2020    D[facility] 2020    Central abdominal pain     Colitis 2018    Essential hypertension 2017    Chronic coronary artery disease 2015    Dyslipidemia 2015        Past Medical History:   Diagnosis Date    Diverticulitis     Hyperlipidemia     Hypertension      Past Surgical History:   Procedure Laterality Date    APPENDECTOMY      CARDIAC SURGERY      CHOLECYSTECTOMY      JOINT REPLACEMENT         Chart Reviewed: Yes  Patient assessed for rehabilitation services?: Yes  Family / Caregiver Present: Yes (dtr)    Restrictions:  Restrictions/Precautions: Weight Bearing (R hip dislocated- pt is not surgical candidate)  Lower Extremity Weight Bearing Restrictions  Right Lower Extremity Weight Bearing: Weight Bearing As Tolerated     SUBJECTIVE:      Pain  Pre Treatment Pain Screening  Pain at present: 6  Intervention List: Patient able to continue with treatment;Nurse/physician notified  Comments / Details: pt screams out in pain during mobility    Post Treatment Pain Screening:   Pain Screening  Patient Currently in Pain: Yes  Pain Assessment  Pain Level: 8  Pain Location: Knee  Pain Orientation: Right    Prior Level of Function:  Social/Functional History  Lives With: Daughter (dtr works; another dtr lives next door and assists pt 2xs/day and son assists during the day on Tues, Wed, Thurs; pt is home alone for approx 1-2 hrs and she rests in bed during the times when she is home alone)  Type of Home: [facility] [place] [employer]: Two level,Able to Live on Main level with bedroom/bathroom  Home Access: Stairs to enter with rails  Entrance Stairs - Number of Steps: 2+1  Entrance Stairs - Rails: Left  Bathroom Shower/Tub: Walk-in shower  Bathroom Equipment: Shower chair,3-in-1 commode  Home Equipment: [address] wheeled walker,Wheelchair-manual  ADL Assistance: Independent (intermittent assistance for pants and socks)  Homemaking Assistance: Needs assistance  Homemaking Responsibilities: No  Ambulation Assistance: Independent (uses w/c or amb with rollator inside the home and walker outside the home)  Transfer Assistance: Independent  Active : No  Patient's  Info: son  Additional Comments: family assists with grocery shopping    OBJECTIVE:   Vision: Within Functional Limits  Hearing: Within functional limits    Cognition:  Overall Orientation Status: Within Functional Limits  Follows Commands: Within Functional Limits    Observation/Palpation  Observation: pt screaming out in pain with R hip movement; pt with long standing compensatory strategy of hooking L LE on R to assist with R LE movement; no c/o dizziness    ROM:  RLE General PROM: R hip pain full PROM  RLE AROM: WFL  RLE General AROM: minimal AROM due to pain R hip  LLE AROM : WFL    Strength:  Strength RLE  Comment: hip 1/5, knee 3-/5, ankle 3+/5  Strength LLE  Comment: grossly 4-/5    Neuro:  Balance  Sitting - Static: Good  Sitting - Dynamic: Good;- (SBA)  Standing - Static: Poor;+ (min A and B UE support on [employer])  Standing - Dynamic: Poor (min A and B UE support)     Tone RLE  RLE Tone: Normotonic  Tone LLE  LLE Tone: Normotonic  Motor Control  Gross Motor?: WFL  Sensation  Overall Sensation Status: WFL    Bed mobility  Supine to Sit: Maximum assistance;2 Person assistance  Sit to Supine: Maximum assistance;2 Person assistance  Comment: HOB elevated; increased time and effort due to R hip pain    Transfers  Sit to Stand: Minimal Assistance  Stand to sit: Minimal Assistance  Bed to Chair: Minimal assistance [facility], [facility]; scooting feet along floor; unable to weight shift on R LE and unable to effectively use UE to advance L LE to take a step)  Comment: [address] used; slow movement due to increased R hip pain; FF in standing    Ambulation  Ambulation?: No (unsafe to assess)    Stairs/Curb  Stairs?: No         Activity Tolerance  Activity Tolerance: Patient limited by pain  Activity Tolerance: Pt limited by balance and strength deficits          PT Education  PT Education: Goals;PT Role;Plan of Care;General Safety    ASSESSMENT:   Body structures, Functions, Activity limitations: Decreased functional mobility ; Decreased balance;Decreased ROM; Decreased endurance; Increased pain;Decreased strength;Decreased posture  Decision Making: Medium Complexity  History: high  Exam: med  Clinical Presentation: med    Prognosis: Good;Fair    DISCHARGE RECOMMENDATIONS:  Discharge Recommendations: Continue to assess pending progress    Assessment: Pt is 80 y.o. female with recent fall and h/o R hip dislocation. Pt is currently WBAT R LE. Pt exhibits decreased ROM, strength, posture, activity tolerance, balance, and increased pain requiring increased assistance and increased time to complete functional mobility. Continued PT is required for safe d/c home with family with decreased caregiver burden.   REQUIRES PT FOLLOW UP: Yes      PLAN OF CARE:  Plan  Times per week: 3-6  Current Treatment Recommendations: Strengthening,Functional Mobility Training,Wheelchair Mobility Training,Neuromuscular Re-education,Home Exercise Program,Equipment Evaluation, Education, & procurement,ROM,Transfer Training,Gait Training,Safety Education & Maykel Flax Kofi Aguilar training,Pain Management,Patient/Caregiver Education & Training,Positioning  Safety Devices  Type of devices: Call light within reach,Chair alarm in place,Left in chair    Goals:  Short term goals  Short term goal 1: Pt will demonstrate HEP indep. Long term goals  Long term goal 1: Pt will demonstrate bed mobility CGA  Long term goal 2: Pt will demonstrate transfers sit <> stand and SPT to chair/ BSC CGA with safest AD  Long term goal 3: Pt will demonstrate static standing with upright posture >/= 1 min with safest AD and SBA for increased safety with toileting  Long term goal 4: Pt will demonstrate w/c mobility >/= 100ft indep  Long term goal 5: Pt will demonstrate amb >/= 10ft with safest AD min A    AMPAC (6 CLICK) BASIC MOBILITY  AM-PAC Inpatient Mobility Raw Score : 10    Therapy Time:   Individual   Time In 5097   Time Out 1603   Minutes [address], 03/29/22 at 4:34 PM         Definitions for assistance levels  Independent = pt does not require any physical supervision or assistance from another person for activity completion. Device may be needed.   Stand by assistance = pt requires verbal cues or instructions from another person, close to but not touching, to perform the activity  Minimal assistance= pt performs 75% or more of the activity; assistance is required to complete the activity  Moderate assistance= pt performs 50% of the activity; assistance is required to complete the activity  Maximal assistance = pt performs 25% of the activity; assistance is required to complete the activity  Dependent = pt requires total physical assistance to accomplish the task

## 2022-03-30 NOTE — CONSULTS
Cardiology Consult Note      Date:   3/30/2022  Patient name:  Luis Bonilla  Date of admission:  3/28/2022  7:14 PM  MRN:   [id number]  YOB: 1934  Time of Consult:  9:21 AM    Consulting Cardiologist: Dr. Natividad Chambers APRN-CNP  Primary Cardiologist:  Dr. Natividad Cheung     Referring Provider: Dr. Paris Awan MD    Consult Reason: Chest Pain     I have personally performed a complete face to face history and physical on this patient. I have reviewed the notations as entered by FLOR Chambers I have personally  formulated the impression and plan on this patient and amended as necessary. Yari Denny MD [address] [facility] with note as delineated below  25-year-old woman had a mechanical fall tripping while she was reaching for a light switch that was too far for her to reach. She has a chronically displaced hip but she became a bit worse but does not require surgery at this point in time. She does have very severe ischemic cardiomyopathy for which she has not wanted any sort of intervention or AICD. She has been fairly well compensated over the years and I just saw her in fact a few weeks ago in the office and she was doing well without exertional dyspnea or shortness of breath. At her level of activity. She has had no chest tightness pressure fullness. She says she is not having any chest tightness at this time. She does have some epigastric discomfort. She does not get out of breath unless she does more than she is used to. Serial cardiac enzymes here in the hospital have been negative. Exam: Lungs are clear. Cardiac regular rhythm and rate soft S4 no S3.   No elevation jugular venous distention abdominal exam mildly uncomfortable    review of systems mainly remarkable for slight nausea    ECG: Normal sinus rhythm nonspecific IVCD no acute changes    Troponins unremarkable    Impression  Patient's fall was not related to hemodynamic compromise or dysrhythmia but rather mechanical  No evidence of congestive heart failure and current cardiac medications adjusted to reflect her most recent med list in my office f  As she has no cardiac issues at this time will sign off please reconsult if can be of service. Jose Guadalupe Ramirez MD    Assessment:   1. Chest Pain: currently denies chest pain. EKG without any St or T wave changes, Troponin negative. Will increase Imdur to 60 mg 1 1/2 abs daily,  home dose     2. Hypertension: Slightly hypertensive today, may be related to pain. Nitrates had been increased. Will continue to monitor and adjust medications as necessary. 3. CAD: CAGB ,S /P angioplasty with RAYNA to mid  [address] and balloon PTCA PDA    7/2/15.  6/25/15 LM PTCA with drug eluting stent. 4. Ischemic dilated cardiomyopathy: Patient wishes no AICD. 2020 Lexiscan EF 22%. 10/14/2015 LVEF 20% with 2+ MR.  2015 LVEF 20%. 5. Hypokalemia:  K + 2.6. Replaced by medicine      Plan:   1. Monitor on telemetry    2. Increase Imdur to 90 mg PO dialy    3. Trend troponin    4. Further recommendations per Dr. Manfred Alford. HPI:   Katie Santoro is a 80 y.o.  female patient who is being at the request of Dr. Franky Queen for inpatient consultation of chest pain. She was admitted on 3/28/2022. Previous [facility] and [facility] records have been reviewed in detail. Andre Murrell is a 80 yr old female with a PMH of known coronary artery disease previous bypass graft surgery and percutaneous intervention and ischemic dilated cardiomyopathy for which she has refused an AICD in the past. She was recently seen in the office on 3/23/2022 and stated that she is not interested in any sort of noninvasive or invasive testing or treatments. She is very sad due to the recent loss her  of many years who  at the age of Randy  [age] earlier this year.  . She stated that she does get somewhat anxious in regards to this and when she gets anxious she has anterior precordial tightness lasting perhaps 20 to 30 minutes resolved with the nitroglycerin. In the last couple of months she thinks she perhaps is only taken a couple. She may have taken a bit more than that. She is very sedentary and no other significant walking would cause angina is not clear to me. She does use a walker at home and most of the time does not notice the angina with short walks within the house. Her nitrates were increased and her son felt she was dong better with current antianxiety medications. She also has a history of HTN, HLD, seizure disorder, edema, and osteoarthritis. She presented to the ER with CCO fall. Per ER notes she fell at home and family helped her to bed at that time and she developed severe right hip pain later in the day. She was noted to have shortening of the right lower extremity. She has a history of right hip replacement, orthopedics have been consulted with recommendations for patient to being mobilizing and weight bearing as tolerated. We were consulted for chest pain 10/10 that she described as sharp in nature, and feels like the chest pain she had when she had a heart attack. EKG showed SR with 1 st degree AV block with occassionally PVC's, LVH. HR 66 bpm, troponin negative x 1. She states that her chest pain is on her let side and goes down to her belly and left leg. She does admit to being constipated and nauseated. She is currently resting comfortably in bed with her son at bedside. She currently denies chest pain or shortness of breath but admits to some abdominal pain. Per her son she has had this feeling of chest discomfort in the past with constipation. She does have notable discomfort pain with light palpitation. Cardiac History/Testing:  · Edema (782.3) (R60.9)   · Elevated calcitonin level (259.9) (E34.9)   · HLD (hyperlipidemia) (272.4) (E78.5)   · Ischemic dilated cardiomyopathy (414.8) (I25.5,I42.0)  · 7/14/2020. Lexiscan 22% EF.  Patient wishes no AICD      10/14/15 LVEF 20% with 2+ MR      5/2015- LVEF 20%   · Multi-vessel coronary artery stenosis (414.01) (I25.10)   · 7/14/2020 Lexiscan perfusion. LVEF 22%. No inducible ischemia       9/30/2000 Stents X 2          7/2/15 Drug eluting stent mid RCA, balloon PTCA PDA          6/25/15 LM PTCA with drug eluting stent. 6/25/15 Cardiac Cath- LM 90%; LAC occluded; RCA 90%; L to LAD patent collateral for          RCA; S to RCA occluded; LVEF 25%; LVEDP 12 mmHg; right cath with PCWP 12; PAP          25/10          1992 CABG L-LAD, S-RCA          1992 IWMI   · Never a smoker   · Osteoarthritis, chronic (715.90) (M19.90)   · Primary hypertension (401.9) (I10)   · S/P angioplasty (V45.89) (F38.54)   · 7/2/15 Drug eluting stent mid RCA, balloon PTCA PDA          6/25/15 LM PTCA with drug eluting stent. · S/P CABG (coronary artery bypass graft) (V45.81) (Z95.1)   · 1992. L-LAD. S-RCA   · Seizure disorder (345.90) (G40.909)   · Seizure 2011, none since as of 5/2015   · Ventricular ectopy (427.69) (I49.3)      [facility] Medication List 3/23/2022  Medication Name Instruction   Albuterol Sulfate (2.5 MG/3ML) 0.083% Inhalation Nebulization Solution USE AS DIRECTED   Albuterol Sulfate  (90 Base) MCG/ACT Inhalation Aerosol Solution INHALE 1-2 PUFFS EVERY 4-6 HOURS AS NEEDED AND AS DIRECTED. Align 4 MG Oral Capsule TAKE 1 CAPSULE Daily   Aspirin EC 81 MG Oral Tablet Delayed Release TAKE 1 TABLET PO DAILY. Citalopram Hydrobromide 10 MG Oral Tablet TAKE 1 TABLET PO DAILY. Diclofenac Sodium 1 % GEL USE TOPICALLY AS DIRECTED PRN   Famotidine 20 MG Oral Tablet TAKE 1 TABLET BY MOUTH ONCE DAILY   Furosemide 20 MG Oral Tablet TAKE 1 TABLET PO twice DAILY. Isosorbide Mononitrate ER 60 MG Oral Tablet Extended Release 24 Hour TAKE 1 1/2 TABLETS BY MOUTH EVERY DAY   lamoTRIgine 100 MG Oral Tablet Take 2 tablets po twice daily   Losartan Potassium 50 MG Oral Tablet TAKE 1 TABLET PO DAILY.    metOLazone 2.5 MG Oral Tablet TAKE 1 TABLET PO DAILY ON MONDAY, WEDNESDAY, AND FRIDAY. Metoprolol Succinate ER 25 MG Oral Tablet Extended Release 24 Hour take 3 tablet po daily   MiraLax 17 GM Oral Packet USE PO AS DIRECTED PRN   Narcan 4 MG/0.1ML Nasal Liquid USE AS DIRECTED   Nitroglycerin 0.4 MG Sublingual Tablet Sublingual PLACE 1 TABLET UNDER THE TONGUE EVERY 5 MINUTES FOR UP TO 3 DOSES AS NEEDED FOR CHEST PAIN. CALL 911 IF PAIN PERSISTS. Nucynta 50 MG Oral Tablet TAKE 1 TABLET po tid prn   Pantoprazole Sodium 40 MG Oral Tablet Delayed Release TAKE 1 TABLET PO DAILY 30 MINUTES BEFORE BREAKFAST   Potassium Chloride ER 10 MEQ Oral Tablet Extended Release TAKE 1 TABLET BY MOUTH ONCE DAILY   Rosuvastatin Calcium 20 MG Oral Tablet TAKE 1 TABLET PO DAILY. Tylenol 8 Hour Arthritis Pain 650 MG Oral Tablet Extended Release TAKE 1 TABLET PO 4 TIMES DAILY AS NEEDED PRN   Vitamin D3 50 MCG (2000 UT) Oral Tablet take 1 tablet po daily         Allergies: Allergies   Allergen Reactions    Ezetimibe-Simvastatin Other (See Comments)       Past Medical History:  Past Medical History:   Diagnosis Date    Diverticulitis     Hyperlipidemia     Hypertension        Past Surgical History:  Past Surgical History:   Procedure Laterality Date    APPENDECTOMY      CARDIAC SURGERY      CHOLECYSTECTOMY      JOINT REPLACEMENT         Family History:   History reviewed. No pertinent family history. Social  History:     Social History     Tobacco Use    Smoking status: Never Smoker    Smokeless tobacco: Never Used   Substance Use Topics    Alcohol use: No       Home Medications:    Prior to Admission medications    Medication Sig Start Date End Date Taking?  Authorizing Provider   isosorbide mononitrate (IMDUR) 60 MG extended release tablet Take 1 tablet by mouth daily 5/29/21   Montrell Warner MD   metoprolol succinate (TOPROL XL) 25 MG extended release tablet Take 3 tablets by mouth daily 5/29/21   Montrell Warner MD   losartan (COZAAR) 50 MG tablet Take 1 tablet by mouth daily 5/29/21   Megan Roberson MD   dicyclomine (BENTYL) 10 MG capsule Take 1 capsule by mouth 4 times daily (before meals and nightly) 4/14/21   Lizzy Matute PA-C   dicyclomine (BENTYL) 10 MG capsule Take 1 capsule by mouth every 6 hours as needed (cramps) 3/20/21   Autumn Estrada PA-C   albuterol sulfate HFA (VENTOLIN HFA) 108 (90 Base) MCG/ACT inhaler Inhale 2 puffs into the lungs 4 times daily as needed for Wheezing 3/20/21   Crow Bishop PA-C   furosemide (LASIX) 20 MG tablet Take 1 tablet by mouth daily 11/4/20   Cristino Guevara MD   pantoprazole (PROTONIX) 40 MG tablet Take 1 tablet by mouth every morning (before breakfast) 11/5/20   Melinda Crowley DO   diclofenac sodium (VOLTAREN) 1 % GEL Apply 2 g topically 2 times daily    Historical Provider, MD   naloxone 4 MG/0.1ML LIQD nasal spray 1 spray by Nasal route as needed for Opioid Reversal    Historical Provider, MD   docusate sodium (COLACE) 100 MG capsule Take 100 mg by mouth 2 times daily as needed for Constipation    Historical Provider, MD   Bacillus Coagulans-Inulin (ALIGN PREBIOTIC-PROBIOTIC PO) Take by mouth Unsure of dose    Historical Provider, MD   aspirin 81 MG tablet Take 81 mg by mouth daily    Historical Provider, MD   acetaminophen (TYLENOL) 325 MG tablet Take 650 mg by mouth every 6 hours as needed for Pain    Historical Provider, MD   loratadine (CLARITIN) 10 MG capsule Take 10 mg by mouth daily    Historical Provider, MD   nitroGLYCERIN (NITROSTAT) 0.4 MG SL tablet Place 0.4 mg under the tongue every 5 minutes as needed for Chest pain up to max of 3 total doses. If no relief after 1 dose, call 911.     Historical Provider, MD   rosuvastatin (CRESTOR) 10 MG tablet Take 20 mg by mouth daily     Historical Provider, MD   lamoTRIgine (LAMICTAL) 100 MG tablet Take 100 mg by mouth 2 times daily     Historical Provider, MD   vitamin D (CHOLECALCIFEROL) 1000 UNIT TABS tablet Take 2,000 Units by mouth daily     Historical Provider, MD   tapentadol (NUCYNTA) 50 MG TABS Take 50 mg by mouth 3 times daily .     Historical Provider, MD       Current Medications:   sodium chloride         IV Medications:  Current Facility-Administered Medications   Medication Dose Route Frequency Provider Last Rate Last Admin    nitroGLYCERIN (NITROSTAT) SL tablet 0.4 mg  0.4 mg SubLINGual Q5 Min PRN Alva Brown Sedar, DO        potassium chloride (KLOR-CON M) extended release tablet 40 mEq  40 mEq Oral Q4H Hilary Benito MD   40 mEq at 03/30/22 0841    potassium chloride 10 mEq/100 mL IVPB (Peripheral Line)  10 mEq IntraVENous Q1H Hilary Benito MD        polyethylene glycol (GLYCOLAX) packet 17 g  17 g Oral Daily Hilary Benito MD        senna (SENOKOT) tablet 8.6 mg  1 tablet Oral Nightly Hilary Benito MD        sodium chloride flush 0.9 % injection 5-40 mL  5-40 mL IntraVENous 2 times per day HUMPHREY Del Angel - CNP   10 mL at 03/30/22 0849    sodium chloride flush 0.9 % injection 5-40 mL  5-40 mL IntraVENous PRN HUMPHREY Levine - CNP        0.9 % sodium chloride infusion   IntraVENous PRN HUMPHREY Del Angel - JEMAL        ondansetron (ZOFRAN-ODT) disintegrating tablet 4 mg  4 mg Oral Q8H PRN HUMPHREY Del Angel CNP        Or    ondansetron (ZOFRAN) injection 4 mg  4 mg IntraVENous Q6H PRN HUMPHREY Del Angel - CNP   4 mg at 03/30/22 0557    enoxaparin (LOVENOX) injection 30 mg  30 mg SubCUTAneous Daily HUMPHREY Levine - CNP   30 mg at 03/30/22 0839    oxyCODONE (ROXICODONE) immediate release tablet 2.5 mg  2.5 mg Oral Q6H PRN HUMPHREY Del Angel CNP   2.5 mg at 03/30/22 0415    aspirin chewable tablet 81 mg  81 mg Oral Daily Hilary Benito MD   81 mg at 03/30/22 0840    furosemide (LASIX) tablet 20 mg  20 mg Oral Daily Hilary Benito MD   20 mg at 03/30/22 0839    isosorbide mononitrate (IMDUR) extended release tablet 60 mg  60 mg Oral Daily Hilary Benito MD   60 mg at 03/30/22 0840    lamoTRIgine (LAMICTAL) tablet 100 mg  100 mg Oral BID Tiara Guevara MD   100 mg at 03/30/22 0840    cetirizine (ZYRTEC) tablet 5 mg  5 mg Oral Daily Tiara Guevara MD   5 mg at 03/30/22 0840    losartan (COZAAR) tablet 50 mg  50 mg Oral Daily Tiara Guevara MD   50 mg at 03/30/22 0840    metoprolol succinate (TOPROL XL) extended release tablet 75 mg  75 mg Oral Daily Tiara Guevara MD   75 mg at 03/30/22 0840    pantoprazole (PROTONIX) tablet 40 mg  40 mg Oral QAM AC Tiara Guevara MD   40 mg at 03/30/22 0552    rosuvastatin (CRESTOR) tablet 20 mg  20 mg Oral Daily Tiara Guevara MD   20 mg at 03/30/22 3869         Review of Systems  Constitutional: fever, chills, Positive for chronic weakness and fatigue. HEENT: No visual loss, blurred vision, double vision or yellow sclerae. Skin: No rash or itching  Cardiovascular:  No chest pain, pressure or discomfort. No palpitations or edema. Respiratory:  No shortness of breath, cough or sputum. Gastrointestinal:  Positive for nausea and abdominal pain. No anorexia, vomiting or diarrhea. No bloody or dark tarry stools. Neurological:  No headache, dizziness, syncope, paralysis, ataxia, numbness or tingling in the extremities. No change in bowel or bladder control. Musculoskeletal:  Positive for right hip/leg pain. Hematologic: No anemia, bleeding or bruising.          Vital Signs:  Vitals:    03/30/22 0539 03/30/22 0541 03/30/22 0547 03/30/22 0806   BP: (!) 145/62 (!) 147/48 (!) 133/52 (!) 150/60   Pulse: 60 60 70 67   Resp: 16 16  18   Temp:    97.5 °F (36.4 °C)   TempSrc:    Oral   SpO2: 100% 100%  100%   Weight:       Height:           Intake/Output Summary (Last 24 hours) at 3/30/2022 0921  Last data filed at 3/30/2022 0547  Gross per 24 hour   Intake 210 ml   Output 1300 ml   Net -1090 ml       Patient Vitals for the past 96 hrs (Last 3 readings):   Weight   03/30/22 0419 134 lb 3.2 oz (60.9 kg)   03/28/22 1918 116 lb (52.6 kg)       Physical exam   Constitutional:   awake/alert/oriented x3, no distress, alert and cooperative. Eyes:  PERRL, sclera clear, conjunctiva pink. EMMT:  mucous membranes  moist, no apparent injury, no lesion seen. Head/Neck:  Neck supple, no apparent injury,  No JVD, trachea midline, no bruits. Respiratory/Thorax: Patent airways, CTAB,  normal breath sounds with good chest expansion, thorax symmetric. Cardiovascular: Regular, rate and rhythm,  normal S1 and S2, PMI non displaced. Gastrointestinal:  Non distended, soft, tender, no rebound tenderness or guarding, Genitourinary:  deferred  Musculoskeletal:  No apparent injury. Extremities:  No cyanosis, edema, contusions or wounds, no clubbing. DP 1 + equal bilaterally. Radial 2+ equal bilaterally. Neurological:  Alert and oriented x3. Moves extremities spontaneous with purpose  Psychological:  Appropriate mood and behavior  Skin:  Warm and dry,  no lesions or rashes. Diagnostics:    EKG:3/30/2022  HR 66 bpm  Sinus rhythm with 1st degree AV block with occasional premature ventricular complexes  Left ventricular hypertrophy with QRS widening and repolarization abnormality  Abnormal ECG  When compared with ECG of 29-MAR-2022 03:23,  premature ventricular complexes are now present  premature atrial complexes are no longer present      Telemetry: normal sinus . Lab Data:  BMP:  Recent Labs     03/28/22 2015 03/28/22 2015 03/29/22  5110 03/29/22  0608 03/30/22  0612     --  138  --  139   K 3.5  --  4.0  --  2.6*   CL 96  --  95  --  94*   CO2 29  --  27  --  28   BUN 44*  --  33*  --  25*   CREATININE 1.74*  --  1.22*  --  0.89   LABGLOM 27.6*   < > 41.6*   < > 59.9*    < > = values in this interval not displayed.        CBC:  Recent Labs     03/28/22 2015 03/29/22  0608 03/30/22  0612   WBC 8.7 9.0 8.7   RBC 3.89* 4.03* 4.00*   HGB 11.9* 12.4 12.3   HCT 35.7* 37.0 36.6*   MCV 91.8 91.9 91.4   MCH 30.6 30.8 30.7   MCHC 33.3 33.5 33.7   RDW 13.3 13.5 13.6    228 217       Cardiac Enzymes:   Recent Labs 22  0612   CKTOTAL 43   TROPONINI <0.010       Hepatic Function Panel:  Recent Labs     22  0608 22  0612   ALKPHOS 61 62 62   ALT 7 7 6   AST 15 19 11   PROT 7.3 7.6 7.5   BILITOT 0.4 0.6 0.5   LABALBU 4.3 4.3 4.3       Magnesium:  Recent Labs     22  0612   MG 2.3       Pro-BNP:  Lab Results   Component Value Date    PROBNP 4,866 2021    PROBNP 5,055 2020    PROBNP 4,727 10/19/2020       INR:  Recent Labs     22   PROTIME 13.9   INR 1.1       TSH:  Lab Results   Component Value Date    TSH 3.690 2020       Lipid Profile:  Lab Results   Component Value Date    TRIG 75 2020    HDL 62 2020    [address] 95 2020       HgbA1C:  No results found for: LABA1C    Lactate Level:  No results for input(s): LACTA in the last 72 hours. CMP:  Recent Labs     22  0608 22  0612    138 139   K 3.5 4.0 2.6*   CL 96 95 94*   CO2 29 27 28   BUN 44* 33* 25*   CREATININE 1.74* 1.22* 0.89   GLUCOSE 141* 140* 175*   CALCIUM 9.6 10.2* 9.6   PROT 7.3 7.6 7.5   LABALBU 4.3 4.3 4.3   BILITOT 0.4 0.6 0.5   ALKPHOS 61 62 62   AST 15 19 11   ALT 7 7 6       Amylase:  No results for input(s): AMYLASE in the last 72 hours. Lipase:  No results for input(s): LIPASE in the last 72 hours. ABG:  No results for input(s): PH, PO2, PCO2, HCO3, BE, O2SAT in the last 72 hours. Radiology:   CT ABDOMEN PELVIS WO CONTRAST Additional Contrast? 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 Findings: The liver is normal in size and attenuation no focal lesion noted.  The gallbladder is absent because of previous cholecystectomy. The spleen is not enlarged. Both kidneys are normal in size, shape and position no hydronephrosis or stone formation. The small and large bowel are within normal limits. Degenerative changes are seen in the spine with narrowing at L1-2 level with vacuum phenomenon present no fracture noted. Marked calcification of the abdominal aorta is seen. There is cephalad dislocation of the right hip prosthesis while the  acetabular cap is tilted as described on the plain film. Intact total hip on the left side seen. Impression: No abnormality detected in the abdomen. Superior dislocation of the right hip prosthesis with tilt of the acetabular cap as described. 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 Findings: The liver is normal in size and attenuation no focal lesion noted. The gallbladder is absent because of previous cholecystectomy. The spleen is not enlarged. Both kidneys are normal in size, shape and position no hydronephrosis or stone formation. The small and large bowel are within normal limits. Degenerative changes are seen in the spine with narrowing at L1-2 level with vacuum phenomenon present no fracture noted. Marked calcification of the abdominal aorta is seen. There is cephalad dislocation of the right hip prosthesis while the  acetabular cap is tilted as described on the plain film. Intact total hip on the left side seen. Impression: No abnormality detected in the abdomen. Superior dislocation of the right hip prosthesis with tilt of the acetabular cap as described.     CT HEAD WO CONTRAST    Result Date: 3/29/2022  Patient MRN: [id number] : 1934 Age:  80 years Gender: Female Order Date: 3/28/2022 9:44 PM Exam: CT HEAD WO CONTRAST Number of Images: views Indication:   fall from standing   Hip pain Comparison: . Findings: There is still prominence of the ventricular system and cerebral sulci which are age-related changes. There is old infarction involving the anterior limb of the internal capsule on the right side this has not changed significantly since the last study. Periventricular lucencies seen  due to deep white matter ischemic changes. No midline shift or mass effect identified. No evidence of epidural, subdural or intracerebral hematoma. The skull base and cranial vault are intact. The sinuses are clear. The orbits are unremarkable. Impression:  Prominence of the ventricular system and cerebral sulci. 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 CT CHEST WO CONTRAST    Result Date: 3/29/2022  CT of the Chest without intravenous contrast medium. HISTORY: Fall from standing; chronic heart failure with 10% EF. Trauma. TECHNICAL FACTORS: CT imaging of the chest was obtained and formatted as 5 mm contiguous axial images from the thoracic inlet through the adrenal glands. Sagittal and coronal reconstructions obtained during postprocessing. Intravenous contrast medium:  None. Comparison:  2020 FINDINGS: Right lun.4 mm elpidio scarring left lung base. Calcified granuloma, right lower lobe. Mild dependent subsegmental atelectatic change. No, pleural effusion, pneumothorax. Left lung: No nodules, masses, consolidation, pleural effusion, pneumothorax. Lymph nodes: No hilar, mediastinal, or axillary lymph node enlargement. Thoracic aorta: AP and transverse dimension, ascending thoracic aorta, at level of pulmonary chill bifurcation, measures 4.0 x 3.9 cm. Cardiac: Enlarged. Median sternotomy.  Coronary artery calcification Pericardial effusion: None. Upper abdomen:Limited imaging upper abdomen shows no gross anomaly. Musculoskeletal:No osteoblastic, and no osteolytic lesions. Postsurgical change left humeral head. Degenerative change right glenohumeral joint. No acute findings. Cardiomegaly. Median sternotomy. Dilatation ascending thoracic aorta. Postsurgical change, left shoulder. All CT scans at this facility use dose modulation, iterative reconstruction, and/or weight based dosing when appropriate to reduce radiation dose to as low as reasonably achievable. CT CERVICAL SPINE WO CONTRAST    Result Date: 3/29/2022  CT OF THE CERVICAL SPINE COMPARISON: No prior studies available for comparison. HISTORY: Fall from standing TECHNIQUE: , CT CERVICAL SPINE WO CONTRAST FINDINGS: Straightening of the spine with reversal of the normal cervical lordosis is seen on this examination. There is 3 mm of anterolisthesis of C3 on C4 and intervertebral disc space narrowing is seen at this level. Intervertebral disc space narrowing is seen at C5-6 and C6-C7. Mild anterolisthesis of C6 on C7. Neural foraminal narrowing is seen bilaterally at C3-4, C5-6 and at C6-C7. The thyroid gland is very heterogeneous and enlarged. In the left lobe a 13 mm nodule is seen. In the right lobe 2 cm nodule is seen. The visualized lung fields show no pneumothorax are consolidation. There are subtle groundglass opacities. 1. Degenerative changes are seen at multiple levels. No acute fractures visualized. Anterolisthesis of C3 on C4 appears to be chronic. 2. The thyroid gland is enlarged and contains multiple nodules with the largest seen in the right lobe of the gland. All CT scans at this facility use dose modulation, iterative reconstruction, and/or weight based dosing when appropriate to reduce radiation dose to as low as reasonably achievable.  CT THORACIC SPINE WO CONTRAST COMPARISON: Chest x-ray May 3, 2020 HISTORY: Fall from standing TECHNIQUE: Helical CT scanning of the multiple nodules with the largest seen in the right lobe of the gland. All CT scans at this facility use dose modulation, iterative reconstruction, and/or weight based dosing when appropriate to reduce radiation dose to as low as reasonably achievable. CT THORACIC SPINE WO CONTRAST COMPARISON: Chest x-ray May 3, 2020 HISTORY: Fall from standing TECHNIQUE: Helical CT scanning of the thoracic spine was performed in axial plane. Coronal and sagittal reconstructed images were obtained and viewed. FINDINGS: The spine is demineralized. No acute fracture is visualized. 12 rib-bearing vertebral bodies are seen. The thoracic vertebral bodies are fairly well preserved. No acute fractures seen. There is a slight dextroscoliosis seen in the upper to mid thoracic spine. Hypertrophic spurring is seen on the right at multiple levels. . There are also small anterior osteophytes. No hematomas or soft tissue masses are seen. In the visualized lung fields atelectasis is seen. There are also atherosclerotic calcifications seen. IMPRESSION: No fracture or dislocation is seen. Mild degenerative changes are visualized. . All CT scans at this facility use dose modulation, iterative reconstruction, and/or weight based dosing when appropriate to reduce radiation dose to as low as reasonably achievable. CT LUMBAR SPINE WO CONTRAST    Result Date: 3/29/2022  Patient MRN: [id number] : 1934 Age:  80 years Gender: Female Order Date: 3/28/2022 9:44 PM Exam: CT LUMBAR SPINE WO CONTRAST Number of Images: views Indication:   frall from standing; chronic right hip dislocation 5 years out of prostatic on right   Hip pain Comparison: None. Findings:  The vertebral bodies are normal height and alignment. There is narrowing of the intervertebral disc spaces between L1-2 with vacuum phenomena. There is diffuse bulge of the annulus fibrosus at the level of L2-3 with spinal stenosis, spinal canal is about 9 mm at this level.  There is also spinal stenosis and narrowing of the lateral recesses at the level of L3-4 with osteophyte formation and disc bulge. Also noted diffuse bulge of the annulus at the level of L4-5 with moderate spinal stenosis spinal canal is about 6 mm at this level. There is encroachment upon the neural foramina bilaterally at the level of L5-S1. There is diffuse bulge of the annulus fibrosus. Moderate hypertrophic changes seen in the apophyseal joints. No fracture is seen . No spondylolysis is identified. Impression: Evidence of degenerative disc disease with spondylolsis with encroachment upon the neural foramina. At the levels described above. XR HIP 2-3 VW W PELVIS RIGHT    Result Date: 3/29/2022  Patient MRN: [id number] : 1934 Age:  80 years Gender: Female Order Date: 3/28/2022 8:11 PM Exam: XR HIP 2-3 VW W PELVIS RIGHT Number of Images: 2 views Indication:   fall   Hip pain Comparison: 2021. Findings:  Again noted bilateral total hip replacement and still seen on the right side superior dislocation of the artificial head the acetabular component of the total hip on the right side is  tilted almost 90 degrees, no change noted since the previous examination. The left  total hip is in good position. There is marked osteoporosis present. No fracture noted     Impression: Chronic dislocation of the total hip on the right side with tilt of the acetabular portion of the prosthesis as mentioned. Patient Active Problem List   Diagnosis    Chronic coronary artery disease    Dyslipidemia    Colitis    Essential hypertension    Central abdominal pain    Chest pain    Epigastric pain    Diverticulitis    Falls frequently    Chronic heart failure (HCC)    Dilated cardiomyopathy (HCC)    Presence of automatic (implantable) cardiac defibrillator    CKD (chronic kidney disease)    Frequent falls             Thank you for the opportunity to participate in the care of your patient.   Do not hesitate to call if you have any questions.     Electronically signed by HUMPHREY Paiz CNP, [facility] on 3/30/2022 at 9:21 AM

## 2022-03-30 NOTE — PROGRESS NOTES
Hospitalist Progress Note      PCP: Yenni Gaines MD    Date of Admission: 3/28/2022    Chief Complaint:  Patient was experiencing chest pain overnight, ECG was negative for acute ischemic changes, troponin was negative, cardiology was consulted, afebrile, stable HD    Medications:  Reviewed    Infusion Medications    sodium chloride       Scheduled Medications    potassium chloride  40 mEq Oral Q4H    potassium chloride  10 mEq IntraVENous Q1H    sodium chloride flush  5-40 mL IntraVENous 2 times per day    enoxaparin  30 mg SubCUTAneous Daily    aspirin  81 mg Oral Daily    furosemide  20 mg Oral Daily    isosorbide mononitrate  60 mg Oral Daily    lamoTRIgine  100 mg Oral BID    cetirizine  5 mg Oral Daily    losartan  50 mg Oral Daily    metoprolol succinate  75 mg Oral Daily    pantoprazole  40 mg Oral QAM AC    rosuvastatin  20 mg Oral Daily     PRN Meds: nitroGLYCERIN, sodium chloride flush, sodium chloride, ondansetron **OR** ondansetron, polyethylene glycol, oxyCODONE      Intake/Output Summary (Last 24 hours) at 3/30/2022 7034  Last data filed at 3/30/2022 0547  Gross per 24 hour   Intake 210 ml   Output 1300 ml   Net -1090 ml       Exam:    BP (!) 133/52   Pulse 70   Temp 97.3 °F (36.3 °C) (Oral)   Resp 16   Ht 4' 11\" (1.499 m)   Wt 134 lb 3.2 oz (60.9 kg)   SpO2 100%   BMI 27.11 kg/m²     General appearance: appears stated age and cooperative. Respiratory:  clear to auscultation, bilaterally   Cardiovascular: Regular rate and rhythm, S1/S2. Abdomen: Soft, active bowel sounds. Musculoskeletal: No edema bilaterally.      Labs:   Recent Labs     03/28/22 2015 03/29/22  0608 03/30/22  0612   WBC 8.7 9.0 8.7   HGB 11.9* 12.4 12.3   HCT 35.7* 37.0 36.6*    228 217     Recent Labs     03/28/22 2015 03/29/22  0608 03/30/22  0612    138 139   K 3.5 4.0 2.6*   CL 96 95 94*   CO2 29 27 28   BUN 44* 33* 25*   CREATININE 1.74* 1.22* 0.89   CALCIUM 9.6 10.2* 9.6     Recent Labs 03/28/22 2015 03/29/22  0608 03/30/22  0612   AST 15 19 11   ALT 7 7 6   BILITOT 0.4 0.6 0.5   ALKPHOS 61 62 62     Recent Labs     03/28/22 2015   INR 1.1     Recent Labs     03/30/22  0612   CKTOTAL 37   TROPONINI <0.010       Urinalysis:      Lab Results   Component Value Date    NITRU Negative 10/17/2021    WBCUA 6-9 10/17/2021    BACTERIA Negative 10/17/2021    RBCUA 3-5 11/03/2020    BLOODU Negative 10/17/2021    SPECGRAV 1.016 10/17/2021    GLUCOSEU Negative 10/17/2021       Radiology:  CT HEAD WO CONTRAST   Final Result   Impression:        Prominence of the ventricular system and cerebral sulci. 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 CT CERVICAL SPINE WO CONTRAST   Final Result   1. Degenerative changes are seen at multiple levels. No acute fractures visualized. Anterolisthesis of C3 on C4 appears to be chronic. 2. The thyroid gland is enlarged and contains multiple nodules with the largest seen in the right lobe of the gland. All CT scans at this facility use dose modulation, iterative reconstruction, and/or weight based dosing when appropriate to reduce radiation dose to as low as reasonably achievable. CT THORACIC SPINE WO CONTRAST      COMPARISON: Chest x-ray May 3, 2020      HISTORY: Fall from standing         TECHNIQUE: Helical CT scanning of the thoracic spine was performed in axial plane. Coronal and sagittal reconstructed images were obtained and viewed. FINDINGS:   The spine is demineralized. No acute fracture is visualized. 12 rib-bearing vertebral bodies are seen. The thoracic vertebral bodies are fairly well preserved. No acute fractures seen. There is a slight dextroscoliosis seen in the upper to mid thoracic    spine. Hypertrophic spurring is seen on the right at multiple levels. . There are also small anterior osteophytes. No hematomas or soft tissue masses are seen.  In the visualized lung fields atelectasis is seen. There are also atherosclerotic    calcifications seen. IMPRESSION: No fracture or dislocation is seen. Mild degenerative changes are visualized. .            All CT scans at this facility use dose modulation, iterative reconstruction, and/or weight based dosing when appropriate to reduce radiation dose to as low as reasonably achievable. CT THORACIC SPINE WO CONTRAST   Final Result   1. Degenerative changes are seen at multiple levels. No acute fractures visualized. Anterolisthesis of C3 on C4 appears to be chronic. 2. The thyroid gland is enlarged and contains multiple nodules with the largest seen in the right lobe of the gland. All CT scans at this facility use dose modulation, iterative reconstruction, and/or weight based dosing when appropriate to reduce radiation dose to as low as reasonably achievable. CT THORACIC SPINE WO CONTRAST      COMPARISON: Chest x-ray May 3, 2020      HISTORY: Fall from standing         TECHNIQUE: Helical CT scanning of the thoracic spine was performed in axial plane. Coronal and sagittal reconstructed images were obtained and viewed. FINDINGS:   The spine is demineralized. No acute fracture is visualized. 12 rib-bearing vertebral bodies are seen. The thoracic vertebral bodies are fairly well preserved. No acute fractures seen. There is a slight dextroscoliosis seen in the upper to mid thoracic    spine. Hypertrophic spurring is seen on the right at multiple levels. . There are also small anterior osteophytes. No hematomas or soft tissue masses are seen. In the visualized lung fields atelectasis is seen. There are also atherosclerotic    calcifications seen. IMPRESSION: No fracture or dislocation is seen. Mild degenerative changes are visualized. .            All CT scans at this facility use dose modulation, iterative reconstruction, and/or weight based dosing when appropriate to reduce radiation dose to as low as reasonably achievable. CT CHEST WO CONTRAST   Final Result      No acute findings. Cardiomegaly. Median sternotomy. Dilatation ascending thoracic aorta. Postsurgical change, left shoulder. All CT scans at this facility use dose modulation, iterative reconstruction, and/or weight based dosing when appropriate to reduce radiation dose to as low as reasonably achievable. CT ABDOMEN PELVIS WO CONTRAST Additional Contrast? None   Final Result   Impression:   No abnormality detected in the abdomen. Superior dislocation of the right hip prosthesis with tilt of the acetabular cap as described. 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 At the levels described above. 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 XR FEMUR RIGHT (MIN 2 VIEWS)   Final Result   Impression:   No abnormality detected in the abdomen. Superior dislocation of the right hip prosthesis with tilt of the acetabular cap as described.               Assessment/Plan:    81 y/o female with history of CAD s/p remote CABG/PCI, ischemic CMP/ chronic systolic HF, CKD3, GERD, CORRIE, seizure disorder, chronic right hip pain s/p right YESSY with chronic dislocation / loosening of hardware who presented with:     Acute/chronic right hip pain, inability to ambulate s/p fall  - in the setting of right YESSY with chronic dislocation / loosening of hardware   - conservative therapy per Ortho  - pain control  - PT/OT    Chest pain / CAD s/p remote CABG/PCI, ischemic CMP/ chronic systolic HF  - ECG was negative for acute ischemic changes,   - troponins were negative,   - no further w/u per cardiology   - continue home meds    Hypokalemia   - replaced    CKD3  - monitor renal function    Seizure disorder  - continue home regimen    Diet: ADULT DIET; Easy to Chew; Low Sodium (2 gm)    Code Status: DNR-CCA      Disposition - SNF vs home with [facility],  following        Electronically signed by Rose Ty MD on 3/30/2022 at 8:22 AM

## 2022-03-30 NOTE — PROGRESS NOTES
Physical Therapy Missed Treatment   Facility/Department: [facility] MED SURG N226/N226-01    NAME: Giovana Sosa    : 1934 (80 y.o.)  MRN: [id number]    Account: [de-identified]  Gender: female    Chart reviewed, attempted PT at 46. Patient unavailable 2° to:    [] Hold per nsg request    [] Pt declined     [] Nsg notified   [] Other notified    [] Pt. . off floor for test/procedure. [x] Pt. Unavailable, pt's potassium level 2.6 hold until value is back within therapeutic range.          Electronically signed by Otis Staton PTA on 3/30/22 at 2:03 PM EDT

## 2022-03-30 NOTE — PROGRESS NOTES
1032 skin assessment completed, no breakdown noted, preventative mepiplex placed to heels and sacrum, repostioned to left side, patient currently denies nausea.   Electronically signed by Nadine Flores RN on 3/30/2022 at 1:28 PM

## 2022-03-30 NOTE — PROGRESS NOTES
Pt has co chest pain 10/10   Pt states the pain is sharp and feels like when she had a heart attack. Nurse completed EKG and notified Dr Eunice Arana.

## 2022-03-30 NOTE — CARE COORDINATION
LSW and Care Management team meet with Pt and son at bed side. Pt and son notified that DrSundeep decided not to do surgery on Pt. RN, Janie Gaspar stated Pt had a really hard time with PT and OT yesterday. DC plan discus with son. SNF list provided to son. Son notified LSW to come back tomorrow to discuss more about DC plan. LSW will follow up.

## 2022-03-31 NOTE — CARE COORDINATION
03/31/22    From: Home with daughter, dependent on family for care. Has walker, WC. Admit: 3/28 Hip pain and inability to ambulate s/p fall. No surgery planned. PMH: Htn, hyperlipidemia, diverticulitis, CAD s/p CABG, ischemic CM, chronic systolic HF, CKD, seizure disorder, chronic right hip pain s/p right YESSY with chronic dislocation / loosening of hardware. Anticipated Discharge Disposition: SNF list given to patient/son on 3/30. SW working with family to decide on facility (see SW notes). Patient Mobility or PT/OT ordered: Yes. Consults: Ortho ([address]), cardiology Mulu Miranda). Covid result &/or vacc status: Vaccinated + booster. Barriers to Discharge:   - Pain, mobility, hypokalemia.  - Need SNF choice from family (list given to family by SW on 3/30). Assessments: CMI done.

## 2022-03-31 NOTE — PROGRESS NOTES
Renal Monitoring based on    Recent Labs     03/31/22  0601   CREATININE 0.86   CrCl cannot be calculated (Unknown ideal weight.). [place] [employer] using Adjusted BW: 38.9 (ml/min)      Pharmacy Dose Adjustment Per Protocol    Sommer Blood is a 80 y.o. female. Recent Labs     03/30/22  0612 03/31/22  0601   BUN 25* 21   CREATININE 0.89 0.86   CrCl cannot be calculated (Unknown ideal weight.). [place] Height: 4' 11\" (149.9 cm), Weight: 134 lb 3.2 oz (60.9 kg)      Current order:  Enoxaparin 30 mg SUBQ once daily      Plan:  Pharmacologic VTE prophylaxis modified based on patient weight and renal function per [facility]/P&T approved protocol     Patient Weight (kg)      50.9 and below .9 101-150.9 151-174.9 175 or greater   Estimated   CrCl  (ml/min) 30 or greater []   30 mg   SUBQ daily   [x]   40 mg   SUBQ daily []  30 mg SUBQ   BID  []  40 mg   SUBQ   BID []  60mg SUBQ BID    15-29.9 []  UFH 5000   units SUBQ BID []  30 mg   SUBQ daily [] 30 mg SUBQ   daily []  40 mg SUBQ   daily [] 60 mg SUBQ   daily    Less than 15 or dialysis []  UFH 5000   units SUBQ BID [] UFH 5000 units SUBQ TID []  UFH 7500   units   SUBQ TID       Thank you,     SAE Barber Ph.  3/31/2022  12:45 PM

## 2022-03-31 NOTE — DISCHARGE INSTR - COC
Continuity of Care Form    Patient Name: Giovanny Keys   :  1934  MRN:  [id number]    Admit date:  3/28/2022  Discharge date:  2022    Code Status Order: DNR-CCA   Advance Directives:      Admitting Physician:  Leena Rashid DO  PCP: Chasidy Keenan MD    Discharging Nurse: [facility] Unit/Room#: N46/N46-5  Discharging Unit Phone Number: [phone]    Emergency Contact:   Extended Emergency Contact Information  Primary Emergency Contact: Festus Joyce  Address: [address] [place] [address] Phone: [phone]  Relation: Child  Secondary Emergency Contact: [address] [place] [address] Phone: [phone]  Relation: Child    Past Surgical History:  Past Surgical History:   Procedure Laterality Date    APPENDECTOMY      CARDIAC SURGERY      CHOLECYSTECTOMY      JOINT REPLACEMENT         Immunization History:   Immunization History   Administered Date(s) Administered    COVID-19, Pfizer Purple top, DILUTE for use, 12+ yrs, 30mcg/0.3mL dose 2021, 2021, 10/25/2021       Active Problems:  Patient Active Problem List   Diagnosis Code    Chronic coronary artery disease I25.10    Dyslipidemia E78.5    Colitis K52.9    Essential hypertension I10    Central abdominal pain R10.9    Chest pain R07.9    Epigastric pain R10.13    Diverticulitis K57.92    Falls frequently R29.6    Chronic heart failure (HCC) I50.9    Dilated cardiomyopathy ([facility] [address] I42.0    Presence of automatic (implantable) cardiac defibrillator Z95.810    CKD (chronic kidney disease) N18.9    Frequent falls R29.6       Isolation/Infection:   Isolation            No Isolation          Patient Infection Status       Infection Onset Added Last Indicated Last Indicated By Review Planned Expiration Resolved Resolved By    None active    Resolved    COVID-19 (Rule Out) 21 COVID-19, Rapid (Ordered)   21 Rule-Out Test Resulted    C-diff Rule Out 05/28/21 05/28/21 05/28/21 Gastrointestinal Panel by DNA (Ordered)   05/30/21 Radha Kapoor RN    Order discontinued and patient discharged home    COVID-19 (Rule Out) 03/20/21 03/20/21 03/20/21 COVID-19, Rapid (Ordered)   03/20/21 Rule-Out Test Resulted            Nurse Assessment:  Last Vital Signs: /79   Pulse 90   Temp 97.9 °F (36.6 °C) (Oral)   Resp 16   Ht 4' 11\" (1.499 m)   Wt 134 lb 3.2 oz (60.9 kg)   SpO2 95%   BMI 27.11 kg/m²     Last documented pain score (0-10 scale): Pain Level: 3  Last Weight:   Wt Readings from Last 1 Encounters:   03/30/22 134 lb 3.2 oz (60.9 kg)     Mental Status:  oriented and alert    IV Access:  - None    Nursing Mobility/ADLs:  Walking   Assisted  Transfer  Assisted  Bathing  Dependent  Dressing  Dependent  Toileting  Assisted  Feeding  Independent  Med Admin  Assisted  Med Delivery   whole    Wound Care Documentation and Therapy:        Elimination:  Continence: Bowel: Yes  Bladder: No  Urinary Catheter: None   Colostomy/Ileostomy/Ileal Conduit: No       Date of Last BM: 4/4/2022    Intake/Output Summary (Last 24 hours) at 3/31/2022 1054  Last data filed at 3/30/2022 1829  Gross per 24 hour   Intake 413.93 ml   Output 400 ml   Net 13.93 ml     I/O last 3 completed shifts: In: 623.9 [P.O.:210; IV Piggyback:413.9]  Out: 1100 [Urine:1100]    Safety Concerns:     History of Falls (last 30 days) and At Risk for Falls    Impairments/Disabilities:      Vision and Hearing    Nutrition Therapy:  Current Nutrition Therapy:   - Oral Diet:  Dental Soft    Routes of Feeding: Oral  Liquids: Thin Liquids  Daily Fluid Restriction: no  Last Modified Barium Swallow with Video (Video Swallowing Test): not done    Treatments at the Time of Hospital Discharge:   Respiratory Treatments: NA  Oxygen Therapy:  is not on home oxygen therapy.   Ventilator:    - No ventilator support    Rehab Therapies: Physical Therapy and Occupational Therapy  Weight Bearing Status/Restrictions: No weight bearing restrictions  Other Medical Equipment (for information only, NOT a DME order):  walker and hospital bed  Other Treatments: NA    Patient's personal belongings (please select all that are sent with patient):  Dentures upper    RN SIGNATURE:  Electronically signed by Devon Brownlee RN on 4/4/22 at 1:02 PM EDT    CASE MANAGEMENT/SOCIAL WORK SECTION    Inpatient Status Date: ***    Readmission Risk Assessment Score:  Readmission Risk              Risk of Unplanned Readmission:  17           Discharging to Facility/ Agency   Name: [facility]  Address:  Phone:  Fax:    Dialysis Facility (if applicable)   Name:  Address:  Dialysis Schedule:  Phone:  Fax:    / signature: Electronically signed by MACY Jansen on 3/31/22 at 10:54 AM EDT    PHYSICIAN SECTION    Prognosis: Fair    Condition at Discharge: Stable    Rehab Potential (if transferring to Rehab): Good    Recommended Labs or Other Treatments After Discharge:   WBAT to RLE with use of walker for assistance with ambulation   Follow up with orthopedic surgeon as an outpatient in 4-6 weeks     Physician Certification: I certify the above information and transfer of Liliya Sandoval  is necessary for the continuing treatment of the diagnosis listed and that [facility] for less 30 days.      Update Admission H&P: No change in H&P    PHYSICIAN SIGNATURE:  Electronically signed by Ambrosio Farmer MD on 4/4/22 at 4:45 PM EDT

## 2022-03-31 NOTE — PROGRESS NOTES
Physical Therapy Med Surg Daily Treatment Note  Facility/Department: [address] NEURO  Room: 26/N226[facility]       NAME: Collin Huerta  : 1934 (15 y.o.)  MRN: [id number]  CODE STATUS: DNR-CCA    Date of Service: 3/31/2022    Patient Diagnosis(es): Falls frequently [R29.6]  Frequent falls [R29.6]   Chief Complaint   Patient presents with    Fall     Patient Active Problem List    Diagnosis Date Noted    Falls frequently 2022    Chronic heart failure ([facility] 2022    CKD (chronic kidney disease) 2022    Frequent falls 2022    Diverticulitis 10/17/2021    Epigastric pain 2021    Presence of automatic (implantable) cardiac defibrillator 2020    Chest pain 10/20/2020    Dilated cardiomyopathy ([facility] 2020    Central abdominal pain     Colitis 2018    Essential hypertension 2017    Chronic coronary artery disease 2015    Dyslipidemia 2015        Past Medical History:   Diagnosis Date    Diverticulitis     Hyperlipidemia     Hypertension      Past Surgical History:   Procedure Laterality Date    APPENDECTOMY      CARDIAC SURGERY      CHOLECYSTECTOMY      JOINT REPLACEMENT         Restrictions  Restrictions/Precautions: Weight Bearing (R hip dislocated- pt is not surgical candidate)  Lower Extremity Weight Bearing Restrictions  Right Lower Extremity Weight Bearing: Weight Bearing As Tolerated    SUBJECTIVE   General  Chart Reviewed: Yes  Family / Caregiver Present: Yes (son)    Pre-Session Pain Report  Pre Treatment Pain Screening  Pain at present: 3  Scale Used: Numeric Score  Intervention List: Patient able to continue with treatment  Pain Screening  Patient Currently in Pain: Yes       Post-Session Pain Report  Pain Assessment  Pain Assessment: 0-10  Pain Level: 3  Pain Type: Acute pain  Pain Location: Hip  Pain Orientation: Right         OBJECTIVE        Bed mobility  Supine to Sit: Maximum assistance;2 Person assistance  Sit to Supine: Maximum assistance;2 Person assistance  Comment: increased time and effort to complete, increased pain R hip with movement. Transfers  Sit to Stand: Moderate Assistance  Stand to sit: Moderate Assistance  Comment: WW used; slow movement due to increased R hip pain; FF in standing. Mod A for stability and AD management. Ambulation  Ambulation?: Yes  Ambulation 1  Surface: level tile  Device: Rolling Walker  Assistance: Moderate assistance;2 Person assistance  Quality of Gait: poor WB acceptance RLE, poor UE use on AD. assistance needed for weight shifting and hip stability. Gait Deviations: Slow Carmelita  Distance: 3 steps FWD. Comments: bed needing brought up behind pt because she is unable to take backwards steps back towards bed. Neuromuscular Education  Neuromuscular Comments: standing balance training, lateral weight shifting for improved WB tolerance RLE and righting reactions. Exercises  Quad Sets: x 10  Heelslides: x 10 AAROM RLE. AROM LLE. Gluteal Sets: x 10  Ankle Pumps: x 10  Comments: increased time and effort to complete. max cueing needed to keep pt on task. Activity Tolerance  Activity Tolerance: Patient limited by pain          ASSESSMENT   Assessment: pt able to weight shift and take 3 steps using [employer], difficulty with all movements, significant assistance needed to complete bed mobility. Discharge Recommendations:  Continue to assess pending progress    Goals  Short term goals  Short term goal 1: Pt will demonstrate HEP indep.   Long term goals  Long term goal 1: Pt will demonstrate bed mobility CGA  Long term goal 2: Pt will demonstrate transfers sit <> stand and SPT to chair/ BSC CGA with safest AD  Long term goal 3: Pt will demonstrate static standing with upright posture >/= 1 min with safest AD and SBA for increased safety with toileting  Long term goal 4: Pt will demonstrate w/c mobility >/= 100ft indep  Long term goal 5: Pt will demonstrate amb >/= 10ft with safest AD min A    PLAN    Times per week: 3-6  Safety Devices  Type of devices: All fall risk precautions in place,Bed alarm in place,Call light within reach,Left in bed     [facility] (6 CLICK) WilliamSundeep Kayla Doss 28 Inpatient Mobility Raw Score : 11      Therapy Time   Individual   Time In 1052   Time Out 1125   Minutes 33      BM/Trsf: 15  Therex: 10  Gait: 3  NM: 5       Munira Sommers PTA, 03/31/22 at 11:42 AM         Definitions for assistance levels  Independent = pt does not require any physical supervision or assistance from another person for activity completion. Device may be needed.   Stand by assistance = pt requires verbal cues or instructions from another person, close to but not touching, to perform the activity  Minimal assistance= pt performs 75% or more of the activity; assistance is required to complete the activity  Moderate assistance= pt performs 50% of the activity; assistance is required to complete the activity  Maximal assistance = pt performs 25% of the activity; assistance is required to complete the activity  Dependent = pt requires total physical assistance to accomplish the task

## 2022-03-31 NOTE — CARE COORDINATION
LSW and the care management team meet with pt and son ( who is the decision maker) this morning to discuss DC plan. Per RN, Bong Mckeon, pt was confuse last night trying to get out of bed. LSW meet with the son after daily rounds. The son understand the Dr. Is not going to do surgery. LSW explained to son the process that involves to get to SNF. Son mentions couple SNFs that he is intrested. Son want to see PT and OT input on Pt. LSW contacted [facility]strada Liaison, to review. Will follow with pt and son after PT and OT works with pt.

## 2022-04-01 NOTE — PROGRESS NOTES
Comprehensive Nutrition Assessment    Type and Reason for Visit:  Initial,Consult (Gonzalez)    Nutrition Recommendations/Plan: Continue Current Diet,Start Oral Nutrition Supplement    Nutrition Assessment:  Pt presents at nutritional risk due to reported decreased appetite/noted sub-optimal intake at meals. To provide ONS tid with meals/continue to monitor. Malnutrition Assessment:  Malnutrition Status:  Insufficient data    Context:  Chronic Illness       Estimated Daily Nutrient Needs:  Energy (kcal):  7383-1019 (Kg X 3-25); Weight Used for Energy Requirements:  Current     Protein (g):  52-56 (kg x 1.2-1.3); Weight Used for Protein Requirements:  Ideal        Fluid (ml/day):  Per MD; Method Used for Fluid Requirements:         Nutrition Related Findings:  PMH-htn, hld, CAD, CHF, CKD, diverticulosis, DB noted (no home meds) Gluc-127-175 ; adm s/p fall. Trace BLE edema noted. Pt reports decreased appetite currently, but stated that she eats good at home. Pt agreeable to supplement/flavor preference noted. Wounds:  None       Current Nutrition Therapies:    ADULT DIET; Easy to Chew; Low Sodium (2 gm)    Anthropometric Measures:  · Height: 4' 11\" (149.9 cm)  · Current Body Weight: 125 lb (56.7 kg) (3/31 bedscale)   · Admission Body Weight: 116 lb (52.6 kg) (stated)    · Usual Body Weight: 124 lb (56.2 kg) (5/2021 bedscale; 125lb (stated 8/2021))     · Ideal Body Weight: 95 lbs; % Ideal Body Weight   >100%  · BMI: 25.2  · BMI Categories: Overweight (BMI 25.0-29. 9)       Nutrition Diagnosis:   · Inadequate oral intake related to  (current condition, advanced age) as evidenced by intake 0-25%    Interventions:   Food and/or Nutrient Delivery:  Continue Current Diet,Start Oral Nutrition Supplement  Nutrition Education/Counseling:  No recommendation at this time   Coordination of Nutrition Care:  Continue to monitor while inpatient    Goals:  Intake >50% of meals/supplement. Gluc <140. Stable weight. Nutrition Monitoring and Evaluation:   Food/Nutrient Intake Outcomes:  Food and Nutrient Intake,Supplement Intake  Physical Signs/Symptoms Outcomes:  Weight,Biochemical Data     Electronically signed by Tierra Ruiz RD, ELIZABETH on 4/1/22 at 10:28 AM EDT

## 2022-04-01 NOTE — PROGRESS NOTES
Physical Therapy Med Surg Daily Treatment Note  [facility]       NAME: Giovanny Keys  : 1934 (88 y.o.)  MRN: [id number]  CODE STATUS: DNR-CCA    Date of Service: 2022    Patient Diagnosis(es): Falls frequently [R29.6]  Frequent falls [R29.6]   Chief Complaint   Patient presents with    Fall     Patient Active Problem List    Diagnosis Date Noted    Falls frequently 2022    [facility]    CKD (chronic kidney disease) 2022    Frequent falls 2022    Diverticulitis 10/17/2021    Epigastric pain 2021    Presence of automatic (implantable) cardiac defibrillator 2020    Chest pain 10/20/2020    Dilated [facility] 2020    Central abdominal pain     Colitis 2018    Essential hypertension 2017    Chronic coronary artery disease 2015    Dyslipidemia 2015        Past Medical History:   Diagnosis Date    Diverticulitis     Hyperlipidemia     Hypertension      Past Surgical History:   Procedure Laterality Date    APPENDECTOMY      CARDIAC SURGERY      CHOLECYSTECTOMY      JOINT REPLACEMENT         Restrictions  Restrictions/Precautions: Weight Bearing (R hip dislocated- pt is not surgical candidate)  Lower Extremity Weight Bearing Restrictions  Right Lower Extremity Weight Bearing: Weight Bearing As Tolerated    SUBJECTIVE   General  Chart Reviewed: Yes  Family / Caregiver Present: Yes  Subjective  Subjective: \"I am tired. \"    Pre-Session Pain Report  Pre Treatment Pain Screening  Pain at present: 3  Scale Used: Numeric Score  Intervention List: Patient able to continue with treatment  Pain Screening  Patient Currently in Pain: Yes       Post-Session Pain Report  Pain Assessment  Pain Assessment: 0-10  Pain Level: 3  Pain Type: Acute pain  Pain Location: Hip  Pain Orientation: Right         OBJECTIVE        Bed mobility  Supine to Sit: Maximum assistance;2 Person assistance  Sit to Supine: Maximum assistance;2 Person assistance  Comment: increased time and effort, poor initiation, R hip pain with movement. Transfers  Sit to Stand: Moderate Assistance  Stand to sit: Moderate Assistance  Comment: [employer] used; vc's for improved hand placement. slow movement due to increased R hip pain; FF in standing. Mod A for stability and AD management. STS x3    Ambulation  Ambulation?: Yes  Ambulation 1  Surface: level tile  Device: Rolling Walker  Assistance: Moderate assistance;2 Person assistance  Quality of Gait: poor WB acceptance RLE, poor UE use on AD. assistance needed for weight shifting and hip stability. Distance: 3 steps FWD. Comments: bed needing brought up behind pt because she is unable to take backwards steps back towards bed. Neuromuscular Education  Neuromuscular Comments: standing balance training, lateral weight shifting for improved WB tolerance RLE and righting reactions. Activity Tolerance  Activity Tolerance: Patient limited by pain          ASSESSMENT   Assessment: pt more fatigued this date compared to yesterday, STS x3. significant assistance needed throughout mobility. Discharge Recommendations:  Continue to assess pending progress    Goals  Short term goals  Short term goal 1: Pt will demonstrate HEP indep. Long term goals  Long term goal 1: Pt will demonstrate bed mobility CGA  Long term goal 2: Pt will demonstrate transfers sit <> stand and SPT to chair/ BSC CGA with safest AD  Long term goal 3: Pt will demonstrate static standing with upright posture >/= 1 min with safest AD and SBA for increased safety with toileting  Long term goal 4: Pt will demonstrate w/c mobility >/= 100ft indep  Long term goal 5: Pt will demonstrate amb >/= 10ft with safest AD min A    PLAN    Times per week: 3-6  Safety Devices  Type of devices:  All fall risk precautions in place,Bed alarm in place,Call light within reach,Left in bed [facility] (6 CLICK) BASIC MOBILITY  AM-PAC Inpatient Mobility Raw Score : 11      Therapy Time   Individual   Time In 1300   Time Out 1323   Minutes 23      BM/Trsf: 10  Gait: 8   NM: 5     Billy Wheeler PTA, 04/01/22 at 2:20 PM         Definitions for assistance levels  Independent = pt does not require any physical supervision or assistance from another person for activity completion. Device may be needed.   Stand by assistance = pt requires verbal cues or instructions from another person, close to but not touching, to perform the activity  Minimal assistance= pt performs 75% or more of the activity; assistance is required to complete the activity  Moderate assistance= pt performs 50% of the activity; assistance is required to complete the activity  Maximal assistance = pt performs 25% of the activity; assistance is required to complete the activity  Dependent = pt requires total physical assistance to accomplish the task

## 2022-04-01 NOTE — PROGRESS NOTES
Hospitalist Progress Note      PCP: Catrina Vivar MD    Date of Admission: 3/28/2022    Chief Complaint:  No acute events, afebrile, stable HD    Medications:  Reviewed    Infusion Medications    sodium chloride       Scheduled Medications    citalopram  10 mg Oral Daily    famotidine  20 mg Oral BID    metOLazone  2.5 mg Oral Once per day on Mon Wed Fri    potassium chloride  10 mEq Oral BID    psyllium  1 packet Oral BID    furosemide  20 mg Oral Daily    enoxaparin  40 mg SubCUTAneous Daily    polyethylene glycol  17 g Oral Daily    senna  1 tablet Oral Nightly    isosorbide mononitrate  90 mg Oral Daily    potassium chloride  10 mEq IntraVENous Once    sodium chloride flush  5-40 mL IntraVENous 2 times per day    aspirin  81 mg Oral Daily    lamoTRIgine  100 mg Oral BID    cetirizine  5 mg Oral Daily    losartan  50 mg Oral Daily    metoprolol succinate  75 mg Oral Daily    pantoprazole  40 mg Oral QAM AC    rosuvastatin  20 mg Oral Daily     PRN Meds: acetaminophen, dicyclomine, docusate sodium, polyethylene glycol, nitroGLYCERIN, sodium chloride flush, sodium chloride, ondansetron **OR** ondansetron, oxyCODONE    No intake or output data in the 24 hours ending 04/01/22 0905    Exam:    BP (!) 138/91   Pulse 97   Temp 97.3 °F (36.3 °C) (Oral)   Resp 18   Ht 4' 11\" (1.499 m)   Wt 134 lb 3.2 oz (60.9 kg)   SpO2 98%   BMI 27.11 kg/m²     General appearance: appears stated age and cooperative. Respiratory:  clear to auscultation, bilaterally   Cardiovascular: Regular rate and rhythm, S1/S2. Abdomen: Soft, active bowel sounds. Musculoskeletal: No edema bilaterally.      Labs:   Recent Labs     03/30/22  0612 03/31/22  0601 04/01/22  0655   WBC 8.7 12.5* 11.4*   HGB 12.3 13.1 13.1   HCT 36.6* 38.6 39.4    266 278     Recent Labs     03/30/22  0612 03/31/22  0601 04/01/22  0655    138 138   K 2.6* 3.8 3.7   CL 94* 93* 95   CO2 28 28 26   BUN 25* 21 25*   CREATININE 0.89 0.86 0.96*   CALCIUM 9.6 10.8* 10.3*     Recent Labs     03/30/22  0612 03/31/22  0601 04/01/22  0655   AST 11 15 16   ALT 6 8 9   BILITOT 0.5 0.6 0.8*   ALKPHOS 62 64 60     No results for input(s): INR in the last 72 hours. Recent Labs     03/30/22  0612 03/30/22  1211 03/30/22  1816   CKTOTAL 43 40 38   TROPONINI <0.010 <0.010 <0.010       Urinalysis:      Lab Results   Component Value Date    NITRU Negative 10/17/2021    WBCUA 6-9 10/17/2021    BACTERIA Negative 10/17/2021    RBCUA 3-5 11/03/2020    BLOODU Negative 10/17/2021    SPECGRAV 1.016 10/17/2021    GLUCOSEU Negative 10/17/2021       Radiology:  CT HEAD WO CONTRAST   Final Result   Impression:        Prominence of the ventricular system and cerebral sulci. 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 CT CERVICAL SPINE WO CONTRAST   Final Result   1. Degenerative changes are seen at multiple levels. No acute fractures visualized. Anterolisthesis of C3 on C4 appears to be chronic. 2. The thyroid gland is enlarged and contains multiple nodules with the largest seen in the right lobe of the gland. All CT scans at this facility use dose modulation, iterative reconstruction, and/or weight based dosing when appropriate to reduce radiation dose to as low as reasonably achievable. CT THORACIC SPINE WO CONTRAST      COMPARISON: Chest x-ray May 3, 2020      HISTORY: Fall from standing         TECHNIQUE: Helical CT scanning of the thoracic spine was performed in axial plane. Coronal and sagittal reconstructed images were obtained and viewed. FINDINGS:   The spine is demineralized. No acute fracture is visualized. 12 rib-bearing vertebral bodies are seen. The thoracic vertebral bodies are fairly well preserved. No acute fractures seen. There is a slight dextroscoliosis seen in the upper to mid thoracic    spine.  Hypertrophic spurring is seen on the right at multiple levels. . There are also small anterior osteophytes. No hematomas or soft tissue masses are seen. In the visualized lung fields atelectasis is seen. There are also atherosclerotic    calcifications seen. IMPRESSION: No fracture or dislocation is seen. Mild degenerative changes are visualized. .            All CT scans at this facility use dose modulation, iterative reconstruction, and/or weight based dosing when appropriate to reduce radiation dose to as low as reasonably achievable. CT THORACIC SPINE WO CONTRAST   Final Result   1. Degenerative changes are seen at multiple levels. No acute fractures visualized. Anterolisthesis of C3 on C4 appears to be chronic. 2. The thyroid gland is enlarged and contains multiple nodules with the largest seen in the right lobe of the gland. All CT scans at this facility use dose modulation, iterative reconstruction, and/or weight based dosing when appropriate to reduce radiation dose to as low as reasonably achievable. CT THORACIC SPINE WO CONTRAST      COMPARISON: Chest x-ray May 3, 2020      HISTORY: Fall from standing         TECHNIQUE: Helical CT scanning of the thoracic spine was performed in axial plane. Coronal and sagittal reconstructed images were obtained and viewed. FINDINGS:   The spine is demineralized. No acute fracture is visualized. 12 rib-bearing vertebral bodies are seen. The thoracic vertebral bodies are fairly well preserved. No acute fractures seen. There is a slight dextroscoliosis seen in the upper to mid thoracic    spine. Hypertrophic spurring is seen on the right at multiple levels. . There are also small anterior osteophytes. No hematomas or soft tissue masses are seen. In the visualized lung fields atelectasis is seen. There are also atherosclerotic    calcifications seen. IMPRESSION: No fracture or dislocation is seen. Mild degenerative changes are visualized. .            All CT scans at this facility use dose modulation, iterative reconstruction, and/or weight based dosing when appropriate to reduce radiation dose to as low as reasonably achievable. CT CHEST WO CONTRAST   Final Result      No acute findings. Cardiomegaly. Median sternotomy. Dilatation ascending thoracic aorta. Postsurgical change, left shoulder. All CT scans at this facility use dose modulation, iterative reconstruction, and/or weight based dosing when appropriate to reduce radiation dose to as low as reasonably achievable. CT ABDOMEN PELVIS WO CONTRAST Additional Contrast? None   Final Result   Impression:   No abnormality detected in the abdomen. Superior dislocation of the right hip prosthesis with tilt of the acetabular cap as described. 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 At the levels described above. 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 XR FEMUR RIGHT (MIN 2 VIEWS)   Final Result   Impression:   No abnormality detected in the abdomen. Superior dislocation of the right hip prosthesis with tilt of the acetabular cap as described.               Assessment/Plan:    79 y/o female with history of CAD s/p remote CABG/PCI, ischemic CMP/ chronic systolic HF, CKD3, GERD, CORRIE, seizure disorder, chronic right hip pain s/p right YESSY with chronic dislocation / loosening of hardware who presented with:     Acute/chronic right hip pain, inability to ambulate s/p fall  - in the setting of right YESSY with chronic dislocation / loosening of hardware   - conservative therapy per Ortho  - pain control  - PT/OT    Chest pain / CAD s/p remote CABG/PCI, ischemic CMP/ chronic systolic HF  - ECG was negative for acute ischemic changes,   - troponins were negative,   - no further w/u per cardiology   - continue home meds    Hypokalemia   - replaced    CKD3  - monitor renal function    Seizure disorder  - continue home regimen    Diet: ADULT DIET; Easy to Chew; Low Sodium (2 gm)  ADULT ORAL NUTRITION SUPPLEMENT; Breakfast, Lunch, Dinner; Standard High Calorie/High Protein Oral Supplement    Code Status: DNR-CCA      Disposition - SNF, waiting for precert,  following        Electronically signed by Carmela Duarte MD on 4/1/2022 at 9:05 AM

## 2022-04-01 NOTE — PROGRESS NOTES
Report given to [facility] on [address].   Son in room and updated on patients new floor and room number. Patient notified of change as well.

## 2022-04-01 NOTE — CARE COORDINATION
4/1/22     From: Home with daughter, dependent on family for care. Has walker, WC.     Admit: 3/28 Hip pain and inability to ambulate s/p fall. No surgery planned.     PMH: Htn, hyperlipidemia, diverticulitis, CAD s/p CABG, ischemic CM, chronic systolic HF, CKD, seizure disorder, chronic right hip pain s/p right YESSY with chronic dislocation / loosening of hardware.     Anticipated Discharge Disposition: [facility] SNF, precert started 4/26 per  notes.     Patient Mobility or PT/OT ordered: Yes.     Consults: Ortho ([address]), cardiology Lizet [place]).     Covid result &/or vacc status: Vaccinated + booster.     Barriers to Discharge:   - Pain, mobility --> need SNF at [place] and requires precert (started 0/14).      Assessments: CMI done.

## 2022-04-01 NOTE — PLAN OF CARE
Nutrition Problem #1: Inadequate oral intake  Intervention: Food and/or Nutrient Delivery: Continue Current Diet,Start Oral Nutrition Supplement  Nutritional Goals: Intake >50% of meals/supplement. Gluc <140. Stable weight.

## 2022-04-01 NOTE — CARE COORDINATION
MACY along with the care team met with the pt to discuss her DC needs. [facility] Precert was started yesterday. No approval as of the time of this note. Pt being transferred to [place] [id number] until precert approval is received.

## 2022-04-01 NOTE — FLOWSHEET NOTE
Assessment and VS completed. Patient AOx3. Denies pain at this time. Patient able to move around in bed very well on her own. Able to get patient up to bathroom using sera steady.

## 2022-04-02 NOTE — PROGRESS NOTES
Hospitalist Progress Note      PCP: Luis Rudd MD    Date of Admission: 3/28/2022    Chief Complaint:  Patient is having frequent PVCs on telemetry with HR in 90-100s range, normotensive, no new complaints    Medications:  Reviewed    Infusion Medications    sodium chloride       Scheduled Medications    potassium chloride  40 mEq Oral Q4H    potassium chloride  20 mEq Oral BID WC    citalopram  10 mg Oral Daily    famotidine  20 mg Oral BID    metOLazone  2.5 mg Oral Once per day on Mon Wed Fri    psyllium  1 packet Oral BID    furosemide  20 mg Oral Daily    enoxaparin  40 mg SubCUTAneous Daily    polyethylene glycol  17 g Oral Daily    senna  1 tablet Oral Nightly    isosorbide mononitrate  90 mg Oral Daily    potassium chloride  10 mEq IntraVENous Once    sodium chloride flush  5-40 mL IntraVENous 2 times per day    aspirin  81 mg Oral Daily    lamoTRIgine  100 mg Oral BID    cetirizine  5 mg Oral Daily    losartan  50 mg Oral Daily    metoprolol succinate  75 mg Oral Daily    pantoprazole  40 mg Oral QAM AC    rosuvastatin  20 mg Oral Daily     PRN Meds: acetaminophen, dicyclomine, docusate sodium, polyethylene glycol, nitroGLYCERIN, sodium chloride flush, sodium chloride, ondansetron **OR** ondansetron, oxyCODONE      Intake/Output Summary (Last 24 hours) at 4/2/2022 0951  Last data filed at 4/2/2022 8873  Gross per 24 hour   Intake 200 ml   Output 450 ml   Net -250 ml       Exam:    /68   Pulse 92   Temp 97.2 °F (36.2 °C) (Oral)   Resp 18   Ht 4' 11\" (1.499 m)   Wt 123 lb (55.8 kg)   SpO2 97%   BMI 24.84 kg/m²     General appearance: appears stated age and cooperative. Respiratory:  clear to auscultation, bilaterally   Cardiovascular: Regular rate and rhythm, S1/S2. Abdomen: Soft, active bowel sounds. Musculoskeletal: No edema bilaterally.      Labs:   Recent Labs     03/31/22  0601 04/01/22  0655 04/02/22  0513   WBC 12.5* 11.4* 9.4   HGB 13.1 13.1 13.4   HCT 38.6 39.4 well preserved. No acute fractures seen. There is a slight dextroscoliosis seen in the upper to mid thoracic    spine. Hypertrophic spurring is seen on the right at multiple levels. . There are also small anterior osteophytes. No hematomas or soft tissue masses are seen. In the visualized lung fields atelectasis is seen. There are also atherosclerotic    calcifications seen. IMPRESSION: No fracture or dislocation is seen. Mild degenerative changes are visualized. .            All CT scans at this facility use dose modulation, iterative reconstruction, and/or weight based dosing when appropriate to reduce radiation dose to as low as reasonably achievable. CT THORACIC SPINE WO CONTRAST   Final Result   1. Degenerative changes are seen at multiple levels. No acute fractures visualized. Anterolisthesis of C3 on C4 appears to be chronic. 2. The thyroid gland is enlarged and contains multiple nodules with the largest seen in the right lobe of the gland. All CT scans at this facility use dose modulation, iterative reconstruction, and/or weight based dosing when appropriate to reduce radiation dose to as low as reasonably achievable. CT THORACIC SPINE WO CONTRAST      COMPARISON: Chest x-ray May 3, 2020      HISTORY: Fall from standing         TECHNIQUE: Helical CT scanning of the thoracic spine was performed in axial plane. Coronal and sagittal reconstructed images were obtained and viewed. FINDINGS:   The spine is demineralized. No acute fracture is visualized. 12 rib-bearing vertebral bodies are seen. The thoracic vertebral bodies are fairly well preserved. No acute fractures seen. There is a slight dextroscoliosis seen in the upper to mid thoracic    spine. Hypertrophic spurring is seen on the right at multiple levels. . There are also small anterior osteophytes. No hematomas or soft tissue masses are seen. In the visualized lung fields atelectasis is seen.  There are also atherosclerotic    calcifications seen. IMPRESSION: No fracture or dislocation is seen. Mild degenerative changes are visualized. .            All CT scans at this facility use dose modulation, iterative reconstruction, and/or weight based dosing when appropriate to reduce radiation dose to as low as reasonably achievable. CT CHEST WO CONTRAST   Final Result      No acute findings. Cardiomegaly. Median sternotomy. Dilatation ascending thoracic aorta. Postsurgical change, left shoulder. All CT scans at this facility use dose modulation, iterative reconstruction, and/or weight based dosing when appropriate to reduce radiation dose to as low as reasonably achievable. CT ABDOMEN PELVIS WO CONTRAST Additional Contrast? None   Final Result   Impression:   No abnormality detected in the abdomen. Superior dislocation of the right hip prosthesis with tilt of the acetabular cap as described. 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 At the levels described above. 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 XR FEMUR RIGHT (MIN 2 VIEWS)   Final Result   Impression:   No abnormality detected in the abdomen. Superior dislocation of the right hip prosthesis with tilt of the acetabular cap as described.               Assessment/Plan:    79 y/o female with history of CAD s/p remote CABG/PCI, ischemic CMP/ chronic systolic HF, CKD3, GERD, CORRIE, seizure disorder, chronic right hip pain s/p right YESSY with chronic dislocation / loosening of hardware who presented with:     Acute/chronic right hip pain, inability to ambulate s/p fall  - in the setting of right YESSY with chronic dislocation / loosening of hardware   - conservative therapy per Ortho  - pain control  - PT/OT    Frequent PVCs / sustained VT / severe ischemic CMP  - started on amiodarone infusion  - on Toprol  - replace K  - IV mag  - management per cardiology     Hypokalemia   - in the setting of diuretic therapy  - replaced    CKD3  - monitor renal function    Seizure disorder  - continue home regimen    Diet: ADULT DIET; Easy to Chew; Low Sodium (2 gm)  ADULT ORAL NUTRITION SUPPLEMENT; Breakfast, Lunch, Dinner; Diabetic Oral Supplement    Code Status: DNR-CCA      Disposition - SNF, waiting for precert,  following        Electronically signed by Milena Garcia MD on 4/2/2022 at 9:51 AM

## 2022-04-02 NOTE — PLAN OF CARE
Problem: Falls - Risk of:  Goal: Will remain free from falls  Outcome: Ongoing  Goal: Absence of physical injury  Outcome: Ongoing     Problem: Skin Integrity:  Goal: Will show no infection signs and symptoms  Outcome: Ongoing  Goal: Absence of new skin breakdown  Outcome: Ongoing     Problem: IP BALANCE  Goal: LTG - Patient will maintain balance to allow for safe/functional mobility  Outcome: Ongoing     Problem: IP DRESSINGS LOWER EXTREMITIES  Goal: LTG - Patient will safely utilize adaptive techniques/equipment to dress lower body  Outcome: Ongoing     Problem: IP DRESSINGS LOWER EXTREMITIES  Goal: LTG - Patient will safely utilize adaptive techniques/equipment to dress lower body  Outcome: Ongoing     Problem: Pain:  Goal: Pain level will decrease  Outcome: Ongoing  Goal: Control of acute pain  Outcome: Ongoing  Goal: Control of chronic pain  Outcome: Ongoing     Problem: Nutrition  Goal: Optimal nutrition therapy  Outcome: Ongoing

## 2022-04-02 NOTE — PROGRESS NOTES
[facility] OF THE [facility] Heart Pillow Note      Patient: Audi Schmidt    Unit/Bed: U467/O497-13  YOB: 1934  MRN: [id number]  Admit Date:  3/28/2022  Hospital Day: 4    Rounding Date: 4/2/2022    Rounding Cardiologist:  RICK Munoz MD    PRIMARY Cardiologist: Isidro Campos    Subjective Complaint:   Denies any chest pain with exertion or at rest, palpitations, syncope, or edema.,  Somewhat somnolent. Physical Examination:     /68   Pulse 87   Temp 97.2 °F (36.2 °C) (Oral)   Resp 18   Ht 4' 11\" (1.499 m)   Wt 123 lb (55.8 kg)   SpO2 96%   BMI 24.84 kg/m²       Intake/Output Summary (Last 24 hours) at 4/2/2022 1104  Last data filed at 4/2/2022 1045  Gross per 24 hour   Intake 560 ml   Output 450 ml   Net 110 ml                  Audi Schmidt examined at bedside in in no apparent distress. Focused exam reveals:     Cardiac: Heart regular rate and rhythm--PMI is laterally displaced consistent with cardiomyopathy     Lungs:  clear to auscultation bilaterally- no wheezes, rales or rhonchi, normal air movement, no respiratory distress    Extremities:   negative    Telemetry:      normal sinus  and Had an extended period of wide-complex tachycardia at other 130 bpm-presumably ventricular tachycardia. LABS:   CBC:   Recent Labs     03/31/22  0601 04/01/22  0655 04/02/22  0513   WBC 12.5* 11.4* 9.4   HGB 13.1 13.1 13.4    278 262      BMP:    Recent Labs     03/31/22  0601 04/01/22  0655 04/02/22  0513    138 137   K 3.8 3.7 3.2*   CL 93* 95 94*   CO2 28 26 27   BUN 21 25* 29*   CREATININE 0.86 0.96* 1.03*   GLUCOSE 127* 143* 118*              Troponin: No results for input(s): TROPONINT in the last 72 hours. BNP: No results for input(s): PROBNP in the last 72 hours. INR: No results for input(s): INR in the last 72 hours.    Mg:   Recent Labs     04/02/22  0513   MG 2.3       Cardiac Testing:    none    Assessment:  Patient last night had a long period of ventricular tachycardia, lasting minutes according to nursing station  Hypokalemia  Known ischemic cardiomyopathy-patient in the past has been offered several times defibrillator--patient always pleasantly refused  Falling spells  Being considered for nursing facility at least temporarily  Plan:  1. Potassium is presently being repleted  2. IV magnesium has been ordered  3. We will start amiodarone-with IV bolus and drip for 24 hours, then will convert to oral amiodarone  4. This will hold up patient's nursing home placement  5. Follow electrolytes and magnesium level  6. See orders  7. Close attention to blood pressure as well  8.  Family and patient change her mind, consider AICD but this has been dressed many times in the past per Dr. Usha Cisse notes  Electronically signed by Laurence Bear MD on 4/2/2022 at 11:04 AM

## 2022-04-02 NOTE — CARE COORDINATION
FLY LUCAS PRECERT PENDING. SPOKE WITH ELENA TO VERIFY, NO PRECERT AS OF THIS DATE/TIME. WILL CONTINUE TO FOLLOW.

## 2022-04-02 NOTE — FLOWSHEET NOTE
4/2/22 @ [address] [facility] Notified [facility] answering service of Cardiology consult # [id number]

## 2022-04-03 NOTE — PROGRESS NOTES
4/3/22     From: Home with daughter, dependent on family for care. Has walker, WC. Admit: 3/28 Hip pain and inability to ambulate s/p fall. No surgery planned. PMH: Htn, hyperlipidemia, diverticulitis, CAD s/p CABG, ischemic CM, chronic systolic HF, CKD, seizure disorder, chronic right hip pain s/p right YESSY with chronic dislocation / loosening of hardware. Anticipated Discharge Disposition: [facility] SNF, precert started 8/27 per  notes. Patient Mobility or PT/OT ordered: Yes. Consults: Ortho ([address]), cardiology Oliverosjohn Dominguez). Covid result &/or vacc status: Vaccinated + booster. Barriers to Discharge:   - Pain, mobility --> need SNF at [place] and requires precert (started 5/55). 4/2 PVC'S, VTACH--AMIO GTT STARTED. PLAN TRANSITION TO PO AMIO. Assessments: CMI done.

## 2022-04-03 NOTE — PROGRESS NOTES
Pharmacy Dose Adjustment Per Protocol    Torsten Pagan is a 80 y.o. female. Recent Labs     04/02/22  0513 04/03/22  0905   BUN 29* 29*   CREATININE 1.03* 1.05*   CrCl cannot be calculated (Unknown ideal weight.). [place] [facility] CrCl as calculated 15 mL/min  Creatinine clearance for underweight patient (BMI 11.3 kg/m²), calculated using actual body weight (no adjustment).     Height: 4' 11\" (149.9 cm), Weight: 123 lb (55.8 kg)      Current order:  Enoxaparin 40 mg SUBQ once daily      Plan:  Pharmacologic VTE prophylaxis modified based on patient renal function per [facility]/[employer] approved protocol     Patient Weight (kg)      50.9 and below .9 101-150.9 151-174.9 175 or greater   Estimated   CrCl  (ml/min) 30 or greater []   30 mg   SUBQ daily   []   40 mg   SUBQ daily []  30 mg SUBQ   BID  []  40 mg   SUBQ   BID []  60mg SUBQ BID    15-29.9   []UFH 5000   units SUBQ BID [x]  30 mg   SUBQ daily [] 30 mg SUBQ   daily []  40 mg SUBQ   daily [] 60 mg SUBQ   daily    Less than 15 or dialysis []  UFH 5000   units SUBQ BID [] UFH 5000 units SUBQ TID []  UFH 7500   units   SUBQ TID       Thank you,  Jesusita Wild [facility]  Staff Pharmacist, [facility]  4/3/2022  11:02 AM

## 2022-04-03 NOTE — FLOWSHEET NOTE
0730-- Pt complains of chest pain, EKG order per protocol. Notified MD. Juan Tovar given per [facility] VSS. Will continue to monitor. IV infiltrated- IV removed, arm elevated, dry heat applied. Site red and swollen. Notified pharmacy and attending. Will continue to monitor.

## 2022-04-03 NOTE — PROGRESS NOTES
Hospitalist Progress Note      PCP: Daisy Saldana MD    Date of Admission: 3/28/2022    Chief Complaint:  Patient is having fewer PVCs on telemetry today, afebrile, stable HD, is complaining of chest/epigastric pain today    Medications:  Reviewed    Infusion Medications    amiodarone 0.5 mg/min (04/03/22 1117)     Scheduled Medications    amiodarone  200 mg Oral BID    [START ON 4/4/2022] enoxaparin  30 mg SubCUTAneous Daily    potassium chloride  20 mEq Oral BID WC    metoprolol succinate  25 mg Oral BID    citalopram  10 mg Oral Daily    famotidine  20 mg Oral BID    metOLazone  2.5 mg Oral Once per day on Mon Wed Fri    psyllium  1 packet Oral BID    furosemide  20 mg Oral Daily    polyethylene glycol  17 g Oral Daily    senna  1 tablet Oral Nightly    [Held by provider] isosorbide mononitrate  90 mg Oral Daily    potassium chloride  10 mEq IntraVENous Once    sodium chloride flush  5-40 mL IntraVENous 2 times per day    aspirin  81 mg Oral Daily    lamoTRIgine  100 mg Oral BID    cetirizine  5 mg Oral Daily    [Held by provider] losartan  50 mg Oral Daily    pantoprazole  40 mg Oral QAM AC    rosuvastatin  20 mg Oral Daily     PRN Meds: acetaminophen, dicyclomine, docusate sodium, polyethylene glycol, nitroGLYCERIN, sodium chloride flush, ondansetron **OR** ondansetron, oxyCODONE      Intake/Output Summary (Last 24 hours) at 4/3/2022 1144  Last data filed at 4/3/2022 0659  Gross per 24 hour   Intake 1139.97 ml   Output    Net 1139.97 ml       Exam:    BP (!) 129/51   Pulse 70   Temp 97.3 °F (36.3 °C)   Resp 18   Ht 4' 11\" (1.499 m)   Wt 123 lb (55.8 kg)   SpO2 99%   BMI 24.84 kg/m²     General appearance: appears stated age and cooperative. Respiratory:  clear to auscultation, bilaterally   Cardiovascular: Regular rate and rhythm, S1/S2. Abdomen: Soft, active bowel sounds. Musculoskeletal: No edema bilaterally.      Labs:   Recent Labs     04/01/22  2383 04/02/22  6874 04/03/22  0904   WBC 11.4* 9.4 12.0*   HGB 13.1 13.4 13.3   HCT 39.4 40.5 40.9    262 258     Recent Labs     04/01/22  0655 04/01/22  0655 04/02/22  0513 04/03/22  0905     --  137 137   K 3.7   < > 3.2* 4.6  4.6   CL 95  --  94* 100   CO2 26  --  27 23   BUN 25*  --  29* 29*   CREATININE 0.96*  --  1.03* 1.05*   CALCIUM 10.3*  --  10.1* 9.4    < > = values in this interval not displayed. Recent Labs     04/01/22  0655 04/02/22  0513 04/03/22  0905   AST 16 14 12   ALT 9 8 7   BILITOT 0.8* 0.7 0.7   ALKPHOS 60 59 56     No results for input(s): INR in the last 72 hours. Recent Labs     04/03/22  0905   TROPONINI 0.016*       Urinalysis:      Lab Results   Component Value Date    NITRU Negative 10/17/2021    WBCUA 6-9 10/17/2021    BACTERIA Negative 10/17/2021    RBCUA 3-5 11/03/2020    BLOODU Negative 10/17/2021    SPECGRAV 1.016 10/17/2021    GLUCOSEU Negative 10/17/2021       Radiology:  XR CHEST PORTABLE   Final Result      NO EVIDENCE OF ACTIVE CARDIOPULMONARY DISEASE OR SIGNIFICANT CHANGE FROM PRIOR STUDIES IDENTIFIED. CT HEAD WO CONTRAST   Final Result   Impression:        Prominence of the ventricular system and cerebral sulci. 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 CT CERVICAL SPINE WO CONTRAST   Final Result   1. Degenerative changes are seen at multiple levels. No acute fractures visualized. Anterolisthesis of C3 on C4 appears to be chronic. 2. The thyroid gland is enlarged and contains multiple nodules with the largest seen in the right lobe of the gland. All CT scans at this facility use dose modulation, iterative reconstruction, and/or weight based dosing when appropriate to reduce radiation dose to as low as reasonably achievable.       CT THORACIC SPINE WO CONTRAST      COMPARISON: Chest x-ray May 3, 2020      HISTORY: Fall from standing         TECHNIQUE: Helical CT scanning of the thoracic spine was performed in axial plane. Coronal and sagittal reconstructed images were obtained and viewed. FINDINGS:   The spine is demineralized. No acute fracture is visualized. 12 rib-bearing vertebral bodies are seen. The thoracic vertebral bodies are fairly well preserved. No acute fractures seen. There is a slight dextroscoliosis seen in the upper to mid thoracic    spine. Hypertrophic spurring is seen on the right at multiple levels. . There are also small anterior osteophytes. No hematomas or soft tissue masses are seen. In the visualized lung fields atelectasis is seen. There are also atherosclerotic    calcifications seen. IMPRESSION: No fracture or dislocation is seen. Mild degenerative changes are visualized. .            All CT scans at this facility use dose modulation, iterative reconstruction, and/or weight based dosing when appropriate to reduce radiation dose to as low as reasonably achievable. CT THORACIC SPINE WO CONTRAST   Final Result   1. Degenerative changes are seen at multiple levels. No acute fractures visualized. Anterolisthesis of C3 on C4 appears to be chronic. 2. The thyroid gland is enlarged and contains multiple nodules with the largest seen in the right lobe of the gland. All CT scans at this facility use dose modulation, iterative reconstruction, and/or weight based dosing when appropriate to reduce radiation dose to as low as reasonably achievable. CT THORACIC SPINE WO CONTRAST      COMPARISON: Chest x-ray May 3, 2020      HISTORY: Fall from standing         TECHNIQUE: Helical CT scanning of the thoracic spine was performed in axial plane. Coronal and sagittal reconstructed images were obtained and viewed. FINDINGS:   The spine is demineralized. No acute fracture is visualized. 12 rib-bearing vertebral bodies are seen. The thoracic vertebral bodies are fairly well preserved. No acute fractures seen.  There is a slight dextroscoliosis seen in the upper to mid thoracic    spine. Hypertrophic spurring is seen on the right at multiple levels. . There are also small anterior osteophytes. No hematomas or soft tissue masses are seen. In the visualized lung fields atelectasis is seen. There are also atherosclerotic    calcifications seen. IMPRESSION: No fracture or dislocation is seen. Mild degenerative changes are visualized. .            All CT scans at this facility use dose modulation, iterative reconstruction, and/or weight based dosing when appropriate to reduce radiation dose to as low as reasonably achievable. CT CHEST WO CONTRAST   Final Result      No acute findings. Cardiomegaly. Median sternotomy. Dilatation ascending thoracic aorta. Postsurgical change, left shoulder. All CT scans at this facility use dose modulation, iterative reconstruction, and/or weight based dosing when appropriate to reduce radiation dose to as low as reasonably achievable. CT ABDOMEN PELVIS WO CONTRAST Additional Contrast? None   Final Result   Impression:   No abnormality detected in the abdomen. Superior dislocation of the right hip prosthesis with tilt of the acetabular cap as described. 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 At the levels described above. 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 XR FEMUR RIGHT (MIN 2 VIEWS)   Final Result   Impression:   No abnormality detected in the abdomen. Superior dislocation of the right hip prosthesis with tilt of the acetabular cap as described.       XR CHEST PORTABLE    (Results Pending)           Assessment/Plan:    81 y/o female with history of CAD s/p remote CABG/PCI, ischemic CMP/ chronic systolic HF, CKD3, GERD, CORRIE, seizure disorder, chronic right hip pain s/p right YESSY with chronic dislocation / loosening of hardware who presented with:     Acute/chronic right hip pain, inability to ambulate s/p fall  - in the setting of right YESSY with chronic dislocation / loosening of hardware   - conservative therapy per Ortho  - pain control  - PT/OT    Frequent PVCs / sustained VT / severe ischemic CMP  - improved with amiodarone infusion  - switched to PO amiodarone  - continue Toprol  - management per cardiology     Hypokalemia   - in the setting of diuretic therapy  - replaced    CKD3  - monitor renal function    Seizure disorder  - continue home regimen    Diet: ADULT DIET; Easy to Chew; Low Sodium (2 gm)  ADULT ORAL NUTRITION SUPPLEMENT; Breakfast, Lunch, Dinner; Diabetic Oral Supplement    Code Status: DNR-CCA      Disposition - SNF, waiting for precert,  following        Electronically signed by Carmela Duarte MD on 4/3/2022 at 11:44 AM

## 2022-04-03 NOTE — PROGRESS NOTES
[facility] Progress Note      Patient: Adam Sun    Unit/Bed: O366/Q753-35  YOB: 1934  MRN: [id number]  Admit Date:  3/28/2022  Hospital Day: 5    Rounding Date: 4/3/2022    Rounding Cardiologist:  RICK Colunga MD    PRIMARY Cardiologist: Eli Esteves    Subjective Complaint:   Had some epigastric/chest discomfort. Patient states she needs to have a bowel movement. Does not appear to be in any acute distress. Physical Examination:     BP (!) 129/51   Pulse 70   Temp 97.3 °F (36.3 °C)   Resp 18   Ht 4' 11\" (1.499 m)   Wt 123 lb (55.8 kg)   SpO2 99%   BMI 24.84 kg/m²       Intake/Output Summary (Last 24 hours) at 4/3/2022 1051  Last data filed at 4/3/2022 6834  Gross per 24 hour   Intake 1139.97 ml   Output    Net 1139.97 ml                  Adam Sun examined at bedside in in no apparent distress and cooperative. Focused exam reveals:     Cardiac: Heart regular rate and rhythm     Lungs:  clear to auscultation bilaterally- no wheezes, rales or rhonchi, normal air movement, no respiratory distress and decreased breath sounds noted-     Extremities:   Trace edema    Telemetry:      normal sinus  and Has ventricular beats, but less V. tach         LABS:   CBC:   Recent Labs     04/01/22  0655 04/02/22  0513 04/03/22  0904   WBC 11.4* 9.4 12.0*   HGB 13.1 13.4 13.3    262 258      BMP:    Recent Labs     04/01/22  0655 04/01/22  0655 04/02/22  0513 04/03/22  0905     --  137 137   K 3.7   < > 3.2* 4.6  4.6   CL 95  --  94* 100   CO2 26  --  27 23   BUN 25*  --  29* 29*   CREATININE 0.96*  --  1.03* 1.05*   GLUCOSE 143*  --  118* 126*    < > = values in this interval not displayed. Troponin: No results for input(s): TROPONINT in the last 72 hours. BNP: No results for input(s): PROBNP in the last 72 hours. INR: No results for input(s): INR in the last 72 hours.    Mg:   Recent Labs     04/03/22  0905   MG 2.6*       Cardiac Testing: none    Assessment:  Patient with nonsustained ventricular tachycardia in the setting of known ischemic cardiomyopathy  High risk medication-IV amiodarone--this appears to be helping the patient's ventricular arrhythmias  Chest pain, rather vague and trivially elevated troponins.--0.016 and EKG shows no real acute changes  Hypotension-appears to have resolved  Stage III renal insufficiency  Free T4 is normal, but TSH is mildly reduced--this is that baseline     Plan:  1. DC IV amiodarone when this bag is done  2. Start oral amiodarone  3. Blood pressure is better, can resume low-dose diuretics, beta-blockers, as well as Imdur  4. Check chest x-ray in a.m.  5. Check EKG in a.m. 6. Repeat troponins in a.m.  7. Per previous notes, generalized conservative care  8.  Resume diuretic therapy  Electronically signed by Linda Vera MD on 4/3/2022 at 10:51 AM

## 2022-04-03 NOTE — PLAN OF CARE
Problem: Falls - Risk of:  Goal: Will remain free from falls  Outcome: Ongoing  Goal: Absence of physical injury  Outcome: Ongoing     Problem: Skin Integrity:  Goal: Will show no infection signs and symptoms  Outcome: Ongoing  Goal: Absence of new skin breakdown  Outcome: Ongoing     Problem: IP BALANCE  Goal: LTG - Patient will maintain balance to allow for safe/functional mobility  Outcome: Ongoing     Problem: IP DRESSINGS LOWER EXTREMITIES  Goal: LTG - Patient will safely utilize adaptive techniques/equipment to dress lower body  Outcome: Ongoing     Problem: Pain:  Goal: Pain level will decrease  Outcome: Ongoing  Goal: Control of acute pain  Outcome: Ongoing  Goal: Control of chronic pain  Outcome: Ongoing     Problem: Nutrition  Goal: Optimal nutrition therapy  Outcome: Ongoing

## 2022-04-04 NOTE — PROGRESS NOTES
4991 - in with patient, EKG done. Denies chest pain or SOB. Pt washed and purwic replaced at this time. Pt alert and oriented. Excoriation noted to patient sacrum, paste applied and pt repositioned. Pt denies further needs at this time. 1221 - pt waiting cardiac clearance and medication reconciliation for dc to [facility]. [phone] - first attempt made to call report to [facility], no answer  1701 - second attempt made to call report to [facility], no answer    1722 - report called to [address] at [facility] [place] - pt safely transferred to [address] [facility] with assistance, family took all patient belongings.

## 2022-04-04 NOTE — DISCHARGE INSTR - DIET
Good nutrition is important when healing from an illness, injury, or surgery. Follow any nutrition recommendations given to you during your hospital stay. If you were given an oral nutrition supplement while in the hospital, continue to take this supplement at home. You can take it with meals, in-between meals, and/or before bedtime. These supplements can be purchased at most local grocery stores, pharmacies, and chain [employer]-stores. If you have any questions about your diet or nutrition, call the hospital and ask for the dietitian.   Easy to [employer], low sodium

## 2022-04-04 NOTE — PROGRESS NOTES
Hospitalist Progress Note      PCP: Mark Jimenez MD    Date of Admission: 3/28/2022    Chief Complaint: No new symptoms. Daughter is at bedside. No chest pain or palpitations. No fever or chills. Medications:  Reviewed    Infusion Medications    amiodarone Stopped (04/03/22 1422)     Scheduled Medications    amiodarone  200 mg Oral BID    enoxaparin  30 mg SubCUTAneous Daily    potassium chloride  20 mEq Oral BID WC    metoprolol succinate  25 mg Oral BID    citalopram  10 mg Oral Daily    famotidine  20 mg Oral BID    metOLazone  2.5 mg Oral Once per day on Mon Wed Fri    psyllium  1 packet Oral BID    furosemide  20 mg Oral Daily    polyethylene glycol  17 g Oral Daily    senna  1 tablet Oral Nightly    [Held by provider] isosorbide mononitrate  90 mg Oral Daily    potassium chloride  10 mEq IntraVENous Once    sodium chloride flush  5-40 mL IntraVENous 2 times per day    aspirin  81 mg Oral Daily    lamoTRIgine  100 mg Oral BID    cetirizine  5 mg Oral Daily    [Held by provider] losartan  50 mg Oral Daily    pantoprazole  40 mg Oral QAM AC    rosuvastatin  20 mg Oral Daily     PRN Meds: acetaminophen, dicyclomine, docusate sodium, polyethylene glycol, nitroGLYCERIN, sodium chloride flush, ondansetron **OR** ondansetron, oxyCODONE      Intake/Output Summary (Last 24 hours) at 4/4/2022 1257  Last data filed at 4/4/2022 0955  Gross per 24 hour   Intake 405.03 ml   Output    Net 405.03 ml       Exam:    /63   Pulse 88   Temp 97.7 °F (36.5 °C) (Oral)   Resp 18   Ht 4' 11\" (1.499 m)   Wt 123 lb (55.8 kg)   SpO2 98%   BMI 24.84 kg/m²     General appearance: appears stated age and cooperative. Respiratory:  clear to auscultation, bilaterally   Cardiovascular: Regular rate and rhythm, S1/S2. Abdomen: Soft, active bowel sounds. Musculoskeletal: No edema bilaterally.      Labs:   Recent Labs     04/02/22  0513 04/03/22  0904 04/04/22  0506   WBC 9.4 12.0* 13.0*   HGB 13.4 13.3 13.4   HCT 40.5 40.9 40.5    258 267     Recent Labs     04/02/22  0513 04/02/22  0513 04/03/22  0905 04/04/22  0506     --  137 137   K 3.2*   < > 4.6  4.6 4.5  4.5   CL 94*  --  100 102   CO2 27  --  23 23   BUN 29*  --  29* 28*   CREATININE 1.03*  --  1.05* 0.95*   CALCIUM 10.1*  --  9.4 9.6    < > = values in this interval not displayed. Recent Labs     04/02/22  0513 04/03/22  0905 04/04/22  0506   AST 14 12 12   ALT 8 7 7   BILITOT 0.7 0.7 0.7   ALKPHOS 59 56 61     No results for input(s): INR in the last 72 hours. Recent Labs     04/03/22  0905 04/04/22  0916   TROPONINI 0.016* <0.010       Urinalysis:      Lab Results   Component Value Date    NITRU Negative 10/17/2021    WBCUA 6-9 10/17/2021    BACTERIA Negative 10/17/2021    RBCUA 3-5 11/03/2020    BLOODU Negative 10/17/2021    SPECGRAV 1.016 10/17/2021    GLUCOSEU Negative 10/17/2021       Radiology:  XR CHEST PORTABLE   Final Result   STABLE CARDIOMEGALY. XR CHEST PORTABLE   Final Result      NO EVIDENCE OF ACTIVE CARDIOPULMONARY DISEASE OR SIGNIFICANT CHANGE FROM PRIOR STUDIES IDENTIFIED. CT HEAD WO CONTRAST   Final Result   Impression:        Prominence of the ventricular system and cerebral sulci. 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 CT CERVICAL SPINE WO CONTRAST   Final Result   1. Degenerative changes are seen at multiple levels. No acute fractures visualized. Anterolisthesis of C3 on C4 appears to be chronic. 2. The thyroid gland is enlarged and contains multiple nodules with the largest seen in the right lobe of the gland. All CT scans at this facility use dose modulation, iterative reconstruction, and/or weight based dosing when appropriate to reduce radiation dose to as low as reasonably achievable.       CT THORACIC SPINE WO CONTRAST      COMPARISON: Chest x-ray May 3, 2020      HISTORY: Fall from standing TECHNIQUE: Helical CT scanning of the thoracic spine was performed in axial plane. Coronal and sagittal reconstructed images were obtained and viewed. FINDINGS:   The spine is demineralized. No acute fracture is visualized. 12 rib-bearing vertebral bodies are seen. The thoracic vertebral bodies are fairly well preserved. No acute fractures seen. There is a slight dextroscoliosis seen in the upper to mid thoracic    spine. Hypertrophic spurring is seen on the right at multiple levels. . There are also small anterior osteophytes. No hematomas or soft tissue masses are seen. In the visualized lung fields atelectasis is seen. There are also atherosclerotic    calcifications seen. IMPRESSION: No fracture or dislocation is seen. Mild degenerative changes are visualized. .            All CT scans at this facility use dose modulation, iterative reconstruction, and/or weight based dosing when appropriate to reduce radiation dose to as low as reasonably achievable. CT THORACIC SPINE WO CONTRAST   Final Result   1. Degenerative changes are seen at multiple levels. No acute fractures visualized. Anterolisthesis of C3 on C4 appears to be chronic. 2. The thyroid gland is enlarged and contains multiple nodules with the largest seen in the right lobe of the gland. All CT scans at this facility use dose modulation, iterative reconstruction, and/or weight based dosing when appropriate to reduce radiation dose to as low as reasonably achievable. CT THORACIC SPINE WO CONTRAST      COMPARISON: Chest x-ray May 3, 2020      HISTORY: Fall from standing         TECHNIQUE: Helical CT scanning of the thoracic spine was performed in axial plane. Coronal and sagittal reconstructed images were obtained and viewed. FINDINGS:   The spine is demineralized. No acute fracture is visualized. 12 rib-bearing vertebral bodies are seen. The thoracic vertebral bodies are fairly well preserved.  No acute fractures seen. There is a slight dextroscoliosis seen in the upper to mid thoracic    spine. Hypertrophic spurring is seen on the right at multiple levels. . There are also small anterior osteophytes. No hematomas or soft tissue masses are seen. In the visualized lung fields atelectasis is seen. There are also atherosclerotic    calcifications seen. IMPRESSION: No fracture or dislocation is seen. Mild degenerative changes are visualized. .            All CT scans at this facility use dose modulation, iterative reconstruction, and/or weight based dosing when appropriate to reduce radiation dose to as low as reasonably achievable. CT CHEST WO CONTRAST   Final Result      No acute findings. Cardiomegaly. Median sternotomy. Dilatation ascending thoracic aorta. Postsurgical change, left shoulder. All CT scans at this facility use dose modulation, iterative reconstruction, and/or weight based dosing when appropriate to reduce radiation dose to as low as reasonably achievable. CT ABDOMEN PELVIS WO CONTRAST Additional Contrast? None   Final Result   Impression:   No abnormality detected in the abdomen. Superior dislocation of the right hip prosthesis with tilt of the acetabular cap as described. 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 At the levels described above. 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 XR FEMUR RIGHT (MIN 2 VIEWS)   Final Result   Impression:   No abnormality detected in the abdomen. Superior dislocation of the right hip prosthesis with tilt of the acetabular cap as described.               Assessment/Plan:    81 y/o female with history of CAD s/p remote CABG/PCI, ischemic CMP/ chronic systolic HF, CKD3, GERD, CORRIE, seizure disorder, chronic right hip pain s/p right YESSY with chronic dislocation / loosening of hardware who presented with:     Acute/chronic right hip pain, inability to ambulate s/p fall  - in the setting of right YESSY with chronic dislocation / loosening of hardware   - conservative therapy per Ortho  - pain control  - PT/OT    Frequent PVCs / sustained VT / severe ischemic CMP  - improved with amiodarone infusion  - switched to PO amiodarone  - continue Toprol  - management per cardiology     Hypokalemia   - in the setting of diuretic therapy  - replaced    CKD3  - monitor renal function    Seizure disorder  - continue home regimen    Diet: ADULT DIET; Easy to Chew; Low Sodium (2 gm)  ADULT ORAL NUTRITION SUPPLEMENT; Breakfast, Lunch, Dinner; Diabetic Oral Supplement    Code Status: DNR-CCA      Disposition - SNF, waiting for precraphael,  following        Electronically signed by Francia Bailon DO on 4/4/2022 at 12:57 PM

## 2022-04-04 NOTE — PROGRESS NOTES
[facility] [place] Daily Progress Note  Name: Audi Schmidt  Age: 80 y.o. Gender: female  [facility]: [facility]    Primary Cardiology  [address] [facility]   Primary Care Provider: Ollie Salazar MD  Admission Date: 3/28/2022     Cardiologist note  I have personally performed a complete face to face history and physical on this patient. I have reviewed the notations as entered by NP Malik Patel I have personally  formulated the impression and plan on this patient and amended as necessary. Marcie Campos MD [facility] [place]    She is lying supine not short of breath and no chest tightness. When she moves around in the bed her hip hurts and that does make her a little short of breath. She says overall though she is comfortable from a cardiovascular standpoint. I have reviewed the monitor today and there is been no ventricular tachycardia in over 24 hours. She still does have some PVCs. ECG today demonstrates sinus rhythm with PVCs but no acute ST change    Impression  1. Ischemic cardiomyopathy: Clinically without significant volume overload on the current medication would continue same. Blood pressure heart rate are stable    2. Sustained ventricular tachycardia: No recurrence on amiodarone. Most likely this represents progressive coronary disease but she is not at all interested in revascularization of any sort. He is very appropriate and well oriented. we will therefore continue her on amiodarone plus cardiac medications as ordered. 3.  Okay for transfer to nursing home for rehabilitation of her hip. 4. Losartan was held for bp. Now bp ok will put back on losartan 25. REduce toprol to 50 from prior 75 as with amio, has pauses of 2 seconds. 5. Discussed with son who is aware of her prognosis and wishes for no interventions.     Marquis Amanda MD        Chief complaint/Associated symptoms:   Sylvia Peralta was seen and examined at bedside     She is resting in bed with her daughter at bedside    She denies any chest pain or shortness of breath but is complaining of left lower quadrant pain that radiated to her left hip area. She thinks it is from spending so much time in bed. Plan for [facility] for rehab on discharge. Assessment:   1. Chest Pain: Denies chest pain. 2. Non-sustained ventricular tachycardia: in the setting of ischemic dilated cardiomyopathy. Was administered Magnesium IV along with amio IV which has been changed to Amio PO. No reoccurrence of V-tach noted on monitor. AICD has been discussed in the past and patient wishes no AICD. 7/14/2020 Lexiscan EF 22%. 10/14/2015 LVEF 20% with 2+ MR.  5/2015 LVEF 20%. 3. CAD: CAGB 1992,S /P angioplasty with RAYNA to mid RAC and balloon PTCA PDA    7/2/15.  6/25/15 LM PTCA with drug eluting stent.     4. . Hypertension: Improved with periods of borderline hypotension. 5. Elevated troponin:  0.016, trivial elevation. Will trend troponin levels. 6. Hyperthyroidism:  TSH 0.337  Per medicine. Plan:   1. Monitor on telemetry    2. Trend troponin    3. Further recommendations per Dr. Trung Murrell. Physical Exam  Constitutional:   awake/alert/oriented x3, no distress, alert and cooperative. Respiratory/Thorax: Patent airways, CTAB,  normal breath sounds with good chest expansion, thorax symmetric. Cardiovascular: Regular, rate and rhythm, normal S1 and S2, PMI non displaced. Gastrointestinal:  Non distended, soft,tender, no rebound tenderness or guarding, Genitourinary:  deferred  Musculoskeletal:  No apparent injury. Extremities:  No cyanosis, edema, contusions or wounds, no clubbing. Neurological:  Alert and oriented x3. Moves extremities spontaneous with purpose  Psychological:  Appropriate mood and behavior  Skin:  Warm and dry,  no lesions or rashes.        Allergies: Ezetimibe-Simvastatin    Medications:  Reviewed  Home Medications    Infusion Medications:    amiodarone Stopped (04/03/22 1422)     Scheduled Medications:    amiodarone  200 mg Oral BID    enoxaparin  30 mg SubCUTAneous Daily    potassium chloride  20 mEq Oral BID WC    metoprolol succinate  25 mg Oral BID    citalopram  10 mg Oral Daily    famotidine  20 mg Oral BID    metOLazone  2.5 mg Oral Once per day on Mon Wed Fri    psyllium  1 packet Oral BID    furosemide  20 mg Oral Daily    polyethylene glycol  17 g Oral Daily    senna  1 tablet Oral Nightly    [Held by provider] isosorbide mononitrate  90 mg Oral Daily    potassium chloride  10 mEq IntraVENous Once    sodium chloride flush  5-40 mL IntraVENous 2 times per day    aspirin  81 mg Oral Daily    lamoTRIgine  100 mg Oral BID    cetirizine  5 mg Oral Daily    [Held by provider] losartan  50 mg Oral Daily    pantoprazole  40 mg Oral QAM AC    rosuvastatin  20 mg Oral Daily     PRN Meds: acetaminophen, dicyclomine, docusate sodium, polyethylene glycol, nitroGLYCERIN, sodium chloride flush, ondansetron **OR** ondansetron, oxyCODONE    Vitals  Vitals:    04/04/22 0736   BP: 118/63   Pulse: 88   Resp: 18   Temp: 97.7 °F (36.5 °C)   SpO2: 98%       I&O  No documented urine output       Telemetry:  SR/SB with PVC's       EKG 4/4/2022  Normal sinus rhythm HR 86 bpm   Left ventricular hypertrophy with QRS widening  Cannot rule out Septal infarct , age undetermined        Labs:   Recent Labs     04/02/22 0513 04/03/22  0904 04/04/22  0506   WBC 9.4 12.0* 13.0*   HGB 13.4 13.3 13.4   HCT 40.5 40.9 40.5    258 267     Recent Labs     04/02/22 0513 04/02/22  0513 04/03/22  0905 04/04/22  0506     --  137 137   K 3.2*   < > 4.6  4.6 4.5  4.5   CL 94*  --  100 102   CO2 27  --  23 23   BUN 29*  --  29* 28*   CREATININE 1.03*  --  1.05* 0.95*   CALCIUM 10.1*  --  9.4 9.6    < > = values in this interval not displayed.      Recent Labs     04/02/22  0513 04/03/22  0905 04/04/22  0506   AST 14 12 12   ALT 8 7 7   BILITOT 0.7 0.7 0.7   ALKPHOS 59 56 61     No results for input(s): INR in the last 72 hours. Recent Labs     22  0905   TROPONINI 0.016*       Urinalysis:   Lab Results   Component Value Date    NITRU Negative 10/17/2021    WBCUA 6-9 10/17/2021    BACTERIA Negative 10/17/2021    RBCUA 3-5 2020    BLOODU Negative 10/17/2021    SPECGRAV 1.016 10/17/2021    GLUCOSEU Negative 10/17/2021       Radiology:   Most recent    Chest CT      WITH CONTRAST:No results found for this or any previous visit. WITHOUT CONTRAST: Results for orders placed during the hospital encounter of 22    CT CHEST WO CONTRAST    Narrative  CT of the Chest without intravenous contrast medium. HISTORY: Fall from standing; chronic heart failure with 10% EF. Trauma. TECHNICAL FACTORS:    CT imaging of the chest was obtained and formatted as 5 mm contiguous axial images from the thoracic inlet through the adrenal glands. Sagittal and coronal reconstructions obtained during postprocessing. Intravenous contrast medium:  None. Comparison:  2020      FINDINGS:      Right lun.4 mm elpidio scarring left lung base. Calcified granuloma, right lower lobe. Mild dependent subsegmental atelectatic change. No, pleural effusion, pneumothorax. Left lung: No nodules, masses, consolidation, pleural effusion, pneumothorax. Lymph nodes: No hilar, mediastinal, or axillary lymph node enlargement. Thoracic aorta: AP and transverse dimension, ascending thoracic aorta, at level of pulmonary chill bifurcation, measures 4.0 x 3.9 cm. Cardiac: Enlarged. Median sternotomy. Coronary artery calcification    Pericardial effusion: None. Upper abdomen:Limited imaging upper abdomen shows no gross anomaly. Musculoskeletal:No osteoblastic, and no osteolytic lesions. Postsurgical change left humeral head. Degenerative change right glenohumeral joint. Impression  No acute findings. Cardiomegaly. Median sternotomy. Dilatation ascending thoracic aorta. Postsurgical change, left shoulder.       All CT scans at this facility use dose modulation, iterative reconstruction, and/or weight based dosing when appropriate to reduce radiation dose to as low as reasonably achievable. Portable: Results for orders placed during the hospital encounter of 03/28/22    XR CHEST PORTABLE    Narrative  EXAMINATION: XR CHEST PORTABLE    CLINICAL HISTORY: SHORTNESS OF BREATH    COMPARISONS: APRIL 20, 20    FINDINGS: Postsurgical change left humeral head. Degenerative change right glenohumeral joint. Median sternotomy. Cardiopericardial silhouette is enlarged, unchanged. Lungs clear. Surgical clips, gastroesophageal junction. Impression  STABLE CARDIOMEGALY. Active Hospital Problems    Diagnosis Date Noted    Essential hypertension [I10] 08/30/2017     Priority: Low    Dyslipidemia [E78.5] 04/20/2015     Priority: [address] frequently [R29.6] 03/29/2022    Chronic heart failure ([facility] [address] [I50.9] 03/29/2022    CKD (chronic kidney disease) [N18.9] 03/29/2022    Frequent falls [R29.6] 03/29/2022       Additional work up or/and treatment plan may be added today or then after based on clinical progression. I am managing a portion of pt care. Some medical issues are handled by other specialists. Additional work up and treatment should be done in out pt setting by pt PCP and other out pt providers. In addition to examining and evaluating pt, I spent additional time explaining care, normaland abnormal findings, and treatment plan. All of pt questions were answered. Counseling, diet and education were provided. Case will be discussed with nursing staff when appropriate. Family will be updated if and whenappropriate.       Electronically signed by HUMPHREY Mccray CNP on 4/4/2022 at 8:55 AM

## 2022-04-04 NOTE — DISCHARGE SUMMARY
Hospital Medicine Discharge Summary    Collin Huerta  :  1934  MRN:  [id number]    Admit date:  3/28/2022  Discharge date:  2022    Admitting Physician:  Jacqui Sprague DO  Primary Care Physician:  Carson Hernández MD      Discharge Diagnoses:      Acute on chronic right hip pain inability to ambulate status post fall  Frequent PVCs  Sustained VT/severe ischemic cardiomyopathy  Hypokalemia  CKD 3  History of CAD status post CABG and PCI    Chief Complaint   Patient presents with   [facility]:     81 y/o female with history of CAD s/p remote CABG/PCI, ischemic CMP/ chronic systolic HF, CKD3, GERD, CORRIE, seizure disorder, chronic right hip pain s/p right YESSY with chronic dislocation / loosening of hardware who presented with:     Acute/chronic right hip pain, inability to ambulate s/p fall  - in the setting of right YESSY with chronic dislocation / loosening of hardware   - conservative therapy per Ortho  - pain control  - PT/OT     Frequent PVCs / sustained VT / severe ischemic CMP  - improved with amiodarone infusion  - switched to PO amiodarone  - continue Toprol  - management per cardiology      Hypokalemia   - in the setting of diuretic therapy  - replaced     CKD3  - monitor renal function     Seizure disorder  - continue home regimen  Exam on discharge:   /63   Pulse 88   Temp 97.7 °F (36.5 °C) (Oral)   Resp 18   Ht 4' 11\" (1.499 m)   Wt 123 lb (55.8 kg)   SpO2 98%   BMI 24.84 kg/m²     See examined progress note from day of discharge.     Patient was seen by the following consultants   Consults:  IP CONSULT TO ORTHOPEDIC SURGERY  IP CONSULT TO CARDIOLOGY  IP CONSULT TO DIETITIAN  IP CONSULT TO CARDIOLOGY    Significant Diagnostic Studies:    Refer to chart     Please refer to chart if no studies are shown here    CT ABDOMEN PELVIS WO CONTRAST Additional Contrast? None    Result Date: 3/29/2022  Patient MRN: [id number] : 1934 Age:  80 years Gender: Female Order Date: 3/28/2022 9:44 PM Exam: CT ABDOMEN PELVIS WO CONTRAST, XR FEMUR RIGHT (MIN 2 VIEWS) Number of Images: views Indication:   Fall from standing; chronic right hip prostetic dislocation (5 years out of socket); trauma assessment   Hip pain Comparison: None. Findings: The liver is normal in size and attenuation no focal lesion noted. The gallbladder is absent because of previous cholecystectomy. The spleen is not enlarged. Both kidneys are normal in size, shape and position no hydronephrosis or stone formation. The small and large bowel are within normal limits. Degenerative changes are seen in the spine with narrowing at L1-2 level with vacuum phenomenon present no fracture noted. Marked calcification of the abdominal aorta is seen. There is cephalad dislocation of the right hip prosthesis while the  acetabular cap is tilted as described on the plain film. Intact total hip on the left side seen. Impression: No abnormality detected in the abdomen. Superior dislocation of the right hip prosthesis with tilt of the acetabular cap as described. XR FEMUR RIGHT (MIN 2 VIEWS)    Result Date: 3/29/2022  Patient MRN: [id number] : 1934 Age:  80 years Gender: Female Order Date: 3/28/2022 9:44 PM Exam: CT ABDOMEN PELVIS WO CONTRAST, XR FEMUR RIGHT (MIN 2 VIEWS) Number of Images: views Indication:   Fall from standing; chronic right hip prostetic dislocation (5 years out of socket); trauma assessment   Hip pain Comparison: None. Findings: The liver is normal in size and attenuation no focal lesion noted. The gallbladder is absent because of previous cholecystectomy. The spleen is not enlarged. Both kidneys are normal in size, shape and position no hydronephrosis or stone formation. The small and large bowel are within normal limits. Degenerative changes are seen in the spine with narrowing at L1-2 level with vacuum phenomenon present no fracture noted. Marked calcification of the abdominal aorta is seen.  There is cephalad dislocation of the right hip prosthesis while the  acetabular cap is tilted as described on the plain film. Intact total hip on the left side seen. Impression: No abnormality detected in the abdomen. Superior dislocation of the right hip prosthesis with tilt of the acetabular cap as described. CT HEAD WO CONTRAST    Result Date: 3/29/2022  Patient MRN: [id number] : 1934 Age:  80 years Gender: Female Order Date: 3/28/2022 9:44 PM Exam: CT HEAD WO CONTRAST Number of Images: views Indication:   fall from standing   Hip pain Comparison: . Findings: There is still prominence of the ventricular system and cerebral sulci which are age-related changes. There is old infarction involving the anterior limb of the internal capsule on the right side this has not changed significantly since the last study. Periventricular lucencies seen  due to deep white matter ischemic changes. No midline shift or mass effect identified. No evidence of epidural, subdural or intracerebral hematoma. The skull base and cranial vault are intact. The sinuses are clear. The orbits are unremarkable. Impression:  Prominence of the ventricular system and cerebral sulci. 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 CT CHEST WO CONTRAST    Result Date: 3/29/2022  CT of the Chest without intravenous contrast medium. HISTORY: Fall from standing; chronic heart failure with 10% EF. Trauma. TECHNICAL FACTORS: CT imaging of the chest was obtained and formatted as 5 mm contiguous axial images from the thoracic inlet through the adrenal glands. Sagittal and coronal reconstructions obtained during postprocessing. Intravenous contrast medium:  None. Comparison:  2020 FINDINGS: Right lun.4 mm elpidio scarring left lung base. Calcified granuloma, right lower lobe. Mild dependent subsegmental atelectatic change.  No, pleural effusion, pneumothorax. Left lung: No nodules, masses, consolidation, pleural effusion, pneumothorax. Lymph nodes: No hilar, mediastinal, or axillary lymph node enlargement. Thoracic aorta: AP and transverse dimension, ascending thoracic aorta, at level of pulmonary chill bifurcation, measures 4.0 x 3.9 cm. Cardiac: Enlarged. Median sternotomy. Coronary artery calcification Pericardial effusion: None. Upper abdomen:Limited imaging upper abdomen shows no gross anomaly. Musculoskeletal:No osteoblastic, and no osteolytic lesions. Postsurgical change left humeral head. Degenerative change right glenohumeral joint. No acute findings. Cardiomegaly. Median sternotomy. Dilatation ascending thoracic aorta. Postsurgical change, left shoulder. All CT scans at this facility use dose modulation, iterative reconstruction, and/or weight based dosing when appropriate to reduce radiation dose to as low as reasonably achievable. CT CERVICAL SPINE WO CONTRAST    Result Date: 3/29/2022  CT OF THE CERVICAL SPINE COMPARISON: No prior studies available for comparison. HISTORY: Fall from standing TECHNIQUE: , CT CERVICAL SPINE WO CONTRAST FINDINGS: Straightening of the spine with reversal of the normal cervical lordosis is seen on this examination. There is 3 mm of anterolisthesis of C3 on C4 and intervertebral disc space narrowing is seen at this level. Intervertebral disc space narrowing is seen at C5-6 and C6-C7. Mild anterolisthesis of C6 on C7. Neural foraminal narrowing is seen bilaterally at C3-4, C5-6 and at C6-C7. The thyroid gland is very heterogeneous and enlarged. In the left lobe a 13 mm nodule is seen. In the right lobe 2 cm nodule is seen. The visualized lung fields show no pneumothorax are consolidation. There are subtle groundglass opacities. 1. Degenerative changes are seen at multiple levels. No acute fractures visualized. Anterolisthesis of C3 on C4 appears to be chronic.  2. The thyroid gland is enlarged and contains multiple nodules with the largest seen in the right lobe of the gland. All CT scans at this facility use dose modulation, iterative reconstruction, and/or weight based dosing when appropriate to reduce radiation dose to as low as reasonably achievable. CT THORACIC SPINE WO CONTRAST COMPARISON: Chest x-ray May 3, 2020 HISTORY: Fall from standing TECHNIQUE: Helical CT scanning of the thoracic spine was performed in axial plane. Coronal and sagittal reconstructed images were obtained and viewed. FINDINGS: The spine is demineralized. No acute fracture is visualized. 12 rib-bearing vertebral bodies are seen. The thoracic vertebral bodies are fairly well preserved. No acute fractures seen. There is a slight dextroscoliosis seen in the upper to mid thoracic spine. Hypertrophic spurring is seen on the right at multiple levels. . There are also small anterior osteophytes. No hematomas or soft tissue masses are seen. In the visualized lung fields atelectasis is seen. There are also atherosclerotic calcifications seen. IMPRESSION: No fracture or dislocation is seen. Mild degenerative changes are visualized. . All CT scans at this facility use dose modulation, iterative reconstruction, and/or weight based dosing when appropriate to reduce radiation dose to as low as reasonably achievable. CT THORACIC SPINE WO CONTRAST    Result Date: 3/29/2022  CT OF THE CERVICAL SPINE COMPARISON: No prior studies available for comparison. HISTORY: Fall from standing TECHNIQUE: , CT CERVICAL SPINE WO CONTRAST FINDINGS: Straightening of the spine with reversal of the normal cervical lordosis is seen on this examination. There is 3 mm of anterolisthesis of C3 on C4 and intervertebral disc space narrowing is seen at this level. Intervertebral disc space narrowing is seen at C5-6 and C6-C7. Mild anterolisthesis of C6 on C7. Neural foraminal narrowing is seen bilaterally at C3-4, C5-6 and at C6-C7.  The thyroid gland is very heterogeneous and enlarged. In the left lobe a 13 mm nodule is seen. In the right lobe 2 cm nodule is seen. The visualized lung fields show no pneumothorax are consolidation. There are subtle groundglass opacities. 1. Degenerative changes are seen at multiple levels. No acute fractures visualized. Anterolisthesis of C3 on C4 appears to be chronic. 2. The thyroid gland is enlarged and contains multiple nodules with the largest seen in the right lobe of the gland. All CT scans at this facility use dose modulation, iterative reconstruction, and/or weight based dosing when appropriate to reduce radiation dose to as low as reasonably achievable. CT THORACIC SPINE WO CONTRAST COMPARISON: Chest x-ray May 3, 2020 HISTORY: Fall from standing TECHNIQUE: Helical CT scanning of the thoracic spine was performed in axial plane. Coronal and sagittal reconstructed images were obtained and viewed. FINDINGS: The spine is demineralized. No acute fracture is visualized. 12 rib-bearing vertebral bodies are seen. The thoracic vertebral bodies are fairly well preserved. No acute fractures seen. There is a slight dextroscoliosis seen in the upper to mid thoracic spine. Hypertrophic spurring is seen on the right at multiple levels. . There are also small anterior osteophytes. No hematomas or soft tissue masses are seen. In the visualized lung fields atelectasis is seen. There are also atherosclerotic calcifications seen. IMPRESSION: No fracture or dislocation is seen. Mild degenerative changes are visualized. . All CT scans at this facility use dose modulation, iterative reconstruction, and/or weight based dosing when appropriate to reduce radiation dose to as low as reasonably achievable.      CT LUMBAR SPINE WO CONTRAST    Result Date: 3/29/2022  Patient MRN: [id number] : 1934 Age:  80 years Gender: Female Order Date: 3/28/2022 9:44 PM Exam: CT LUMBAR SPINE WO CONTRAST Number of Images: views Indication:   frall from standing; chronic right hip dislocation 5 years out of prostatic on right   Hip pain Comparison: None. Findings:  The vertebral bodies are normal height and alignment. There is narrowing of the intervertebral disc spaces between L1-2 with vacuum phenomena. There is diffuse bulge of the annulus fibrosus at the level of L2-3 with spinal stenosis, spinal canal is about 9 mm at this level. There is also spinal stenosis and narrowing of the lateral recesses at the level of L3-4 with osteophyte formation and disc bulge. Also noted diffuse bulge of the annulus at the level of L4-5 with moderate spinal stenosis spinal canal is about 6 mm at this level. There is encroachment upon the neural foramina bilaterally at the level of L5-S1. There is diffuse bulge of the annulus fibrosus. Moderate hypertrophic changes seen in the apophyseal joints. No fracture is seen . No spondylolysis is identified. Impression: Evidence of degenerative disc disease with spondylolsis with encroachment upon the neural foramina. At the levels described above. XR HIP 2-3 VW W PELVIS RIGHT    Result Date: 3/29/2022  Patient MRN: [id number] : 1934 Age:  80 years Gender: Female Order Date: 3/28/2022 8:11 PM Exam: XR HIP 2-3 VW W PELVIS RIGHT Number of Images: 2 views Indication:   fall   Hip pain Comparison: 2021. Findings:  Again noted bilateral total hip replacement and still seen on the right side superior dislocation of the artificial head the acetabular component of the total hip on the right side is  tilted almost 90 degrees, no change noted since the previous examination. The left  total hip is in good position. There is marked osteoporosis present. No fracture noted     Impression: Chronic dislocation of the total hip on the right side with tilt of the acetabular portion of the prosthesis as mentioned.        Discharge Medications:         Medication List      START taking these medications    oxyCODONE 5 MG immediate release tablet  Commonly known as: ROXICODONE  Take 0.5 tablets by mouth every 6 hours as needed for Pain for up to 7 days. CHANGE how you take these medications    dicyclomine 10 MG capsule  Commonly known as: Bentyl  Take 1 capsule by mouth every 6 hours as needed (cramps)  What changed: Another medication with the same name was removed. Continue taking this medication, and follow the directions you see here.         CONTINUE taking these medications    acetaminophen 325 MG tablet  Commonly known as: TYLENOL     albuterol sulfate  (90 Base) MCG/ACT inhaler  Commonly known as: Ventolin HFA  Inhale 2 puffs into the lungs 4 times daily as needed for Wheezing     ALIGN PREBIOTIC-PROBIOTIC PO     aspirin 81 MG tablet     citalopram 10 MG tablet  Commonly known as: CELEXA     Claritin 10 MG capsule  Generic drug: loratadine     diclofenac sodium 1 % Gel  Commonly known as: VOLTAREN     docusate sodium 100 MG capsule  Commonly known as: COLACE     famotidine 20 MG tablet  Commonly known as: PEPCID     furosemide 20 MG tablet  Commonly known as: LASIX  Take 1 tablet by mouth daily     lamoTRIgine 100 MG tablet  Commonly known as: LAMICTAL     metOLazone 2.5 MG tablet  Commonly known as: ZAROXOLYN     naloxone 4 MG/0.1ML Liqd nasal spray     nitroGLYCERIN 0.4 MG SL tablet  Commonly known as: NITROSTAT     Nucynta 50 MG Tabs  Generic drug: tapentadol     polyethylene glycol 17 g packet  Commonly known as: GLYCOLAX     potassium chloride 10 MEQ extended release capsule  Commonly known as: MICRO-K     psyllium 28.3 % Pack  Commonly known as: KONSYL     rosuvastatin 10 MG tablet  Commonly known as: CRESTOR     vitamin D 1000 UNIT Tabs tablet  Commonly known as: CHOLECALCIFEROL        ASK your doctor about these medications    isosorbide mononitrate 60 MG extended release tablet  Commonly known as: IMDUR  Take 1 tablet by mouth daily     losartan 50 MG tablet  Commonly known as: COZAAR  Take 1 tablet by mouth daily     metoprolol succinate 25 MG extended release tablet  Commonly known as: TOPROL XL  Take 3 tablets by mouth daily           Where to Get Your Medications      You can get these medications from any pharmacy    Bring a paper prescription for each of these medications  · oxyCODONE 5 MG immediate release tablet         Disposition:   If discharged to Home, Any [facility] needs that were indicated and/or required as been addressed and set up by Social Work. Condition at discharge: good     Activity: activity as tolerated    Total time taken for discharging this patient: 40 minutes. Greater than 70% of time was spent focused exclusively on this patient. Time was taken to review chart, discuss plans with consultants, reconciling medications, discussing plan answering questions with patient.      Kellee Del Toro DO  4/4/2022, 9:34 AM  ----------------------------------------------------------------------------------------------------------------------    Avel Pappas

## 2022-04-04 NOTE — PROGRESS NOTES
Physical Therapy Med Surg Daily Treatment Note  [id number]       NAME: Karrie Johnson  : 1934 (41 y.o.)  MRN: [id number]  CODE STATUS: DNR-CCA    Date of Service: 2022    Patient Diagnosis(es): Falls frequently [R29.6]  Frequent falls [R29.6]   Chief Complaint   Patient presents with    Fall     Patient Active Problem List    Diagnosis Date Noted    Falls frequently 2022    Chronic heart failure ([facility] [address] 2022    CKD (chronic kidney disease) 2022    Frequent falls 2022    Diverticulitis 10/17/2021    Epigastric pain 2021    Presence of automatic (implantable) cardiac defibrillator 2020    Chest pain 10/20/2020    Dilated cardiomyopathy ([facility] [address] 2020    Central abdominal pain     Colitis 2018    Essential hypertension 2017    Chronic coronary artery disease 2015    Dyslipidemia 2015        Past Medical History:   Diagnosis Date    Diverticulitis     Hyperlipidemia     Hypertension      Past Surgical History:   Procedure Laterality Date    APPENDECTOMY      CARDIAC SURGERY      CHOLECYSTECTOMY      JOINT REPLACEMENT         Restrictions  Restrictions/Precautions: Weight Bearing  Lower Extremity Weight Bearing Restrictions  Right Lower Extremity Weight Bearing: Weight Bearing As Tolerated    SUBJECTIVE   General  Chart Reviewed: Yes  Family / Caregiver Present: No  Subjective  Subjective: \"i will get up\"  General Comment  Comments: agreeable to tx    Pre-Session Pain Report     Pain Screening  Patient Currently in Pain: Yes       Post-Session Pain Report  Pain Assessment  Pain Assessment: 0-10  Pain Level: 6  Pain Type: Acute pain  Pain Location: Hip  Pain Orientation: Right  Pain Descriptors: Aching  Pain Frequency: Intermittent         OBJECTIVE   Orientation  Overall Orientation Status: Within Functional Limits    Bed mobility  Supine to Sit: Moderate assistance;2 Person assistance  Comment: pt unable to initiate movement with right leg this date. lifts with her upper extremities    Transfers  Sit to Stand: Minimal Assistance;2 Person Assistance  Stand to sit: Minimal Assistance;2 Person Assistance  Comment: improved transfer technique. Ambulation  Ambulation?: Yes  Ambulation 1  Surface: level tile  Device: Rolling Walker  Assistance: Minimal assistance;2 Person assistance  Quality of Gait: downward gaze, avoids heel weight bearing on right, assistance needed for weight shifting and safety with reverse to chair  Gait Deviations: Slow Carmelita;Decreased step length  Distance: 8 steps x 2 with one turn. ASSESSMENT pt tolerated getting out of bed and ambulating short distance in room. Pt states she has a ww at home she has been using when her hip hurts her. Pt okay with staying up in chair with legs elevated on stool and ice on her right hip. Discharge Recommendations:  Continue to assess pending progress    Goals  Short term goals  Short term goal 1: Pt will demonstrate HEP indep.   Long term goals  Long term goal 1: Pt will demonstrate bed mobility CGA  Long term goal 2: Pt will demonstrate transfers sit <> stand and SPT to chair/ BSC CGA with safest AD  Long term goal 3: Pt will demonstrate static standing with upright posture >/= 1 min with safest AD and SBA for increased safety with toileting  Long term goal 4: Pt will demonstrate w/c mobility >/= 100ft indep  Long term goal 5: Pt will demonstrate amb >/= 10ft with safest AD min A    PLAN    Times per week: 3-6        AMPAC (6 CLICK) BASIC MOBILITY  AM-PAC Inpatient Mobility Raw Score : 11     Therapy Time   Individual   Time In  1333   Time Out 1356   Minutes 23   13 minutes bed mob/transfers  10 minutes gt          Monie Jessica PTA, 04/04/22 at 3:04 PM         Definitions for assistance levels  Independent = pt does not require any physical supervision or assistance from another person for activity completion. Device may be needed.   Stand by assistance = pt requires verbal cues or instructions from another person, close to but not touching, to perform the activity  Minimal assistance= pt performs 75% or more of the activity; assistance is required to complete the activity  Moderate assistance= pt performs 50% of the activity; assistance is required to complete the activity  Maximal assistance = pt performs 25% of the activity; assistance is required to complete the activity  Dependent = pt requires total physical assistance to accomplish the task

## 2022-04-05 NOTE — PROGRESS NOTES
Physical Therapy  Facility/Department: [facility] MED SURG [id number]  Physical Therapy Discharge      NAME: Avni Bennett    : 1934 (80 y.o.)  MRN: [id number]    Account: [de-identified]  Gender: female      Patient has been discharged from acute care hospital. DC patient from current PT program.      Electronically signed by Eirca Lea PT on 22 at 4:15 PM EDT

## 2022-04-11 PROBLEM — G40.909 SEIZURE DISORDER (HCC): Status: ACTIVE | Noted: 2022-01-01

## 2022-04-11 PROBLEM — Z96.641 HX OF TOTAL HIP ARTHROPLASTY, RIGHT: Status: ACTIVE | Noted: 2022-01-01

## 2022-04-11 PROBLEM — Z74.01 BEDRIDDEN: Status: ACTIVE | Noted: 2022-01-01

## 2022-04-11 PROBLEM — S73.004S: Status: ACTIVE | Noted: 2022-01-01

## 2022-04-11 NOTE — PROGRESS NOTES
Subjective:    [facility]   Patient ID: Giovana Sosa is a pleasant 80 y.o. female who presents today for:  Chief Complaint   Patient presents with    Other     F/U   Resident resting comfortably in bed. Her granddaughter is at the bedside. Resident with right hip pain. This is well managed with pain medication. Without the pain medication she does experience severe pain with any movement. She is currently bedridden. 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 Conservative care per ortho. She does have a F/U with ortho in the next month. Patient tolerating PO. No N/V/D. No abd pain or constipation. No melena or hematochezia. No dysuria or hematuria. She denies  CP or SOB. No dizziness or h/a. She is A/Ox3. Has a follow up with cardiology 4/18. Has history of severe ischemic cardiomyopathy.    On PO amiodarone for sustained VT, along with BB.  121/73, 97.5, 55, 18, 97%    Patient Active Problem List   Diagnosis    Chronic coronary artery disease    Dyslipidemia    Colitis    Essential hypertension    Central abdominal pain    Chest pain    Epigastric pain    Diverticulitis    Falls frequently    Chronic heart failure (HCC)    Dilated cardiomyopathy (HCC)    Presence of automatic (implantable) cardiac defibrillator    CKD (chronic kidney disease)    Frequent falls    Seizure disorder (HCC)    Dislocation, hip closed, right, sequela    Hx of total hip arthroplasty, right    Bedridden     Past Medical History:   Diagnosis Date    Diverticulitis     Hyperlipidemia     Hypertension      Past Surgical History:   Procedure Laterality Date    APPENDECTOMY      CARDIAC SURGERY      CHOLECYSTECTOMY      JOINT REPLACEMENT       Social History     Socioeconomic History    Marital status:      Spouse name: Not on file    Number of children: Not on file    Years of education: Not on file    Highest education level: Not on file   Occupational History    Not on file   Tobacco Use    Smoking status: Never Smoker    Smokeless tobacco: Never Used   Substance and Sexual Activity    Alcohol use: No    Drug use: No    Sexual activity: Not Currently   Other Topics Concern    Not on file   Social History Narrative    Not on file     Social Determinants of Health     Financial Resource Strain:     Difficulty of Paying Living Expenses: Not on file   Food Insecurity:     Worried About Running Out of Food in the Last Year: Not on file    Alyse of Food in the Last Year: Not on file   Transportation Needs:     Lack of Transportation (Medical): Not on file    Lack of Transportation (Non-Medical): Not on file   Physical Activity:     Days of Exercise per Week: Not on file    Minutes of Exercise per Session: Not on file   Stress:     Feeling of Stress : Not on file   Social Connections:     Frequency of Communication with Friends and Family: Not on file    Frequency of Social Gatherings with Friends and Family: Not on file    Attends [employer] Services: Not on file    Active Member of [address] [place] [address] or Organizations: Not on file    Attends Club or Organization Meetings: Not on file    Marital Status: Not on file   Intimate Partner Violence:     Fear of Current or Ex-Partner: Not on file    Emotionally Abused: Not on file    Physically Abused: Not on file    Sexually Abused: Not on file   Housing Stability:     Unable to Pay for Housing in the Last Year: Not on file    Number of [place] in the Last Year: Not on file    Unstable Housing in the Last Year: Not on file     No family history on file. Allergies   Allergen Reactions    Ezetimibe-Simvastatin Other (See Comments)         Review of Systems   All other systems reviewed and are negative. Objective:       Physical Exam  Vitals reviewed. Constitutional:       General: She is awake. She is not in acute distress.      Appearance: She is well-developed and Memory: Cognition and memory normal.         Judgment: Judgment normal.       Assessment:       Diagnosis Orders   1. Essential hypertension     2. Chronic heart failure, unspecified heart failure type ([facility]     3. Seizure disorder ([facility]     4. Dislocation, hip closed, right, sequela     5. Hx of total hip arthroplasty, right     6. Bedridden           Plan:   1. Stable. Will continue current POC. She will keep her F/U appointment with cardiology next week. -Adhere to [facility] guidelines.  -Routine labs  - Monitor BP daily and PRN. 2. Stable. On BB. She is in NAD and lungs are CTAB. Keep follow up appointment with cardiology. 3. No seizure activity per staff and patient family. Will continue current POC. 4. F/U with ortho.  - PT/OT. - Ok to continue pain medication PRN for severe pain. - Hold pain medication if sedation is noted. 5. F/U with ortho. Not a candidate for OR, due to severe ischemic cardiomyopathy. Conservative  care at this time. 6. Continue with PT/OT. -repositioning Q 2 hours/PRN. Side effects, adverse effects of the medication prescribed today, as well as treatment plan and result expectations have beendiscussed with the patient who expresses understanding and desires to proceed.     HUMPHREY Carranza - CNP

## 2022-04-30 NOTE — ED NOTES
Lab called to advise a critical lab result, pt's troponin is 0.019, Dr Jasen Perez was advised.      Deonte Grcae RN  04/30/22 7114

## 2022-04-30 NOTE — ED PROVIDER NOTES
[facility] ED  eMERGENCY dEPARTMENT eNCOUnter      Pt Name: Eric Means  MRN: [id number]  [place] 1934  Date of evaluation: 4/30/2022  Provider: Kelin Valle MD    CHIEF COMPLAINT       Chief Complaint   Patient presents with    Chest Pain         HISTORY OF PRESENT ILLNESS   (Location/Symptom, Timing/Onset,Context/Setting, Quality, Duration, Modifying Factors, Severity)  Note limiting factors. Eric Means is a 80 y.o. female who presents to the emergency department chest pain      Patient with known history of chronic congestive heart failure with ejection fraction of 20% CKD hypertension dilated cardiomyopathy. Currently lives in a nursing home after she had hip dislocation and was not a surgical candidate she is homebound and DNR CC have been having chest pain for the last few days resolved after nitro was sent for evaluation but in the ER no active chest pain no shortness of breath no other acute    The history is provided by the patient. NursingNotes were reviewed. REVIEW OF SYSTEMS    (2-9 systems for level 4, 10 or more for level 5)     Review of Systems   Constitutional: Negative. HENT: Negative. Eyes: Negative. Respiratory: Positive for shortness of breath. Cardiovascular: Positive for chest pain. Gastrointestinal: Negative. Endocrine: Negative. Genitourinary: Negative. Musculoskeletal: Negative. Skin: Negative. Allergic/Immunologic: Negative. Neurological: Negative. Hematological: Negative. Psychiatric/Behavioral: Negative. All other systems reviewed and are negative. Except as noted above the remainder of the review of systems was reviewed and negative.        PAST MEDICAL HISTORY     Past Medical History:   Diagnosis Date    Diverticulitis     Hyperlipidemia     Hypertension          SURGICALHISTORY       Past Surgical History:   Procedure Laterality Date    APPENDECTOMY      CARDIAC SURGERY      CHOLECYSTECTOMY      JOINT REPLACEMENT           CURRENT MEDICATIONS       Previous Medications    ACETAMINOPHEN (TYLENOL) 325 MG TABLET    Take 650 mg by mouth every 4 hours as needed for Pain or Fever     AMIODARONE (CORDARONE) 200 MG TABLET    Take 1 tablet by mouth daily    ASPIRIN 81 MG TABLET    Take 81 mg by mouth daily Indications: CORONARY ARTERY DISEASE     ATORVASTATIN (LIPITOR) 40 MG TABLET    Take 40 mg by mouth nightly Indications: High Amount of Fats in the Blood    CITALOPRAM (CELEXA) 10 MG TABLET    Take 10 mg by mouth daily Indications: Depression     DICLOFENAC SODIUM (VOLTAREN) 1 % GEL    Apply 2 g topically 2 times daily Indications: Pain To right hip    DICYCLOMINE (BENTYL) 10 MG CAPSULE    Take 1 capsule by mouth every 6 hours as needed (cramps)    DOCUSATE SODIUM (COLACE) 100 MG CAPSULE    Take 100 mg by mouth 2 times daily as needed for Constipation    FAMOTIDINE (PEPCID) 20 MG TABLET    Take 20 mg by mouth 2 times daily Indications: Gastroesophageal Reflux Disease Daily at dinner     FUROSEMIDE (LASIX) 20 MG TABLET    Take 1 tablet by mouth daily    ISOSORBIDE MONONITRATE (IMDUR) 30 MG EXTENDED RELEASE TABLET    Take 3 tablets by mouth daily    LAMOTRIGINE (LAMICTAL) 100 MG TABLET    Take 100 mg by mouth 2 times daily Indications: Epilepsy     LORATADINE (CLARITIN) 10 MG CAPSULE    Take 10 mg by mouth daily Indications: Allergy     LOSARTAN (COZAAR) 25 MG TABLET    Take 1 tablet by mouth daily    METOLAZONE (ZAROXOLYN) 2.5 MG TABLET    Take 2.5 mg by mouth three times a week Indications: Congestive Heart Failure MWF     METOPROLOL SUCCINATE (TOPROL XL) 50 MG EXTENDED RELEASE TABLET    Take 1 tablet by mouth daily    NALOXONE 4 MG/0.1ML LIQD NASAL SPRAY    1 spray by Nasal route as needed for Opioid Reversal    NITROGLYCERIN (NITROSTAT) 0.4 MG SL TABLET    Place 0.4 mg under the tongue every 5 minutes as needed for Chest pain up to max of 3 total doses. If no relief after 1 dose, call 911.     POLYETHYLENE GLYCOL (GLYCOLAX) 17 G PACKET    Take 17 g by mouth daily as needed for Constipation    POTASSIUM CHLORIDE (MICRO-K) 10 MEQ EXTENDED RELEASE CAPSULE    Take 10 mEq by mouth 2 times daily Indications: Nutritional Support, diuretic therapy Daily at noon     PSYLLIUM (KONSYL) 28.3 % PACK    Take 1 packet by mouth 2 times daily Indications: Constipation     TAPENTADOL (NUCYNTA) 50 MG TABS    Take 1 tablet by mouth every 8 hours for 30 days. VITAMIN D (CHOLECALCIFEROL) 1000 UNIT TABS TABLET    Take 2,000 Units by mouth daily Indications: Nutritional Support        ALLERGIES     Ezetimibe-simvastatin    FAMILY HISTORY     History reviewed. No pertinent family history. SOCIAL HISTORY       Social History     Socioeconomic History    Marital status:      Spouse name: None    Number of children: None    Years of education: None    Highest education level: None   Occupational History    None   Tobacco Use    Smoking status: Never Smoker    Smokeless tobacco: Never Used   Substance and Sexual Activity    Alcohol use: No    Drug use: No    Sexual activity: Not Currently   Other Topics Concern    None   Social History Narrative    None     Social Determinants of Health     Financial Resource Strain:     Difficulty of Paying Living Expenses: Not on file   Food Insecurity:     Worried About Running Out of Food in the Last Year: Not on file    Alyse of Food in the Last Year: Not on file   Transportation Needs:     Lack of Transportation (Medical): Not on file    Lack of Transportation (Non-Medical):  Not on file   Physical Activity:     Days of Exercise per Week: Not on file    Minutes of Exercise per Session: Not on file   Stress:     Feeling of Stress : Not on file   Social Connections:     Frequency of Communication with Friends and Family: Not on file    Frequency of Social Gatherings with Friends and Family: Not on file    Attends Voodoo Services: Not on file   [employer] or Organizations: Not on file    Attends Club or Organization Meetings: Not on file    Marital Status: Not on file   Intimate Partner Violence:     Fear of Current or Ex-Partner: Not on file    Emotionally Abused: Not on file    Physically Abused: Not on file    Sexually Abused: Not on file   Housing Stability:     Unable to Pay for Housing in the Last Year: Not on file    Number of [place] in the Last Year: Not on file    Unstable Housing in the Last Year: Not on file       SCREENINGS    Hanna Coma Scale  Eye Opening: Spontaneous  Best Verbal Response: Oriented  Best Motor Response: Obeys commands  Luigi Coma Scale Score: 15 @FLOW([id number])@      PHYSICAL EXAM    (up to 7 for level 4, 8 or more for level 5)     ED Triage Vitals [04/30/22 1707]   BP Temp Temp Source Pulse Resp SpO2 Height Weight   107/71 98.5 °F (36.9 °C) Oral 108 18 95 % 4' 11\" (1.499 m) 120 lb (54.4 kg)       Physical Exam  Vitals and nursing note reviewed. Constitutional:       Appearance: She is well-developed. She is obese. HENT:      Head: Normocephalic and atraumatic. Right Ear: External ear normal.      Left Ear: External ear normal.      Nose: Nose normal.   Eyes:      Extraocular Movements: Extraocular movements intact. Pupils: Pupils are equal, round, and reactive to light. Cardiovascular:      Rate and Rhythm: Normal rate and regular rhythm. Heart sounds: Normal heart sounds. Pulmonary:      Effort: Pulmonary effort is normal.      Breath sounds: Normal breath sounds. Abdominal:      General: Bowel sounds are normal.      Palpations: Abdomen is soft. Musculoskeletal:         General: Normal range of motion. Cervical back: Normal range of motion and neck supple. Right lower leg: Edema present. Left lower leg: Edema present. Skin:     General: Skin is warm and dry. Neurological:      Mental Status: She is alert and oriented to person, place, and time. Cranial Nerves:  No cranial nerve deficit. Sensory: No sensory deficit. Motor: No abnormal muscle tone. Coordination: Coordination normal.      Deep Tendon Reflexes: Reflexes normal.   Psychiatric:         Behavior: Behavior normal.         Thought Content: Thought content normal.         Judgment: Judgment normal.         DIAGNOSTIC RESULTS     EKG: All EKG's are interpreted by the Emergency Department Physician who either signs or Co-signsthis chart in the absence of a cardiologist.    EKG: Sinus rhythm with frequent PVCs nonspecific intraventricular block rate 98 no changes from previous.       RADIOLOGY:   Non-plain filmimages such as CT, Ultrasound and MRI are read by the radiologist. Plain radiographic images are visualized and preliminarily interpreted by the emergency physician with the below findings:         Interpretation per the Radiologist below, if available at the time ofthis note:    XR CHEST PORTABLE    (Results Pending)     Acute cardiopulmonary disease    ED BEDSIDE ULTRASOUND:   Performed by ED Physician - none    LABS:  Labs Reviewed   COMPREHENSIVE METABOLIC PANEL - Abnormal; Notable for the following components:       Result Value    Glucose 106 (*)     Albumin 3.4 (*)     Alkaline Phosphatase 160 (*)     All other components within normal limits   CBC WITH AUTO DIFFERENTIAL - Abnormal; Notable for the following components:    RBC 3.23 (*)     Hemoglobin 9.9 (*)     Hematocrit 30.0 (*)     RDW 14.8 (*)     All other components within normal limits   TROPONIN - Abnormal; Notable for the following components:    Troponin 0.019 (*)     All other components within normal limits    Narrative:     CALL  [place]  [facility] tel. [phone],  Trop results called to and read back by Nathaniel Rogel, 04/30/2022 18:18, by  [place]    Narrative:     Krystal Wood tel. [phone],  Trop results called to and read back by Nathaniel Rogel, 04/30/2022 18:18, by  [facility]       All other labs were within normal range or not returned as of this dictation. EMERGENCY DEPARTMENT COURSE and DIFFERENTIAL DIAGNOSIS/MDM:   Vitals:    Vitals:    04/30/22 1707 04/30/22 1814 04/30/22 1830   BP: 107/71  100/62   Pulse: 108     Resp: 18     Temp: 98.5 °F (36.9 °C)     TempSrc: Oral     SpO2: 95% 100%    Weight: 120 lb (54.4 kg)     Height: 4' 11\" (1.499 m)                MDM  Number of Diagnoses or Management Options  Atypical chest pain  Diagnosis management comments: 80years old with known history of dilated cardiomyopathy chronic CHF with an ejection fraction of 20% CKD hypertension and hyperlipidemia. Currently is in the nursing home for  hip displacement presented as she was not a surgical candidate. Presented today today after she having intermittent chest pain for the last few days resolved with nitro in the ED had no chest pain EKG showed no acute changes blood work negative troponin stable. Result discussed with patient and her daughter and agreed since the patient is DNR CC they do not want any further intervention they prefer to go back to the nursing home patient discharged back to the nursing home in stable condition remained hemodynamically stable in the ER with normal blood pressure satting 100% on room air and no chest pain       Amount and/or Complexity of Data Reviewed  Clinical lab tests: ordered and reviewed  Tests in the radiology section of CPT®: ordered and reviewed       CONSULTS:  None    PROCEDURES:  Unless otherwise noted below, none     Procedures    FINAL IMPRESSION      1.  Atypical chest pain          DISPOSITION/PLAN   DISPOSITION        PATIENT REFERRED TO:  Noble Burden MD  [address] [place] [address] [place] [id number] [phone]    In 3 days        DISCHARGE MEDICATIONS:  New Prescriptions    No medications on file          (Please note thatportions of this note were completed with a voice recognition program.  Efforts were made to edit the dictations but occasionally words are mis-transcribed.)    Maxwell Duenas MD (electronically signed)  Attending Emergency Physician          Sascha Polanco MD  04/30/22 2003

## 2022-04-30 NOTE — TELEPHONE ENCOUNTER
Writer contacted Dr. Suersh Ramos via text to inform of 30 day readmission risk.      Attending: Michelle Ballesteros MD

## 2022-05-03 NOTE — PROGRESS NOTES
Patient's name, date of birth, and allergies have been confirmed. Patient is aware that injection is to be given in Left knee. He/she is aware that they will be seeing  and the injection will be given by him. A timeout was performed immediately prior to the start of the injection   procedure and included the correct patient (two identifiers), correct procedure and correct site(s). Procedure consent and allergies were also verified.

## 2022-05-03 NOTE — PROGRESS NOTES
Subjective:      Patient ID: Casandra Falcon is a 80 y.o. female who presents today for:  Chief Complaint   Patient presents with    Follow-up     Dislocation of right hip. 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  However the patient is mobilizing using a walker partial weightbearing on her right hip, fairly comfortably, and getting around  She was admitted about 2 weeks ago when she rolled over in bed and landed on her right side and this caused her the discomfort in the right lower extremity    Past Medical History:   Diagnosis Date    Diverticulitis     Hyperlipidemia     Hypertension      Past Surgical History:   Procedure Laterality Date    APPENDECTOMY      CARDIAC SURGERY      CHOLECYSTECTOMY      JOINT REPLACEMENT       Social History     Socioeconomic History    Marital status:      Spouse name: Not on file    Number of children: Not on file    Years of education: Not on file    Highest education level: Not on file   Occupational History    Not on file   Tobacco Use    Smoking status: Never Smoker    Smokeless tobacco: Never Used   Substance and Sexual Activity    Alcohol use: No    Drug use: No    Sexual activity: Not Currently   Other Topics Concern    Not on file   Social History Narrative    Not on file     Social Determinants of Health     Financial Resource Strain:     Difficulty of Paying Living Expenses: Not on file   Food Insecurity:     Worried About Running Out of Food in the Last Year: Not on file    Alyse of Food in the Last Year: Not on file   Transportation Needs:     Lack of Transportation (Medical): Not on file    Lack of Transportation (Non-Medical):  Not on file   Physical Activity:     Days of Exercise per Week: Not on file    Minutes of Exercise per Session: Not on file   Stress:     Feeling of Stress : Not on file   Social Connections:     Frequency of Communication with Friends and Family: Not on file    Frequency of Social Gatherings with Friends and Family: Not on file    Attends Confucianist Services: Not on file    Active Member of Clubs or Organizations: Not on file    Attends Club or Organization Meetings: Not on file    Marital Status: Not on file   Intimate Partner Violence:     Fear of Current or Ex-Partner: Not on file    Emotionally Abused: Not on file    Physically Abused: Not on file    Sexually Abused: Not on file   Housing Stability:     Unable to Pay for Housing in the Last Year: Not on file    Number of [place] in the Last Year: Not on file    Unstable Housing in the Last Year: Not on file     No family history on file.   Allergies   Allergen Reactions    Ezetimibe-Simvastatin Other (See Comments)     Current Outpatient Medications on File Prior to Visit   Medication Sig Dispense Refill    isosorbide mononitrate (IMDUR) 30 MG extended release tablet Take 3 tablets by mouth daily (Patient taking differently: Take 90 mg by mouth daily Indications: High Blood Pressure Disorder ) 30 tablet 3    amiodarone (CORDARONE) 200 MG tablet Take 1 tablet by mouth daily (Patient taking differently: Take 200 mg by mouth daily Indications: Atrial Fibrillation ) 30 tablet 3    losartan (COZAAR) 25 MG tablet Take 1 tablet by mouth daily (Patient taking differently: Take 25 mg by mouth daily Indications: High Blood Pressure Disorder ) 30 tablet 3    metoprolol succinate (TOPROL XL) 50 MG extended release tablet Take 1 tablet by mouth daily (Patient taking differently: Take 50 mg by mouth daily Indications: High Blood Pressure Disorder ) 30 tablet 3    dicyclomine (BENTYL) 10 MG capsule Take 1 capsule by mouth every 6 hours as needed (cramps) (Patient taking differently: Take 10 mg by mouth every 6 hours as needed (cramps) Indications: Cramping Pain in the Abdomen ) 20 capsule 0    furosemide (LASIX) 20 MG tablet Take 1 tablet by mouth daily (Patient taking differently: Take 20 mg by mouth daily Indications: Congestive Heart Failure ) 60 tablet 3    tapentadol (NUCYNTA) 50 MG TABS Take 1 tablet by mouth every 8 hours for 30 days. 60 tablet 0    atorvastatin (LIPITOR) 40 MG tablet Take 40 mg by mouth nightly Indications: High Amount of Fats in the Blood      psyllium (KONSYL) 28.3 % PACK Take 1 packet by mouth 2 times daily Indications: Constipation       polyethylene glycol (GLYCOLAX) 17 g packet Take 17 g by mouth daily as needed for Constipation      citalopram (CELEXA) 10 MG tablet Take 10 mg by mouth daily Indications: Depression       metOLazone (ZAROXOLYN) 2.5 MG tablet Take 2.5 mg by mouth three times a week Indications: Congestive Heart Failure MWF       famotidine (PEPCID) 20 MG tablet Take 20 mg by mouth 2 times daily Indications: Gastroesophageal Reflux Disease Daily at dinner       potassium chloride (MICRO-K) 10 MEQ extended release capsule Take 10 mEq by mouth 2 times daily Indications: Nutritional Support, diuretic therapy Daily at noon       diclofenac sodium (VOLTAREN) 1 % GEL Apply 2 g topically 2 times daily Indications: Pain To right hip      naloxone 4 MG/0.1ML LIQD nasal spray 1 spray by Nasal route as needed for Opioid Reversal      docusate sodium (COLACE) 100 MG capsule Take 100 mg by mouth 2 times daily as needed for Constipation      aspirin 81 MG tablet Take 81 mg by mouth daily Indications: CORONARY ARTERY DISEASE       acetaminophen (TYLENOL) 325 MG tablet Take 650 mg by mouth every 4 hours as needed for Pain or Fever       loratadine (CLARITIN) 10 MG capsule Take 10 mg by mouth daily Indications: Allergy       nitroGLYCERIN (NITROSTAT) 0.4 MG SL tablet Place 0.4 mg under the tongue every 5 minutes as needed for Chest pain up to max of 3 total doses. If no relief after 1 dose, call 911.       lamoTRIgine (LAMICTAL) 100 MG tablet Take 100 mg by mouth 2 times daily Indications: Epilepsy       vitamin D (CHOLECALCIFEROL) 1000 UNIT TABS tablet Take 2,000 Units by mouth daily Indications: Nutritional Support        No current facility-administered medications on file prior to visit. Controlled Substance Monitoring:    Acute and Chronic Pain Monitoring:   No flowsheet data found. Review of Systems   Constitutional: Negative for activity change and appetite change. Respiratory: Negative for chest tightness. Cardiovascular: Negative for chest pain. Gastrointestinal: Negative for abdominal distention. Genitourinary: Negative for difficulty urinating. Neurological: Negative for headaches. Objective:   Pulse 103   Temp 96.8 °F (36 °C) (Temporal)   Ht 4' 11\" (1.499 m)   Wt 120 lb (54.4 kg)   SpO2 (!) 89%   BMI 24.24 kg/m²     Physical Exam:    Patient is in a wheelchair  She appears to be fairly comfortable    Right lower extremity  Shortening noticed in the right lower extremity  Passive motion of the hip is possible up to about 100 degrees, abduction is 30, adduction is 20, internal and external rotation are restricted and causes her discomfort  Right knee, and varus deformity is noted  She is tender over the medial joint line  Retropatellar compression causes pain  There is a small effusion present      Diagnostic Imaging:  X-rays of her right femur entire length AP, lateral view were reviewed  There shows evidence of chronic superior dislocation of the right hip-total hip arthroplasty  The acetabulum appears to be  from the pelvis as of now in the vertical position  No signs of loosening noticed in the femoral component      Assessment:       Diagnosis Orders   1. Arthritis of right knee  MA ARTHROCENTESIS ASPIR&/INJ MAJOR JT/BURSA W/O US   2.  Dislocation of internal right hip prosthesis, sequela           Plan:      Discussed various treatment options for her discomfort  Hip is being dislocated for about 6 years, however she has been able to get around with a walker, moving from her bed to her chair and to the toilet. She currently is in a skilled nursing facility, and is awaiting evaluation before she is discharged home  Surgical intervention for the hip replacement would be a long and complex procedure and she does not want to go through with that  Her discomfort is also in her knee along the medial aspect, where she certainly has some mild to moderate arthritis  Cortisone injection is an option, the risks and benefits of this injection including inflammation, infection, failure to alleviate her symptoms have been discussed  She consents for the injection to her right knee    The medial joint line of the right knee was prepped well with Betadine  Using a 22-gauge needle, 1 cc of Depo-Medrol and 6 cc of 1% lidocaine carefully injected into the knee  She tolerated the injection well  Strengthening exercises could be continued for the knee, and advised her to apply ice on her knee 20 minutes every hour for the next 3 to 4 hours today  She will notice some improvement following the injections  She will follow-up with me again in the next 2 to 3 months for reevaluation    Continued mobilization with a walker  I have explained to her as well as to her son that the right hip is clearly unstable and there is a risk of falling and she has to take or do precautions  They certainly do not want to go through revision surgery for the right hip        Orders Placed This Encounter   Procedures    CA ARTHROCENTESIS ASPIR&/INJ MAJOR JT/BURSA W/O US     Orders Placed This Encounter   Medications    lidocaine 1 % injection 5 mL    methylPREDNISolone acetate (DEPO-MEDROL) injection 80 mg       Return if symptoms worsen or fail to improve.       Maura Mar MD

## 2022-05-07 PROBLEM — R57.9 SHOCK (HCC): Status: ACTIVE | Noted: 2022-01-01

## 2022-05-07 NOTE — H&P
Hospital Medicine  History and Physical    Patient:  Liliya Sandoval  MRN: [id number]    CHIEF COMPLAINT:    Chief Complaint   Patient presents with    Altered Mental Status     pt's   a weak ago. Pt living at [facility]        History Obtained From:  Patient, EMR  Primary Care Physician: Linsey Vee MD    HISTORY OF PRESENT ILLNESS:   80-year-old female with chronic systolic heart failure (EF 10%), recent right hip fracture which was managed nonoperatively has been in [facility] home since and has been acting confused, calling out in pain, calling for family members who have been  for some time. She is brought in for evaluation of this confusion. In the ED, she was found to have new acute kidney injury, severely elevated transaminases, elevated troponin, bandemia. After speaking with family, they are interested in comfort care strategy. They would like to avoid any aggressive treatments and stop blood draws. They would like low rate IV fluid overnight and will meet with hospice tomorrow. Past Medical History:      Diagnosis Date    Diverticulitis     Hyperlipidemia     Hypertension        Past Surgical History:      Procedure Laterality Date    APPENDECTOMY      CARDIAC SURGERY      CHOLECYSTECTOMY      JOINT REPLACEMENT         Medications Prior to Admission:    Prior to Admission medications    Medication Sig Start Date End Date Taking? Authorizing Provider   tapentadol (NUCYNTA) 50 MG TABS Take 1 tablet by mouth every 8 hours for 30 days.  22  Linsey Vee MD   atorvastatin (LIPITOR) 40 MG tablet Take 40 mg by mouth nightly Indications: High Amount of Fats in the Blood    Historical Provider, MD   isosorbide mononitrate (IMDUR) 30 MG extended release tablet Take 3 tablets by mouth daily  Patient taking differently: Take 90 mg by mouth daily Indications: High Blood Pressure Disorder  22   Iván Aponte MD   amiodarone (CORDARONE) 200 MG tablet Take 1 tablet by mouth daily  Patient taking differently: Take 200 mg by mouth daily Indications: Atrial Fibrillation  4/4/22   Mercedes العلي MD   losartan (COZAAR) 25 MG tablet Take 1 tablet by mouth daily  Patient taking differently: Take 25 mg by mouth daily Indications: High Blood Pressure Disorder  4/5/22   Mercedes العلي MD   metoprolol succinate (TOPROL XL) 50 MG extended release tablet Take 1 tablet by mouth daily  Patient taking differently: Take 50 mg by mouth daily Indications: High Blood Pressure Disorder  4/5/22   Mercedes العلي MD   psyllium (KONSYL) 28.3 % PACK Take 1 packet by mouth 2 times daily Indications: Constipation     Historical Provider, MD   polyethylene glycol (GLYCOLAX) 17 g packet Take 17 g by mouth daily as needed for Constipation    Historical Provider, MD   citalopram (CELEXA) 10 MG tablet Take 10 mg by mouth daily Indications: Depression     Historical Provider, MD   metOLazone (ZAROXOLYN) 2.5 MG tablet Take 2.5 mg by mouth three times a week Indications: Congestive Heart Failure [facility]     Historical Provider, MD   famotidine (PEPCID) 20 MG tablet Take 20 mg by mouth 2 times daily Indications: Gastroesophageal Reflux Disease Daily at dinner     Historical Provider, MD   potassium chloride (MICRO-K) 10 MEQ extended release capsule Take 10 mEq by mouth 2 times daily Indications: Nutritional Support, diuretic therapy Daily at noon     Historical Provider, MD   dicyclomine (BENTYL) 10 MG capsule Take 1 capsule by mouth every 6 hours as needed (cramps)  Patient taking differently: Take 10 mg by mouth every 6 hours as needed (cramps) Indications: Cramping Pain in the Abdomen  3/20/21   Autumn Estrada PA-C   furosemide (LASIX) 20 MG tablet Take 1 tablet by mouth daily  Patient taking differently: Take 20 mg by mouth daily Indications: Congestive Heart Failure  11/4/20   Cristino Guevara MD   diclofenac sodium (VOLTAREN) 1 % GEL Apply 2 g topically 2 times daily Indications: Pain To right hip    Historical Provider, MD   naloxone 4 MG/0.1ML LIQD nasal spray 1 spray by Nasal route as needed for Opioid Reversal    Historical Provider, MD   docusate sodium (COLACE) 100 MG capsule Take 100 mg by mouth 2 times daily as needed for Constipation    Historical Provider, MD   aspirin 81 MG tablet Take 81 mg by mouth daily Indications: CORONARY ARTERY DISEASE     Historical Provider, MD   acetaminophen (TYLENOL) 325 MG tablet Take 650 mg by mouth every 4 hours as needed for Pain or Fever     Historical Provider, MD   loratadine (CLARITIN) 10 MG capsule Take 10 mg by mouth daily Indications: Allergy     Historical Provider, MD   nitroGLYCERIN (NITROSTAT) 0.4 MG SL tablet Place 0.4 mg under the tongue every 5 minutes as needed for Chest pain up to max of 3 total doses. If no relief after 1 dose, call 911. Historical Provider, MD   lamoTRIgine (LAMICTAL) 100 MG tablet Take 100 mg by mouth 2 times daily Indications: Epilepsy     Historical Provider, MD   vitamin D (CHOLECALCIFEROL) 1000 UNIT TABS tablet Take 2,000 Units by mouth daily Indications: Nutritional Support     Historical Provider, MD       Allergies:  Ezetimibe-simvastatin    Social History:   TOBACCO:   reports that she has never smoked. She has never used smokeless tobacco.  ETOH:   reports no history of alcohol use. Family History:   History reviewed. No pertinent family history. REVIEW OF SYSTEMS:  Ten systems reviewed and negative except for stated in HPI    Physical Exam:    Vitals: BP (!) 99/55   Pulse 82   Temp 98.8 °F (37.1 °C) (Oral)   Resp 21   Ht 4' 11\" (1.499 m)   Wt 120 lb (54.4 kg)   SpO2 100%   BMI 24.24 kg/m²   General appearance: Elderly and chronically ill-appearing. Will withdraw to pain but does not open eyes to voice. Does not talk or follow commands  Skin: Skin color, texture, turgor normal. No rashes or lesions  HEENT: eomi, perrla. MMM  Neck: No JVD or lymphadenopathy  Lungs: CTA bilaterally.  No wheeze Heart: RRR, no murmur or gallp  Abdomen: soft, nontender. Bsx4. No masses or organomegaly  Extremities: no edema, redness, or tenderness in calves. Cap refill <2s  Neurologic: No focal deficits     Recent Labs     05/07/22  1415   WBC 4.1*   HGB 11.9*        Recent Labs     05/07/22  1512 05/07/22  1655   NA  --  141   K  --  5.3*   CL  --  102   CO2  --  22   BUN  --  63*   CREATININE 3.7* 3.31*   GLUCOSE  --  93   AST  --  1,499*   ALT  --  1,339*   BILITOT  --  1.2*   ALKPHOS  --  233*     Troponin T:   Recent Labs     05/07/22  1415   TROPONINI 0.152*       ABGs: No results found for: PHART, PO2ART, OSP5XML  INR: No results for input(s): INR in the last 72 hours. URINALYSIS:  Recent Labs     05/07/22  1415   COLORU DARK YELLOW*   PHUR 5.0   WBCUA 10-20*   RBCUA 3-5*   BACTERIA Negative   CLARITYU CLOUDY*   SPECGRAV 1.019   LEUKOCYTESUR TRACE*   UROBILINOGEN 1.0   BILIRUBINUR MODERATE*   BLOODU TRACE*   GLUCOSEU Negative     -----------------------------------------------------------------   CT ABDOMEN PELVIS WO CONTRAST Additional Contrast? None    Result Date: 5/7/2022  EXAMINATION:  CT ABDOMEN AND PELVIS WITHOUT IV CONTRAST CLINICAL HISTORY:  Altered mental status and abdominal pain. TECHNIQUE: Non-IV contrast imaging of the abdomen and pelvis was performed using standard technique, scanning from just above the dome of the diaphragm to the symphysis pubis. Unenhanced imaging is limited for the evaluation of some intra-abdominal and pelvic pathology. All CT scans at this facility use dose modulation, iterative reconstruction, and/or weight based dosing when appropriate to reduce radiation dose to as low as reasonably achievable. Contrast: IV: None COMPARISON: None. RESULT: Abdomen / Pelvis: Examination is severely limited by motion. Liver: Unremarkable. Biliary: S/p cholecystectomy. Spleen: No splenomegaly. Pancreas: Unremarkable.  Adrenals: Normal. Kidneys: No calculus, hydronephrosis or finding to suggest a cyst or mass in the unenhanced kidney. GI Tract: No bowel dilation. Large amount of colonic stool. Lymph Nodes: No lymphadenopathy. Mesentery/peritoneum: No ascites. Retroperitoneum: No mass. Vasculature: Atherosclerotic vascular disease without aneurysm. Pelvis: Severely Limited evaluation due to artifact from bilateral hip replacement. . Bones/Soft Tissues: Status post bilateral hip replacements. Degenerative changes of the lumbar spine. Lower thorax: Small right pleural effusion and atelectasis. Examination is severely limited by motion. Within limits of evaluation there is no gross acute abnormality. Large amount of colonic stool Small right pleural effusion. ==========     CT Head WO Contrast    Result Date: 5/7/2022  EXAMINATION:  CT HEAD WO CONTRAST HISTORY:   ams  - TECHNIQUE: CT head without contrast. All CT scans at this facility use dose modulation, iterative reconstruction, and/or weight based dosing when appropriate to reduce radiation dose to as low as reasonably achievable. COMPARISON:  CT brain 3/28/2022 RESULT: Post-operative change:  None. Acute change:   No evidence of an acute intracranial process. Hemorrhage:    No evidence of acute intracranial hemorrhage. Mass Lesion / Mass Effect:   No evidence of an intracranial mass or extraaxial fluid collection. No significant mass effect. Chronic change:   Patchy foci of  low attenuation coefficient are present within the supratentorial white matter which is a nonspecific finding but likely represents moderate microvascular ischemia. Atherosclerotic calcification of the carotid siphons  and vertebrobasilar arteries. Parenchyma:  Moderate generalized volume loss. Ventricles:   Ventricular enlargement concordant with the degree of parenchymal volume loss. Other: The calvarium, skull base, mastoids, and extracranial soft tissues are unremarkable. Mild paranasal sinus mucosal thickening. Status post bilateral lens replacements.      No acute intracranial abnormality. XR CHEST PORTABLE    Result Date: 5/7/2022  EXAMINATION:  CHEST RADIOGRAPH (PORTABLE SINGLE VIEW AP) Exam Date/Time:  5/7/2022 2:29 PM Clinical History:   ams Comparison:  4/30/2022  RESULT: Lines, tubes, and devices:  None. Lungs and pleura: Increased bilateral interstitial markings in the perihilar and mid to lower lung zones likely vascular congestion. Bibasilar atelectasis. No pneumothorax. No large pleural effusion. Cardiomediastinal silhouette:  Stable enlarged cardiomediastinal silhouette. Atherosclerotic calcification of the aortic arch. Other: No acute osseous process. Status post median sternotomy. Findings compatible with interstitial edema. Assessment and Plan     1. Shock suspect combination of septic and cardiogenic  -Given her baseline EF 10%, recent right hip fracture which has rendered her bedbound, and new decompensation rating to acute renal failure and acute liver injury, family has elected for comfort care strategy. Hospice consult placed. We will continue gentle IV fluid. Low-dose as needed Dilaudid for pain. Stop unnecessary medications and blood draws. 35 minutes of advance care planning discussing the above with family. They prefer 1st contact be geoffrey Navarro ([phone]). FLORENTINO  Shock liver  Metabolic encephalopathy  Chronic systolic heart failure  History of right hip fracture managed nonoperatively    DNR CC    45 minutes in total care time.      Karyn Lane DO  Admitting Hospitalist

## 2022-05-07 NOTE — ED PROVIDER NOTES
Melinda Falcon  eMERGENCY dEPARTMENTeNCOUnter      Pt Name: Kassy Aragon  MRN: [id number]  [place] 1934  Date ofevaluation: 2022  Provider: Joselo Gr PA-C    CHIEF COMPLAINT       Chief Complaint   Patient presents with    Altered Mental Status     pt's   a weak ago. [facility]          HISTORY OF PRESENT ILLNESS   (Location/Symptom, Timing/Onset,Context/Setting, Quality, Duration, Modifying Factors, Severity)  Note limiting factors. Kassy Aragon is a 80 y.o. female who presents to the emergency department third mental status. On Wednesday patient was complaining of nausea vomiting and abdominal pain she resides in the nursing home. Yesterday she started to be more lethargic and not responding and not alert and oriented anymore. Patient's daughter is at bedside and endorses this as well. They thought maybe she had a urinary tract infection. Patient does not offer anything to the history. HPI    NursingNotes were reviewed. REVIEW OF SYSTEMS    (2-9 systems for level 4, 10 or more for level 5)     Review of Systems   Unable to perform ROS: Mental status change   Constitutional: Positive for activity change and appetite change. Gastrointestinal: Positive for abdominal pain, nausea and vomiting. Except as noted above the remainder of the review of systems was reviewed and negative. PAST MEDICAL HISTORY     Past Medical History:   Diagnosis Date    Diverticulitis     Hyperlipidemia     Hypertension          SURGICALHISTORY       Past Surgical History:   Procedure Laterality Date    APPENDECTOMY      CARDIAC SURGERY      CHOLECYSTECTOMY      JOINT REPLACEMENT           CURRENT MEDICATIONS       Current Discharge Medication List      CONTINUE these medications which have NOT CHANGED    Details   tapentadol (NUCYNTA) 50 MG TABS Take 1 tablet by mouth every 8 hours for 30 days.   Qty: 60 tablet, Refills: 0    Comments: Reduce doses taken as pain becomes manageable  Associated Diagnoses: Central abdominal pain      atorvastatin (LIPITOR) 40 MG tablet Take 40 mg by mouth nightly Indications: High Amount of Fats in the Blood      isosorbide mononitrate (IMDUR) 30 MG extended release tablet Take 3 tablets by mouth daily  Qty: 30 tablet, Refills: 3      amiodarone (CORDARONE) 200 MG tablet Take 1 tablet by mouth daily  Qty: 30 tablet, Refills: 3      losartan (COZAAR) 25 MG tablet Take 1 tablet by mouth daily  Qty: 30 tablet, Refills: 3      metoprolol succinate (TOPROL XL) 50 MG extended release tablet Take 1 tablet by mouth daily  Qty: 30 tablet, Refills: 3      psyllium (KONSYL) 28.3 % PACK Take 1 packet by mouth 2 times daily Indications: Constipation       polyethylene glycol (GLYCOLAX) 17 g packet Take 17 g by mouth daily as needed for Constipation      citalopram (CELEXA) 10 MG tablet Take 10 mg by mouth daily Indications: Depression       metOLazone (ZAROXOLYN) 2.5 MG tablet Take 2.5 mg by mouth three times a week Indications: Congestive Heart Failure MWF       famotidine (PEPCID) 20 MG tablet Take 20 mg by mouth 2 times daily Indications: Gastroesophageal Reflux Disease Daily at dinner       potassium chloride (MICRO-K) 10 MEQ extended release capsule Take 10 mEq by mouth 2 times daily Indications: Nutritional Support, diuretic therapy Daily at noon       dicyclomine (BENTYL) 10 MG capsule Take 1 capsule by mouth every 6 hours as needed (cramps)  Qty: 20 capsule, Refills: 0      furosemide (LASIX) 20 MG tablet Take 1 tablet by mouth daily  Qty: 60 tablet, Refills: 3      diclofenac sodium (VOLTAREN) 1 % GEL Apply 2 g topically 2 times daily Indications: Pain To right hip      naloxone 4 MG/0.1ML LIQD nasal spray 1 spray by Nasal route as needed for Opioid Reversal      docusate sodium (COLACE) 100 MG capsule Take 100 mg by mouth 2 times daily as needed for Constipation      aspirin 81 MG tablet Take 81 mg by mouth daily Indications: CORONARY ARTERY DISEASE       acetaminophen (TYLENOL) 325 MG tablet Take 650 mg by mouth every 4 hours as needed for Pain or Fever       loratadine (CLARITIN) 10 MG capsule Take 10 mg by mouth daily Indications: Allergy       nitroGLYCERIN (NITROSTAT) 0.4 MG SL tablet Place 0.4 mg under the tongue every 5 minutes as needed for Chest pain up to max of 3 total doses. If no relief after 1 dose, call 911.      lamoTRIgine (LAMICTAL) 100 MG tablet Take 100 mg by mouth 2 times daily Indications: Epilepsy       vitamin D (CHOLECALCIFEROL) 1000 UNIT TABS tablet Take 2,000 Units by mouth daily Indications: Nutritional Support              ALLERGIES     Ezetimibe-simvastatin    FAMILY HISTORY     History reviewed. No pertinent family history. SOCIAL HISTORY       Social History     Socioeconomic History    Marital status:      Spouse name: None    Number of children: None    Years of education: None    Highest education level: None   Occupational History    None   Tobacco Use    Smoking status: Never Smoker    Smokeless tobacco: Never Used   Substance and Sexual Activity    Alcohol use: No    Drug use: No    Sexual activity: Not Currently   Other Topics Concern    None   Social History Narrative    None     Social Determinants of Health     Financial Resource Strain:     Difficulty of Paying Living Expenses: Not on file   Food Insecurity:     Worried About Running Out of Food in the Last Year: Not on file    Alyse of Food in the Last Year: Not on file   Transportation Needs:     Lack of Transportation (Medical): Not on file    Lack of Transportation (Non-Medical):  Not on file   Physical Activity:     Days of Exercise per Week: Not on file    Minutes of Exercise per Session: Not on file   Stress:     Feeling of Stress : Not on file   Social Connections:     Frequency of Communication with Friends and Family: Not on file    Frequency of Social Gatherings with Friends and Family: Not on file    Attends [employer] Services: Not on file    Active Member of Clubs or Organizations: Not on file    Attends Club or Organization Meetings: Not on file    Marital Status: Not on file   Intimate Partner Violence:     Fear of Current or Ex-Partner: Not on file    Emotionally Abused: Not on file    Physically Abused: Not on file    Sexually Abused: Not on file   Housing Stability:     Unable to Pay for Housing in the Last Year: Not on file    Number of [place] in the Last Year: Not on file    Unstable Housing in the Last Year: Not on file       SCREENINGS    Luigi Coma Scale  Eye Opening: To speech  Best Verbal Response: Incomprehensible speech  Best Motor Response: Withdraws from pain  Lugii Coma Scale Score: 9 @FLOW([id number])@      PHYSICAL EXAM    (up to 7 for level 4, 8 or more for level 5)     ED Triage Vitals [05/07/22 1355]   BP Temp Temp Source Pulse Resp SpO2 Height Weight   (!) 115/98 98.8 °F (37.1 °C) Oral 97 26 98 % 4' 11\" (1.499 m) 120 lb (54.4 kg)       Physical Exam  Vitals and nursing note reviewed. Constitutional:       General: She is not in acute distress. Appearance: Normal appearance. She is well-developed. She is not diaphoretic. HENT:      Head: Normocephalic and atraumatic. Eyes:      General: Lids are normal.      Conjunctiva/sclera: Conjunctivae normal.   Cardiovascular:      Rate and Rhythm: Normal rate and regular rhythm. Pulses: Normal pulses. Heart sounds: Normal heart sounds. Pulmonary:      Effort: Pulmonary effort is normal.      Breath sounds: Normal breath sounds. Abdominal:      General: Bowel sounds are normal.      Palpations: Abdomen is soft. Tenderness: There is no abdominal tenderness. Musculoskeletal:      Cervical back: Normal range of motion and neck supple. Lymphadenopathy:      Cervical: No cervical adenopathy. Skin:     General: Skin is warm and dry. Capillary Refill: Capillary refill takes less than 2 seconds. Findings: No rash. Neurological:      Mental Status: She is lethargic. GCS: GCS eye subscore is 3. GCS verbal subscore is 3. GCS motor subscore is 5. Comments: gcs 11 maintaining airway without difficulty. Will moan out. Will open eyes to her name. Will pull away from iv start. Psychiatric:         Thought Content: Thought content normal.         Judgment: Judgment normal.         DIAGNOSTIC RESULTS     EKG: All EKG's are interpreted by the Emergency Department Physician who either signs or Co-signsthis chart in the absence of a cardiologist.        RADIOLOGY:   Carolyn Boards such as CT, Ultrasound and MRI are read by the radiologist. Lionel Campbell radiographic images are visualized and preliminarily interpreted by the emergency physician with the below findings:        Interpretation per the Radiologist below, if available at the time ofthis note:    CT Head WO Contrast   Final Result      No acute intracranial abnormality. CT ABDOMEN PELVIS WO CONTRAST Additional Contrast? None   Final Result      Examination is severely limited by motion. Within limits of evaluation there is no gross acute abnormality. Large amount of colonic stool   Small right pleural effusion.               ==========               XR CHEST PORTABLE   Final Result      Findings compatible with interstitial edema.             ED BEDSIDE ULTRASOUND:   Performed by ED Physician - none    LABS:  Labs Reviewed   CBC WITH AUTO DIFFERENTIAL - Abnormal; Notable for the following components:       Result Value    WBC 4.1 (*)     RBC 3.90 (*)     Hemoglobin 11.9 (*)     Hematocrit 36.5 (*)     MCHC 32.5 (*)     RDW 15.2 (*)     Lymphocytes Absolute 0.1 (*)     All other components within normal limits   URINALYSIS WITH REFLEX TO CULTURE - Abnormal; Notable for the following components:    Color, UA DARK YELLOW (*)     Clarity, UA CLOUDY (*)     Bilirubin Urine MODERATE (*)     Ketones, Urine TRACE (*)     Blood, Urine TRACE (*) Protein, UA 30 (*)     Leukocyte Esterase, Urine TRACE (*)     All other components within normal limits   TROPONIN - Abnormal; Notable for the following components:    Troponin 0.152 (*)     All other components within normal limits    Narrative:     Alesha Gomez  ED tel. [phone],  TROP results called to and read back by Jong Kim RN, 05/07/2022 15:53, by  Eyal Ritchie   LACTIC ACID - Abnormal; Notable for the following components:    Lactic Acid 3.4 (*)     All other components within normal limits    Narrative:     CALL  Parmjit  ED tel. [phone],  LACTIC ACID results called to and read back by Jong Kim RN, 05/07/2022  15:53, by yEal Ritchie   PROCALCITONIN - Abnormal; Notable for the following components:    Procalcitonin 1.65 (*)     All other components within normal limits    Narrative:     CALL  Parmjit  ED tel. [phone],  TROP results called to and read back by Jong Kim RN, 05/07/2022 15:53, by  Eyal Ritchie   COMPREHENSIVE METABOLIC PANEL - Abnormal; Notable for the following components:    Potassium 5.3 (*)     Anion Gap 17 (*)     BUN 63 (*)     CREATININE 3.31 (*)     GFR Non- 13.1 (*)     GFR  15.9 (*)     Total Bilirubin 1.2 (*)     Alkaline Phosphatase 233 (*)     ALT 1,339 (*)     AST 1,499 (*)     All other components within normal limits   MAGNESIUM - Abnormal; Notable for the following components:    Magnesium 3.3 (*)     All other components within normal limits   MICROSCOPIC URINALYSIS - Abnormal; Notable for the following components:    RBC, UA 3-5 (*)     Renal Epithelial, UA 3-5 (*)     WBC, UA 10-20 (*)     All other components within normal limits   POCT VENOUS - Abnormal; Notable for the following components:    POC Creatinine 3.7 (*)     GFR Non- 12 (*)     GFR  14 (*)     All other components within normal limits   POCT CREATININE - URINE - Normal   COVID-19, RAPID   CULTURE, BLOOD 1   CULTURE, BLOOD 2   CULTURE, URINE   SPECIMEN REJECTION       All other labs were within normal range or not returned as of this dictation. EMERGENCY DEPARTMENT COURSE and DIFFERENTIAL DIAGNOSIS/MDM:   Vitals:    Vitals:    05/07/22 1707 05/07/22 1853 05/07/22 1938 05/07/22 2016   BP: 101/60 (!) 99/55 (!) 104/58 (!) 98/54   Pulse: 87 82 79 80   Resp: 26 21 18 18   Temp:       TempSrc:       SpO2: 99% 100% 96% 100%   Weight:   113 lb 1.5 oz (51.3 kg)    Height:           Patient taken over by Thiago Conroy at shift change. This is a 44-year-old female presenting with altered mental status. Patient is afebrile hemodynamically stable. Patient given 1 L normal saline. CT head unremarkable. EKG shows NSR with HR 96, left axis, normal intervals, hypertrophic qrs in leads V2/V3 with peaked T in these septal leads. CT abdomen pelvis shows a small pleural effusion and otherwise unremarkable. Chest x-ray shows interstitial edema. Labs notable for white blood cells 4.1, lactic acid of 3.4, Pro-Juan Francisco of 1.65. Patient admitted in stable condition. MDM      REASSESSMENT          CRITICAL CARE TIME   Total Critical Care time was  minutes, excluding separatelyreportable procedures. There was a high probability ofclinically significant/life threatening deterioration in the patient's condition which required my urgent intervention. CONSULTS:  IP CONSULT TO HOSPICE  IP CONSULT TO CASE MANAGEMENT    PROCEDURES:  Unless otherwise noted below, none     Procedures    FINAL IMPRESSION      1. Altered mental status, unspecified altered mental status type    2. Acute renal failure, unspecified acute renal failure type [facility])          DISPOSITION/PLAN   DISPOSITION Admitted 05/07/2022 07:13:08 PM      PATIENT REFERREDTO:  No follow-up provider specified.     DISCHARGEMEDICATIONS:  Current Discharge Medication List             (Please note that portions of this note were completed with a voice recognition program.  Efforts were made to edit the dictations but occasionally words are mis-transcribed.)    Ariel Sarkar PA-C (electronically signed)  Attending Emergency Physician         Ariel Sarkar PA-C  05/07/22 5410 Berta Hamlin PA-C  05/08/22 2674

## 2022-05-07 NOTE — ED TRIAGE NOTES
Pt's   2022  Pt living at [facility] (over a month)  Pt fell and broke her hip (at nursing home for rehab)  Pt usually lives with her daughter   Pt altered mental states   Daughter states they did an UA today at [facility]   Pt has been lethargic since Wednesday   Pt moaning and making noises in bed  Pt is usually A&O   Pt is afebrile

## 2022-05-08 NOTE — ED NOTES
Tele monitor placed on pt.  Report was given to nurse on [address] [place] [address]  05/07/22 2025

## 2022-05-08 NOTE — PROGRESS NOTES
Patient lying in bed, at times screaming out. Hospice consulted. Unable to assess patients orientation status. Daughter at bedside. RN to continue with dilaudid for comfort per MAR.  Jus Swanson RN

## 2022-05-08 NOTE — CARE COORDINATION
Call out to [facility] to let them know that there is a Hospice consult for this pt. Waiting on return call.

## 2022-05-08 NOTE — DISCHARGE SUMMARY
[facility] Medicine Discharge Summary    Giovanny Keys  :  1934  MRN:  [id number]    Admit date:  2022    Discharge date:  2022    Admitting Physician:  Ayaz Grove DO  Primary Care Physician:  Dora Florian MD    Discharge Diagnoses:    Principal Problem:    Shock [facility])  Resolved Problems:    * No resolved hospital problems. *    Chief Complaint   Patient presents with    Altered Mental Status     pt's   a weak ago. Pt living at [facility]        Condition: unchanged   Activity: no strenuous activity   Diet: pleasure feed  Disposition: hospice  Functional Status: bed bound    Significant Findings:     Recent Labs     22  1415 22  1730   WBC 4.1* 5.2   HGB 11.9* 9.9*   HCT 36.5* 30.0*   MCV 93.5 92.8    219     Labs Renal Latest Ref Rng & Units 2022 2022 2022 2022 4/3/2022   BUN 8 - 23 mg/dL 63(H) 9 28(H) - 29(H)   Cr 0.50 - 0.90 mg/dL 3.31(H) 0.78 0.95(H) - 1.05(H)   K 3.4 - 4.9 mEq/L 5.3(H) 4.2 4.5 4.5 4.6   Na 135 - 144 mEq/L 141 144 137 - 137     Lab Results   Component Value Date    ALT 1,339 (H) 2022    AST 1,499 (H) 2022    ALKPHOS 233 (H) 2022    BILITOT 1.2 (H) 2022       Hospital Course:   60-year-old female with chronic systolic heart failure (EF 10%), recent right hip fracture which was managed nonoperatively has been in [address] [place] [address] home since and has been acting confused, calling out in pain, calling for family members who have been  for some time. She was found to have shock suspect to be combination of septic and cardiogenic with new FLORENTINO, shock liver, metabolic encephalopathy. Family elected for transition to comfort care and hospice. She will be discharged once a hospice bed is arranged. Please see nursing and case management notes for further discussion.         Exam on Discharge:   /64   Pulse 77   Temp 97.3 °F (36.3 °C) (Oral)   Resp 16   Ht 4' 11\" (1.499 m)   Wt 113 lb 1.5 oz (51.3 kg)   SpO2 97%   BMI 22.84 kg/m²   General appearance: Lethargic and chronically ill-appearing. Withdraws to pain but is not talking or following commands. Lungs: clear to auscultation bilaterally, normal effort  Heart: regular rate and rhythm, no murmur  Abdomen: soft, nontender, nondistended, bowel sounds present, no masses  Extremities: no edema, redness, tenderness in the calves  Skin: no gross lesions, rashes    Discharge Medication List:     Medication List      ASK your doctor about these medications    acetaminophen 325 MG tablet  Commonly known as: TYLENOL     amiodarone 200 MG tablet  Commonly known as: CORDARONE  Take 1 tablet by mouth daily     aspirin 81 MG tablet     atorvastatin 40 MG tablet  Commonly known as: LIPITOR     citalopram 10 MG tablet  Commonly known as: CELEXA     Claritin 10 MG capsule  Generic drug: loratadine     diclofenac sodium 1 % Gel  Commonly known as: VOLTAREN     dicyclomine 10 MG capsule  Commonly known as: Bentyl  Take 1 capsule by mouth every 6 hours as needed (cramps)     docusate sodium 100 MG capsule  Commonly known as: COLACE     famotidine 20 MG tablet  Commonly known as: PEPCID     furosemide 20 MG tablet  Commonly known as: LASIX  Take 1 tablet by mouth daily     isosorbide mononitrate 30 MG extended release tablet  Commonly known as: IMDUR  Take 3 tablets by mouth daily     lamoTRIgine 100 MG tablet  Commonly known as: LAMICTAL     losartan 25 MG tablet  Commonly known as: COZAAR  Take 1 tablet by mouth daily     metOLazone 2.5 MG tablet  Commonly known as: ZAROXOLYN     metoprolol succinate 50 MG extended release tablet  Commonly known as: TOPROL XL  Take 1 tablet by mouth daily     naloxone 4 MG/0.1ML Liqd nasal spray     nitroGLYCERIN 0.4 MG SL tablet  Commonly known as: NITROSTAT     Nucynta 50 MG Tabs  Generic drug: tapentadol  Take 1 tablet by mouth every 8 hours for 30 days.      polyethylene glycol 17 g packet  Commonly known as: GLYCOLAX     potassium chloride 10 MEQ extended release capsule  Commonly known as: MICRO-K     psyllium 28.3 % Pack  Commonly known as: KONSYL     vitamin D 1000 UNIT Tabs tablet  Commonly known as: CHOLECALCIFEROL            DC time 37 minutes    Signed:  Gloria Gutierrez DO  5/8/2022, 9:51 AM

## 2022-05-08 NOTE — PROGRESS NOTES
0600  Patient arrived to floor from ED on 5/7/22 at approximately 2100. VSS. Patient is disoriented and lethargic. Moans out when moved for care. Skin is warm, dry, and of normal color for ethnicity. Noted two open areas/wounds to buttocks/coccyx area. Areas covered with Mepilex dressing. Respirations are even and non-labored. No respiratory distress observed. Patient incontinent of Bowel during turning x1. Soft, brown stool. Patient began yelling out x1 hour during remainder of shift. Calming and reassurance ineffective. Behavior continued.   Patient yelling out is mostly nonsensical.

## 2022-05-08 NOTE — PLAN OF CARE
Problem: Skin/Tissue Integrity  Goal: Absence of new skin breakdown  Description: 1. Monitor for areas of redness and/or skin breakdown  2. Assess vascular access sites hourly  3. Every 4-6 hours minimum:  Change oxygen saturation probe site  4. Every 4-6 hours:  If on nasal continuous positive airway pressure, respiratory therapy assess nares and determine need for appliance change or resting period.   5/8/2022 1613 by Nelly Glasgow RN  Outcome: Progressing  5/8/2022 0709 by Duke Moore RN  Outcome: Progressing     Problem: Safety - Adult  Goal: Free from fall injury  5/8/2022 1613 by Nelly Glasgow RN  Outcome: Progressing  5/8/2022 0709 by Duke Moore RN  Outcome: Progressing     Problem: ABCDS Injury Assessment  Goal: Absence of physical injury  5/8/2022 1613 by Nelly Glasgow RN  Outcome: Progressing  5/8/2022 0709 by Duke Moore RN  Outcome: Progressing

## 2022-05-08 NOTE — CARE COORDINATION
[phone]--Call to son Radha Christina ([phone]) re scheduling referral meeting with [facility], [facility]. [place] requests meeting Mon 5/9/22 at 1400. States he lives in [place] and will be picking his brother up from airport, so should be able to be at pt room by 2P. Will call [phone] if time would need to change for any reason.

## 2022-05-09 PROBLEM — K72.00 SHOCK LIVER: Status: ACTIVE | Noted: 2022-01-01

## 2022-05-09 PROBLEM — N17.9 AKI (ACUTE KIDNEY INJURY) (HCC): Status: ACTIVE | Noted: 2022-01-01

## 2022-05-09 PROBLEM — I50.22 CHRONIC SYSTOLIC (CONGESTIVE) HEART FAILURE (HCC): Status: ACTIVE | Noted: 2022-01-01

## 2022-05-09 PROBLEM — I50.22 CHRONIC SYSTOLIC HEART FAILURE (HCC): Status: ACTIVE | Noted: 2022-01-01

## 2022-05-09 PROBLEM — G93.41 METABOLIC ENCEPHALOPATHY: Status: ACTIVE | Noted: 2022-01-01

## 2022-05-09 NOTE — PROGRESS NOTES
Wound ostomy Continence Nursing    This [id number] [facility] Nurse received referral for evaluation of buttocks/sacrococcygeal areas. Per nursing, patient's family is meeting with Hospice soon for goals of care. Nursing describes wounds to the sacrococcygeal area  as superficial and clean. Orders placed for pressure injury prevention interventions and protective barrier cream with zinc at this time.      Electronically signed by CL Wyatt, RN, Alireza Dumas on 5/9/2022 at 2:50 PM

## 2022-05-09 NOTE — PROGRESS NOTES
Physician Progress Note    2022   10:41 AM    Name:  Annette Kaminski  MRN:    [id number]     IP Day: 2     Admit Date: 2022  1:54 PM  PCP: Evelia Adan MD    Code Status:  DNR-CC    Subjective:     She is calling out for people. Appears uncomfortable. Current Facility-Administered Medications   Medication Dose Route Frequency Provider Last Rate Last Admin    LORazepam (ATIVAN) injection 0.5 mg  0.5 mg IntraVENous Q4H PRN Beverley Lisa, DO        bisacodyl (DULCOLAX) suppository 10 mg  10 mg Rectal Once Beverley Lisa, DO        HYDROmorphone (DILAUDID) injection 0.25 mg  0.25 mg IntraVENous Q2H PRN Beverley Lisa, DO   0.25 mg at 22 0738    sodium chloride flush 0.9 % injection 5-40 mL  5-40 mL IntraVENous 2 times per day Beverley Lisa, DO   5 mL at 22 0742    sodium chloride flush 0.9 % injection 5-40 mL  5-40 mL IntraVENous PRN Beverley Lisa, DO        0.9 % sodium chloride infusion   IntraVENous PRN Beverley Lisa, DO        ondansetron (ZOFRAN-ODT) disintegrating tablet 4 mg  4 mg Oral Q8H PRN Beverley Lisa, DO        Or    ondansetron [facility]) injection 4 mg  4 mg IntraVENous Q6H PRN Beverley Lisa, DO        polyethylene glycol (GLYCOLAX) packet 17 g  17 g Oral Daily PRN Beverley Lisa, DO        acetaminophen (TYLENOL) tablet 650 mg  650 mg Oral Q6H PRN Beverley Lisa, DO        Or    acetaminophen (TYLENOL) suppository 650 mg  650 mg Rectal Q6H PRN Beverley Lisa, DO           Physical Examination:      Vitals:  /64   Pulse 85   Temp 97.3 °F (36.3 °C) (Axillary)   Resp 18   Ht 4' 11\" (1.499 m)   Wt 113 lb 1.5 oz (51.3 kg)   SpO2 97%   BMI 22.84 kg/m²   Temp (24hrs), Av.3 °F (36.3 °C), Min:97.3 °F (36.3 °C), Max:97.3 °F (36.3 °C)      General appearance: Elderly and chronically ill appearing. Moaning. Anxious and uncomfortable.   Will track with eyes but does not follow commands for me  Lungs: clear to auscultation bilaterally, normal effort  Heart: regular rate and rhythm, no murmur  Abdomen: soft, nontender, nondistended, bowel sounds present, no masses  Extremities: no edema, redness, tenderness in the calves. Cap refill <2s    Data:     Labs:  Recent Labs     05/07/22  1415   WBC 4.1*   HGB 11.9*        Recent Labs     05/07/22  1512 05/07/22  1655   NA  --  141   K  --  5.3*   CL  --  102   CO2  --  22   BUN  --  63*   CREATININE 3.7* 3.31*   GLUCOSE  --  93     Recent Labs     05/07/22  1655   AST 1,499*   ALT 1,339*   BILITOT 1.2*   ALKPHOS 233*       Assessment and Plan:        1. Shock suspect combination of septic and cardiogenic  -Comfort care strategy. Awaiting hospice placement. Will update discharge summary from 5/8  -As needed Dilaudid for pain control. As needed Ativan for anxiety    2.   Failure to thrive     FLORENTINO  Shock liver  Metabolic encephalopathy  Chronic systolic heart failure  History of right hip fracture managed nonoperatively     DNR CC    >35 minutes in total care time    Electronically signed by Karyn Lane DO on 5/9/2022 at 10:41 AM

## 2022-05-12 LAB
BLOOD CULTURE, ROUTINE: NORMAL
CULTURE, BLOOD 2: NORMAL

## 2022-05-31 NOTE — PROGRESS NOTES
Subjective:    [facility]  Patient ID: Delvin Chauhan is a pleasant 80 y.o. female who presents today for:  Chief Complaint   Patient presents with    Other     F/U   Residents daughter is at bedside. She is able to ambulate with her walker, minimal pain. Is not keen on continuing oxycodone due to drowsy feeling and hallucinations at night. Tylenol has been helping with pain. She is A/Ox3, forgetful  But reorients easily. No B/B habit changes. No CP or SOB. Nurse with no concerns at this time.    See [facility] for updated VS.      Patient Active Problem List   Diagnosis    Chronic coronary artery disease    Dyslipidemia    Colitis    Essential hypertension    Central abdominal pain    Chest pain    Epigastric pain    Diverticulitis    Falls frequently    Chronic systolic heart failure (HCC)    Dilated cardiomyopathy (HCC)    Presence of automatic (implantable) cardiac defibrillator    CKD (chronic kidney disease)    Frequent falls    Seizure disorder ([address]    Dislocation, hip closed, right, sequela    Hx of total hip arthroplasty, right    Bedridden    Shock ([address]    FLORENTINO (acute kidney injury) ([address]    Shock liver    Metabolic encephalopathy    Chronic systolic (congestive) heart failure (HCC)     Past Medical History:   Diagnosis Date    Diverticulitis     Hyperlipidemia     Hypertension      Past Surgical History:   Procedure Laterality Date    APPENDECTOMY      CARDIAC SURGERY      CHOLECYSTECTOMY      JOINT REPLACEMENT       Social History     Socioeconomic History    Marital status:      Spouse name: Not on file    Number of children: Not on file    Years of education: Not on file    Highest education level: Not on file   Occupational History    Not on file   Tobacco Use    Smoking status: Never Smoker    Smokeless tobacco: Never Used   Substance and Sexual Activity    Alcohol use: No    Drug use: No    Sexual activity: Not Currently   Other Topics Concern    light. Cardiovascular:      Rate and Rhythm: Normal rate and regular rhythm. Heart sounds: Normal heart sounds. No murmur heard. Pulmonary:      Effort: Pulmonary effort is normal. No respiratory distress. Breath sounds: Normal breath sounds. No stridor. No wheezing, rhonchi or rales. Chest:      Chest wall: No tenderness. Abdominal:      General: Bowel sounds are normal. There is no distension. Palpations: Abdomen is soft. There is no mass. Tenderness: There is no abdominal tenderness. There is no guarding or rebound. Musculoskeletal:         General: Signs of injury (right hip) present. No swelling, tenderness or deformity. Cervical back: Normal range of motion and neck supple. Right lower leg: No edema. Left lower leg: No edema. Comments: Right hip dislocation. receiving PT/OT-up with walker. Lymphadenopathy:      Cervical: No cervical adenopathy. Skin:     General: Skin is warm and dry. Coloration: Skin is not cyanotic, jaundiced, mottled or pale. Findings: No bruising, erythema or rash. Neurological:      Mental Status: She is alert and oriented to person, place, and time. Mental status is at baseline. Motor: Weakness (global) present. Psychiatric:         Mood and Affect: Mood normal.         Speech: Speech normal.         Behavior: Behavior normal. Behavior is cooperative. Thought Content: Thought content normal.         Cognition and Memory: Cognition and memory normal.         Judgment: Judgment normal.         Assessment:       Diagnosis Orders   1. Dislocation, hip closed, right, sequela     2. Essential hypertension     3. Dyslipidemia           Plan:   1. Continue with PT/OT.  - D/C oxycodone d/t sedative effect.  - F/U with ortho. 2.  Stable. No hypo or hypertensive events. Continue POC, adhere to JNC 8 guidelines.  -Continue to follow-up with cardiology  -Routine labs   3. Stable.  Continue  POC and check lipid panel in 3-6  months along with LFT      Side effects, adverse effects of the medication prescribed today, as well as treatment plan and result expectations have beendiscussed with the patient who expresses understanding and desires to proceed.     Santana Emanuel, APRN - CNP

## 2022-05-31 NOTE — PROGRESS NOTES
Subjective:    [facility]  Patient ID: Torsten Pagan is a pleasant 80 y.o. female who presents today for:  Chief Complaint   Patient presents with    Other     F/U   Resident with complaints of nausea. No emesis. Appetite decreased, but eating small amounts. Cardiology  appointment tomorrow. No chest pain or shortness of breath at this time. Labs pending. Change in B/B habits. 120/77, 97.4, 87, 18, 90% room air.     Patient Active Problem List   Diagnosis    Chronic coronary artery disease    Dyslipidemia    Colitis    Essential hypertension    Central abdominal pain    Chest pain    Epigastric pain    Diverticulitis    Falls frequently    Chronic systolic heart failure (HCC)    Dilated cardiomyopathy (HCC)    Presence of automatic (implantable) cardiac defibrillator    CKD (chronic kidney disease)    Frequent falls    Seizure disorder ([address]    Dislocation, hip closed, right, sequela    Hx of total hip arthroplasty, right    Bedridden    Shock ([address]    FLORENTINO (acute kidney injury) ([address]    Shock liver    Metabolic encephalopathy    Chronic systolic (congestive) heart failure (HCC)     Past Medical History:   Diagnosis Date    Diverticulitis     Hyperlipidemia     Hypertension      Past Surgical History:   Procedure Laterality Date    APPENDECTOMY      CARDIAC SURGERY      CHOLECYSTECTOMY      JOINT REPLACEMENT       Social History     Socioeconomic History    Marital status:      Spouse name: Not on file    Number of children: Not on file    Years of education: Not on file    Highest education level: Not on file   Occupational History    Not on file   Tobacco Use    Smoking status: Never Smoker    Smokeless tobacco: Never Used   Substance and Sexual Activity    Alcohol use: No    Drug use: No    Sexual activity: Not Currently   Other Topics Concern    Not on file   Social History Narrative    Not on file     Social Determinants of Health     Financial Resource Strain:     Difficulty of Paying Living Expenses: Not on file   Food Insecurity:     Worried About Running Out of Food in the Last Year: Not on file    Alyse of Food in the Last Year: Not on file   Transportation Needs:     Lack of Transportation (Medical): Not on file    Lack of Transportation (Non-Medical): Not on file   Physical Activity:     Days of Exercise per Week: Not on file    Minutes of Exercise per Session: Not on file   Stress:     Feeling of Stress : Not on file   Social Connections:     Frequency of Communication with Friends and Family: Not on file    Frequency of Social Gatherings with Friends and Family: Not on file    Attends Qu[employer] Services: Not on file    Active Member of [address] [place] [address] or Organizations: Not on file    Attends Club or Organization Meetings: Not on file    Marital Status: Not on file   Intimate Partner Violence:     Fear of Current or Ex-Partner: Not on file    Emotionally Abused: Not on file    Physically Abused: Not on file    Sexually Abused: Not on file   Housing Stability:     Unable to Pay for Housing in the Last Year: Not on file    Number of [place] in the Last Year: Not on file    Unstable Housing in the Last Year: Not on file     No family history on file. Allergies   Allergen Reactions    Ezetimibe-Simvastatin Other (See Comments)         Review of Systems   All other systems reviewed and are negative. Objective:       Physical Exam  Vitals reviewed. Constitutional:       General: She is not in acute distress. Appearance: She is well-developed. She is not diaphoretic. HENT:      Head: Normocephalic and atraumatic. Eyes:      General: No scleral icterus. Conjunctiva/sclera: Conjunctivae normal.      Pupils: Pupils are equal, round, and reactive to light. Cardiovascular:      Rate and Rhythm: Normal rate. Rhythm irregular. Pulses: Normal pulses. Heart sounds: Normal heart sounds.  No murmur heard.      Pulmonary:      Effort: Pulmonary effort is normal. No respiratory distress. Breath sounds: Normal breath sounds. No stridor. No wheezing, rhonchi or rales. Chest:      Chest wall: No tenderness. Abdominal:      General: Abdomen is flat. Bowel sounds are normal. There is no distension. Palpations: Abdomen is soft. There is no mass. Tenderness: There is no abdominal tenderness. There is no guarding or rebound. Musculoskeletal:         General: Signs of injury (right hip dislocation. locomotion with walker) present. Normal range of motion. Cervical back: Normal range of motion and neck supple. Right lower leg: No edema. Left lower leg: No edema. Lymphadenopathy:      Cervical: No cervical adenopathy. Skin:     General: Skin is warm and dry. Coloration: Skin is not cyanotic, jaundiced, mottled or pale. Findings: No bruising, erythema or rash. Neurological:      Mental Status: She is alert and oriented to person, place, and time. Mental status is at baseline. Motor: Weakness (global) present. Psychiatric:         Mood and Affect: Mood normal.         Speech: Speech normal.         Behavior: Behavior normal.         Thought Content: Thought content normal.         Cognition and Memory: Cognition normal. Memory is impaired (forgetful). Judgment: Judgment normal.         Assessment:       Diagnosis Orders   1. Chronic systolic (congestive) heart failure ([address]     2. Essential hypertension     3. Nausea     4. Decreased appetite           Plan:   1. Continue  POC. Keep cards appt in am.  - labs pending. 2.  Stable. Continue POC, adhere to JNC 8 guidelines. 3.   Zofran ODT 4 mg every 8 hours as needed  4. Offer pleasure foods. Monitor weight.           Side effects, adverse effects of the medication prescribed today, as well as treatment plan and result expectations have beendiscussed with the patient who expresses understanding and desires to proceed.     Breana Mendoza, APRN - CNP

## 2024-07-22 NOTE — PROGRESS NOTES
Hospitalist Progress Note      PCP: Bobby Caballero MD    Date of Admission: 3/28/2022    Chief Complaint:  No acute events, afebrile, stable HD    Medications:  Reviewed    Infusion Medications    sodium chloride       Scheduled Medications    polyethylene glycol  17 g Oral Daily    senna  1 tablet Oral Nightly    isosorbide mononitrate  90 mg Oral Daily    furosemide  20 mg Oral BID    potassium chloride  10 mEq IntraVENous Once    sodium chloride flush  5-40 mL IntraVENous 2 times per day    enoxaparin  30 mg SubCUTAneous Daily    aspirin  81 mg Oral Daily    lamoTRIgine  100 mg Oral BID    cetirizine  5 mg Oral Daily    losartan  50 mg Oral Daily    metoprolol succinate  75 mg Oral Daily    pantoprazole  40 mg Oral QAM AC    rosuvastatin  20 mg Oral Daily     PRN Meds: nitroGLYCERIN, sodium chloride flush, sodium chloride, ondansetron **OR** ondansetron, oxyCODONE      Intake/Output Summary (Last 24 hours) at 3/31/2022 0918  Last data filed at 3/30/2022 1829  Gross per 24 hour   Intake 413.93 ml   Output 400 ml   Net 13.93 ml       Exam:    /79   Pulse 90   Temp 97.9 °F (36.6 °C) (Oral)   Resp 16   Ht 4' 11\" (1.499 m)   Wt 134 lb 3.2 oz (60.9 kg)   SpO2 95%   BMI 27.11 kg/m²     General appearance: appears stated age and cooperative. Respiratory:  clear to auscultation, bilaterally   Cardiovascular: Regular rate and rhythm, S1/S2. Abdomen: Soft, active bowel sounds. Musculoskeletal: No edema bilaterally.      Labs:   Recent Labs     03/29/22  0608 03/30/22  0612 03/31/22  0601   WBC 9.0 8.7 12.5*   HGB 12.4 12.3 13.1   HCT 37.0 36.6* 38.6    217 266     Recent Labs     03/29/22  0608 03/30/22  0612 03/31/22  0601    139 138   K 4.0 2.6* 3.8   CL 95 94* 93*   CO2 27 28 28   BUN 33* 25* 21   CREATININE 1.22* 0.89 0.86   CALCIUM 10.2* 9.6 10.8*     Recent Labs     03/29/22  0608 03/30/22  0612 03/31/22  0601   AST 19 11 15   ALT 7 6 8   BILITOT 0.6 0.5 0.6   ALKPHOS 62 62 64     Recent Labs     03/28/22 2015   INR 1.1     Recent Labs     03/30/22  0612 03/30/22  1211 03/30/22  1816   CKTOTAL 43 40 38   TROPONINI <0.010 <0.010 <0.010       Urinalysis:      Lab Results   Component Value Date    NITRU Negative 10/17/2021    WBCUA 6-9 10/17/2021    BACTERIA Negative 10/17/2021    RBCUA 3-5 11/03/2020    BLOODU Negative 10/17/2021    SPECGRAV 1.016 10/17/2021    GLUCOSEU Negative 10/17/2021       Radiology:  CT HEAD WO CONTRAST   Final Result   Impression:        Prominence of the ventricular system and cerebral sulci. 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 CT CERVICAL SPINE WO CONTRAST   Final Result   1. Degenerative changes are seen at multiple levels. No acute fractures visualized. Anterolisthesis of C3 on C4 appears to be chronic. 2. The thyroid gland is enlarged and contains multiple nodules with the largest seen in the right lobe of the gland. All CT scans at this facility use dose modulation, iterative reconstruction, and/or weight based dosing when appropriate to reduce radiation dose to as low as reasonably achievable. CT THORACIC SPINE WO CONTRAST      COMPARISON: Chest x-ray May 3, 2020      HISTORY: Fall from standing         TECHNIQUE: Helical CT scanning of the thoracic spine was performed in axial plane. Coronal and sagittal reconstructed images were obtained and viewed. FINDINGS:   The spine is demineralized. No acute fracture is visualized. 12 rib-bearing vertebral bodies are seen. The thoracic vertebral bodies are fairly well preserved. No acute fractures seen. There is a slight dextroscoliosis seen in the upper to mid thoracic    spine. Hypertrophic spurring is seen on the right at multiple levels. . There are also small anterior osteophytes. No hematomas or soft tissue masses are seen. In the visualized lung fields atelectasis is seen.  There are also atherosclerotic    calcifications seen. IMPRESSION: No fracture or dislocation is seen. Mild degenerative changes are visualized. .            All CT scans at this facility use dose modulation, iterative reconstruction, and/or weight based dosing when appropriate to reduce radiation dose to as low as reasonably achievable. CT THORACIC SPINE WO CONTRAST   Final Result   1. Degenerative changes are seen at multiple levels. No acute fractures visualized. Anterolisthesis of C3 on C4 appears to be chronic. 2. The thyroid gland is enlarged and contains multiple nodules with the largest seen in the right lobe of the gland. All CT scans at this facility use dose modulation, iterative reconstruction, and/or weight based dosing when appropriate to reduce radiation dose to as low as reasonably achievable. CT THORACIC SPINE WO CONTRAST      COMPARISON: Chest x-ray May 3, 2020      HISTORY: Fall from standing         TECHNIQUE: Helical CT scanning of the thoracic spine was performed in axial plane. Coronal and sagittal reconstructed images were obtained and viewed. FINDINGS:   The spine is demineralized. No acute fracture is visualized. 12 rib-bearing vertebral bodies are seen. The thoracic vertebral bodies are fairly well preserved. No acute fractures seen. There is a slight dextroscoliosis seen in the upper to mid thoracic    spine. Hypertrophic spurring is seen on the right at multiple levels. . There are also small anterior osteophytes. No hematomas or soft tissue masses are seen. In the visualized lung fields atelectasis is seen. There are also atherosclerotic    calcifications seen. IMPRESSION: No fracture or dislocation is seen. Mild degenerative changes are visualized. .            All CT scans at this facility use dose modulation, iterative reconstruction, and/or weight based dosing when appropriate to reduce radiation dose to as low as reasonably achievable.          CT CHEST WO CONTRAST   Final Result      No acute findings. Cardiomegaly. Median sternotomy. Dilatation ascending thoracic aorta. Postsurgical change, left shoulder. All CT scans at this facility use dose modulation, iterative reconstruction, and/or weight based dosing when appropriate to reduce radiation dose to as low as reasonably achievable. CT ABDOMEN PELVIS WO CONTRAST Additional Contrast? None   Final Result   Impression:   No abnormality detected in the abdomen. Superior dislocation of the right hip prosthesis with tilt of the acetabular cap as described. 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 At the levels described above. 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 XR FEMUR RIGHT (MIN 2 VIEWS)   Final Result   Impression:   No abnormality detected in the abdomen. Superior dislocation of the right hip prosthesis with tilt of the acetabular cap as described.               Assessment/Plan:    79 y/o female with history of CAD s/p remote CABG/PCI, ischemic CMP/ chronic systolic HF, CKD3, GERD, CORRIE, seizure disorder, chronic right hip pain s/p right YESSY with chronic dislocation / loosening of hardware who presented with:     Acute/chronic right hip pain, inability to ambulate s/p fall  - in the setting of right YESSY with chronic dislocation / loosening of hardware   - conservative therapy per Ortho  - pain control  - PT/OT    Chest pain / CAD s/p remote CABG/PCI, ischemic CMP/ chronic systolic HF  - ECG was negative for acute ischemic changes,   - troponins were negative,   - no further w/u per cardiology   - continue home meds    Hypokalemia   - replaced    CKD3  - monitor renal function    Seizure disorder  - continue home regimen    Diet: ADULT DIET; Easy to Chew; Low Sodium (2 gm)    Code Status: DNR-CCA      Disposition - SNF vs home with [facility],  following        Electronically signed by Annabelle Purcell MD on 3/31/2022 at 9:18 AM [de-identified] : General: Well appearing, no acute distress Neurologic: A&Ox3, No focal deficits Head: NCAT without abrasions, lacerations, or ecchymosis to head, face, or scalp Eyes: No scleral icterus, no gross abnormalities Respiratory: Equal chest wall expansion bilaterally, no accessory muscle use Lymphatic: No lymphadenopathy palpated Skin: Warm and dry Psychiatric: Normal mood and affect  Examination of the right knee reveals mild effusion, no erythema or ecchymosis. There are no leg length discrepancies appreciated. The patient's range of motion is from 0-120 degrees. The patient has no pain with patella mobility or apprehension. The patient displays tenderness to palpation in the medial joint line. There is no patella facet tenderness. The patient has a 4.5 out of 5 strength resisted straight leg raising as well as knee flexion and extension. The knee demonstrates 2+ laxity to Lachman testing, as well as POS anterior drawer with mild endpoint. Stable posterior drawer. Unable to tolerate a pivot shift. Stable to posterior drawer. There is grade 1 valgus instability, no varus instability. The patient has pain with Louie and Grind Testing. The calf and thigh are soft, supple and nontender. The patient is grossly neurovascularly intact distally. [de-identified] : DATE OF EXAM: 05/28/2024 MRI-RIGHT KNEE NON CONTRAST  IMPRESSION: Full-thickness anterior cruciate ligament tear.

## 2024-12-12 NOTE — ED TRIAGE NOTES
Pt arrived after family called EMS. Family reports pt was confused x 2 min. To EMS. Upon arrival EMD reports pt was A&O x 4, but slow to respond. On arrival to ED ambulated to bed x 2 assist. Pt awake and alert x 4. No s/s of distress noted at this time. No concerns or needs at this time. Will continue to monitor. Pt is scheduled and notified for labs